# Patient Record
Sex: MALE | Race: BLACK OR AFRICAN AMERICAN | NOT HISPANIC OR LATINO
[De-identification: names, ages, dates, MRNs, and addresses within clinical notes are randomized per-mention and may not be internally consistent; named-entity substitution may affect disease eponyms.]

---

## 2019-12-25 PROBLEM — Z00.00 ENCOUNTER FOR PREVENTIVE HEALTH EXAMINATION: Status: ACTIVE | Noted: 2019-12-25

## 2020-01-02 ENCOUNTER — APPOINTMENT (OUTPATIENT)
Dept: VASCULAR SURGERY | Facility: CLINIC | Age: 85
End: 2020-01-02
Payer: MEDICARE

## 2020-01-02 PROCEDURE — 93923 UPR/LXTR ART STDY 3+ LVLS: CPT

## 2020-01-02 PROCEDURE — 93880 EXTRACRANIAL BILAT STUDY: CPT

## 2020-01-02 PROCEDURE — 99204 OFFICE O/P NEW MOD 45 MIN: CPT

## 2020-01-06 NOTE — PROCEDURE
[FreeTextEntry1] : BRANDYN:\par   Left- 0.81\par   Right- 0.88\par \par Carotid US without significant stenosis

## 2020-01-06 NOTE — HISTORY OF PRESENT ILLNESS
[FreeTextEntry1] : 85 yo male with PMH of HTN, HLD, previous smoker, CHF presenting to the office for evaluation of toe ulcer. He noticed he had an ulcer on the left 3rd toe for about a month. He is followed by Dr. Veras who is doing wound care and recently removed the toenail. He was hospitalized for infection and discharged on oral abx which he finished on 12/19/19. He states the ulcer is no longer red or swollen but it is not really healing. He denies any pain with walking however he does not do a lot of walking because of the wound and worried about putting pressure on it. Cleaning daily with hydrogen peroxide and wrapping with gauze. \par \par

## 2020-01-06 NOTE — PHYSICAL EXAM
[2+] : left 2+ [0] : left 0 [Alert] : alert [Oriented to Person] : oriented to person [Oriented to Place] : oriented to place [Oriented to Time] : oriented to time [Calm] : calm [Ankle Swelling (On Exam)] : not present [Varicose Veins Of Lower Extremities] : not present [] : not present [de-identified] : appears stated age [de-identified] : irregular heart [de-identified] : left third toe with small superficial ulcer, dorsal/medial, no signs of infection about 0.2x0.2cm

## 2020-01-06 NOTE — ASSESSMENT
[FreeTextEntry1] : 85 yo male with PMH of HTN, HLD, previous smoker, CHF presenting to the office for evaluation of toe ulcer. It has not changed in the past month. BRANDYN/PVR shows moderate disease. We discussed intervention (angio/PTA/stent). Patient would like to hold off for now and just try wound care. Had a long discussion that he does have an increased chance of infection and possible amputation. Patient understands, will start cleaning with hydrogen peroxide and betadine and will FU in one month. Will come in sooner with any issues.

## 2020-01-15 ENCOUNTER — INPATIENT (INPATIENT)
Facility: HOSPITAL | Age: 85
LOS: 0 days | Discharge: HOME | DRG: 603 | End: 2020-01-16
Attending: HOSPITALIST | Admitting: HOSPITALIST
Payer: MEDICARE

## 2020-01-15 VITALS
SYSTOLIC BLOOD PRESSURE: 104 MMHG | HEART RATE: 85 BPM | HEIGHT: 69 IN | DIASTOLIC BLOOD PRESSURE: 55 MMHG | OXYGEN SATURATION: 99 % | TEMPERATURE: 98 F | WEIGHT: 190.7 LBS | RESPIRATION RATE: 20 BRPM

## 2020-01-15 DIAGNOSIS — R63.8 OTHER SYMPTOMS AND SIGNS CONCERNING FOOD AND FLUID INTAKE: ICD-10-CM

## 2020-01-15 DIAGNOSIS — I48.91 UNSPECIFIED ATRIAL FIBRILLATION: ICD-10-CM

## 2020-01-15 DIAGNOSIS — E78.5 HYPERLIPIDEMIA, UNSPECIFIED: ICD-10-CM

## 2020-01-15 DIAGNOSIS — Z91.89 OTHER SPECIFIED PERSONAL RISK FACTORS, NOT ELSEWHERE CLASSIFIED: ICD-10-CM

## 2020-01-15 DIAGNOSIS — I10 ESSENTIAL (PRIMARY) HYPERTENSION: ICD-10-CM

## 2020-01-15 DIAGNOSIS — N17.9 ACUTE KIDNEY FAILURE, UNSPECIFIED: ICD-10-CM

## 2020-01-15 DIAGNOSIS — N28.9 DISORDER OF KIDNEY AND URETER, UNSPECIFIED: ICD-10-CM

## 2020-01-15 DIAGNOSIS — R73.03 PREDIABETES: ICD-10-CM

## 2020-01-15 DIAGNOSIS — L03.90 CELLULITIS, UNSPECIFIED: ICD-10-CM

## 2020-01-15 DIAGNOSIS — I50.9 HEART FAILURE, UNSPECIFIED: ICD-10-CM

## 2020-01-15 LAB
ALBUMIN SERPL ELPH-MCNC: 4 G/DL — SIGNIFICANT CHANGE UP (ref 3.3–5)
ALP SERPL-CCNC: 84 U/L — SIGNIFICANT CHANGE UP (ref 40–120)
ALT FLD-CCNC: 20 U/L — SIGNIFICANT CHANGE UP (ref 10–45)
ANION GAP SERPL CALC-SCNC: 13 MMOL/L — SIGNIFICANT CHANGE UP (ref 5–17)
APTT BLD: 43 SEC — HIGH (ref 27.5–36.3)
AST SERPL-CCNC: 30 U/L — SIGNIFICANT CHANGE UP (ref 10–40)
BASOPHILS # BLD AUTO: 0.03 K/UL — SIGNIFICANT CHANGE UP (ref 0–0.2)
BASOPHILS NFR BLD AUTO: 0.5 % — SIGNIFICANT CHANGE UP (ref 0–2)
BILIRUB SERPL-MCNC: 0.8 MG/DL — SIGNIFICANT CHANGE UP (ref 0.2–1.2)
BUN SERPL-MCNC: 25 MG/DL — HIGH (ref 7–23)
CALCIUM SERPL-MCNC: 9.3 MG/DL — SIGNIFICANT CHANGE UP (ref 8.4–10.5)
CHLORIDE SERPL-SCNC: 104 MMOL/L — SIGNIFICANT CHANGE UP (ref 96–108)
CO2 SERPL-SCNC: 24 MMOL/L — SIGNIFICANT CHANGE UP (ref 22–31)
CREAT SERPL-MCNC: 1.81 MG/DL — HIGH (ref 0.5–1.3)
EOSINOPHIL # BLD AUTO: 0.1 K/UL — SIGNIFICANT CHANGE UP (ref 0–0.5)
EOSINOPHIL NFR BLD AUTO: 1.7 % — SIGNIFICANT CHANGE UP (ref 0–6)
GLUCOSE SERPL-MCNC: 103 MG/DL — HIGH (ref 70–99)
HCT VFR BLD CALC: 39.6 % — SIGNIFICANT CHANGE UP (ref 39–50)
HGB BLD-MCNC: 12 G/DL — LOW (ref 13–17)
IMM GRANULOCYTES NFR BLD AUTO: 0.2 % — SIGNIFICANT CHANGE UP (ref 0–1.5)
INR BLD: 2.65 — HIGH (ref 0.88–1.16)
LYMPHOCYTES # BLD AUTO: 1.53 K/UL — SIGNIFICANT CHANGE UP (ref 1–3.3)
LYMPHOCYTES # BLD AUTO: 26.3 % — SIGNIFICANT CHANGE UP (ref 13–44)
MCHC RBC-ENTMCNC: 26.6 PG — LOW (ref 27–34)
MCHC RBC-ENTMCNC: 30.3 GM/DL — LOW (ref 32–36)
MCV RBC AUTO: 87.8 FL — SIGNIFICANT CHANGE UP (ref 80–100)
MONOCYTES # BLD AUTO: 0.58 K/UL — SIGNIFICANT CHANGE UP (ref 0–0.9)
MONOCYTES NFR BLD AUTO: 10 % — SIGNIFICANT CHANGE UP (ref 2–14)
NEUTROPHILS # BLD AUTO: 3.56 K/UL — SIGNIFICANT CHANGE UP (ref 1.8–7.4)
NEUTROPHILS NFR BLD AUTO: 61.3 % — SIGNIFICANT CHANGE UP (ref 43–77)
NRBC # BLD: 0 /100 WBCS — SIGNIFICANT CHANGE UP (ref 0–0)
PLATELET # BLD AUTO: 206 K/UL — SIGNIFICANT CHANGE UP (ref 150–400)
POTASSIUM SERPL-MCNC: 4.7 MMOL/L — SIGNIFICANT CHANGE UP (ref 3.5–5.3)
POTASSIUM SERPL-SCNC: 4.7 MMOL/L — SIGNIFICANT CHANGE UP (ref 3.5–5.3)
PROT SERPL-MCNC: 7.2 G/DL — SIGNIFICANT CHANGE UP (ref 6–8.3)
PROTHROM AB SERPL-ACNC: 31.1 SEC — HIGH (ref 10–12.9)
RBC # BLD: 4.51 M/UL — SIGNIFICANT CHANGE UP (ref 4.2–5.8)
RBC # FLD: 14.5 % — SIGNIFICANT CHANGE UP (ref 10.3–14.5)
SODIUM SERPL-SCNC: 141 MMOL/L — SIGNIFICANT CHANGE UP (ref 135–145)
WBC # BLD: 5.81 K/UL — SIGNIFICANT CHANGE UP (ref 3.8–10.5)
WBC # FLD AUTO: 5.81 K/UL — SIGNIFICANT CHANGE UP (ref 3.8–10.5)

## 2020-01-15 PROCEDURE — 73562 X-RAY EXAM OF KNEE 3: CPT | Mod: 26,LT

## 2020-01-15 PROCEDURE — 99222 1ST HOSP IP/OBS MODERATE 55: CPT | Mod: GC

## 2020-01-15 PROCEDURE — 99283 EMERGENCY DEPT VISIT LOW MDM: CPT

## 2020-01-15 PROCEDURE — 99284 EMERGENCY DEPT VISIT MOD MDM: CPT | Mod: 25

## 2020-01-15 RX ORDER — ATORVASTATIN CALCIUM 80 MG/1
40 TABLET, FILM COATED ORAL AT BEDTIME
Refills: 0 | Status: DISCONTINUED | OUTPATIENT
Start: 2020-01-15 | End: 2020-01-16

## 2020-01-15 RX ORDER — LISINOPRIL 2.5 MG/1
10 TABLET ORAL DAILY
Refills: 0 | Status: DISCONTINUED | OUTPATIENT
Start: 2020-01-15 | End: 2020-01-15

## 2020-01-15 RX ORDER — CEFAZOLIN SODIUM 1 G
1000 VIAL (EA) INJECTION ONCE
Refills: 0 | Status: COMPLETED | OUTPATIENT
Start: 2020-01-15 | End: 2020-01-15

## 2020-01-15 RX ORDER — METOPROLOL TARTRATE 50 MG
150 TABLET ORAL DAILY
Refills: 0 | Status: DISCONTINUED | OUTPATIENT
Start: 2020-01-16 | End: 2020-01-16

## 2020-01-15 RX ORDER — WARFARIN SODIUM 2.5 MG/1
6 TABLET ORAL DAILY
Refills: 0 | Status: DISCONTINUED | OUTPATIENT
Start: 2020-01-15 | End: 2020-01-16

## 2020-01-15 RX ORDER — CEFAZOLIN SODIUM 1 G
1000 VIAL (EA) INJECTION EVERY 8 HOURS
Refills: 0 | Status: DISCONTINUED | OUTPATIENT
Start: 2020-01-16 | End: 2020-01-16

## 2020-01-15 RX ORDER — LISINOPRIL 2.5 MG/1
10 TABLET ORAL DAILY
Refills: 0 | Status: DISCONTINUED | OUTPATIENT
Start: 2020-01-16 | End: 2020-01-16

## 2020-01-15 RX ADMIN — WARFARIN SODIUM 6 MILLIGRAM(S): 2.5 TABLET ORAL at 22:12

## 2020-01-15 RX ADMIN — Medication 100 MILLIGRAM(S): at 19:02

## 2020-01-15 RX ADMIN — ATORVASTATIN CALCIUM 40 MILLIGRAM(S): 80 TABLET, FILM COATED ORAL at 22:12

## 2020-01-15 NOTE — ED PROVIDER NOTE - PHYSICAL EXAMINATION
Physical:    General Appearance: Patient is well developed and well nourished. Patient is lying in stretcher, not diaphoretic and does not appear in acute distress.   Pulm: Chest expansion symmetric bilaterally, no evidence or retraction.    Cardio: Regular rate and rhythm. No murmurs, rubs or gallops. Pedal pulses present.   MSK: No evience of gross deformity, dislocation, fracture, edema, ecchymosis or contusion. Full flexion and extension of the knee with normal patellar tracking. Normal anterior and posterior drawer. Intact varus and valgus stress. Full ROM of hip and ankle.   Neuro: Distal sensation intact in arms and legs bilaterally. Sensory Knee and ankle reflexes 2+ and symmetric bilaterally. gait steady. gcs 15  psych: mood and affect appropriate.   skin: warm dry and intact- no note of erythema edema purpura petechia ecchymosis

## 2020-01-15 NOTE — CONSULT NOTE ADULT - ASSESSMENT
83 yo M w/ HTN, HLD, former smoker, CHF, L 3rd toe ulcer, presenting w/ bruise to knee.    - f/u orthopedics recs  - f/u XR knee  - toe wound non-infected, no vascular surgical intervention recommended  - discussed w/ chief resident and attending

## 2020-01-15 NOTE — ED PROVIDER NOTE - OBJECTIVE STATEMENT
85 y/o M with PMHx HTN, afib, borderline DM, and L 3rd toe infection presents toe to the ED with L knee pain extending down his L leg x 4-5 days, ongoing since his L 3rd toe nail removal. Pt called Dr. Mcclellan (vascular surgeon) today who referred pt to the ED for further evaluation. Pt states the pain worsens with movement, especially with flexion and extension. Denies numbness, tingling, fever or chills. 83 y/o M with PMHx HTN, afib, borderline DM, and L 3rd toe infection presents toe to the ED with L knee pain extending down his L leg x 4-5 days, ongoing since his L 3rd toe nail removal. Pt called Dr. Mcclellan (vascular surgeon) today who referred pt to the ED for further evaluation. Pt states the pain worsens with movement, especially with flexion and extension. Denies numbness, tingling, fever or chills. state swelling to the area. states that he recently saw a podiatrist this past week "and she cut the toe and some stuff came out". currently on abx but unsure which at this time.

## 2020-01-15 NOTE — H&P ADULT - PROBLEM SELECTOR PROBLEM 5
Heart failure, unspecified HF chronicity, unspecified heart failure type Hypertension, unspecified type

## 2020-01-15 NOTE — ED ADULT NURSE REASSESSMENT NOTE - NS ED NURSE REASSESS COMMENT FT1
report was received from off-going RN, nad at present, sitting up in chair eating sandwich, orders and sign out have been received, attempted flip to holding per previous Nurse, still awaiting bed assignment/flip, pt. utd on poc, with understanding verbalized

## 2020-01-15 NOTE — ED ADULT TRIAGE NOTE - CHIEF COMPLAINT QUOTE
pt  c/o left knee pain and big toe pain. pt states " he has a wound that was drained and now is more painful, here to see vascular.

## 2020-01-15 NOTE — ED PROVIDER NOTE - DIAGNOSTIC INTERPRETATION
ER Physician Assistant: left knee  INTERPRETATION: no acute fracture; no soft tissue swelling noted; normal bony alignment.

## 2020-01-15 NOTE — H&P ADULT - ATTENDING COMMENTS
Agree with above.   Pt seen and examined.     Pt presenting with Left knee pain. Believes he bumped his knee ~7d/a. Pain progressively worsened. Started taking Ibuprofen 800mg QD for the past 2w-1m. Knee without pain when immobile. Focal TTP over tibial tuberosity/infrapatellar bursa, also pain worsens to lesser extent with knee flexion. Mild erythema over knee. Mild warmth when compared to Rt knee. No fevers, leukocytosis, mildly elevated ESR, normal CRP.     A/  // Knee pain: Infrapatellar bursitis vs less likely septic bursitis vs cellulitis. Infection less likely as pt was on Clindamycin x9d course for Lt toe infection (last dose today), Afebrile, WBC, CRP wnl, ESR 38. Low concern for septic arthritis.   // Renal insufficiency, unknown baseline, in setting of h/o HF on diuretics (euvolemic on exam)    P/  - Ortho recs appreciated  - Will obtain Knee US to evaluated bursa   - F/u knee Xray  - IV cefazolin for now  - Collateral re: renal function  - Urine studies  - Hold home lasix  - Monitor BMP    Rest of plan per residents note Agree with above.   Pt seen and examined.     Pt presenting with Left knee pain. Believes he bumped his knee ~7d/a. Pain progressively worsened. Started taking Ibuprofen 800mg QD for the past 2w-1m. Knee without pain when immobile. Focal TTP over tibial tuberosity/infrapatellar bursa, also pain worsens to lesser extent with knee flexion. Mild erythema over knee. Mild warmth when compared to Rt knee. No fevers, leukocytosis, mildly elevated ESR, normal CRP.     A/  // Knee pain: Infrapatellar bursitis vs less likely septic bursitis vs cellulitis. Infection less likely as pt was on Clindamycin x9d course for Lt toe infection (last dose today), Afebrile, WBC, CRP wnl, ESR 38. Low concern for septic arthritis.   // Renal insufficiency, unknown baseline, in setting of h/o HF on diuretics (euvolemic on exam)    P/  - Ortho recs appreciated  - Will obtain Knee US to evaluated bursa   - F/u knee Xray  - IV cefazolin for now  - Avoid NSAIDs  - PT  - Collateral re: renal function  - Urine studies  - Hold home lasix  - Monitor BMP  - Avoid nephrotoxic meds, renally dose meds    Rest of plan per residents note

## 2020-01-15 NOTE — H&P ADULT - PROBLEM SELECTOR PLAN 9
F- None  E-replete as needed  N- DASH diet    DVt ppx: HSQ 1) PCP Contacted on Admission: (Y/N) --> Name & Phone #:  2) Date of Contact with PCP:  3) PCP Contacted at Discharge: (Y/N, N/A)  4) Summary of Handoff Given to PCP:   5) Post-Discharge Appointment Date and Location:

## 2020-01-15 NOTE — CONSULT NOTE ADULT - SUBJECTIVE AND OBJECTIVE BOX
85 yo M w/ HTN, HLD, former smoker, CHF, L 3rd toe ulcer, presenting w/ L knee pain. Patient denies any recent trauma to area. Denies fevers, chills, CP, SOB. Was recently seen by Dr. Mcclellan in clinic for toe ulcer w/ recommendation for wound care w/ hydrogen peroxide/betadine which he has been following.     Vital Signs Last 24 Hrs  T(C): 36.5 (15 Juan 2020 13:02), Max: 36.5 (15 Juan 2020 13:02)  T(F): 97.7 (15 Juan 2020 13:02), Max: 97.7 (15 Juan 2020 13:02)  HR: 85 (15 Juan 2020 13:02) (85 - 85)  BP: 104/55 (15 Juan 2020 13:02) (104/55 - 104/55)  BP(mean): --  RR: 20 (15 Juan 2020 13:02) (20 - 20)  SpO2: 99% (15 Juan 2020 13:02) (99% - 99%)    General: NAD  Pulm: normal resp effort and excursion  Ext: L 3rd toe w/ clean dorsal wound bed, no purulence or signs of infection, biphasic L DP, triphasic PT, palpable popliteal; bilateral palpable femorals; ABIs L 0.81, R 0.88; L lower knee anterior surface w/ some edema and erythema, no warmth, moderate TTP                          12.0   5.81  )-----------( 206      ( 15 Juan 2020 14:27 )             39.6   01-15    141  |  104  |  25<H>  ----------------------------<  103<H>  4.7   |  24  |  1.81<H>    Ca    9.3      15 Juan 2020 14:27    TPro  7.2  /  Alb  4.0  /  TBili  0.8  /  DBili  x   /  AST  30  /  ALT  20  /  AlkPhos  84  01-15

## 2020-01-15 NOTE — H&P ADULT - PROBLEM SELECTOR PLAN 5
Unknown EF. Euvolemic one exam. On Lasix, metoprolol succinate, and Lisinopril at home.   -hold Lisinopril 20 mg qd   -hold lasix 80 mg qd   -metoprolol succinate 150 mg daily On home metoprolol, lasix, and lisinopril.   - in setting of lena, hold lasix   - start lisinopril 10 mg  - continue with metoprolol succinate 150 mg daily

## 2020-01-15 NOTE — ED ADULT NURSE NOTE - OBJECTIVE STATEMENT
pt received into  spot A&Ox3 ambulatory appears comfortable arrives via walk in triage for eval of left knee pain since yesterday denies known trama/ injury two areas appear slightly swollen/ bruised. Also with left toe pain after nail removal outpatient is currently on unknown abx for it

## 2020-01-15 NOTE — ED ADULT NURSE NOTE - NSIMPLEMENTINTERV_GEN_ALL_ED
Implemented All Fall with Harm Risk Interventions:  [place] to call system. Call bell, personal items and telephone within reach. Instruct patient to call for assistance. Room bathroom lighting operational. Non-slip footwear when patient is off stretcher. Physically safe environment: no spills, clutter or unnecessary equipment. Stretcher in lowest position, wheels locked, appropriate side rails in place. Provide visual cue, wrist band, yellow gown, etc. Monitor gait and stability. Monitor for mental status changes and reorient to person, place, and time. Review medications for side effects contributing to fall risk. Reinforce activity limits and safety measures with patient and family. Provide visual clues: red socks.

## 2020-01-15 NOTE — H&P ADULT - ASSESSMENT
84-year-old male with history of borderline DM, afib, HTN, HLD, and HF (unknown EF), presents to the ED after 5-6 days of left knee pain, concerning for cellulitis, admitted for to rule out osteomylelitis and septic joint.

## 2020-01-15 NOTE — CONSULT NOTE ADULT - ATTENDING COMMENTS
Pt. seen/ examined  No knee pain w/ ROM  Agree with consult: low suspicion for septic knee arthritis  Most likely cellulitis vs bursitis  Will await MRI  No acute ortho intervention  Recommend medicine admission for IV Abx

## 2020-01-15 NOTE — H&P ADULT - PROBLEM SELECTOR PLAN 6
On home metoprolol, lasix, and lisinopril.   - in setting of lena, hold lasix and lisinopril.   - continue with metoprolol succinate 150 mg daily -continue with atorvastatin 40 mg daily

## 2020-01-15 NOTE — H&P ADULT - NSHPPHYSICALEXAM_GEN_ALL_CORE
VITAL SIGNS:  ICU Vital Signs Last 24 Hrs  T(C): 36.6 (15 Juan 2020 16:00), Max: 36.6 (15 Juan 2020 16:00)  T(F): 97.9 (15 Juan 2020 16:00), Max: 97.9 (15 Juan 2020 16:00)  HR: 74 (15 Juan 2020 16:00) (74 - 85)  BP: 104/71 (15 Juan 2020 16:00) (104/55 - 104/71)  BP(mean): --  ABP: --  ABP(mean): --  RR: 20 (15 Juan 2020 16:00) (20 - 20)  SpO2: 98% (15 Juan 2020 16:00) (98% - 99%)        CAPILLARY BLOOD GLUCOSE    VITALS:   T(C): 36.6 (01-15-20 @ 16:00), Max: 36.6 (01-15-20 @ 16:00)  HR: 74 (01-15-20 @ 16:00) (74 - 85)  BP: 104/71 (01-15-20 @ 16:00) (104/55 - 104/71)  RR: 20 (01-15-20 @ 16:00) (20 - 20)  SpO2: 98% (01-15-20 @ 16:00) (98% - 99%)    GENERAL: NAD   HEAD:  Atraumatic, Normocephalic  EYES: EOMI, PERRLA, conjunctiva and sclera clear  ENT: Moist mucous membranes  NECK: Supple, No JVD  CHEST/LUNG: Clear to auscultation bilaterally; No rales, rhonchi, wheezing, or rubs. Unlabored respirations  HEART: Regular rate and rhythm; No murmurs, rubs, or gallops  ABDOMEN: BSx4; Soft, nontender, nondistended  EXTREMITIES:  2+ Peripheral Pulses, brisk capillary refill. No clubbing, cyanosis, or edema  NERVOUS SYSTEM:  A&Ox3, no focal deficits   SKIN: erythematous left knee with no effusion   MSK: erythema in left knee, no effusion, passive and active range of motion limited; painful to manipulation of patella; no drainage. Patient did not want to take off sock on left foot for examiner to examine third left toe.

## 2020-01-15 NOTE — H&P ADULT - PROBLEM SELECTOR PLAN 4
Patient has creatinine of 1.81. Unclear if this is baseline or new ZEENAT.   - hold ACE/ARB  - follow-up urine lytes   - monitor I+Os   - avoid nephrotoxic substances Unknown EF. Euvolemic one exam. On Lasix, metoprolol succinate, and Lisinopril at home.   -hold Lisinopril 20 mg qd and start 10 mg for now  -hold lasix 80 mg qd   -metoprolol succinate 150 mg daily

## 2020-01-15 NOTE — H&P ADULT - PROBLEM SELECTOR PLAN 1
Has history of worsening leg pain. Found to have erythematous area around patella painful to manipulation.   - ortho and vascular surgery consulted, appreciate recs   - will start Keflex 500 mg po q 12 hrs (start on 01/15).   - follow-up blood cultures   - daily cbc Has history of worsening leg pain. Found to have erythematous area around patella painful to manipulation.   - ortho and vascular surgery consulted, appreciate recs   - will start cefazolin 1000 mg po q 8 hrs (start on 01/15).   - follow-up blood cultures   - daily cbc Has history of worsening leg pain. Found to have erythematous area around patella painful to manipulation.   - ortho and vascular surgery consulted, appreciate recs   - will start cefazolin 1000 mg po q 8 hrs (start on 01/15).   - follow-up blood cultures   - daily cbc    #R/o septic joint. Erythematous left knee. Painful to movement. Has been on 8-9 days of clindamycin 300 mg q 6 hrs. ESR elevated but CRP WNL.   -finished clindamycin course  -no intervention by ortho and vascular as of now

## 2020-01-15 NOTE — H&P ADULT - PROBLEM SELECTOR PLAN 3
On Coumadin at home. Currently RRR.   -continue with Coumadin 6 mg qd   -stat ptt and pt/inr   -collateral in AM as to why on Coumadin

## 2020-01-15 NOTE — ED PROVIDER NOTE - ATTENDING CONTRIBUTION TO CARE
85 y/o M with PMHx HTN, afib, borderline DM, and L 3rd toe infection presents to the ED with L knee pain extending down his L leg x 4-5 days, ongoing since his L 3rd toe nail removal. Pt called Dr. Mcclellan (vascular surgeon) today who referred pt to the ED for further evaluation. Pt states the pain worsens with movement, especially with flexion and extension. Denies numbness, tingling, fever or chills. state swelling to the area. states that he recently saw a podiatrist this past week "and she cut the toe and some stuff came out". currently on abx but unsure which at this time. On exam, pt appears well, nontoxic, ttp infrapatella tendon/bursa with associated erythema. Plan for labs, XR, surgery consult and ortho consult. vascular surgery states toe appears well, not infected and does not believe related to knee pain. orthopedics would like patient to be admitted for to medicine for treatment cellulitis and mri of the left knee for further evaluation and medical management. pt agreeable to plan.

## 2020-01-15 NOTE — ED PROVIDER NOTE - CLINICAL SUMMARY MEDICAL DECISION MAKING FREE TEXT BOX
85 y/o M with PMHx HTN, afib, borderline DM, L 3rd toe infection presents to the ED with L knee pain, ongoing for the past 4-5 days; no associated fever, chills, numbness or tingling. Pt reports pain with bending and extending his L knee; pt concerned pain is associated with his L 3rd toe infection. Pt called Dr. Mcclellan's office, who recommended pt come to the ED for further evaluation. On exam, pt appears well, nontoxic, ttp infrapatella tendon/bursa with associated erythema. Plan for labs, XR, surgery consult and ortho consult. 85 y/o M with PMHx HTN, afib, borderline DM, L 3rd toe infection presents to the ED with L knee pain, ongoing for the past 4-5 days; no associated fever, chills, numbness or tingling. Pt reports pain with bending and extending his L knee; pt concerned pain is associated with his L 3rd toe infection. Pt called Dr. Mcclellan's office, who recommended pt come to the ED for further evaluation. On exam, pt appears well, nontoxic, ttp infrapatella tendon/bursa with associated erythema. Plan for labs, XR, surgery consult and ortho consult. vascular surgery states toe appears well, not infected and does not believe related to knee pain. 85 y/o M with PMHx HTN, afib, borderline DM, L 3rd toe infection presents to the ED with L knee pain, ongoing for the past 4-5 days; no associated fever, chills, numbness or tingling. Pt reports pain with bending and extending his L knee; pt concerned pain is associated with his L 3rd toe infection. Pt called Dr. Mcclellan's office, who recommended pt come to the ED for further evaluation. On exam, pt appears well, nontoxic, ttp infrapatella tendon/bursa with associated erythema. Plan for labs, XR, surgery consult and ortho consult. vascular surgery states toe appears well, not infected and does not believe related to knee pain. orthopedics would like patient to be admitted for to medicine for treatment cellulitis and mri of the left knee for further evaluation and medical management. pt agreeable to plan.

## 2020-01-15 NOTE — H&P ADULT - PROBLEM SELECTOR PROBLEM 4
ZEENAT (acute kidney injury) Heart failure, unspecified HF chronicity, unspecified heart failure type

## 2020-01-15 NOTE — H&P ADULT - NSICDXPASTMEDICALHX_GEN_ALL_CORE_FT
PAST MEDICAL HISTORY:  Afib     Borderline diabetes mellitus     Heart failure     HLD (hyperlipidemia)     HTN (hypertension)

## 2020-01-15 NOTE — H&P ADULT - PROBLEM SELECTOR PLAN 2
erythematous left knee. Painful to movement. Has been on 8-9 days of clindamycin 300 mg q 6 hrs.   -finish clindamycin course   -consider MRI of left knee   -no intervention by ortho and vascular as of now Erythematous left knee. Painful to movement. Has been on 8-9 days of clindamycin 300 mg q 6 hrs. ESR elevated but CRP WNL.   -finish clindamycin course   -consider MRI of left knee   -no intervention by ortho and vascular as of now Patient has creatinine of 1.81. Unclear if this is baseline or new ZEENAT.   - can start lisinopril 10 mg tomorrow, but hold if creatinine increases  - follow-up urine lytes   - monitor I+Os   - avoid nephrotoxic substances  - collateral in AM for whether this is chronic or new

## 2020-01-15 NOTE — CONSULT NOTE ADULT - SUBJECTIVE AND OBJECTIVE BOX
84M with borderline DM, Afib on Coumadin, HTN, presents to ER with 5 days of left knee pain. Patient has been seen by Vascular surgery Dr Mcclellan as outpatient for infected left toe for which he is taking antibiotics for. Pt is unable to recall which antibiotics.  Pt presented to ER after calling his Vascular teams office and reporting his left knee pain. Patient is unsure if he had any trauma or bumps to his left knee over last 1 week. Pain does not radiate and he is able to walk with some stiffness. Ambulates with no assistance at baseline. Denies fevers, chills, recent illness, numbness, tingling. paresthesias.       Pleasant, comfortable, alert male, frail looking, with bandage around left third toe.   Left knee: No fluctuance, no induration. No warmth. No significant swelling to prepattelar area and no effusion to knee visible. +Small area of erythema, possible cellulitic in nature over prepatellar bursa. Active and passive ROM full without too much difficulty.  +TTP over tibial tubercle, which is prominent equally bilaterally. No TTP at joint line or patella. 5/5 ehl/ta/fhl/gs and sensation intact distally.     XR: no fracture, no effusion, no swelling and pre-patellar bursa, reviewed with Dr. Napoles    A/P 84M with 5 days of left knee pain (presumed atraumatic) with small area of cellulitis, not improving despite ABX as outpatient for toe infection  - No concern for septic joint based on exam  - No significant prepatellar bursitis at this time based on exam and XR, c however will recommend treatment of superficial cellulitis given pt is elderly, frail with multiple comorbidities and not improving on current outpatient ABX regimen.    - pt will be admitted for IV antibiotics since current outpatient antibiotic regimen not resolving issue  - Pain control  - Will follow and monitor for improvement  - WBAT  - XR reviewed  - Vascular team made aware of plan  - Discussed with Dr. Napoles who agrees with plan

## 2020-01-15 NOTE — H&P ADULT - HISTORY OF PRESENT ILLNESS
HPI     84-year-old male with history of borderline DM, afib, HTN, HLD, and HF (unknown EF), presents to the ED after 5-6 days of left knee pain. Patient was in his usual state of health when he noticed that his left knee was bothering him. He denies trauma, puncture, or any precipitating event. Initially, it did not hinder his ability to move, but 3-4 days ago, he noted that the pain became worse (again, no precipitating event). Eventually, patient had a difficulty coming to a stand froma  sitting position due to the knee pain. Of note, patient follows Dr. Mcclellan, who prescribed with clindamycin (10-day course and finished 8-9 days already) for an infected third left toe. Patient denies headache, vision change, CP, palpitations, SOB, n/v/d/c, fever/chills, appetite loss, dysuria.     In the ED, vitals were temp 97.7 F, HR 85, /55, RR 20, o2sat 99% on room air. Labs were significant for no leukocytosis, ESR 39, BUN/cre 25/1.81, ESR 39, CRP WNL. Xray of left knee obtained and pending final read. Given cefazolin 1000 mg IV x1.          INTERVAL HPI/OVERNIGHT EVENTS:    SUBJECTIVE: Patient seen and examined at bedside. Endorses pain in left knee. Denies headache, vision changes, CP, palpiations, SOB, n/v/d/c, fever/chills, weight loss.     MEDICATIONS:  MEDICATIONS  (STANDING):    MEDICATIONS  (PRN):      ALLERGIES:  Allergies    No Known Allergies    Intolerances

## 2020-01-15 NOTE — H&P ADULT - NSHPLABSRESULTS_GEN_ALL_CORE
LABS:                        12.0   5.81  )-----------( 206      ( 15 Juan 2020 14:27 )             39.6     01-15    141  |  104  |  25<H>  ----------------------------<  103<H>  4.7   |  24  |  1.81<H>    Ca    9.3      15 Juan 2020 14:27    TPro  7.2  /  Alb  4.0  /  TBili  0.8  /  DBili  x   /  AST  30  /  ALT  20  /  AlkPhos  84  01-15          RADIOLOGY & ADDITIONAL TESTS: Reviewed.

## 2020-01-16 ENCOUNTER — TRANSCRIPTION ENCOUNTER (OUTPATIENT)
Age: 85
End: 2020-01-16

## 2020-01-16 VITALS
TEMPERATURE: 98 F | SYSTOLIC BLOOD PRESSURE: 107 MMHG | RESPIRATION RATE: 17 BRPM | DIASTOLIC BLOOD PRESSURE: 64 MMHG | OXYGEN SATURATION: 98 % | HEART RATE: 86 BPM

## 2020-01-16 LAB
ALBUMIN SERPL ELPH-MCNC: 3.6 G/DL — SIGNIFICANT CHANGE UP (ref 3.3–5)
ALP SERPL-CCNC: 83 U/L — SIGNIFICANT CHANGE UP (ref 40–120)
ALT FLD-CCNC: 17 U/L — SIGNIFICANT CHANGE UP (ref 10–45)
ANION GAP SERPL CALC-SCNC: 13 MMOL/L — SIGNIFICANT CHANGE UP (ref 5–17)
APTT BLD: 41.4 SEC — HIGH (ref 27.5–36.3)
AST SERPL-CCNC: 25 U/L — SIGNIFICANT CHANGE UP (ref 10–40)
BASOPHILS # BLD AUTO: 0.02 K/UL — SIGNIFICANT CHANGE UP (ref 0–0.2)
BASOPHILS NFR BLD AUTO: 0.4 % — SIGNIFICANT CHANGE UP (ref 0–2)
BILIRUB SERPL-MCNC: 1 MG/DL — SIGNIFICANT CHANGE UP (ref 0.2–1.2)
BUN SERPL-MCNC: 26 MG/DL — HIGH (ref 7–23)
CALCIUM SERPL-MCNC: 9.4 MG/DL — SIGNIFICANT CHANGE UP (ref 8.4–10.5)
CHLORIDE SERPL-SCNC: 107 MMOL/L — SIGNIFICANT CHANGE UP (ref 96–108)
CO2 SERPL-SCNC: 23 MMOL/L — SIGNIFICANT CHANGE UP (ref 22–31)
CREAT SERPL-MCNC: 1.62 MG/DL — HIGH (ref 0.5–1.3)
EOSINOPHIL # BLD AUTO: 0.08 K/UL — SIGNIFICANT CHANGE UP (ref 0–0.5)
EOSINOPHIL NFR BLD AUTO: 1.6 % — SIGNIFICANT CHANGE UP (ref 0–6)
GLUCOSE SERPL-MCNC: 82 MG/DL — SIGNIFICANT CHANGE UP (ref 70–99)
HBA1C BLD-MCNC: 6 % — HIGH (ref 4–5.6)
HCT VFR BLD CALC: 37.2 % — LOW (ref 39–50)
HGB BLD-MCNC: 11.4 G/DL — LOW (ref 13–17)
IMM GRANULOCYTES NFR BLD AUTO: 0.4 % — SIGNIFICANT CHANGE UP (ref 0–1.5)
INR BLD: 2.7 — HIGH (ref 0.88–1.16)
LYMPHOCYTES # BLD AUTO: 1.43 K/UL — SIGNIFICANT CHANGE UP (ref 1–3.3)
LYMPHOCYTES # BLD AUTO: 28.4 % — SIGNIFICANT CHANGE UP (ref 13–44)
MAGNESIUM SERPL-MCNC: 2.2 MG/DL — SIGNIFICANT CHANGE UP (ref 1.6–2.6)
MCHC RBC-ENTMCNC: 27 PG — SIGNIFICANT CHANGE UP (ref 27–34)
MCHC RBC-ENTMCNC: 30.6 GM/DL — LOW (ref 32–36)
MCV RBC AUTO: 88.2 FL — SIGNIFICANT CHANGE UP (ref 80–100)
MONOCYTES # BLD AUTO: 0.51 K/UL — SIGNIFICANT CHANGE UP (ref 0–0.9)
MONOCYTES NFR BLD AUTO: 10.1 % — SIGNIFICANT CHANGE UP (ref 2–14)
NEUTROPHILS # BLD AUTO: 2.97 K/UL — SIGNIFICANT CHANGE UP (ref 1.8–7.4)
NEUTROPHILS NFR BLD AUTO: 59.1 % — SIGNIFICANT CHANGE UP (ref 43–77)
NRBC # BLD: 0 /100 WBCS — SIGNIFICANT CHANGE UP (ref 0–0)
PHOSPHATE SERPL-MCNC: 2.4 MG/DL — LOW (ref 2.5–4.5)
PLATELET # BLD AUTO: 188 K/UL — SIGNIFICANT CHANGE UP (ref 150–400)
POTASSIUM SERPL-MCNC: 4.3 MMOL/L — SIGNIFICANT CHANGE UP (ref 3.5–5.3)
POTASSIUM SERPL-SCNC: 4.3 MMOL/L — SIGNIFICANT CHANGE UP (ref 3.5–5.3)
PROT SERPL-MCNC: 6.6 G/DL — SIGNIFICANT CHANGE UP (ref 6–8.3)
PROTHROM AB SERPL-ACNC: 31.7 SEC — HIGH (ref 10–12.9)
RBC # BLD: 4.22 M/UL — SIGNIFICANT CHANGE UP (ref 4.2–5.8)
RBC # FLD: 14.4 % — SIGNIFICANT CHANGE UP (ref 10.3–14.5)
SODIUM SERPL-SCNC: 143 MMOL/L — SIGNIFICANT CHANGE UP (ref 135–145)
WBC # BLD: 5.03 K/UL — SIGNIFICANT CHANGE UP (ref 3.8–10.5)
WBC # FLD AUTO: 5.03 K/UL — SIGNIFICANT CHANGE UP (ref 3.8–10.5)

## 2020-01-16 PROCEDURE — 83036 HEMOGLOBIN GLYCOSYLATED A1C: CPT

## 2020-01-16 PROCEDURE — 99285 EMERGENCY DEPT VISIT HI MDM: CPT

## 2020-01-16 PROCEDURE — 86140 C-REACTIVE PROTEIN: CPT

## 2020-01-16 PROCEDURE — 85730 THROMBOPLASTIN TIME PARTIAL: CPT

## 2020-01-16 PROCEDURE — 97161 PT EVAL LOW COMPLEX 20 MIN: CPT

## 2020-01-16 PROCEDURE — 84100 ASSAY OF PHOSPHORUS: CPT

## 2020-01-16 PROCEDURE — 99239 HOSP IP/OBS DSCHRG MGMT >30: CPT | Mod: GC

## 2020-01-16 PROCEDURE — 85610 PROTHROMBIN TIME: CPT

## 2020-01-16 PROCEDURE — 83735 ASSAY OF MAGNESIUM: CPT

## 2020-01-16 PROCEDURE — 85652 RBC SED RATE AUTOMATED: CPT

## 2020-01-16 PROCEDURE — 87040 BLOOD CULTURE FOR BACTERIA: CPT

## 2020-01-16 PROCEDURE — 80053 COMPREHEN METABOLIC PANEL: CPT

## 2020-01-16 PROCEDURE — 85025 COMPLETE CBC W/AUTO DIFF WBC: CPT

## 2020-01-16 PROCEDURE — 73562 X-RAY EXAM OF KNEE 3: CPT

## 2020-01-16 PROCEDURE — 36415 COLL VENOUS BLD VENIPUNCTURE: CPT

## 2020-01-16 RX ORDER — LIDOCAINE 4 G/100G
1 CREAM TOPICAL
Qty: 10 | Refills: 0
Start: 2020-01-16 | End: 2020-01-25

## 2020-01-16 RX ORDER — ACETAMINOPHEN 500 MG
1000 TABLET ORAL ONCE
Refills: 0 | Status: DISCONTINUED | OUTPATIENT
Start: 2020-01-16 | End: 2020-01-16

## 2020-01-16 RX ORDER — COLCHICINE 0.6 MG
1.2 TABLET ORAL ONCE
Refills: 0 | Status: COMPLETED | OUTPATIENT
Start: 2020-01-16 | End: 2020-01-16

## 2020-01-16 RX ORDER — LANOLIN ALCOHOL/MO/W.PET/CERES
5 CREAM (GRAM) TOPICAL ONCE
Refills: 0 | Status: COMPLETED | OUTPATIENT
Start: 2020-01-16 | End: 2020-01-16

## 2020-01-16 RX ORDER — CEPHALEXIN 500 MG
1 CAPSULE ORAL
Qty: 8 | Refills: 0
Start: 2020-01-16 | End: 2020-01-19

## 2020-01-16 RX ORDER — DICLOFENAC SODIUM 30 MG/G
1 GEL TOPICAL
Qty: 1 | Refills: 0
Start: 2020-01-16 | End: 2020-01-25

## 2020-01-16 RX ORDER — ACETAMINOPHEN 500 MG
650 TABLET ORAL ONCE
Refills: 0 | Status: DISCONTINUED | OUTPATIENT
Start: 2020-01-16 | End: 2020-01-16

## 2020-01-16 RX ADMIN — LISINOPRIL 10 MILLIGRAM(S): 2.5 TABLET ORAL at 06:59

## 2020-01-16 RX ADMIN — Medication 100 MILLIGRAM(S): at 03:39

## 2020-01-16 RX ADMIN — Medication 100 MILLIGRAM(S): at 11:13

## 2020-01-16 RX ADMIN — Medication 5 MILLIGRAM(S): at 01:42

## 2020-01-16 RX ADMIN — Medication 150 MILLIGRAM(S): at 06:59

## 2020-01-16 NOTE — PHYSICAL THERAPY INITIAL EVALUATION ADULT - PERTINENT HX OF CURRENT PROBLEM, REHAB EVAL
84-year-old male with history of borderline DM, afib, HTN, HLD, and HF (unknown EF), presents to the ED after 5-6 days of left knee pain. Patient was in his usual state of health when he noticed that his left knee was bothering him. He denies trauma, puncture, or any precipitating event. it did not hinder his ability to move, but 3-4 days ago, he noted that the pain became worse.

## 2020-01-16 NOTE — DISCHARGE NOTE PROVIDER - NSDCMRMEDTOKEN_GEN_ALL_CORE_FT
atorvastatin 40 mg oral tablet: 1 tab(s) orally once a day  Keflex 250 mg oral capsule: 1 cap(s) orally every 12 hours   Lasix 80 mg oral tablet: 1 tab(s) orally once a day  lidocaine 4% topical cream: Apply topically to affected area once a day   lisinopril 20 mg oral tablet: 1 tab(s) orally once a day  Metoprolol Succinate ER: 150 milligram(s) orally once a day  Voltaren 1% topical gel: Apply topically to affected area 2 times a day   warfarin 6 mg oral tablet: 1 tab(s) orally once a day

## 2020-01-16 NOTE — DISCHARGE NOTE NURSING/CASE MANAGEMENT/SOCIAL WORK - PATIENT PORTAL LINK FT
You can access the FollowMyHealth Patient Portal offered by [facility] by registering at the following website: [email or web address]. By joining [facility]’s FollowMyHealth portal, you will also be able to view your health information using other applications (apps) compatible with our system.

## 2020-01-16 NOTE — DISCHARGE NOTE PROVIDER - HOSPITAL COURSE
84-year-old male with history of borderline DM, afib, HTN, HLD, and HF (unknown EF), presents to the ED after 5-6 days of left knee pain, concerning for cellulitis, admitted for to rule out osteomylelitis and septic joint.         #Cellulitis v. Gout. Has history of worsening leg pain. Found to have erythematous area around patella painful to manipulation. Initial concern for cellulitis which was briefly treated with cefazolin; however, patient had no leukocytosis or fever. Our other thought was immanuel was possibly gout. Colchicine was not given due to impaired creatinine clearance. Will give Keflex outpatient to finish 5-day antibiotic  Possibly this is a presentation of gout. Gave colchicine. Patient will follow-up with outpatient PCP and cardiologist upon leaving the hospital. Will also given Voltaren cream and lidocaine patches.         #R/o septic joint. Erythematous left knee. Painful to movement. Has been on 8-9 days of clindamycin 300 mg q 6 hrs. ESR elevated but CRP WNL. Ortho and vascular consulted with no intervention.         #CKD. Patient had an elevated creatinine. This was within his normal range. On discharge, he can resume his Lisinopril and Lasix. He will follow-up with his primary care doctor when he leaves the hospital.          #Atrial fibrillation, unspecified type. On Coumadin at home. Continue with this medication. Follow-up outpatient with PCP and cardiologist.         #Heart failure, unspecified HF chronicity, unspecified heart failure type. Unknown EF. Euvolemic one exam. On Lasix, metoprolol succinate, and Lisinopril at home, which he can continue outpatient.         #Hypertension, unspecified type. On home metoprolol, lasix, and lisinopril. Can continue these medications outpatient.         #Hyperlipidemia, unspecified hyperlipidemia type. Can continue with atorvastatin outpatient.         #Borderline diabetes mellitus. Not on metformin. A1c found to be 6%. Please follow-up with outpatient doctor.         Inpatient treatment course:     84-year-old male with history of borderline DM, afib, HTN, HLD, and HF (unknown EF), presents to the ED after 5-6 days of left knee pain, concerning for cellulitis, admitted for to rule out osteomylelitis and septic joint. The patient presented with worsening leg pain. Found to have erythematous area around patella painful to manipulation. Initial concern for cellulitis which was briefly treated with cefazolin; however, patient had no leukocytosis or fever. Our other thought was this was possibly gout. Colchicine was not given due to impaired creatinine clearance. Will give Keflex outpatient to finish 5-day antibiotic. Possibly this is a presentation of gout. Gave colchicine. Patient will follow-up with outpatient PCP and cardiologist upon leaving the hospital. Will also be given Voltaren cream and lidocaine patches.        New medications:     Labs to be followed outpatient:     Exam to be followed outpatient:

## 2020-01-16 NOTE — PROGRESS NOTE ADULT - ASSESSMENT
83 yo M w/ HTN, HLD, former smoker, CHF, L 3rd toe ulcer, presenting w/ bruise to knee.    - f/u orthopedics recs  - f/u Ultrasound of knee  - toe wound non-infected, no vascular surgical intervention recommended

## 2020-01-16 NOTE — DISCHARGE NOTE PROVIDER - CARE PROVIDER_API CALL
Tawil, Joseph  [address] [place] [address]  Phone: [phone]  Fax: (   )    -  Follow Up Time:     Don Garrett  [address] [place] [address]  Phone: [phone]  Fax: (   )    -  Follow Up Time:

## 2020-01-16 NOTE — PROGRESS NOTE ADULT - SUBJECTIVE AND OBJECTIVE BOX
Subjective: Pt still c/o pain in L knee. No issues overnight. States he feels stiffness in the knee but is able to bear weight and ambulate on it.    Vital Signs Last 24 Hrs  T(C): 36.4 (16 Jan 2020 05:49), Max: 36.6 (15 Juan 2020 16:00)  T(F): 97.6 (16 Jan 2020 05:49), Max: 97.9 (15 Juan 2020 16:00)  HR: 77 (16 Jan 2020 05:49) (74 - 91)  BP: 124/86 (16 Jan 2020 05:49) (104/55 - 124/86)  RR: 16 (16 Jan 2020 05:49) (16 - 20)  SpO2: 97% (16 Jan 2020 05:49) (96% - 99%)      Physical Exam:  Gen: AAOx3, NAD.  LLE: Bandage around left third toe.   Left knee: No fluctuance, no induration. No warmth. No swelling to prepatellar area and appreciable knee effusion. Active ROM and passive ROM 5-130 deg.  +TTP over tibial tubercle, which is prominent equally bilaterally. No TTP at medial or lateral joint line, patella, or quad tendon. 5/5 ehl/ta/fhl/gs and sensation intact distally.       XR: no fracture, no effusion, no swelling and pre-patellar bursa, reviewed with Dr. Napoles    A/P 84M with 6 days of left knee pain (presumed atraumatic) with small area of cellulitis, not improving despite ABX as outpatient for toe infection, alternative etiology patellar tendonitis  - No concern for septic joint based on exam  - No appreciable prepatellar bursitis at this time based on exam and XR, c however will recommend treatment of superficial cellulitis given pt is elderly, frail with multiple comorbidities and not improving on current outpatient ABX regimen.    - pt will be admitted for IV antibiotics since current outpatient antibiotic regimen not resolving issue  - Pain control  - Will follow and monitor for improvement  - WBAT  - XR reviewed, demonstrates OA with productive changes  - Vascular team made aware of plan  - Discussed with Dr. Napoles who agrees with plan

## 2020-01-16 NOTE — DISCHARGE NOTE PROVIDER - PROVIDER TOKENS
FREE:[LAST:[Tawil],FIRST:[Tevin],PHONE:[phone]],FAX:[(   )    -],ADDRESS:[[address] [place] [address]]],FREE:[LAST:[Tami],FIRST:[Don],PHONE:[phone]],FAX:[(   )    -],ADDRESS:[[address] [place] [address]]]

## 2020-01-16 NOTE — PROGRESS NOTE ADULT - SUBJECTIVE AND OBJECTIVE BOX
ceFAZolin   IVPB 1000  ceFAZolin   IVPB 1000  lisinopril 10  metoprolol succinate   warfarin 6    S: Seen and examined at bedside, reporting left knee pain     Vital Signs Last 24 Hrs  T(C): 36.4 (16 Jan 2020 05:49), Max: 36.6 (15 Juan 2020 16:00)  T(F): 97.6 (16 Jan 2020 05:49), Max: 97.9 (15 Juan 2020 16:00)  HR: 77 (16 Jan 2020 05:49) (74 - 91)  BP: 124/86 (16 Jan 2020 05:49) (104/55 - 124/86)  BP(mean): --  RR: 16 (16 Jan 2020 05:49) (16 - 20)  SpO2: 97% (16 Jan 2020 05:49) (96% - 99%)  I&O's Detail      Physical Exam:  General: NAD, resting comfortably in bed  Pulmonary: Nonlabored breathing, no respiratory distress  Cardiovascular: NSR  Abdominal: soft, NT/ND  Extremities: L 3rd toe w/ clean wound, no purulence or gangrene  ,LEFT: biphasic DP, triphasic PT,   ABIs L 0.81, R 0.88;  L lower knee anterior surface w/ some edema and erythema, no warmth, moderate TTP      LABS:                        11.4   5.03  )-----------( 188      ( 16 Jan 2020 06:03 )             37.2     01-16    143  |  107  |  26<H>  ----------------------------<  82  4.3   |  23  |  1.62<H>    Ca    9.4      16 Jan 2020 06:03  Phos  2.4     01-16  Mg     2.2     01-16    TPro  6.6  /  Alb  3.6  /  TBili  1.0  /  DBili  x   /  AST  25  /  ALT  17  /  AlkPhos  83  01-16    PT/INR - ( 16 Jan 2020 06:03 )   PT: 31.7 sec;   INR: 2.70          PTT - ( 16 Jan 2020 06:03 )  PTT:41.4 sec    Radiology and Additional Studies:

## 2020-01-16 NOTE — CONSULT NOTE ADULT - ASSESSMENT
per Internal Medicine    84-year-old male with history of borderline DM, afib, HTN, HLD, and HF (unknown EF), presents to the ED after 5-6 days of left knee pain, concerning for cellulitis, admitted for to rule out osteomylelitis and septic joint.       Problem/Plan - 1:  ·  Problem: Cellulitis, unspecified cellulitis site.  Plan: Has history of worsening leg pain. Found to have erythematous area around patella painful to manipulation.   - ortho and vascular surgery consulted, appreciate recs   - will start cefazolin 1000 mg po q 8 hrs (start on 01/15).   - follow-up blood cultures   - daily cbc    #R/o septic joint. Erythematous left knee. Painful to movement. Has been on 8-9 days of clindamycin 300 mg q 6 hrs. ESR elevated but CRP WNL.   -finished clindamycin course  -no intervention by ortho and vascular as of now.     Problem/Plan - 2:  ·  Problem: Renal insufficiency.  Plan: Patient has creatinine of 1.81. Unclear if this is baseline or new ZEENAT.   - can start lisinopril 10 mg tomorrow, but hold if creatinine increases  - follow-up urine lytes   - monitor I+Os   - avoid nephrotoxic substances  - collateral in AM for whether this is chronic or new.     Problem/Plan - 3:  ·  Problem: Atrial fibrillation, unspecified type.  Plan: On Coumadin at home. Currently RRR.   -continue with Coumadin 6 mg qd   -stat ptt and pt/inr   -collateral in AM as to why on Coumadin.     Problem/Plan - 4:  ·  Problem: Heart failure, unspecified HF chronicity, unspecified heart failure type.  Plan: Unknown EF. Euvolemic one exam. On Lasix, metoprolol succinate, and Lisinopril at home.   -hold Lisinopril 20 mg qd and start 10 mg for now  -hold lasix 80 mg qd   -metoprolol succinate 150 mg daily.     Problem/Plan - 5:  ·  Problem: Hypertension, unspecified type.  Plan: On home metoprolol, lasix, and lisinopril.   - in setting of zeenat, hold lasix   - start lisinopril 10 mg  - continue with metoprolol succinate 150 mg daily.     Problem/Plan - 6:  Problem: Hyperlipidemia, unspecified hyperlipidemia type. Plan: -continue with atorvastatin 40 mg daily.    Problem/Plan - 7:  ·  Problem: Borderline diabetes mellitus.  Plan: Not on metformin   -A1c in AM.     Problem/Plan - 8:  ·  Problem: Nutrition, metabolism, and development symptoms.  Plan: F- None  E-replete as needed  N- DASH diet    DVt ppx: HSQ.

## 2020-01-16 NOTE — CONSULT NOTE ADULT - SUBJECTIVE AND OBJECTIVE BOX
Patient is a 84y old  Male who presents with a chief complaint of      HPI:  HPI     84-year-old male with history of borderline DM, afib, HTN, HLD, and HF (unknown EF), presents to the ED after 5-6 days of left knee pain. Patient was in his usual state of health when he noticed that his left knee was bothering him. He denies trauma, puncture, or any precipitating event. Initially, it did not hinder his ability to move, but 3-4 days ago, he noted that the pain became worse (again, no precipitating event). Eventually, patient had a difficulty coming to a stand froma  sitting position due to the knee pain. Of note, patient follows Dr. Mcclellan, who prescribed with clindamycin (10-day course and finished 8-9 days already) for an infected third left toe. Patient denies headache, vision change, CP, palpitations, SOB, n/v/d/c, fever/chills, appetite loss, dysuria.     In the ED, vitals were temp 97.7 F, HR 85, /55, RR 20, o2sat 99% on room air. Labs were significant for no leukocytosis, ESR 39, BUN/cre 25/1.81, ESR 39, CRP WNL. Xray of left knee obtained and pending final read. Given cefazolin 1000 mg IV x1.          INTERVAL HPI/OVERNIGHT EVENTS:    SUBJECTIVE: Patient seen and examined at bedside. Endorses pain in left knee. Denies headache, vision changes, CP, palpiations, SOB, n/v/d/c, fever/chills, weight loss.     MEDICATIONS:  MEDICATIONS  (STANDING):    MEDICATIONS  (PRN):      ALLERGIES:  Allergies    No Known Allergies    Intolerances (15 Juan 2020 19:16)      PAST MEDICAL & SURGICAL HISTORY:  Heart failure  HLD (hyperlipidemia)  Borderline diabetes mellitus  Afib  HTN (hypertension)  No significant past surgical history      MEDICATIONS  (STANDING):  atorvastatin 40 milliGRAM(s) Oral at bedtime  ceFAZolin   IVPB 1000 milliGRAM(s) IV Intermittent every 8 hours  lisinopril 10 milliGRAM(s) Oral daily  metoprolol succinate  milliGRAM(s) Oral daily  warfarin 6 milliGRAM(s) Oral daily    MEDICATIONS  (PRN):  acetaminophen   Tablet .. 650 milliGRAM(s) Oral once PRN Moderate Pain (4 - 6)      FAMILY HISTORY:  FHx: diabetes mellitus      CBC Full  -  ( 16 Jan 2020 06:03 )  WBC Count : 5.03 K/uL  RBC Count : 4.22 M/uL  Hemoglobin : 11.4 g/dL  Hematocrit : 37.2 %  Platelet Count - Automated : 188 K/uL  Mean Cell Volume : 88.2 fl  Mean Cell Hemoglobin : 27.0 pg  Mean Cell Hemoglobin Concentration : 30.6 gm/dL  Auto Neutrophil # : 2.97 K/uL  Auto Lymphocyte # : 1.43 K/uL  Auto Monocyte # : 0.51 K/uL  Auto Eosinophil # : 0.08 K/uL  Auto Basophil # : 0.02 K/uL  Auto Neutrophil % : 59.1 %  Auto Lymphocyte % : 28.4 %  Auto Monocyte % : 10.1 %  Auto Eosinophil % : 1.6 %  Auto Basophil % : 0.4 %      01-16    143  |  107  |  26<H>  ----------------------------<  82  4.3   |  23  |  1.62<H>    Ca    9.4      16 Jan 2020 06:03  Phos  2.4     01-16  Mg     2.2     01-16    TPro  6.6  /  Alb  3.6  /  TBili  1.0  /  DBili  x   /  AST  25  /  ALT  17  /  AlkPhos  83  01-16            Radiology:              Vital Signs Last 24 Hrs  T(C): 36.4 (16 Jan 2020 05:49), Max: 36.6 (15 Juan 2020 16:00)  T(F): 97.6 (16 Jan 2020 05:49), Max: 97.9 (15 Juan 2020 16:00)  HR: 77 (16 Jan 2020 05:49) (74 - 91)  BP: 124/86 (16 Jan 2020 05:49) (104/55 - 124/86)  BP(mean): --  RR: 16 (16 Jan 2020 05:49) (16 - 20)  SpO2: 97% (16 Jan 2020 05:49) (96% - 99%)    REVIEW OF SYSTEMS:    CONSTITUTIONAL: No fever, weight loss, or fatigue  EYES: No eye pain, visual disturbances, or discharge  ENMT:  No difficulty hearing, tinnitus, vertigo; No sinus or throat pain  NECK: No pain or stiffness  BREASTS: No pain, masses, or nipple discharge  RESPIRATORY: No cough, wheezing, chills or hemoptysis; No shortness of breath  CARDIOVASCULAR: No chest pain, palpitations, dizziness, or leg swelling  GASTROINTESTINAL: No abdominal or epigastric pain. No nausea, vomiting, or hematemesis; No diarrhea or constipation. No melena or hematochezia.  GENITOURINARY: No dysuria, frequency, hematuria, or incontinence  NEUROLOGICAL: No headaches, memory loss, loss of strength, numbness, or tremors  SKIN: No itching, burning, rashes, or lesions   LYMPH NODES: No enlarged glands  ENDOCRINE: No heat or cold intolerance; No hair loss  MUSCULOSKELETAL: L knee pain  PSYCHIATRIC: No depression, anxiety, mood swings, or difficulty sleeping  HEME/LYMPH: No easy bruising, or bleeding gums  ALLERGY AND IMMUNOLOGIC: No hives or eczema  VASCULAR: no swelling, erythema        Physical Exam: 85 yo AA gentleman lying in semi Luna's position, c/o left knee pain    Head: normocephalic, atraumatic    Eyes: PERRLA, EOMI, no nystagmus, sclera anicteric    ENT: nasal discharge, uvula midline, no oropharyngeal erythema/exudate    Neck: supple, negative JVD, negative carotid bruits, no thyromegaly    Chest: CTA bilaterally, neg wheeze/ rhonchi/ rales/ crackles/ egophany    Cardiovascular: regular rate and rhythm, neg murmurs/rubs/gallops    Abdomen: soft, non distended, non tender, negative rebound/guarding, normal bowel sounds, neg hepatosplenomegaly    L knee: neg effusion/ fluctuance/ slight prepatella erythema, mild parapatella TTP , able to flex knee slowly secondary to guarding    Ext: L III toe bandage refused removal    :     Neurologic Exam:    Motor Exam:    Upper Extremities:     RIght:   5/5   /intrinsics               5/5  wrist flexors/extensors               5/5  biceps/triceps               5/5  deltoid               negative drift    Left :    5/5  /intrinsics               5/5  wrist flexors/extensors               5/5 biceps/triceps               5/5 deltoid               negative drift    Lower Extremities:                 Right:   5/5  DF/PF/ evertors/ invertors                5/5  Quad/ hamstrings                5/5  hip flexors/adductors/abductors                 Left:      5/5  DF/PF/ evertors/ invertors                3+/5  Quad/ hamstrings sec to pain                5/5  hip flexors/adductors/abductors                       Sensory:    intact to LT/PP in all UE/LE dermatomes    DTR:            = biceps/     triceps/     brachioradialis                      = patella/   medial hamstring/ankle                      neg clonus                      neg Babinski                          Gait:  not tested        PM&R Impression:    1) L LE cellulitis  2) weight bearing as tolerated        Recommendations:    1) Physical therapy focusing on therapeutic exercises, bed mobility/transfer out of bed evaluation, progressive ambulation with assistive devices prn.    2) Disposition Plan/Recs: pending functional progress

## 2020-01-16 NOTE — DISCHARGE NOTE PROVIDER - NSDCFUADDAPPT_GEN_ALL_CORE_FT
Please follow-up with Dr. Garrett on January 22nd at 2:20 PM.     Please follow-up with Dr. Tawil on January 27th at 12 PM.

## 2020-01-16 NOTE — DISCHARGE NOTE PROVIDER - NSDCCPCAREPLAN_GEN_ALL_CORE_FT
PRINCIPAL DISCHARGE DIAGNOSIS  Diagnosis: Acute pain of left knee  Assessment and Plan of Treatment: You were found to have pain in the left knee. We thought that it was an infeciton of the skin called cellulitis. We treated it with IV antibiotics initially, but have since transitioned you to an oral antibiotic. Please finish the remaining 4-day course of this antibiotic. Please follow-up with your primary care doctor (appt made). In addition, this may be due to another condition called gout. We did not treat you for this condition, but we have prescribed you a cream and lidocaine patches to help with the pain. Because your condition improved with antibiotics, we prescribed the antibiotic.      SECONDARY DISCHARGE DIAGNOSES  Diagnosis: CKD (chronic kidney disease)  Assessment and Plan of Treatment: You were found to have an elevated creatinine. You said that this is not new. Based on your test results, your creatinine was close to you values in the past. Please continue to take your medication. Please follow-up PRINCIPAL DISCHARGE DIAGNOSIS  Diagnosis: Acute pain of left knee  Assessment and Plan of Treatment: You were found to have pain in the left knee. We thought that it was an infeciton of the skin called cellulitis. We treated it with IV antibiotics initially, but have since transitioned you to an oral antibiotic. Please finish the remaining 4-day course of this antibiotic. Please follow-up with your primary care doctor (appt made). In addition, this may be due to another condition called gout. We did not treat you for this condition, but we have prescribed you a cream and lidocaine patches to help with the pain. Because your condition improved with antibiotics, we prescribed the antibiotic.      SECONDARY DISCHARGE DIAGNOSES  Diagnosis: Borderline diabetes  Assessment and Plan of Treatment: Your A1c was found to be elevated at 6%. This indicates that you have pre-diabetes. Please follow-up with your primary care doctor when you leave the hospital and discuss lifestyle management and/or starting medication.    Diagnosis: HLD (hyperlipidemia)  Assessment and Plan of Treatment: Please continue to take your cholesterol medications as prescribed.    Diagnosis: HTN (hypertension)  Assessment and Plan of Treatment: Please continue to take your blood presssure medications as previously prescribed by your doctors. Please follow-up with them when you leave the hospital.    Diagnosis: Heart failure  Assessment and Plan of Treatment: Please continue to take your medications as previously prescribed by your cardiologist. When you leave the hospital, please follow-up with your cardiologist.    Diagnosis: Atrial fibrillation  Assessment and Plan of Treatment: You have atrial fibrillation. Please continue to take your warfarin.    Diagnosis: CKD (chronic kidney disease)  Assessment and Plan of Treatment: You were found to have an elevated creatinine. You said that this is not new. Based on your test results, your creatinine was close to you values in the past. Please continue to take your medication. Please follow-up with your primary care doctor after leaving the hospital.

## 2020-01-16 NOTE — DISCHARGE NOTE PROVIDER - NSDCCPTREATMENT_GEN_ALL_CORE_FT
PRINCIPAL PROCEDURE  Procedure: XR knee LT 3V  Findings and Treatment: EXAM:  XR KNEE-LEFT-3 VIEWS                        PROCEDURE DATE:  01/15/2020    INTERPRETATION:  INDICATION: left knee pain  IMPRESSION: Osteoarthritis of the knee

## 2020-01-20 LAB
CULTURE RESULTS: SIGNIFICANT CHANGE UP
CULTURE RESULTS: SIGNIFICANT CHANGE UP
SPECIMEN SOURCE: SIGNIFICANT CHANGE UP
SPECIMEN SOURCE: SIGNIFICANT CHANGE UP

## 2020-01-22 DIAGNOSIS — M25.562 PAIN IN LEFT KNEE: ICD-10-CM

## 2020-01-22 DIAGNOSIS — I10 ESSENTIAL (PRIMARY) HYPERTENSION: ICD-10-CM

## 2020-01-22 DIAGNOSIS — Z79.891 LONG TERM (CURRENT) USE OF OPIATE ANALGESIC: ICD-10-CM

## 2020-01-22 DIAGNOSIS — L03.116 CELLULITIS OF LEFT LOWER LIMB: ICD-10-CM

## 2020-01-22 DIAGNOSIS — I12.9 HYPERTENSIVE CHRONIC KIDNEY DISEASE WITH STAGE 1 THROUGH STAGE 4 CHRONIC KIDNEY DISEASE, OR UNSPECIFIED CHRONIC KIDNEY DISEASE: ICD-10-CM

## 2020-01-22 DIAGNOSIS — N18.9 CHRONIC KIDNEY DISEASE, UNSPECIFIED: ICD-10-CM

## 2020-01-22 DIAGNOSIS — I48.91 UNSPECIFIED ATRIAL FIBRILLATION: ICD-10-CM

## 2020-01-22 DIAGNOSIS — I50.9 HEART FAILURE, UNSPECIFIED: ICD-10-CM

## 2020-01-22 DIAGNOSIS — E78.5 HYPERLIPIDEMIA, UNSPECIFIED: ICD-10-CM

## 2020-01-22 DIAGNOSIS — R73.03 PREDIABETES: ICD-10-CM

## 2020-01-22 DIAGNOSIS — M10.9 GOUT, UNSPECIFIED: ICD-10-CM

## 2020-01-22 DIAGNOSIS — L97.529 NON-PRESSURE CHRONIC ULCER OF OTHER PART OF LEFT FOOT WITH UNSPECIFIED SEVERITY: ICD-10-CM

## 2020-01-22 DIAGNOSIS — Z79.01 LONG TERM (CURRENT) USE OF ANTICOAGULANTS: ICD-10-CM

## 2020-01-28 PROBLEM — I48.91 UNSPECIFIED ATRIAL FIBRILLATION: Chronic | Status: ACTIVE | Noted: 2020-01-15

## 2020-01-28 PROBLEM — I10 ESSENTIAL (PRIMARY) HYPERTENSION: Chronic | Status: ACTIVE | Noted: 2020-01-15

## 2020-01-28 PROBLEM — E78.5 HYPERLIPIDEMIA, UNSPECIFIED: Chronic | Status: ACTIVE | Noted: 2020-01-15

## 2020-01-28 PROBLEM — R73.03 PREDIABETES: Chronic | Status: ACTIVE | Noted: 2020-01-15

## 2020-01-28 PROBLEM — I50.9 HEART FAILURE, UNSPECIFIED: Chronic | Status: ACTIVE | Noted: 2020-01-15

## 2020-01-30 ENCOUNTER — APPOINTMENT (OUTPATIENT)
Dept: VASCULAR SURGERY | Facility: CLINIC | Age: 85
End: 2020-01-30
Payer: MEDICARE

## 2020-01-30 PROCEDURE — 99213 OFFICE O/P EST LOW 20 MIN: CPT

## 2020-01-30 RX ORDER — AMOXICILLIN AND CLAVULANATE POTASSIUM 500; 125 MG/1; MG/1
500-125 TABLET, FILM COATED ORAL TWICE DAILY
Qty: 20 | Refills: 0 | Status: ACTIVE | COMMUNITY
Start: 2020-01-30 | End: 1900-01-01

## 2020-01-31 NOTE — PHYSICAL EXAM
[2+] : left 2+ [0] : left 0 [Alert] : alert [Oriented to Person] : oriented to person [Oriented to Place] : oriented to place [Oriented to Time] : oriented to time [Calm] : calm [Respiratory Effort] : normal respiratory effort [Normal Heart Sounds] : normal heart sounds [Ankle Swelling (On Exam)] : not present [Varicose Veins Of Lower Extremities] : not present [] : not present [de-identified] : appears stated age [de-identified] : irregular heart [de-identified] : left third toe with small superficial ulcer, dorsal/medial. Toe is dry, slightly dusky. No erythema.

## 2020-01-31 NOTE — ASSESSMENT
[FreeTextEntry1] : 85 yo male with PMH of HTN, HLD, previous smoker, CHF presenting to the office for FU toe ulcer. Pain increased over the past 2 weeks and finished 2 courses of antibiotics. BRANDYN/PVR shows moderate disease. Patient would like to continue wound care for now, will see him back in 2 weeks. If no change will consider intervention.

## 2020-02-13 ENCOUNTER — APPOINTMENT (OUTPATIENT)
Dept: VASCULAR SURGERY | Facility: CLINIC | Age: 85
End: 2020-02-13

## 2020-03-05 ENCOUNTER — INPATIENT (INPATIENT)
Facility: HOSPITAL | Age: 85
LOS: 3 days | Discharge: HOME HEALTH | DRG: 580 | End: 2020-03-09
Attending: STUDENT IN AN ORGANIZED HEALTH CARE EDUCATION/TRAINING PROGRAM | Admitting: STUDENT IN AN ORGANIZED HEALTH CARE EDUCATION/TRAINING PROGRAM
Payer: MEDICARE

## 2020-03-05 ENCOUNTER — TRANSCRIPTION ENCOUNTER (OUTPATIENT)
Age: 85
End: 2020-03-05

## 2020-03-05 ENCOUNTER — APPOINTMENT (OUTPATIENT)
Dept: VASCULAR SURGERY | Facility: CLINIC | Age: 85
End: 2020-03-05
Payer: MEDICARE

## 2020-03-05 VITALS
WEIGHT: 169.98 LBS | OXYGEN SATURATION: 100 % | RESPIRATION RATE: 18 BRPM | SYSTOLIC BLOOD PRESSURE: 119 MMHG | TEMPERATURE: 98 F | HEART RATE: 74 BPM | DIASTOLIC BLOOD PRESSURE: 89 MMHG

## 2020-03-05 DIAGNOSIS — L02.612 CUTANEOUS ABSCESS OF LEFT FOOT: ICD-10-CM

## 2020-03-05 DIAGNOSIS — L08.9 LOCAL INFECTION OF THE SKIN AND SUBCUTANEOUS TISSUE, UNSPECIFIED: ICD-10-CM

## 2020-03-05 DIAGNOSIS — Z86.79 PERSONAL HISTORY OF OTHER DISEASES OF THE CIRCULATORY SYSTEM: ICD-10-CM

## 2020-03-05 DIAGNOSIS — I49.9 CARDIAC ARRHYTHMIA, UNSPECIFIED: ICD-10-CM

## 2020-03-05 LAB
ALBUMIN SERPL ELPH-MCNC: 4 G/DL — SIGNIFICANT CHANGE UP (ref 3.3–5)
ALP SERPL-CCNC: 69 U/L — SIGNIFICANT CHANGE UP (ref 40–120)
ALT FLD-CCNC: 24 U/L — SIGNIFICANT CHANGE UP (ref 10–45)
ANION GAP SERPL CALC-SCNC: 13 MMOL/L — SIGNIFICANT CHANGE UP (ref 5–17)
APTT BLD: 38.8 SEC — HIGH (ref 27.5–36.3)
AST SERPL-CCNC: 38 U/L — SIGNIFICANT CHANGE UP (ref 10–40)
BASOPHILS # BLD AUTO: 0.02 K/UL — SIGNIFICANT CHANGE UP (ref 0–0.2)
BASOPHILS NFR BLD AUTO: 0.6 % — SIGNIFICANT CHANGE UP (ref 0–2)
BILIRUB SERPL-MCNC: 1.2 MG/DL — SIGNIFICANT CHANGE UP (ref 0.2–1.2)
BUN SERPL-MCNC: 19 MG/DL — SIGNIFICANT CHANGE UP (ref 7–23)
CALCIUM SERPL-MCNC: 9.1 MG/DL — SIGNIFICANT CHANGE UP (ref 8.4–10.5)
CHLORIDE SERPL-SCNC: 105 MMOL/L — SIGNIFICANT CHANGE UP (ref 96–108)
CO2 SERPL-SCNC: 25 MMOL/L — SIGNIFICANT CHANGE UP (ref 22–31)
CREAT SERPL-MCNC: 1.82 MG/DL — HIGH (ref 0.5–1.3)
CRP SERPL-MCNC: 0.22 MG/DL — SIGNIFICANT CHANGE UP (ref 0–0.4)
EOSINOPHIL # BLD AUTO: 0.04 K/UL — SIGNIFICANT CHANGE UP (ref 0–0.5)
EOSINOPHIL NFR BLD AUTO: 1.1 % — SIGNIFICANT CHANGE UP (ref 0–6)
ERYTHROCYTE [SEDIMENTATION RATE] IN BLOOD: 18 MM/HR — SIGNIFICANT CHANGE UP
GLUCOSE SERPL-MCNC: 102 MG/DL — HIGH (ref 70–99)
HCT VFR BLD CALC: 33.8 % — LOW (ref 39–50)
HGB BLD-MCNC: 10.5 G/DL — LOW (ref 13–17)
IMM GRANULOCYTES NFR BLD AUTO: 0.3 % — SIGNIFICANT CHANGE UP (ref 0–1.5)
INR BLD: 2.48 — HIGH (ref 0.88–1.16)
LYMPHOCYTES # BLD AUTO: 0.95 K/UL — LOW (ref 1–3.3)
LYMPHOCYTES # BLD AUTO: 26.3 % — SIGNIFICANT CHANGE UP (ref 13–44)
MCHC RBC-ENTMCNC: 27.3 PG — SIGNIFICANT CHANGE UP (ref 27–34)
MCHC RBC-ENTMCNC: 31.1 GM/DL — LOW (ref 32–36)
MCV RBC AUTO: 88 FL — SIGNIFICANT CHANGE UP (ref 80–100)
MONOCYTES # BLD AUTO: 0.5 K/UL — SIGNIFICANT CHANGE UP (ref 0–0.9)
MONOCYTES NFR BLD AUTO: 13.9 % — SIGNIFICANT CHANGE UP (ref 2–14)
NEUTROPHILS # BLD AUTO: 2.09 K/UL — SIGNIFICANT CHANGE UP (ref 1.8–7.4)
NEUTROPHILS NFR BLD AUTO: 57.8 % — SIGNIFICANT CHANGE UP (ref 43–77)
NRBC # BLD: 0 /100 WBCS — SIGNIFICANT CHANGE UP (ref 0–0)
PLATELET # BLD AUTO: 200 K/UL — SIGNIFICANT CHANGE UP (ref 150–400)
POTASSIUM SERPL-MCNC: 4.6 MMOL/L — SIGNIFICANT CHANGE UP (ref 3.5–5.3)
POTASSIUM SERPL-SCNC: 4.6 MMOL/L — SIGNIFICANT CHANGE UP (ref 3.5–5.3)
PROT SERPL-MCNC: 6.7 G/DL — SIGNIFICANT CHANGE UP (ref 6–8.3)
PROTHROM AB SERPL-ACNC: 29.1 SEC — HIGH (ref 10–12.9)
RBC # BLD: 3.84 M/UL — LOW (ref 4.2–5.8)
RBC # FLD: 15.9 % — HIGH (ref 10.3–14.5)
SODIUM SERPL-SCNC: 143 MMOL/L — SIGNIFICANT CHANGE UP (ref 135–145)
WBC # BLD: 3.61 K/UL — LOW (ref 3.8–10.5)
WBC # FLD AUTO: 3.61 K/UL — LOW (ref 3.8–10.5)

## 2020-03-05 PROCEDURE — 93010 ELECTROCARDIOGRAM REPORT: CPT

## 2020-03-05 PROCEDURE — 93306 TTE W/DOPPLER COMPLETE: CPT | Mod: 26

## 2020-03-05 PROCEDURE — 99285 EMERGENCY DEPT VISIT HI MDM: CPT

## 2020-03-05 PROCEDURE — 99233 SBSQ HOSP IP/OBS HIGH 50: CPT

## 2020-03-05 PROCEDURE — 76775 US EXAM ABDO BACK WALL LIM: CPT | Mod: 26

## 2020-03-05 PROCEDURE — 71045 X-RAY EXAM CHEST 1 VIEW: CPT | Mod: 26

## 2020-03-05 PROCEDURE — 99222 1ST HOSP IP/OBS MODERATE 55: CPT | Mod: 57,GC

## 2020-03-05 PROCEDURE — XXXXX: CPT

## 2020-03-05 RX ORDER — WARFARIN SODIUM 2.5 MG/1
1 TABLET ORAL
Qty: 0 | Refills: 0 | DISCHARGE

## 2020-03-05 RX ORDER — SODIUM CHLORIDE 9 MG/ML
1000 INJECTION INTRAMUSCULAR; INTRAVENOUS; SUBCUTANEOUS
Refills: 0 | Status: DISCONTINUED | OUTPATIENT
Start: 2020-03-05 | End: 2020-03-06

## 2020-03-05 RX ORDER — CEFAZOLIN SODIUM 1 G
2000 VIAL (EA) INJECTION EVERY 8 HOURS
Refills: 0 | Status: DISCONTINUED | OUTPATIENT
Start: 2020-03-05 | End: 2020-03-05

## 2020-03-05 RX ORDER — ATORVASTATIN CALCIUM 80 MG/1
40 TABLET, FILM COATED ORAL AT BEDTIME
Refills: 0 | Status: DISCONTINUED | OUTPATIENT
Start: 2020-03-05 | End: 2020-03-09

## 2020-03-05 RX ORDER — AMIODARONE HYDROCHLORIDE 400 MG/1
200 TABLET ORAL DAILY
Refills: 0 | Status: DISCONTINUED | OUTPATIENT
Start: 2020-03-05 | End: 2020-03-09

## 2020-03-05 RX ORDER — FUROSEMIDE 40 MG
1 TABLET ORAL
Qty: 0 | Refills: 0 | DISCHARGE

## 2020-03-05 RX ORDER — METOPROLOL TARTRATE 50 MG
50 TABLET ORAL DAILY
Refills: 0 | Status: DISCONTINUED | OUTPATIENT
Start: 2020-03-05 | End: 2020-03-09

## 2020-03-05 RX ORDER — CEFAZOLIN SODIUM 1 G
2000 VIAL (EA) INJECTION EVERY 12 HOURS
Refills: 0 | Status: DISCONTINUED | OUTPATIENT
Start: 2020-03-05 | End: 2020-03-06

## 2020-03-05 RX ORDER — HEPARIN SODIUM 5000 [USP'U]/ML
1400 INJECTION INTRAVENOUS; SUBCUTANEOUS
Qty: 25000 | Refills: 0 | Status: DISCONTINUED | OUTPATIENT
Start: 2020-03-05 | End: 2020-03-06

## 2020-03-05 RX ORDER — LISINOPRIL 2.5 MG/1
1 TABLET ORAL
Qty: 0 | Refills: 0 | DISCHARGE

## 2020-03-05 RX ORDER — METOPROLOL TARTRATE 50 MG
150 TABLET ORAL
Qty: 0 | Refills: 0 | DISCHARGE

## 2020-03-05 RX ORDER — ASPIRIN/CALCIUM CARB/MAGNESIUM 324 MG
81 TABLET ORAL DAILY
Refills: 0 | Status: DISCONTINUED | OUTPATIENT
Start: 2020-03-05 | End: 2020-03-09

## 2020-03-05 RX ADMIN — ATORVASTATIN CALCIUM 40 MILLIGRAM(S): 80 TABLET, FILM COATED ORAL at 16:43

## 2020-03-05 RX ADMIN — Medication 50 MILLIGRAM(S): at 16:43

## 2020-03-05 RX ADMIN — AMIODARONE HYDROCHLORIDE 200 MILLIGRAM(S): 400 TABLET ORAL at 16:43

## 2020-03-05 RX ADMIN — Medication 100 MILLIGRAM(S): at 16:43

## 2020-03-05 RX ADMIN — HEPARIN SODIUM 14 UNIT(S)/HR: 5000 INJECTION INTRAVENOUS; SUBCUTANEOUS at 22:37

## 2020-03-05 RX ADMIN — Medication 81 MILLIGRAM(S): at 16:43

## 2020-03-05 NOTE — ED PROVIDER NOTE - ATTENDING CONTRIBUTION TO CARE
83 yo male with PMH of borderline DM, afib, HTN, HLD, and HF (unknown EF) presents with left 3rd toe ulceration x3 months. Pt reports he is followed by Dr. Mcclellan and Dr. Oneil for wound. Last course of antibiotics ~1 month ago. He denies fever, chills, nausea, vomiting. States he intermittently has pain at site. He was seen in Dr. Oneil's clinic today, told he would need amputation and was instructed to come to ED for admission. Pt AAO, NAD, RRR, CTA b/l, foot exam per PA note, dressing in place on my exam. Pt afebrile and hemodynamically stable. Pt with worsening left 3rd toe ulceration, sent from vascular surgery office for admission with plan for partial amputation tomorrow. Labs sent and noted. Discussed with vascular surgery who are in ED to see pt. Pt admitted to Vascular service. Stable in ED.

## 2020-03-05 NOTE — CONSULT NOTE ADULT - SUBJECTIVE AND OBJECTIVE BOX
[facility] DIVISION OF KIDNEY DISEASES AND HYPERTENSION -- INITIAL CONSULT NOTE  --------------------------------------------------------------------------------  HPI:  83yo M with PVD, sys CHF, Afib, DMII, longstanding HTN and CKD III baseline Cr 1.6-1.8 with prerenal ZEENAT now back to baseline being admitted for toe amputation       PAST HISTORY  --------------------------------------------------------------------------------  PAST MEDICAL & SURGICAL HISTORY:  Heart failure  HLD (hyperlipidemia)  Borderline diabetes mellitus  Afib  HTN (hypertension)  No significant past surgical history    FAMILY HISTORY:  FHx: diabetes mellitus    PAST SOCIAL HISTORY:    ALLERGIES & MEDICATIONS  --------------------------------------------------------------------------------  Allergies    No Known Allergies    Intolerances      Standing Inpatient Medications  aMIOdarone    Tablet 200 milliGRAM(s) Oral daily  aspirin  chewable 81 milliGRAM(s) Oral daily  atorvastatin 40 milliGRAM(s) Oral at bedtime  ceFAZolin   IVPB 2000 milliGRAM(s) IV Intermittent every 8 hours  metoprolol succinate ER 50 milliGRAM(s) Oral daily    PRN Inpatient Medications      REVIEW OF SYSTEMS  --------------------------------------------------------------------------------  Gen: No weight changes, fatigue, fevers/chills, weakness  Skin: No rashes  Head/Eyes/Ears/Mouth: No headache; Normal hearing; Normal vision w/o blurriness; No sinus pain/discomfort, sore throat  Respiratory: No dyspnea, cough, wheezing, hemoptysis  CV: No chest pain, PND, orthopnea  GI: No abdominal pain, diarrhea, constipation, nausea, vomiting, melena, hematochezia  : No increased frequency, dysuria, hematuria, nocturia  MSK: No joint pain/swelling; no back pain; no edema  Neuro: No dizziness/lightheadedness, weakness, seizures, numbness, tingling  Heme: No easy bruising or bleeding  Endo: No heat/cold intolerance  Psych: No significant nervousness, anxiety, stress, depression    All other systems were reviewed and are negative, except as noted.    VITALS/PHYSICAL EXAM  --------------------------------------------------------------------------------  T(C): 36.6 (03-05-20 @ 15:46), Max: 36.6 (03-05-20 @ 15:46)  HR: 68 (03-05-20 @ 15:46) (68 - 74)  BP: 120/74 (03-05-20 @ 15:46) (119/89 - 120/74)  RR: 18 (03-05-20 @ 15:46) (18 - 18)  SpO2: 100% (03-05-20 @ 15:46) (100% - 100%)  Wt(kg): --    Weight (kg): 77.1 (03-05-20 @ 11:54)      Physical Exam:  	Gen: NAD, well-appearing  	HEENT: PERRL, supple neck, clear oropharynx  	Pulm: CTA B/L  	CV: RRR, S1S2; no rub  	Back: No spinal or CVA tenderness; no sacral edema  	Abd: +BS, soft, nontender/nondistended  	: No suprapubic tenderness  	UE: Warm, FROM, no clubbing, intact strength; no edema; no asterixis  	LE: Warm, FROM, no clubbing, intact strength; no edema  	Neuro: No focal deficits, intact gait  	Psych: Normal affect and mood  	Skin: Warm, without rashes  	Vascular access:    LABS/STUDIES  --------------------------------------------------------------------------------              10.5   3.61  >-----------<  200      [03-05-20 @ 12:46]              33.8     143  |  105  |  19  ----------------------------<  102      [03-05-20 @ 12:46]  4.6   |  25  |  1.82        Ca     9.1     [03-05-20 @ 12:46]    TPro  6.7  /  Alb  4.0  /  TBili  1.2  /  DBili  x   /  AST  38  /  ALT  24  /  AlkPhos  69  [03-05-20 @ 12:46]    PT/INR: PT 29.1 , INR 2.48       [03-05-20 @ 12:46]  PTT: 38.8       [03-05-20 @ 12:46]      Creatinine Trend:  SCr 1.82 [03-05 @ 12:46]        HbA1c 6.0      [01-16-20 @ 06:03]

## 2020-03-05 NOTE — ED ADULT NURSE NOTE - OBJECTIVE STATEMENT
Pt presents with hx of 3 month abscess to left 3rd toe. Pt reports he was sent in today for pre op admission with plan for amputation of toe tomorrow. Pt reports no pain, no fevers.

## 2020-03-05 NOTE — H&P ADULT - HISTORY OF PRESENT ILLNESS
HPI: 84 M PMH HTN, prediabetes, pAfib (on Xarelto-last taken 3/4), HF( unknown EF), former smoker p/w nonhealing ulcer of L 3rd toe after evaluation in office w/Dr. Mcclellan. Pt reports sx started approximately 2 mo ago with small ulceration of L 3rd toe, atraumatic, following by Dr. Mcclellan in office and completed course of [id number] and [id number] which initially improved but has since increased in size with purulent drainage first identified today. Pt reports increasing throbbing pain of L 3rd toe, denies rest pain, denies numbness/weakness, reports minimal intermittent swelling of LLE, ambulates independently without issues. Denies fever/chills/nausea/emesis/chest pain/dyspnea/ palpitations Of note pt has had two recent hospitalizations one at [facility] 1/15-1/16 for L knee pain, the second at [facility] for 3rd toe pain c/b rapid Afib 2/20/20 evaluated by cardiology, unable to complete echocardiogram, offered cardioversion but declined by pt at the time. After this admission pt was transitioned from Coumadin to Xarelto. 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 Pt is a Shinto

## 2020-03-05 NOTE — ED PROVIDER NOTE - OBJECTIVE STATEMENT
85yo male with history of borderline DM, afib, HTN, HLD, and HF (unknown EF) presents with left 3rd toe ulceration x3 months. Pt reports he is followed by Dr. Mcclellan and Dr. Oneil for wound. Last course of antibiotics ~1 month ago. He denies fever, chills, nausea, vomiting. States he intermittently has pain at site. He was seen in Dr. Oneil's clinic today, told he would need amputation and was instructed to come to ED for admission.

## 2020-03-05 NOTE — CONSULT NOTE ADULT - CONSULT REASON
Surgical Oncology Consult Note       [address] [facility] [place]  [address] [place] [address]    [place] [facility]  1972  [id number]  [address] [facility] [place]  [address] [address]      Chief Complaint:     Chief Complaint   Patient presents with    Consult     Second opinion for imaging       Assessment and Plan:   Assessment/Plan   The patient presents for an abnormality of her right breast   The patient has had a mammographic abnormality identified in the right breast she has been followed for approximately 1 5 years  On ultrasound there is a complex cyst in the area Dr Easton Christianson had recommended this be biopsied  This was subsequently biopsied by Dr Moira Christie and the cyst collapsed but the mammographic abnormality did not resolved  He has recommended 1 more six-month follow-up for the mammographic abnormality  Dr Easton Christianson is out of town though I have asked him to review it when he returns  I reviewed the films with the patient so she has a much better understanding of what was going on  She will contact us next week at her convenience for Dr Easton Christianson is opinion  I was comfortable with the recommendation as it stands  Oncology History:      No history exists  History of Present Illness:   See above,  the patient has a family history of her mother being diagnosed with breast cancer at the age of 71 she is now 68 and is in hospice care from her breast cancer  The patient's mother was tested for a full breast cancer panel and was negative  Patient has questions regarding whether she should be tested  There is no breast cancer history known in the father's side  Review of Systems:   Review of Systems   All other systems reviewed and are negative        Past Medical History:      Patient Active Problem List   Diagnosis    Dysmenorrhea    Herniated lumbar intervertebral disc    Oral contraceptive pill surveillance    Seasonal allergies    Situational anxiety    Essential hypertension    Palpitations    Menorrhagia with regular cycle    Cyst of right breast        Past Medical History:   Diagnosis Date    Atrial flutter (HCC)     Herniated lumbar intervertebral disc     Hypertension     Intestinal obstruction (HCC)     Scoliosis     Varicella         Past Surgical History:   Procedure Laterality Date    APPENDECTOMY      BACK SURGERY  02/2015    Lower back  L5-S1 microdiscectomy     CYST REMOVAL  2007    Excision of vaginal cyst or tumor  Perdomo Party   OTHER SURGICAL HISTORY      Surgical lysis of intestinal adhesions    US BREAST CYST ASPIRATION RIGHT INITIAL Right 12/3/2019    VOLVULUS REDUCTION      WISDOM TOOTH EXTRACTION          Family History   Problem Relation Age of Onset    Breast cancer Mother 71    BRCA1 Negative Mother     BRCA2 Negative Mother     Hypertension Father     Leukemia Father 71    Stroke Maternal Grandmother     Hypertension Family     No Known Problems Daughter     No Known Problems Maternal Grandfather     No Known Problems Paternal Grandmother     No Known Problems Paternal Grandfather     No Known Problems Daughter         Social History     Socioeconomic History    Marital status: Single     Spouse name: Not on file    Number of children: Not on file    Years of education: Not on file    Highest education level: Not on file   Occupational History    Occupation:    Social Needs    Financial resource strain: Not on file    Food insecurity:     Worry: Not on file     Inability: Not on file    Transportation needs:     Medical: Not on file     Non-medical: Not on file   Tobacco Use    Smoking status: Former Smoker     Types: Cigarettes    Smokeless tobacco: Never Used   Substance and Sexual Activity    Alcohol use:  Yes     Alcohol/week: 4 0 standard drinks     Types: 4 Standard drinks or equivalent per week     Comment: Weekends only    Drug use: No    Sexual activity: Yes     Partners: Male     Birth control/protection: OCP   Lifestyle    Physical activity:     Days per week: Not on file     Minutes per session: Not on file    Stress: Not on file   Relationships    Social connections:     Talks on phone: Not on file     Gets together: Not on file     Attends Gnosticist service: Not on file     Active member of club or organization: Not on file     Attends meetings of clubs or organizations: Not on file     Relationship status: Not on file    Intimate partner violence:     Fear of current or ex partner: Not on file     Emotionally abused: Not on file     Physically abused: Not on file     Forced sexual activity: Not on file   Other Topics Concern    Not on file   Social History Narrative    Two children    Completed college, bachelors degree        Current Outpatient Medications:     ALPRAZolam (XANAX) 0 25 mg tablet, Take by mouth daily at bedtime as needed for anxiety, Disp: , Rfl:     norgestimate-ethinyl estradiol (ORTHO-CYCLEN) 0 25-35 MG-MCG per tablet, Take 1 tablet by mouth daily, Disp: 84 tablet, Rfl: 1     Allergies   Allergen Reactions    Bactrim [Sulfamethoxazole-Trimethoprim] Other (See Comments)     "Resulted in bowel obstruction"    Ciprofloxacin Other (See Comments)     "Resulted in bladder problems"    Clindamycin      Other reaction(s): CLINDAMYCIN  Other reaction(s): Other (See Comments)  Unknown Reaction from childhood    Gentamicin      Other reaction(s): Other (See Comments)  Unknown Reaction from childhood  Other reaction(s): Other (See Comments)  Unknown Reaction from childhood    Sulfa Antibiotics      Other reaction(s):  Other (See Comments)  BOWEL OBSTRUCTION       Physical Exam:     Vitals:    01/08/20 0812   BP: 140/80   Pulse: 102   Resp: 16   Temp: 98 2 °F (36 8 °C)     Physical Exam   Pulmonary/Chest:     Both breasts were examined in ZEENAT on CKD the sitting and supine position  There are no worrisome skin lesions, no nipple retraction and no nipple discharge  There are no dominant masses, axillary adenopathy or supraclavicular adenopathy on either side  Results:   I reviewed her ultrasound as well as her mammogram films  I concur with results and recommendations  Discussion/Summary:   Since Dr Marcos Alaniz recommended the biopsy all asked him to review the case and will review that when the patient causes back next week at her convenience  With respect to genetic testing center mother was tested with the panel and there is no family history of breast cancer on the father's side I do not think the patient would benefit from genetic testing  I did stress the screening as well as being breast self aware and having a low threshold to contact health care provider should she appreciate any changes or concerns  The patient seemed to be very comfortable with my explanations of her imaging as well as having the films reviewed  I offered a six-month follow-up visit or she could follow up with her gynecologist   She has explained that she was comfortable following with her gynecologist unless there was an unresolved issue at her next six-month follow-up  Advance Care Planning/Advance Directives:  I discussed the disease status, treatment plans and follow-up with the patient

## 2020-03-05 NOTE — H&P ADULT - ASSESSMENT
A/P: 84 M PMH HTN, prediabetes, pAfib (on Xarelto-last taken 3/4), HF( unknown EF), former smoker p/w nonhealing ulcer of L 3rd toe.    Admit to Vascular Surgery, Dr. Mcclellan  Cardiac diet, NPO after MN  CXR/EKG, preop for OR 3rd toe ampuTation tomorrow, likely Angiogram mon 3/9  IV Ancef  Cardiology consult for hx of HF and pAfib, Echocardiogram  Renal consult in setting of rising Creatinine-Dr. Bello, monitor strict I+Os  Holding Xarelto preop, will recheck coags in am  Discussed w/ chief resident and Dr. Mcclellan A/P: 84 M PMH HTN, prediabetes, pAfib (on Xarelto-last taken 3/4), HF( unknown EF), former smoker p/w nonhealing ulcer of L 3rd toe.    Admit to Vascular Surgery, Dr. Mcclellan  Cardiac diet, NPO after MN  CXR/EKG, preop for OR 3rd toe ampuTation tomorrow, likely Angiogram mon 3/9  IV Ancef  Cardiology consult for hx of HF and pAfib, Echocardiogram, start Aspirin 81 mg, not taking as an outpt  Renal consult in setting of rising Creatinine-Dr. Bello, monitor strict I+Os  Holding Xarelto preop, will recheck coags in am  Discussed w/ chief resident and Dr. Mcclellan

## 2020-03-05 NOTE — ED PROVIDER NOTE - CLINICAL SUMMARY MEDICAL DECISION MAKING FREE TEXT BOX
ED course unremarkable - afebrile and hemodynamically stable. ED course unremarkable - afebrile and hemodynamically stable. Worsening left 3rd toe ulceration, sent from vascular surgery office for admission with plan for partial amputation tomorrow. Labs sent and pending. Discussed with vascular surgery who evaluated pt in ED, will admit for further management.

## 2020-03-05 NOTE — H&P ADULT - NSHPSOCIALHISTORY_GEN_ALL_CORE
Social: Reports smoking 5-10 yrs over 40 yrs ago, denies etoh use, denies illicit drug use, worked as railroad technician. Pt is a [employer]

## 2020-03-05 NOTE — H&P ADULT - NSHPLABSRESULTS_GEN_ALL_CORE
10.5   3.61  )-----------( 200      ( 05 Mar 2020 12:46 )             33.8       03-05    143  |  105  |  19  ----------------------------<  102<H>  4.6   |  25  |  1.82<H>    Ca    9.1      05 Mar 2020 12:46    TPro  6.7  /  Alb  4.0  /  TBili  1.2  /  DBili  x   /  AST  38  /  ALT  24  /  AlkPhos  69  03-05    PT/INR - ( 05 Mar 2020 12:46 )   PT: 29.1 sec;   INR: 2.48          PTT - ( 05 Mar 2020 12:46 )  PTT:38.8 sec

## 2020-03-05 NOTE — ED PROVIDER NOTE - PHYSICAL EXAMINATION
VITAL SIGNS: I have reviewed nursing notes and confirm.  CONSTITUTIONAL: Well-developed; well-nourished; in no acute distress.   SKIN:  warm and dry, no acute rash. 4cm x 4cm ulceration with serous drainage over the dorsal aspect of the distal left 3rd toe that is mildly ttp.   HEAD:  normocephalic, atraumatic.  EYES: PERRL, EOM intact; conjunctiva and sclera clear.  ENT: No nasal discharge; airway clear.   NECK: Supple; non tender.  CARD: S1, S2 normal; no murmurs, gallops, or rubs. Regular rate and rhythm.   RESP:  Clear to auscultation b/l, no wheezes, rales or rhonchi.  ABD: Normal bowel sounds; soft; non-distended; non-tender; no guarding/ rebound.  EXT: Normal ROM of toes. No clubbing, cyanosis or edema. 2+ pulses to b/l ue/le.  NEURO: Alert, oriented, grossly unremarkable. Distal toe sensation intact.   PSYCH: Cooperative, mood and affect appropriate.

## 2020-03-05 NOTE — H&P ADULT - NSHPPHYSICALEXAM_GEN_ALL_CORE
Vital Signs Last 24 Hrs  T(C): 36.5 (05 Mar 2020 11:54), Max: 36.5 (05 Mar 2020 11:54)  T(F): 97.7 (05 Mar 2020 11:54), Max: 97.7 (05 Mar 2020 11:54)  HR: 74 (05 Mar 2020 11:54) (74 - 74)  BP: 119/89 (05 Mar 2020 11:54) (119/89 - 119/89)  BP(mean): --  RR: 18 (05 Mar 2020 11:54) (18 - 18)  SpO2: 100% (05 Mar 2020 11:54) (100% - 100%)    General: NAD, resting comfortably  Neuro: AAOX3, EOMI, PERRLA, no scleral icterus  Pulm: CTAB, Nonlabored breathing  CV: sinus rhythm   Abdomen: soft, nondistended, nontender, no rebound, no guarding  Extremities: WWP b/l, minimal LLE swelling, 2X1.5 cm ulceration of LLE with minimal purulent drainage-sent for culture in office. LLE triphasic DP/PT, palpable popliteal. RLE triphasic DP/PT, palpable popliteal  BRANDYN: Left 0.81, R 0.88

## 2020-03-05 NOTE — ED PROVIDER NOTE - NS ED ROS FT
Constitutional: No fever. No chills.  Eyes: No redness. No discharge. No vision change.   ENT: No sore throat. No ear pain.  Cardiovascular: No chest pain. No leg swelling.  Respiratory: No cough. No shortness of breath.  GI: No abdominal pain. No vomiting. No diarrhea.   MSK: No joint pain. No back pain.   Skin: No rash. No abrasions. +wound.   Neuro: No numbness. No weakness.   Psych: No known mental health issues.

## 2020-03-05 NOTE — CONSULT NOTE ADULT - ASSESSMENT
83yo M with PVD, sys CHF, Afib, DMII, longstanding HTN and CKD III baseline Cr 1.6-1.8 with prerenal ZEENAT now back to baseline being admitted for toe amputation     # CKD III baseline Cr 1.6-1.8 - back to baseline. Likely 2/2 DMII/HTN however no imaging or u/a on file. Please get non urgent renal sono with bladder sono and PVR, please check u/a with urine protein to creatinine ratio. Anemia please check Fe, TIBC, Ferritin and SPEP, serum free light chains and serum immunofixation. DMII uncontrolled. HTN well controlled.   Will benefit from pre-contrast hydration (per team angio tentatively planned for monday) 75/hr NS for 6hrs pre and 6hrs post contrast as he's at elevated risk for SHERI  Avoid nsaids, nephrotoxins. Hold BHAVIK blockade and diuretics while NPO for procedures. Renal diet. 83yo M with PVD, sys CHF, Afib, DMII, longstanding HTN and CKD III baseline Cr 1.6-1.8 with prerenal ZEENAT now back to baseline being admitted for toe amputation     # CKD III baseline Cr 1.6-1.8 - back to baseline. Likely 2/2 DMII/HTN however no imaging or u/a on file. Please get non urgent renal sono with bladder sono and PVR, please check u/a with urine protein to creatinine ratio. Anemia please check Fe, TIBC, Ferritin and SPEP, serum free light chains and serum immunofixation. DMII uncontrolled. HTN well controlled.   Will benefit from pre-contrast hydration (per team angio tentatively planned for monday) 75/hr NS for 6hrs pre and 6hrs post contrast as he's at elevated risk for SHERI  Avoid nsaids, nephrotoxins. Hold BHAVIK blockade and diuretics while NPO for procedures. Renal diet.  Would change Cefazolin dosing to q12hrs for CrCl 34 to prevent cefazolin toxicity in the setting of reduced renal fx.

## 2020-03-06 ENCOUNTER — RESULT REVIEW (OUTPATIENT)
Age: 85
End: 2020-03-06

## 2020-03-06 LAB
% ALBUMIN: 60.5 % — SIGNIFICANT CHANGE UP
% ALPHA 1: 5 % — SIGNIFICANT CHANGE UP
% ALPHA 2: 12.3 % — SIGNIFICANT CHANGE UP
% BETA: 8.7 % — SIGNIFICANT CHANGE UP
% GAMMA: 13.5 % — SIGNIFICANT CHANGE UP
ALBUMIN SERPL ELPH-MCNC: 4.1 G/DL — SIGNIFICANT CHANGE UP (ref 3.6–5.5)
ALBUMIN/GLOB SERPL ELPH: 1.6 RATIO — SIGNIFICANT CHANGE UP
ALPHA1 GLOB SERPL ELPH-MCNC: 0.3 G/DL — SIGNIFICANT CHANGE UP (ref 0.1–0.4)
ALPHA2 GLOB SERPL ELPH-MCNC: 0.8 G/DL — SIGNIFICANT CHANGE UP (ref 0.5–1)
ANION GAP SERPL CALC-SCNC: 10 MMOL/L — SIGNIFICANT CHANGE UP (ref 5–17)
APPEARANCE UR: CLEAR — SIGNIFICANT CHANGE UP
APTT BLD: 129 SEC — CRITICAL HIGH (ref 27.5–36.3)
APTT BLD: 172.2 SEC — CRITICAL HIGH (ref 27.5–36.3)
APTT BLD: 186 SEC — CRITICAL HIGH (ref 27.5–36.3)
APTT BLD: 37.6 SEC — HIGH (ref 27.5–36.3)
B-GLOBULIN SERPL ELPH-MCNC: 0.6 G/DL — SIGNIFICANT CHANGE UP (ref 0.5–1)
BILIRUB UR-MCNC: NEGATIVE — SIGNIFICANT CHANGE UP
BLD GP AB SCN SERPL QL: NEGATIVE — SIGNIFICANT CHANGE UP
BUN SERPL-MCNC: 17 MG/DL — SIGNIFICANT CHANGE UP (ref 7–23)
CALCIUM SERPL-MCNC: 8.7 MG/DL — SIGNIFICANT CHANGE UP (ref 8.4–10.5)
CHLORIDE SERPL-SCNC: 106 MMOL/L — SIGNIFICANT CHANGE UP (ref 96–108)
CHLORIDE UR-SCNC: 34 MMOL/L — SIGNIFICANT CHANGE UP
CO2 SERPL-SCNC: 26 MMOL/L — SIGNIFICANT CHANGE UP (ref 22–31)
COLOR SPEC: YELLOW — SIGNIFICANT CHANGE UP
CREAT ?TM UR-MCNC: 291 MG/DL — SIGNIFICANT CHANGE UP
CREAT SERPL-MCNC: 1.61 MG/DL — HIGH (ref 0.5–1.3)
DIFF PNL FLD: NEGATIVE — SIGNIFICANT CHANGE UP
FERRITIN SERPL-MCNC: 648 NG/ML — HIGH (ref 30–400)
GAMMA GLOBULIN: 0.9 G/DL — SIGNIFICANT CHANGE UP (ref 0.6–1.6)
GLUCOSE BLDC GLUCOMTR-MCNC: 87 MG/DL — SIGNIFICANT CHANGE UP (ref 70–99)
GLUCOSE SERPL-MCNC: 82 MG/DL — SIGNIFICANT CHANGE UP (ref 70–99)
GLUCOSE UR QL: NEGATIVE — SIGNIFICANT CHANGE UP
GRAM STN FLD: SIGNIFICANT CHANGE UP
HCT VFR BLD CALC: 32.3 % — LOW (ref 39–50)
HGB BLD-MCNC: 10.1 G/DL — LOW (ref 13–17)
INR BLD: 1.98 — HIGH (ref 0.88–1.16)
INTERPRETATION SERPL IFE-IMP: SIGNIFICANT CHANGE UP
IRON SATN MFR SERPL: 30 % — SIGNIFICANT CHANGE UP (ref 16–55)
IRON SATN MFR SERPL: 47 UG/DL — SIGNIFICANT CHANGE UP (ref 45–165)
KAPPA LC SER QL IFE: 2.51 MG/DL — HIGH (ref 0.33–1.94)
KAPPA/LAMBDA FREE LIGHT CHAIN RATIO, SERUM: 1.33 RATIO — SIGNIFICANT CHANGE UP (ref 0.26–1.65)
KETONES UR-MCNC: NEGATIVE — SIGNIFICANT CHANGE UP
LAMBDA LC SER QL IFE: 1.89 MG/DL — SIGNIFICANT CHANGE UP (ref 0.57–2.63)
LEUKOCYTE ESTERASE UR-ACNC: NEGATIVE — SIGNIFICANT CHANGE UP
MAGNESIUM SERPL-MCNC: 1.8 MG/DL — SIGNIFICANT CHANGE UP (ref 1.6–2.6)
MCHC RBC-ENTMCNC: 27.2 PG — SIGNIFICANT CHANGE UP (ref 27–34)
MCHC RBC-ENTMCNC: 31.3 GM/DL — LOW (ref 32–36)
MCV RBC AUTO: 87.1 FL — SIGNIFICANT CHANGE UP (ref 80–100)
NITRITE UR-MCNC: NEGATIVE — SIGNIFICANT CHANGE UP
NRBC # BLD: 0 /100 WBCS — SIGNIFICANT CHANGE UP (ref 0–0)
OSMOLALITY UR: 421 MOSMOL/KG — SIGNIFICANT CHANGE UP (ref 100–650)
PH UR: 7 — SIGNIFICANT CHANGE UP (ref 5–8)
PLATELET # BLD AUTO: 169 K/UL — SIGNIFICANT CHANGE UP (ref 150–400)
POTASSIUM SERPL-MCNC: 3.7 MMOL/L — SIGNIFICANT CHANGE UP (ref 3.5–5.3)
POTASSIUM SERPL-SCNC: 3.7 MMOL/L — SIGNIFICANT CHANGE UP (ref 3.5–5.3)
POTASSIUM UR-SCNC: 76 MMOL/L — SIGNIFICANT CHANGE UP
PROT ?TM UR-MCNC: 29 MG/DL — HIGH (ref 0–12)
PROT PATTERN SERPL ELPH-IMP: SIGNIFICANT CHANGE UP
PROT SERPL-MCNC: 6.7 G/DL — SIGNIFICANT CHANGE UP (ref 6–8.3)
PROT UR-MCNC: ABNORMAL MG/DL
PROT/CREAT UR-RTO: 0.1 RATIO — SIGNIFICANT CHANGE UP (ref 0–0.2)
PROTHROM AB SERPL-ACNC: 23 SEC — HIGH (ref 10–12.9)
RBC # BLD: 3.71 M/UL — LOW (ref 4.2–5.8)
RBC # FLD: 15.7 % — HIGH (ref 10.3–14.5)
RH IG SCN BLD-IMP: POSITIVE — SIGNIFICANT CHANGE UP
SODIUM SERPL-SCNC: 142 MMOL/L — SIGNIFICANT CHANGE UP (ref 135–145)
SODIUM UR-SCNC: 85 MMOL/L — SIGNIFICANT CHANGE UP
SP GR SPEC: 1.01 — SIGNIFICANT CHANGE UP (ref 1–1.03)
SPECIMEN SOURCE: SIGNIFICANT CHANGE UP
TIBC SERPL-MCNC: 156 UG/DL — LOW (ref 220–430)
UIBC SERPL-MCNC: 109 UG/DL — LOW (ref 110–370)
UROBILINOGEN FLD QL: 0.2 E.U./DL — SIGNIFICANT CHANGE UP
UUN UR-MCNC: 939 MG/DL — SIGNIFICANT CHANGE UP
WBC # BLD: 3.22 K/UL — LOW (ref 3.8–10.5)
WBC # FLD AUTO: 3.22 K/UL — LOW (ref 3.8–10.5)

## 2020-03-06 PROCEDURE — 88311 DECALCIFY TISSUE: CPT | Mod: 26

## 2020-03-06 PROCEDURE — 99221 1ST HOSP IP/OBS SF/LOW 40: CPT

## 2020-03-06 PROCEDURE — 88305 TISSUE EXAM BY PATHOLOGIST: CPT | Mod: 26

## 2020-03-06 PROCEDURE — 28825 PARTIAL AMPUTATION OF TOE: CPT | Mod: GC

## 2020-03-06 PROCEDURE — 99232 SBSQ HOSP IP/OBS MODERATE 35: CPT

## 2020-03-06 RX ORDER — VANCOMYCIN HCL 1 G
1250 VIAL (EA) INTRAVENOUS ONCE
Refills: 0 | Status: COMPLETED | OUTPATIENT
Start: 2020-03-06 | End: 2020-03-06

## 2020-03-06 RX ORDER — OXYCODONE HYDROCHLORIDE 5 MG/1
5 TABLET ORAL ONCE
Refills: 0 | Status: DISCONTINUED | OUTPATIENT
Start: 2020-03-06 | End: 2020-03-08

## 2020-03-06 RX ORDER — HEPARIN SODIUM 5000 [USP'U]/ML
1100 INJECTION INTRAVENOUS; SUBCUTANEOUS
Qty: 25000 | Refills: 0 | Status: DISCONTINUED | OUTPATIENT
Start: 2020-03-06 | End: 2020-03-06

## 2020-03-06 RX ORDER — HEPARIN SODIUM 5000 [USP'U]/ML
800 INJECTION INTRAVENOUS; SUBCUTANEOUS
Qty: 25000 | Refills: 0 | Status: DISCONTINUED | OUTPATIENT
Start: 2020-03-06 | End: 2020-03-06

## 2020-03-06 RX ORDER — HEPARIN SODIUM 5000 [USP'U]/ML
950 INJECTION INTRAVENOUS; SUBCUTANEOUS
Qty: 25000 | Refills: 0 | Status: DISCONTINUED | OUTPATIENT
Start: 2020-03-06 | End: 2020-03-07

## 2020-03-06 RX ORDER — ACETAMINOPHEN 500 MG
650 TABLET ORAL EVERY 6 HOURS
Refills: 0 | Status: DISCONTINUED | OUTPATIENT
Start: 2020-03-06 | End: 2020-03-09

## 2020-03-06 RX ADMIN — AMIODARONE HYDROCHLORIDE 200 MILLIGRAM(S): 400 TABLET ORAL at 15:54

## 2020-03-06 RX ADMIN — Medication 650 MILLIGRAM(S): at 23:40

## 2020-03-06 RX ADMIN — Medication 100 MILLIGRAM(S): at 05:28

## 2020-03-06 RX ADMIN — HEPARIN SODIUM 9.5 UNIT(S)/HR: 5000 INJECTION INTRAVENOUS; SUBCUTANEOUS at 23:13

## 2020-03-06 RX ADMIN — HEPARIN SODIUM 1100 UNIT(S)/HR: 5000 INJECTION INTRAVENOUS; SUBCUTANEOUS at 15:26

## 2020-03-06 RX ADMIN — Medication 650 MILLIGRAM(S): at 18:48

## 2020-03-06 RX ADMIN — Medication 50 MILLIGRAM(S): at 15:54

## 2020-03-06 RX ADMIN — Medication 81 MILLIGRAM(S): at 14:14

## 2020-03-06 RX ADMIN — ATORVASTATIN CALCIUM 40 MILLIGRAM(S): 80 TABLET, FILM COATED ORAL at 21:21

## 2020-03-06 RX ADMIN — Medication 650 MILLIGRAM(S): at 17:48

## 2020-03-06 RX ADMIN — SODIUM CHLORIDE 40 MILLILITER(S): 9 INJECTION INTRAMUSCULAR; INTRAVENOUS; SUBCUTANEOUS at 00:18

## 2020-03-06 RX ADMIN — Medication 166.67 MILLIGRAM(S): at 11:21

## 2020-03-06 NOTE — PROGRESS NOTE ADULT - SUBJECTIVE AND OBJECTIVE BOX
O/N: ANIKA, preopped hepgtt started                                    A/P: 84 M PMH HTN, prediabetes, pAfib (on Xarelto-last taken 3/4), HF( unknown EF), former smoker p/w nonhealing ulcer of L 3rd toe.      Cardiac diet, NPO  preop for OR 3rd toe ampuTation tomorrow, likely Angiogram mon 3/9  CXR/EKG,   IV Ancef  Cardiology consult for hx of HF and pAfib,   Echocardiogram, start Aspirin 81 mg, not taking as an outpt  Renal consult in setting of rising Creatinine-Dr. Bello, monitor strict I+Os  Hep gtt O/N: ANIKA, preopped hepgtt started  no complaints this AM        Vital Signs Last 24 Hrs  T(C): 36.5 (06 Mar 2020 09:21), Max: 36.9 (05 Mar 2020 22:01)  T(F): 97.7 (06 Mar 2020 09:21), Max: 98.4 (05 Mar 2020 22:01)  HR: 69 (06 Mar 2020 05:16) (67 - 76)  BP: 120/78 (06 Mar 2020 05:16) (109/53 - 132/74)  BP(mean): --  RR: 18 (06 Mar 2020 05:16) (17 - 18)  SpO2: 99% (06 Mar 2020 05:16) (97% - 100%)      General: NAD, resting comfortably  	Neuro: AAOX3, EOMI, PERRLA, no scleral icterus  	Pulm: CTAB, Nonlabored breathing  	CV: sinus rhythm   	Abdomen: soft, nondistended, nontender, no rebound, no guarding  	Extremities: WWP b/l, minimal LLE swelling, 2X1.5 cm ulceration of LLE with minimal purulent drainage-sent for culture in office. LLE triphasic DP/PT, palpable popliteal. RLE triphasic DP/PT, palpable popliteal  dressing changed this AM          LABS:                        10.1   3.22  )-----------( 169      ( 06 Mar 2020 06:02 )             32.3     06 Mar 2020 06:02    142    |  106    |  17     ----------------------------<  82     3.7     |  26     |  1.61     Ca    8.7        06 Mar 2020 06:02  Mg     1.8       06 Mar 2020 06:02    TPro  6.7    /  Alb  4.0    /  TBili  1.2    /  DBili  x      /  AST  38     /  ALT  24     /  AlkPhos  69     05 Mar 2020 12:46    PT/INR - ( 06 Mar 2020 06:02 )   PT: 23.0 sec;   INR: 1.98          PTT - ( 06 Mar 2020 06:02 )  PTT:186.0 sec                          A/P: 84 M PMH HTN, prediabetes, pAfib (on Xarelto-last taken 3/4), HF( unknown EF), former smoker p/w nonhealing ulcer of L 3rd toe.      Cardiac diet, NPO  preop for OR 3rd toe ampuTation this AM, likely Angiogram mon 3/9   IV Ancef  Cardiology consult for hx of HF and pAfib,   Echocardiogram, start Aspirin 81 mg, not taking as an outpt  Renal consult in setting of rising Creatinine-Dr. Bello, monitor strict I+Os  Hep gtt  f/u cultures O/N: ANIKA, preopped hepgtt started  no complaints this AM        Vital Signs Last 24 Hrs  T(C): 36.5 (06 Mar 2020 09:21), Max: 36.9 (05 Mar 2020 22:01)  T(F): 97.7 (06 Mar 2020 09:21), Max: 98.4 (05 Mar 2020 22:01)  HR: 69 (06 Mar 2020 05:16) (67 - 76)  BP: 120/78 (06 Mar 2020 05:16) (109/53 - 132/74)  BP(mean): --  RR: 18 (06 Mar 2020 05:16) (17 - 18)  SpO2: 99% (06 Mar 2020 05:16) (97% - 100%)      General: NAD, resting comfortably  	Neuro: AAOX3, EOMI, PERRLA, no scleral icterus  	Pulm: CTAB, Nonlabored breathing  	CV: sinus rhythm   	Abdomen: soft, nondistended, nontender, no rebound, no guarding  	Extremities: WWP b/l, minimal LLE swelling, 2X1.5 cm ulceration of LLE with minimal purulent drainage-sent for culture in office. LLE triphasic DP/PT, palpable popliteal. RLE triphasic DP/PT, palpable popliteal  dressing changed this AM          LABS:                        10.1   3.22  )-----------( 169      ( 06 Mar 2020 06:02 )             32.3     06 Mar 2020 06:02    142    |  106    |  17     ----------------------------<  82     3.7     |  26     |  1.61     Ca    8.7        06 Mar 2020 06:02  Mg     1.8       06 Mar 2020 06:02    TPro  6.7    /  Alb  4.0    /  TBili  1.2    /  DBili  x      /  AST  38     /  ALT  24     /  AlkPhos  69     05 Mar 2020 12:46    PT/INR - ( 06 Mar 2020 06:02 )   PT: 23.0 sec;   INR: 1.98          PTT - ( 06 Mar 2020 06:02 )  PTT:186.0 sec              ---------------------------------------------------------------------------  PLEASE CHECK WHEN PRESENT:     [  ]Heart Failure     [  ] Acute     [  ] Acute on Chronic     [  x] Chronic  -------------------------------------------------------------------     [  ]Diastolic [HFpEF]     [x  ]Systolic [HFrEF] EF 35%     [  ]Combined [HFpEF & HFrEF]  .................................................................................     [  ]Other:     [ ] Pulmonary Hypertension     [x ] Afib: persistent      [ x] Hypertensive Heart Disease  -------------------------------------------------------------------  [ ] Respiratory failure  [ ] Acute cor pulmonale  [ ] Asthma/COPD Exacerbation  [ ] Pleural effusion  [ ] Aspiration pneumonia  [ ] Obstructive Sleep Apnea  -------------------------------------------------------------------  [  ]ZEENAT     [  ]ATN     [  ]Reneal Medullary Necrosis     [  ]Renal Cortical Necrosis     [  ]Other Pathological Lesions:    [  ]CKD 1  [  ]CKD 2  [x  ]CKD 3  [  ]CKD 4  [  ]CKD 5  [  ]Other  -------------------------------------------------------------------  [ x ]Diabetes  [x  ] Diabetic PVD Ulcer  [  ] Neuropathic ulcer to DM  [  ] Diabetes with Nephropathy  [  ] Osteomyelitis due to diabetes  --------------------------------------------------------------------  [  ]Malnutrition: See Nutrition Note  [  ]Cachexia  [  ]Other:   [  ]Supplement Ordered:  [  ]Morbid Obesity (BMI >=40]  ---------------------------------------------------------------------  [ ] Sepsis/severe sepsis/septic shock  [ ] Noninfectious SIRS  [ ] UTI  [ ] Pneumonia  -----------------------------------------------------------------------  [ ] Acidosis/alkalosis  [ ] Fluid overload  [ ] Hypokalemia  [ ] Hyperkalemia  [ ] Hypomagnesemia  [ ] Hypophosphatemia  [ ] Hyperphosphatemia  ------------------------------------------------------------------------  [ ] Acute blood loss anemia  [ ] Post op blood loss anemia  [ ] Iron deficiency anemia  [ x] Anemia due to chronic disease  [ ] Hypercoagulable state  [ ] Thrombocytopenia  ----------------------------------------------------------------------  [ ] Cerebral infarction  [ ] Transient ischemia attack  [ ] Encephalopathy - Toxic or Metabolic      A/P: 84 M PMH HTN, prediabetes, pAfib (on Xarelto-last taken 3/4), HF( unknown EF), former smoker p/w nonhealing ulcer of L 3rd toe.      Cardiac diet, NPO  preop for OR 3rd toe ampuTation this AM, likely Angiogram mon 3/9   IV Ancef  Cardiology consult for hx of HF and pAfib,   Echocardiogram, start Aspirin 81 mg, not taking as an outpt  Renal consult in setting of rising Creatinine-Dr. Bello, monitor strict I+Os  Hep gtt  f/u cultures

## 2020-03-06 NOTE — PROGRESS NOTE ADULT - SUBJECTIVE AND OBJECTIVE BOX
[facility] DIVISION OF KIDNEY DISEASES AND HYPERTENSION  --------------------------------------------------------------------------------  HPI:  83yo M with PVD, sys CHF, Afib, DMII, longstanding HTN and CKD III baseline Cr 1.6-1.8 with prerenal ZEENAT now back to baseline POD#0 s/p left 3rd toe amputation with plans for LE angio Monday      PAST HISTORY  --------------------------------------------------------------------------------  PAST MEDICAL & SURGICAL HISTORY:  Heart failure  HLD (hyperlipidemia)  Borderline diabetes mellitus  Afib  HTN (hypertension)  No significant past surgical history    FAMILY HISTORY:  FHx: diabetes mellitus    PAST SOCIAL HISTORY:    ALLERGIES & MEDICATIONS  --------------------------------------------------------------------------------  Allergies    No Known Allergies    Intolerances      Standing Inpatient Medications  aMIOdarone    Tablet 200 milliGRAM(s) Oral daily  aspirin  chewable 81 milliGRAM(s) Oral daily  atorvastatin 40 milliGRAM(s) Oral at bedtime  ceFAZolin   IVPB 2000 milliGRAM(s) IV Intermittent every 8 hours  metoprolol succinate ER 50 milliGRAM(s) Oral daily    PRN Inpatient Medications      REVIEW OF SYSTEMS  --------------------------------------------------------------------------------  Gen: No weight changes, fatigue, fevers/chills, weakness  Skin: No rashes  Head/Eyes/Ears/Mouth: No headache; Normal hearing; Normal vision w/o blurriness; No sinus pain/discomfort, sore throat  Respiratory: No dyspnea, cough, wheezing, hemoptysis  CV: No chest pain, PND, orthopnea  GI: No abdominal pain, diarrhea, constipation, nausea, vomiting, melena, hematochezia  : No increased frequency, dysuria, hematuria, nocturia  MSK: No joint pain/swelling; no back pain; no edema  Neuro: No dizziness/lightheadedness, weakness, seizures, numbness, tingling  Heme: No easy bruising or bleeding  Endo: No heat/cold intolerance  Psych: No significant nervousness, anxiety, stress, depression    All other systems were reviewed and are negative, except as noted.    VITALS/PHYSICAL EXAM  --------------------------------------------------------------------------------  T(C): 36.6 (03-05-20 @ 15:46), Max: 36.6 (03-05-20 @ 15:46)  HR: 68 (03-05-20 @ 15:46) (68 - 74)  BP: 120/74 (03-05-20 @ 15:46) (119/89 - 120/74)  RR: 18 (03-05-20 @ 15:46) (18 - 18)  SpO2: 100% (03-05-20 @ 15:46) (100% - 100%)  Wt(kg): --    Weight (kg): 77.1 (03-05-20 @ 11:54)      Physical Exam:  	Gen: NAD, well-appearing  	HEENT: PERRL, supple neck, clear oropharynx  	Pulm: CTA B/L  	CV: RRR, S1S2; no rub  	Back: No spinal or CVA tenderness; no sacral edema  	Abd: +BS, soft, nontender/nondistended  	: No suprapubic tenderness  	UE: Warm, FROM, no clubbing, intact strength; no edema; no asterixis  	LE: Warm, FROM, no clubbing, intact strength; no edema  	Neuro: No focal deficits, intact gait  	Psych: Normal affect and mood  	Skin: Warm, without rashes  	Vascular access:    LABS/STUDIES  --------------------------------------------------------------------------------              10.5   3.61  >-----------<  200      [03-05-20 @ 12:46]              33.8     143  |  105  |  19  ----------------------------<  102      [03-05-20 @ 12:46]  4.6   |  25  |  1.82        Ca     9.1     [03-05-20 @ 12:46]    TPro  6.7  /  Alb  4.0  /  TBili  1.2  /  DBili  x   /  AST  38  /  ALT  24  /  AlkPhos  69  [03-05-20 @ 12:46]    PT/INR: PT 29.1 , INR 2.48       [03-05-20 @ 12:46]  PTT: 38.8       [03-05-20 @ 12:46]      Creatinine Trend:  SCr 1.82 [03-05 @ 12:46]        HbA1c 6.0      [01-16-20 @ 06:03] [facility] DIVISION OF KIDNEY DISEASES AND HYPERTENSION  --------------------------------------------------------------------------------  HPI:  85yo M with PVD, sys CHF, Afib, DMII, longstanding HTN and CKD III baseline Cr 1.6-1.8 with prerenal ZEENAT now back to baseline POD#0 s/p left 3rd toe amputation with plans for LE angio Monday  He's feeling quite well post-op, minimal pain, very tired from the sedation. Wife at bedside. Denies urinary hesitancy or SOB    PAST HISTORY  --------------------------------------------------------------------------------  PAST MEDICAL & SURGICAL HISTORY:  Heart failure  HLD (hyperlipidemia)  Borderline diabetes mellitus  Afib  HTN (hypertension)  No significant past surgical history    FAMILY HISTORY:  FHx: diabetes mellitus    PAST SOCIAL HISTORY:    ALLERGIES & MEDICATIONS  --------------------------------------------------------------------------------  Allergies    No Known Allergies    Intolerances      Standing Inpatient Medications  aMIOdarone    Tablet 200 milliGRAM(s) Oral daily  aspirin  chewable 81 milliGRAM(s) Oral daily  atorvastatin 40 milliGRAM(s) Oral at bedtime  ceFAZolin   IVPB 2000 milliGRAM(s) IV Intermittent every 8 hours  metoprolol succinate ER 50 milliGRAM(s) Oral daily    PRN Inpatient Medications      REVIEW OF SYSTEMS  --------------------------------------------------------------------------------  Gen: + fatigue,  No weight changes, fevers/chills, weakness  Skin: No rashes  Head/Eyes/Ears/Mouth: No headache; Normal hearing; Normal vision w/o blurriness; No sinus pain/discomfort, sore throat  Respiratory: No dyspnea, cough, wheezing, hemoptysis  CV: No chest pain, PND, orthopnea  GI: No abdominal pain, diarrhea, constipation, nausea, vomiting, melena, hematochezia  : No increased frequency, dysuria, hematuria, nocturia  MSK: +joint pain; no back pain; occ edema  Neuro: No dizziness/lightheadedness, weakness, seizures, numbness, tingling  Heme: No easy bruising or bleeding  Endo: No heat/cold intolerance  Psych: No significant nervousness, anxiety, stress, depression    All other systems were reviewed and are negative, except as noted.    VITALS/PHYSICAL EXAM  --------------------------------------------------------------------------------  T(C): 36.6  HR: 68  BP: 120/74   RR: 16  SpO2: 100% RA  Wt(kg): --    Weight (kg): 77.1 (03-05-20 @ 11:54)      Physical Exam:  	Gen: NAD, well-appearing  	HEENT: PERRL, supple neck, clear oropharynx  	Pulm: CTA B/L  	CV: RRR, S1S2; no rub  	Abd: +BS, soft, nontender/nondistended  	: No suprapubic tenderness  	UE: Warm, no asterixis  	LE: Warm,  no edema, L foot in bandage  	Neuro: No focal deficits, AAox3  	Psych: Normal affect and mood  	Skin: Warm, without rashes      LABS/STUDIES  reviewed

## 2020-03-06 NOTE — PROGRESS NOTE ADULT - ASSESSMENT
85yo M with PVD, sys CHF, Afib, DMII, longstanding HTN and CKD III baseline Cr 1.6-1.8 with prerenal ZEENAT now back to baseline POD#0 s/p left 3rd toe amputation with plans for LE angio Monday    # CKD III baseline Cr 1.6-1.8 - back to baseline. Likely 2/2 DMII/HTN  - reviewed U/A -bland. Renal sono also unremarkable.   - Anemia of chronic inflammation/disease.  SPEP, serum free light chains and serum immunofixation pending.   - DMII needs improved control. HTN well controlled.   - Will benefit from pre-contrast hydration (per team angio tentatively planned for monday) 75/hr NS for 6hrs pre and 6hrs post contrast as he's at elevated risk for SHERI  Avoid nsaids, nephrotoxins. Hold lasix while NPO for procedures. Renal diet.

## 2020-03-06 NOTE — ASSESSMENT
[FreeTextEntry1] : 84yoM w/PMH of HTN, HLD, previous smoker, CHF presenting to the office for f/u of L 3rd toe ulcer, for which he was recently admitted to [facility].  Pt was treated for toe infection, and underwent unsuccessful LLE angio, abandoned due to an episode of Afib w/rapid ventricular response.\par \par Today, L 3rd toe ulcer unroofed and purulent drainage noted and cultured (preliminary culture results c/w gram+ cocci).  Will take pt for L 3rd toe amp on 03/06 and f/u angio in OR 03/09.

## 2020-03-06 NOTE — PROGRESS NOTE ADULT - SUBJECTIVE AND OBJECTIVE BOX
Pre-op Diagnosis: Non Healing L 3rd toe ulcer  Procedure: 3rd toe Amp   Surgeon: Eleuterio    Consent: Signed in chart                          10.5   3.61  )-----------( 200      ( 05 Mar 2020 12:46 )             33.8     03-05    143  |  105  |  19  ----------------------------<  102<H>  4.6   |  25  |  1.82<H>    Ca    9.1      05 Mar 2020 12:46    TPro  6.7  /  Alb  4.0  /  TBili  1.2  /  DBili  x   /  AST  38  /  ALT  24  /  AlkPhos  69  03-05    PT/INR - ( 05 Mar 2020 12:46 )   PT: 29.1 sec;   INR: 2.48          PTT - ( 05 Mar 2020 12:46 )  PTT:38.8 sec  Urinalysis Basic - ( 05 Mar 2020 23:55 )    Color: Yellow / Appearance: Clear / S.015 / pH: x  Gluc: x / Ketone: NEGATIVE  / Bili: Negative / Urobili: 0.2 E.U./dL   Blood: x / Protein: Trace mg/dL / Nitrite: NEGATIVE   Leuk Esterase: NEGATIVE / RBC: < 5 /HPF / WBC < 5 /HPF   Sq Epi: x / Non Sq Epi: 0-5 /HPF / Bacteria: Present /HPF    Type & Screen: Apos  Ab pending on 2nd T&S  CXR: No Infiltrates  EKG: NSR    A/P: 84yMale pre-op for above procedure  1. NPO past midnight, except medications  2. IVFsodium chloride 0.9%. 1000 milliLiter(s) IV Continuous <Continuous>    3. [1 ] Blood on hold, Units: Pre-op Diagnosis: Non Healing L 3rd toe ulcer  Procedure: 3rd toe Amp   Surgeon: Eletuerio    Consent: Signed in chart                          10.5   3.61  )-----------( 200      ( 05 Mar 2020 12:46 )             33.8     03-05    143  |  105  |  19  ----------------------------<  102<H>  4.6   |  25  |  1.82<H>    Ca    9.1      05 Mar 2020 12:46    TPro  6.7  /  Alb  4.0  /  TBili  1.2  /  DBili  x   /  AST  38  /  ALT  24  /  AlkPhos  69  03-05    PT/INR - ( 05 Mar 2020 12:46 )   PT: 29.1 sec;   INR: 2.48          PTT - ( 05 Mar 2020 12:46 )  PTT:38.8 sec  Urinalysis Basic - ( 05 Mar 2020 23:55 )    Color: Yellow / Appearance: Clear / S.015 / pH: x  Gluc: x / Ketone: NEGATIVE  / Bili: Negative / Urobili: 0.2 E.U./dL   Blood: x / Protein: Trace mg/dL / Nitrite: NEGATIVE   Leuk Esterase: NEGATIVE / RBC: < 5 /HPF / WBC < 5 /HPF   Sq Epi: x / Non Sq Epi: 0-5 /HPF / Bacteria: Present /HPF    Type & Screen: Apos  Ab pending on 2nd T&S  CXR: No Infiltrates  EKG: Afib    A/P: 84yMale pre-op for above procedure  1. NPO past midnight, except medications  2. IVFsodium chloride 0.9%. 1000 milliLiter(s) IV Continuous <Continuous>    3. [1 ] Blood on hold, Units:

## 2020-03-06 NOTE — CONSULT NOTE ADULT - SUBJECTIVE AND OBJECTIVE BOX
Patient is a 84y old  Male who presents with a chief complaint of Third toe ulcer (06 Mar 2020 06:42)        HPI:  HPI: 84 M PMH HTN, prediabetes, pAfib (on Xarelto-last taken 3/4), HF( unknown EF), former smoker p/w nonhealing ulcer of L 3rd toe after evaluation in office w/Dr. Mcclellan. Pt reports sx started approximately 2 mo ago with small ulceration of L 3rd toe, atraumatic, following by Dr. Mcclellan in office and completed course of [id number] and [id number] which initially improved but has since increased in size with purulent drainage first identified today. Pt reports increasing throbbing pain of L 3rd toe, denies rest pain, denies numbness/weakness, reports minimal intermittent swelling of LLE, ambulates independently without issues. Denies fever/chills/nausea/emesis/chest pain/dyspnea/ palpitations Of note pt has had two recent hospitalizations one at [facility] 1/15-1/16 for L knee pain, the second at [facility] for 3rd toe pain c/b rapid Afib 2/20/20 evaluated by cardiology, unable to complete echocardiogram, offered cardioversion but declined by pt at the time. After this admission pt was transitioned from Coumadin to Xarelto. Pt is a Mosque who does not want blood products    Cardiology consulted for Preoperative Evaluation:  Pt with known Dilated CM, HFrEF 35%, follows with cardiologist Dr. Mata at [facility].  Pt reports good exercise tolerance, >4 METS  Denies CP, SOB, GREY, orthopnea, dizziness, syncope    PMH: as above and hx of throat cancer  PSH: Left laryngectomy (2014)  Meds: Amiodarone 200mg d, Xarelto 15 mg d, Atorvastatin 40mg d, Lasix 40 mg d, Metoprolol Succinate ER 50mg d  Allergies: denies, Ibuprofen intolerance  Social: Reports smoking 5-10 yrs over 40 yrs ago, denies etoh use, denies illicit drug use, worked as railroad technician. Pt is a Mosque (05 Mar 2020 15:15)    PAST MEDICAL & SURGICAL HISTORY:  Heart failure  HLD (hyperlipidemia)  Borderline diabetes mellitus  Afib  HTN (hypertension)  No significant past surgical history    FAMILY HISTORY:  FHx: diabetes mellitus    Social:  Allergies    No Known Allergies    Intolerances    	  MEDICATIONS:  aMIOdarone    Tablet 200 milliGRAM(s) Oral daily  metoprolol succinate ER 50 milliGRAM(s) Oral daily  ceFAZolin   IVPB 2000 milliGRAM(s) IV Intermittent every 12 hours  atorvastatin 40 milliGRAM(s) Oral at bedtime  aspirin  chewable 81 milliGRAM(s) Oral daily  heparin  Infusion 1400 Unit(s)/Hr IV Continuous <Continuous>  sodium chloride 0.9%. 1000 milliLiter(s) IV Continuous <Continuous>      PHYSICAL EXAM:  T(C): 36.6 (03-06-20 @ 05:16), Max: 36.9 (03-05-20 @ 22:01)  HR: 69 (03-06-20 @ 05:16) (67 - 76)  BP: 120/78 (03-06-20 @ 05:16) (109/53 - 132/74)  RR: 18 (03-06-20 @ 05:16) (17 - 18)  SpO2: 99% (03-06-20 @ 05:16) (97% - 100%)  Wt(kg): --  I&O's Summary    05 Mar 2020 07:01  -  06 Mar 2020 07:00  --------------------------------------------------------  IN: 392 mL / OUT: 225 mL / NET: 167 mL      Height (cm): 175.3 (03-05 @ 18:52)  Weight (kg): 79.1 (03-05 @ 18:52)  BMI (kg/m2): 25.7 (03-05 @ 18:52)  BSA (m2): 1.95 (03-05 @ 18:52)    Gen: NAD, AAOx3  Neck: no JVD  Cardiovascular: irregular, S1 S2, No murmurs  Respiratory: Lungs clear to auscultation	  Gastrointestinal:  Soft, Non-tender, + BS	   Ext: no edema, L toe ulcer wrapped  Neuro: no focal deficits.      TELEMETRY: rate controlled afib	    ECG:  	afib with non specific ekg changes  RADIOLOGY:   DIAGNOSTIC TESTING:  [ ] Echocardiogram:    < from: TTE Echo Complete w/ Contrast w/ Doppler (03.05.20 @ 15:57) >   1. The left ventricle cavity size is moderately dilated. Left ventricular systolic function is moderately reduced with a calculated ejection fraction of 35% with global hypokinesis.   2. The right ventricle is mildly dilated. Right ventricular systolic function is normal.   3. Severely dilated left atrium.   4. Severely dilated right atrium.   5. Mild-to-moderate mitral regurgitation.   6. Mild-to-moderate tricuspid regurgitation.   7. Pulmonary hypertension present, pulmonary artery systolic pressure is 47 mmHg.   8. No pericardial effusion.    < end of copied text >    [ ]  Catheterization:  [ ] Stress Test:    OTHER: 	    LABS:	 	    CARDIAC MARKERS:                          10.1   3.22  )-----------( 169      ( 06 Mar 2020 06:02 )             32.3     03-06    142  |  106  |  17  ----------------------------<  82  3.7   |  26  |  1.61<H>    Ca    8.7      06 Mar 2020 06:02  Mg     1.8     03-06    TPro  6.7  /  Alb  4.0  /  TBili  1.2  /  DBili  x   /  AST  38  /  ALT  24  /  AlkPhos  69  03-05    proBNP:   Lipid Profile:   HgA1c:   TSH:     ASSESSMENT/PLAN: 	  84 y.o male pmh of dilated CM, HFrEF 35%, paroxysmal Afib now on heparin drip, htn, predm presents with non healing toe ulcer going for L 3rd toe amputation.    Cardiology consulted for preoperative evaluation:  Pt able to complete > 4 METS  RCRI 1, class II risk   Pt is low risk for a low risk procedure  No further cardiac preoperative evaluation required prior to procedure  Known HFrEF 35% and Afib  Continue Toprol XL 50mg  Heparin drip preop.  Resume oral anticoagulation post op when vascular surgery team finds appropriate (after angiogram planned Monday)  Pt endorsed that he refuses blood transfusion.  Case discussed with Dr. Victoria and Vascular Team

## 2020-03-06 NOTE — PROGRESS NOTE ADULT - SUBJECTIVE AND OBJECTIVE BOX
Ortho Post Op Check    Procedure: Digital block of Left third toe. Third toe amputation of left foot  Surgeon: Dr. Mcclellan    Pt comfortable without complaints, pain controlled  Denies CP, SOB, N/V, numbness/tingling     Vital Signs Last 24 Hrs  T(C): 36.4 (03-06-20 @ 13:24), Max: 36.7 (03-06-20 @ 10:30)  T(F): 97.6 (03-06-20 @ 13:24), Max: 98.1 (03-06-20 @ 10:30)  HR: 88 (03-06-20 @ 13:01) (61 - 88)  BP: 141/79 (03-06-20 @ 13:01) (114/68 - 141/79)  BP(mean): 93 (03-06-20 @ 12:15) (65 - 93)  RR: 18 (03-06-20 @ 13:01) (11 - 18)  SpO2: 96% (03-06-20 @ 13:01) (96% - 100%)      General: Pt Alert and oriented, NAD  DSG C/D/I  Pulses: toes wwp, cap refill < 3 secs  Motor: wiggling toes                          10.1   3.22  )-----------( 169      ( 06 Mar 2020 06:02 )             32.3   06 Mar 2020 06:02    142    |  106    |  17     ----------------------------<  82     3.7     |  26     |  1.61     Mg     1.8       06 Mar 2020 06:02    TPro  6.7    /  Alb  4.0    /  TBili  1.2    /  DBili  x      /  AST  38     /  ALT  24     /  AlkPhos  69     05 Mar 2020 12:46          ---------------------------------------------------------------------------  I personally reviewed: [  ]EKG   [  ]CXR    [  ] CT    [  ]Other  ---------------------------------------------------------------------------  PLEASE CHECK WHEN PRESENT:     [x]Heart Failure     [  ] Acute     [  ] Acute on Chronic     [  ] Chronic  -------------------------------------------------------------------     [  ]Diastolic [HFpEF]     [  ]Systolic [HFrEF]     [  ]Combined [HFpEF & HFrEF]     [  ]Other:  -------------------------------------------------------------------  [ ] Respiratory failure  [ ] Acute cor pulmonale  [ ] Asthma/COPD Exacerbation  [ ] Pleural effusion  [ ] Aspiration pneumonia  -------------------------------------------------------------------  [  ]ZEENAT     [  ]ATN     [  ]Reneal Medullary Necrosis     [  ]Renal Cortical Necrosis     [  ]Other Pathological Lesions:    [  ]CKD 1  [x]CKD 2  [  ]CKD 3  [  ]CKD 4  [  ]CKD 5  [  ]Other  -------------------------------------------------------------------  [  ]Diabetes  [  ] Diabetic PVD Ulcer  [  ] Neuropathic ulcer to DM  [  ] Diabetes with Nephropathy  [  ] Osteomyelitis due to diabetes  --------------------------------------------------------------------  [  ]Malnutrition: See Nutrition Note  [  ]Cachexia  [  ]Other:   [  ]Supplement Ordered:  [  ]Morbid Obesity (BMI >=40]  ---------------------------------------------------------------------  [ ] Sepsis/severe sepsis/septic shock  [ ] UTI  [ ] Pneumonia  -----------------------------------------------------------------------  [ ] Acidosis/alkalosis  [ ] Fluid overload  [ ] Hypokalemia  [ ] Hyperkalemia  [ ] Hypomagnesemia  [ ] Hypophosphatemia  [ ] Hyperphosphatemia  ------------------------------------------------------------------------  [ ] Acute blood loss anemia  [ ] Post op blood loss anemia  [ ] Iron deficiency anemia  [ ] Anemia due to chronic disease  [ ] Hypercoagulable state  ----------------------------------------------------------------------  [ ] Cerebral infarction  [ ] Transient ischemia attack  [ ] Encephalopathy              A/P: 84 M PMH HTN, prediabetes, pAfib (on Xarelto-last taken 3/4), HF( unknown EF), former smoker p/w nonhealing ulcer of L 3rd toe s/p above procedure  - Stable  - Pain Control  - DVT ppx: hep drip  - Post op abx: vanco  - PT, WBS: heal WB    Ortho Pager [phone]

## 2020-03-06 NOTE — BRIEF OPERATIVE NOTE - OPERATION/FINDINGS
Third toe amputation of left foot Digital block of Left third toe. Third toe amputation of left foot. Wound culture sent for further evaluation. Hemostasis achieved.

## 2020-03-06 NOTE — PROVIDER CONTACT NOTE (OTHER) - REASON
Pt. is Jehova's witness, Pt. is Jehovah witness, pt. refuses all blood products. Pt. ordered for 1 unit of PRBC's for OR.

## 2020-03-06 NOTE — HISTORY OF PRESENT ILLNESS
[FreeTextEntry1] : 84yoM w/PMH of HTN, HLD, previous smoker, CHF presenting to the office for f/u of L 3rd toe ulcer, for which he was recently admitted to [facility].  Pt was treated for toe infection, and underwent unsuccessful LLE angio, abandoned due to an episode of Afib w/rapid ventricular response.\par \par Pt presents today in NAD, no fevers/chills/malaise/pain, or drainage from the toe.\par

## 2020-03-07 LAB
ANION GAP SERPL CALC-SCNC: 13 MMOL/L — SIGNIFICANT CHANGE UP (ref 5–17)
APTT BLD: 133.5 SEC — CRITICAL HIGH (ref 27.5–36.3)
APTT BLD: 78.9 SEC — HIGH (ref 27.5–36.3)
APTT BLD: 90.5 SEC — HIGH (ref 27.5–36.3)
BUN SERPL-MCNC: 16 MG/DL — SIGNIFICANT CHANGE UP (ref 7–23)
CALCIUM SERPL-MCNC: 8.5 MG/DL — SIGNIFICANT CHANGE UP (ref 8.4–10.5)
CHLORIDE SERPL-SCNC: 104 MMOL/L — SIGNIFICANT CHANGE UP (ref 96–108)
CO2 SERPL-SCNC: 23 MMOL/L — SIGNIFICANT CHANGE UP (ref 22–31)
CREAT SERPL-MCNC: 1.55 MG/DL — HIGH (ref 0.5–1.3)
GLUCOSE SERPL-MCNC: 99 MG/DL — SIGNIFICANT CHANGE UP (ref 70–99)
HCT VFR BLD CALC: 36.1 % — LOW (ref 39–50)
HGB BLD-MCNC: 11.3 G/DL — LOW (ref 13–17)
MAGNESIUM SERPL-MCNC: 1.8 MG/DL — SIGNIFICANT CHANGE UP (ref 1.6–2.6)
MCHC RBC-ENTMCNC: 27.1 PG — SIGNIFICANT CHANGE UP (ref 27–34)
MCHC RBC-ENTMCNC: 31.3 GM/DL — LOW (ref 32–36)
MCV RBC AUTO: 86.6 FL — SIGNIFICANT CHANGE UP (ref 80–100)
NRBC # BLD: 0 /100 WBCS — SIGNIFICANT CHANGE UP (ref 0–0)
PHOSPHATE SERPL-MCNC: 2.1 MG/DL — LOW (ref 2.5–4.5)
PLATELET # BLD AUTO: 167 K/UL — SIGNIFICANT CHANGE UP (ref 150–400)
POTASSIUM SERPL-MCNC: 3.9 MMOL/L — SIGNIFICANT CHANGE UP (ref 3.5–5.3)
POTASSIUM SERPL-SCNC: 3.9 MMOL/L — SIGNIFICANT CHANGE UP (ref 3.5–5.3)
RBC # BLD: 4.17 M/UL — LOW (ref 4.2–5.8)
RBC # FLD: 15.7 % — HIGH (ref 10.3–14.5)
SODIUM SERPL-SCNC: 140 MMOL/L — SIGNIFICANT CHANGE UP (ref 135–145)
WBC # BLD: 4.09 K/UL — SIGNIFICANT CHANGE UP (ref 3.8–10.5)
WBC # FLD AUTO: 4.09 K/UL — SIGNIFICANT CHANGE UP (ref 3.8–10.5)

## 2020-03-07 PROCEDURE — 99233 SBSQ HOSP IP/OBS HIGH 50: CPT

## 2020-03-07 PROCEDURE — 99232 SBSQ HOSP IP/OBS MODERATE 35: CPT | Mod: GC,57

## 2020-03-07 RX ORDER — GUAIFENESIN/DEXTROMETHORPHAN 600MG-30MG
10 TABLET, EXTENDED RELEASE 12 HR ORAL ONCE
Refills: 0 | Status: COMPLETED | OUTPATIENT
Start: 2020-03-07 | End: 2020-03-07

## 2020-03-07 RX ORDER — HEPARIN SODIUM 5000 [USP'U]/ML
850 INJECTION INTRAVENOUS; SUBCUTANEOUS
Qty: 25000 | Refills: 0 | Status: DISCONTINUED | OUTPATIENT
Start: 2020-03-07 | End: 2020-03-08

## 2020-03-07 RX ORDER — VANCOMYCIN HCL 1 G
1250 VIAL (EA) INTRAVENOUS ONCE
Refills: 0 | Status: COMPLETED | OUTPATIENT
Start: 2020-03-07 | End: 2020-03-07

## 2020-03-07 RX ADMIN — Medication 200 MILLIGRAM(S): at 16:01

## 2020-03-07 RX ADMIN — Medication 10 MILLILITER(S): at 22:10

## 2020-03-07 RX ADMIN — Medication 50 MILLIGRAM(S): at 06:47

## 2020-03-07 RX ADMIN — Medication 650 MILLIGRAM(S): at 06:47

## 2020-03-07 RX ADMIN — AMIODARONE HYDROCHLORIDE 200 MILLIGRAM(S): 400 TABLET ORAL at 06:47

## 2020-03-07 RX ADMIN — ATORVASTATIN CALCIUM 40 MILLIGRAM(S): 80 TABLET, FILM COATED ORAL at 21:07

## 2020-03-07 RX ADMIN — HEPARIN SODIUM 8.5 UNIT(S)/HR: 5000 INJECTION INTRAVENOUS; SUBCUTANEOUS at 09:08

## 2020-03-07 RX ADMIN — Medication 650 MILLIGRAM(S): at 07:28

## 2020-03-07 RX ADMIN — Medication 166.67 MILLIGRAM(S): at 11:12

## 2020-03-07 RX ADMIN — Medication 10 MILLILITER(S): at 04:36

## 2020-03-07 RX ADMIN — Medication 81 MILLIGRAM(S): at 11:12

## 2020-03-07 RX ADMIN — Medication 650 MILLIGRAM(S): at 00:18

## 2020-03-07 NOTE — PROVIDER CONTACT NOTE (OTHER) - ASSESSMENT
VSS. IV site is stable. pt denies any c/o.
VSS. Pt. denies chest pain, SOB, and palpitations.
pt asymptomatic
Pt. A&Ox4 and report he is Jehovah witness and refuses all blood products.

## 2020-03-07 NOTE — PROVIDER CONTACT NOTE (OTHER) - BACKGROUND
HTN, pre DM, HF, former smoker, RpAfib (Xarelto)
Pt has Hx of Afib , HTN, pre DM, HF, former smoker
Pt is in A Fib
Pt. admitted for toe pain and ulcer. Pt. has a hx of a-fib, HTN, and HLD.
Pt. admitted for toe ulcer. Pt. NPO for possible procedure.

## 2020-03-07 NOTE — CONSULT NOTE ADULT - SUBJECTIVE AND OBJECTIVE BOX
HPI:  HPI: 84 M PMH HTN, prediabetes, pAfib (on Xarelto-last taken 3/4), HF( unknown EF), former smoker p/w nonhealing ulcer of L 3rd toe after evaluation in office w/Dr. Mcclellan. Pt reports sx started approximately 2 mo ago with small ulceration of L 3rd toe, atraumatic, following by Dr. Mcclellan in office and completed course of Cggwgqf5g and [id number] which initially improved but has since increased in size with purulent drainage first identified today. Pt reports increasing throbbing pain of L 3rd toe, denies rest pain, denies numbness/weakness, reports minimal intermittent swelling of LLE, ambulates independently without issues. Denies fever/chills/nausea/emesis/chest pain/dyspnea/ palpitations Of note pt has had two recent hospitalizations one at [facility] 1/15- for L knee pain, the second at [facility] for 3rd toe pain c/b rapid Afib 20 evaluated by cardiology, unable to complete echocardiogram, offered cardioversion but declined by pt at the time. After this admission pt was transitioned from Coumadin to Xarelto. Pt is a Judaism who does not want blood products    PMH: as above and hx of throat cancer  PSH: Left laryngectomy ()  Meds: Amiodarone 200mg d, Xarelto 15 mg d, Atorvastatin 40mg d, Lasix 40 mg d, Metoprolol Succinate ER 50mg d  Allergies: denies, Ibuprofen intolerance  Social: Reports smoking 5-10 yrs over 40 yrs ago, denies etoh use, denies illicit drug use, worked as railroad technician. Pt is a Judaism (05 Mar 2020 15:15)    INTERVAL HPI/OVERNIGHT EVENTS:    VITAL SIGNS:  T(C): 36.3 (20 @ 14:17), Max: 36.5 (20 @ 21:45)  T(F): 97.4 (20 @ 14:17), Max: 97.7 (20 @ 21:45)  HR: 74 (20 @ 13:09) (74 - 98)  BP: 128/73 (20 @ 13:09) (126/79 - 138/95)  BP(mean): --  RR: 16 (20 @ 13:09) (16 - 17)  SpO2: 96% (20 @ 13:09) (96% - 99%)  Wt(kg): --    PHYSICAL EXAM:    Constitutional: WDWN resting comfortably in bed; NAD  Head: NC/AT  Eyes: PERRL, EOMI, anicteric sclera  ENT: no nasal discharge; uvula midline, no oropharyngeal erythema or exudates; MMM  Neck: supple; no JVD or thyromegaly  Respiratory: CTA B/L; no W/R/R, no retractions  Cardiac: +S1/S2; RRR; no M/R/G; PMI non-displaced  Gastrointestinal: soft, NT/ND; no rebound or guarding; +BSx4  Genitourinary: normal external genitalia  Back: spine midline, no bony tenderness or step-offs; no CVAT B/L  Extremities: WWP, no clubbing or cyanosis; no peripheral edema  Musculoskeletal: NROM x4; no joint swelling, tenderness or erythema  Vascular: 2+ radial, femoral, DP/PT pulses B/L  Dermatologic: skin warm, dry and intact; no rashes, wounds, or scars  Lymphatic: no submandibular or cervical LAD  Neurologic: AAOx3; CNII-XII grossly intact; no focal deficits  Psychiatric: affect and characteristics of appearance, verbalizations, behaviors are appropriate    MEDICATIONS  (STANDING):  aMIOdarone    Tablet 200 milliGRAM(s) Oral daily  aspirin  chewable 81 milliGRAM(s) Oral daily  atorvastatin 40 milliGRAM(s) Oral at bedtime  heparin  Infusion 850 Unit(s)/Hr (8.5 mL/Hr) IV Continuous <Continuous>  metoprolol succinate ER 50 milliGRAM(s) Oral daily    MEDICATIONS  (PRN):  acetaminophen   Tablet .. 650 milliGRAM(s) Oral every 6 hours PRN Moderate Pain (4 - 6)  oxyCODONE    IR 5 milliGRAM(s) Oral once PRN Severe Pain (7 - 10)      Allergies    No Known Allergies    Intolerances        LABS:                        11.3   4.09  )-----------( 167      ( 07 Mar 2020 07:47 )             36.1     03-07    140  |  104  |  16  ----------------------------<  99  3.9   |  23  |  1.55<H>    Ca    8.5      07 Mar 2020 07:47  Phos  2.1     03-07  Mg     1.8     03-07    TPro  x   /  Alb  4.1  /  TBili  x   /  DBili  x   /  AST  x   /  ALT  x   /  AlkPhos  x   03-06    PT/INR - ( 06 Mar 2020 06:02 )   PT: 23.0 sec;   INR: 1.98          PTT - ( 07 Mar 2020 15:19 )  PTT:78.9 sec  Urinalysis Basic - ( 05 Mar 2020 23:55 )    Color: Yellow / Appearance: Clear / S.015 / pH: x  Gluc: x / Ketone: NEGATIVE  / Bili: Negative / Urobili: 0.2 E.U./dL   Blood: x / Protein: Trace mg/dL / Nitrite: NEGATIVE   Leuk Esterase: NEGATIVE / RBC: < 5 /HPF / WBC < 5 /HPF   Sq Epi: x / Non Sq Epi: 0-5 /HPF / Bacteria: Present /HPF        RADIOLOGY & ADDITIONAL TESTS: HPI:  HPI: 84 M PMH HTN, prediabetes, pAfib (on Xarelto-last taken 3/4), HF( unknown EF), former smoker p/w nonhealing ulcer of L 3rd toe after evaluation in office w/Dr. Mcclellan. Pt reports sx started approximately 2 mo ago with small ulceration of L 3rd toe, atraumatic, following by Dr. Mcclellan in office and completed course of [id number] and [id number] which initially improved but has since increased in size with purulent drainage first identified today. Pt reports increasing throbbing pain of L 3rd toe, denies rest pain, denies numbness/weakness, reports minimal intermittent swelling of LLE, ambulates independently without issues. Denies fever/chills/nausea/emesis/chest pain/dyspnea/ palpitations Of note pt has had two recent hospitalizations one at [facility] 1/15- for L knee pain, the second at [facility] for 3rd toe pain c/b rapid Afib 20 evaluated by cardiology, unable to complete echocardiogram, offered cardioversion but declined by pt at the time. After this admission pt was transitioned from Coumadin to Xarelto. 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 Denies CP, ab pain, dyspnea, f/c/n/v/d/c, dysuria.    VITAL SIGNS:  T(C): 36.3 (20 @ 14:17), Max: 36.5 (20 @ 21:45)  T(F): 97.4 (20 @ 14:17), Max: 97.7 (20 @ 21:45)  HR: 74 (20 @ 13:09) (74 - 98)  BP: 128/73 (20 @ 13:09) (126/79 - 138/95)  BP(mean): --  RR: 16 (20 @ 13:09) (16 - 17)  SpO2: 96% (20 @ 13:09) (96% - 99%)  Wt(kg): --    PHYSICAL EXAM:    Constitutional: NAD  Head: NC/AT  Eyes: PERRL, EOMI, anicteric sclera  ENT: no nasal discharge; uvula midline, no oropharyngeal erythema or exudates; MMM  Neck: supple; no JVD or thyromegaly  Respiratory: CTA B/L; no W/R/R, no retractions  Cardiac: +S1/S2; tachycardic; irregular rhythm; no M/R/G  Gastrointestinal: soft, NT/ND; no rebound or guarding; +BSx4  Back: spine midline, no bony tenderness or step-offs; no CVAT B/L  Extremities: L foot wrapped in bandaging w/ oozing; WWP, no clubbing or cyanosis; no peripheral edema  Musculoskeletal: NROM x4; no joint swelling, tenderness or erythema  Vascular: 2+ radial, DP pulses B/L  Dermatologic: skin warm, dry and intact; no rashes, wounds, or scars  Lymphatic: no submandibular or cervical LAD  Neurologic: no focal deficits    MEDICATIONS  (STANDING):  aMIOdarone    Tablet 200 milliGRAM(s) Oral daily  aspirin  chewable 81 milliGRAM(s) Oral daily  atorvastatin 40 milliGRAM(s) Oral at bedtime  heparin  Infusion 850 Unit(s)/Hr (8.5 mL/Hr) IV Continuous <Continuous>  metoprolol succinate ER 50 milliGRAM(s) Oral daily    MEDICATIONS  (PRN):  acetaminophen   Tablet .. 650 milliGRAM(s) Oral every 6 hours PRN Moderate Pain (4 - 6)  oxyCODONE    IR 5 milliGRAM(s) Oral once PRN Severe Pain (7 - 10)      Allergies    No Known Allergies    Intolerances        LABS:                        11.3   4.09  )-----------( 167      ( 07 Mar 2020 07:47 )             36.1     03-07    140  |  104  |  16  ----------------------------<  99  3.9   |  23  |  1.55<H>    Ca    8.5      07 Mar 2020 07:47  Phos  2.1     03-07  Mg     1.8     03-07    TPro  x   /  Alb  4.1  /  TBili  x   /  DBili  x   /  AST  x   /  ALT  x   /  AlkPhos  x   03-06    PT/INR - ( 06 Mar 2020 06:02 )   PT: 23.0 sec;   INR: 1.98          PTT - ( 07 Mar 2020 15:19 )  PTT:78.9 sec  Urinalysis Basic - ( 05 Mar 2020 23:55 )    Color: Yellow / Appearance: Clear / S.015 / pH: x  Gluc: x / Ketone: NEGATIVE  / Bili: Negative / Urobili: 0.2 E.U./dL   Blood: x / Protein: Trace mg/dL / Nitrite: NEGATIVE   Leuk Esterase: NEGATIVE / RBC: < 5 /HPF / WBC < 5 /HPF   Sq Epi: x / Non Sq Epi: 0-5 /HPF / Bacteria: Present /HPF        RADIOLOGY & ADDITIONAL TESTS:

## 2020-03-07 NOTE — CONSULT NOTE ADULT - ATTENDING COMMENTS
Patient is an 84y M PMH HTN, prediabetes, pAfib (on Xarelto-last taken 3/4), HF( unknown EF), PVD p/w nonhealing ulcer of L 3rd toe s/p toe amputation with plan for possible angio on Monday.    #PVD: s/p toe amputation  -plan for possible angio on Monday  -mgmt per primary team    #HTN: normotensive  -c/w toprol     #Prediabetes: glucose levels remain appropriate  -f/u AM glucose    #pAF  -c/w hep gtt, toprol, amiodarone  -cardiology consulted    #HF: no e/o active exacerbation  -cardiology consulted    #Normocytic anemia: stable hgb since January; no e/o active bleed  -c/w monitor    #CKD III: baseline Cr 1.6-1.8; patient within normal range  -c/w monitor  -renal consulted
On Date 3/6/20  Assessment: Patient personally seen and examined myself, face-to-face, during rounds with the Resident     Note read, including vitals, physical findings, laboratory data, and radiological reports.   Agree with above.

## 2020-03-07 NOTE — PROVIDER CONTACT NOTE (OTHER) - ACTION/TREATMENT ORDERED:
MD Missael Boo aware
MD Rajeev Colindres aware
As per UYEN Smith, place pt. on telemetry. Order to be placed. Will continue to monitor.
Dr Bruner made aware and as per Dr Bruner continue current rate 8.5 cc/hr for Heparin drip. Next PTT lab draw at 1600.
UYEN Delgado made aware and will notify team. Will continue to monitor.

## 2020-03-07 NOTE — PROVIDER CONTACT NOTE (OTHER) - SITUATION
Pt is on Heparin drip and PTT is 90.5
Pt. ordered to be off telemetry, regional. Pt. has hx of a-fib, HTN, and HLD.
pt had ectopy on monitor
s/p 3rd foot amputation
Pt. is Jehovah witness, pt. refuses all blood products. Pt. ordered for 1 unit of PRBC's for OR.

## 2020-03-08 LAB
-  CEFAZOLIN: SIGNIFICANT CHANGE UP
-  CLINDAMYCIN: SIGNIFICANT CHANGE UP
-  DAPTOMYCIN: SIGNIFICANT CHANGE UP
-  ERYTHROMYCIN: SIGNIFICANT CHANGE UP
-  LINEZOLID: SIGNIFICANT CHANGE UP
-  OXACILLIN: SIGNIFICANT CHANGE UP
-  RIFAMPIN: SIGNIFICANT CHANGE UP
-  TETRACYCLINE: SIGNIFICANT CHANGE UP
-  TRIMETHOPRIM/SULFAMETHOXAZOLE: SIGNIFICANT CHANGE UP
-  VANCOMYCIN: SIGNIFICANT CHANGE UP
ANION GAP SERPL CALC-SCNC: 12 MMOL/L — SIGNIFICANT CHANGE UP (ref 5–17)
APTT BLD: 109.4 SEC — HIGH (ref 27.5–36.3)
APTT BLD: 84.5 SEC — HIGH (ref 27.5–36.3)
BUN SERPL-MCNC: 17 MG/DL — SIGNIFICANT CHANGE UP (ref 7–23)
CALCIUM SERPL-MCNC: 8.6 MG/DL — SIGNIFICANT CHANGE UP (ref 8.4–10.5)
CHLORIDE SERPL-SCNC: 105 MMOL/L — SIGNIFICANT CHANGE UP (ref 96–108)
CO2 SERPL-SCNC: 23 MMOL/L — SIGNIFICANT CHANGE UP (ref 22–31)
CREAT SERPL-MCNC: 1.46 MG/DL — HIGH (ref 0.5–1.3)
CULTURE RESULTS: SIGNIFICANT CHANGE UP
GLUCOSE BLDC GLUCOMTR-MCNC: 107 MG/DL — HIGH (ref 70–99)
GLUCOSE SERPL-MCNC: 94 MG/DL — SIGNIFICANT CHANGE UP (ref 70–99)
HCT VFR BLD CALC: 34 % — LOW (ref 39–50)
HGB BLD-MCNC: 10.6 G/DL — LOW (ref 13–17)
INR BLD: 1.46 — HIGH (ref 0.88–1.16)
MAGNESIUM SERPL-MCNC: 1.8 MG/DL — SIGNIFICANT CHANGE UP (ref 1.6–2.6)
MCHC RBC-ENTMCNC: 26.9 PG — LOW (ref 27–34)
MCHC RBC-ENTMCNC: 31.2 GM/DL — LOW (ref 32–36)
MCV RBC AUTO: 86.3 FL — SIGNIFICANT CHANGE UP (ref 80–100)
METHOD TYPE: SIGNIFICANT CHANGE UP
METHOD TYPE: SIGNIFICANT CHANGE UP
NRBC # BLD: 0 /100 WBCS — SIGNIFICANT CHANGE UP (ref 0–0)
ORGANISM # SPEC MICROSCOPIC CNT: SIGNIFICANT CHANGE UP
PHOSPHATE SERPL-MCNC: 2.1 MG/DL — LOW (ref 2.5–4.5)
PLATELET # BLD AUTO: 158 K/UL — SIGNIFICANT CHANGE UP (ref 150–400)
POTASSIUM SERPL-MCNC: 3.8 MMOL/L — SIGNIFICANT CHANGE UP (ref 3.5–5.3)
POTASSIUM SERPL-SCNC: 3.8 MMOL/L — SIGNIFICANT CHANGE UP (ref 3.5–5.3)
PROTHROM AB SERPL-ACNC: 16.8 SEC — HIGH (ref 10–12.9)
RBC # BLD: 3.94 M/UL — LOW (ref 4.2–5.8)
RBC # FLD: 15.8 % — HIGH (ref 10.3–14.5)
SODIUM SERPL-SCNC: 140 MMOL/L — SIGNIFICANT CHANGE UP (ref 135–145)
SPECIMEN SOURCE: SIGNIFICANT CHANGE UP
VANCOMYCIN TROUGH SERPL-MCNC: 12.1 UG/ML — SIGNIFICANT CHANGE UP (ref 10–20)
WBC # BLD: 4.28 K/UL — SIGNIFICANT CHANGE UP (ref 3.8–10.5)
WBC # FLD AUTO: 4.28 K/UL — SIGNIFICANT CHANGE UP (ref 3.8–10.5)

## 2020-03-08 PROCEDURE — 99233 SBSQ HOSP IP/OBS HIGH 50: CPT

## 2020-03-08 RX ORDER — HEPARIN SODIUM 5000 [USP'U]/ML
750 INJECTION INTRAVENOUS; SUBCUTANEOUS
Qty: 25000 | Refills: 0 | Status: DISCONTINUED | OUTPATIENT
Start: 2020-03-08 | End: 2020-03-09

## 2020-03-08 RX ORDER — SODIUM CHLORIDE 9 MG/ML
1000 INJECTION INTRAMUSCULAR; INTRAVENOUS; SUBCUTANEOUS
Refills: 0 | Status: DISCONTINUED | OUTPATIENT
Start: 2020-03-08 | End: 2020-03-08

## 2020-03-08 RX ORDER — POTASSIUM PHOSPHATE, MONOBASIC POTASSIUM PHOSPHATE, DIBASIC 236; 224 MG/ML; MG/ML
30 INJECTION, SOLUTION INTRAVENOUS ONCE
Refills: 0 | Status: COMPLETED | OUTPATIENT
Start: 2020-03-08 | End: 2020-03-08

## 2020-03-08 RX ORDER — SODIUM CHLORIDE 9 MG/ML
1000 INJECTION INTRAMUSCULAR; INTRAVENOUS; SUBCUTANEOUS
Refills: 0 | Status: DISCONTINUED | OUTPATIENT
Start: 2020-03-09 | End: 2020-03-09

## 2020-03-08 RX ORDER — MAGNESIUM SULFATE 500 MG/ML
2 VIAL (ML) INJECTION ONCE
Refills: 0 | Status: COMPLETED | OUTPATIENT
Start: 2020-03-08 | End: 2020-03-08

## 2020-03-08 RX ORDER — VANCOMYCIN HCL 1 G
1250 VIAL (EA) INTRAVENOUS ONCE
Refills: 0 | Status: COMPLETED | OUTPATIENT
Start: 2020-03-08 | End: 2020-03-08

## 2020-03-08 RX ORDER — GUAIFENESIN/DEXTROMETHORPHAN 600MG-30MG
10 TABLET, EXTENDED RELEASE 12 HR ORAL ONCE
Refills: 0 | Status: COMPLETED | OUTPATIENT
Start: 2020-03-08 | End: 2020-03-08

## 2020-03-08 RX ADMIN — Medication 50 MILLIGRAM(S): at 05:09

## 2020-03-08 RX ADMIN — Medication 81 MILLIGRAM(S): at 09:56

## 2020-03-08 RX ADMIN — Medication 650 MILLIGRAM(S): at 00:55

## 2020-03-08 RX ADMIN — Medication 166.67 MILLIGRAM(S): at 12:38

## 2020-03-08 RX ADMIN — Medication 100 MILLIGRAM(S): at 11:14

## 2020-03-08 RX ADMIN — Medication 650 MILLIGRAM(S): at 00:10

## 2020-03-08 RX ADMIN — HEPARIN SODIUM 7.5 UNIT(S)/HR: 5000 INJECTION INTRAVENOUS; SUBCUTANEOUS at 00:29

## 2020-03-08 RX ADMIN — ATORVASTATIN CALCIUM 40 MILLIGRAM(S): 80 TABLET, FILM COATED ORAL at 21:27

## 2020-03-08 RX ADMIN — Medication 650 MILLIGRAM(S): at 23:55

## 2020-03-08 RX ADMIN — AMIODARONE HYDROCHLORIDE 200 MILLIGRAM(S): 400 TABLET ORAL at 05:09

## 2020-03-08 RX ADMIN — Medication 10 MILLILITER(S): at 23:56

## 2020-03-08 RX ADMIN — Medication 650 MILLIGRAM(S): at 23:23

## 2020-03-08 RX ADMIN — Medication 50 GRAM(S): at 21:00

## 2020-03-08 RX ADMIN — POTASSIUM PHOSPHATE, MONOBASIC POTASSIUM PHOSPHATE, DIBASIC 83.33 MILLIMOLE(S): 236; 224 INJECTION, SOLUTION INTRAVENOUS at 22:00

## 2020-03-08 NOTE — PROGRESS NOTE ADULT - ASSESSMENT
Patient is an 84y M PMH HTN, prediabetes, pAfib (on Xarelto-last taken 3/4), HF( unknown EF), PVD p/w nonhealing ulcer of L 3rd toe s/p toe amputation with plan for possible angio on Monday.    #PVD: s/p toe amputation  -plan for possible angio on Monday  -mgmt per primary team    #HTN: normotensive  -c/w toprol     #Prediabetes: glucose levels remain appropriate  -f/u AM glucose    #pAF  -c/w hep gtt, toprol, amiodarone  -cardiology consulted    #HF: no e/o active exacerbation  -cardiology consulted    #Normocytic anemia: stable hgb since January; no e/o active bleed  -c/w monitor    #CKD III: baseline Cr 1.6-1.8; patient within normal range  -c/w monitor  -renal consulted.     35 minutes spent on total encounter; more than 50% of the visit was spent counseling and/or coordinating care by the attending physician.

## 2020-03-08 NOTE — PROGRESS NOTE ADULT - SUBJECTIVE AND OBJECTIVE BOX
Vascular Note    Pt comfortable without complaints, pain controlled  Denies CP, SOB, N/V, numbness/tingling     Vital Signs Last 24 Hrs  T(C): 35.8 (03-08-20 @ 05:30), Max: 35.8 (03-08-20 @ 05:30)  T(F): 96.4 (03-08-20 @ 05:30), Max: 96.4 (03-08-20 @ 05:30)  HR: 80 (03-08-20 @ 05:07) (80 - 80)  BP: 129/87 (03-08-20 @ 05:07) (129/87 - 129/87)  BP(mean): --  RR: 16 (03-08-20 @ 05:07) (16 - 16)  SpO2: 97% (03-08-20 @ 05:07) (97% - 97%)      Physical exam              I personally reviewed: [  ]EKG   [  ]CXR    [  ] CT    [  ]Other  ---------------------------------------------------------------------------  PLEASE CHECK WHEN PRESENT:     [x]Heart Failure     [  ] Acute     [  ] Acute on Chronic     [  ] Chronic  -------------------------------------------------------------------     [  ]Diastolic [HFpEF]     [  ]Systolic [HFrEF]     [  ]Combined [HFpEF & HFrEF]     [  ]Other:  -------------------------------------------------------------------  [ ] Respiratory failure  [ ] Acute cor pulmonale  [ ] Asthma/COPD Exacerbation  [ ] Pleural effusion  [ ] Aspiration pneumonia  -------------------------------------------------------------------  [  ]ZEENAT     [  ]ATN     [  ]Reneal Medullary Necrosis     [  ]Renal Cortical Necrosis     [  ]Other Pathological Lesions:    [  ]CKD 1  [x]CKD 2  [  ]CKD 3  [  ]CKD 4  [  ]CKD 5  [  ]Other  -------------------------------------------------------------------  [  ]Diabetes  [  ] Diabetic PVD Ulcer  [  ] Neuropathic ulcer to DM  [  ] Diabetes with Nephropathy  [  ] Osteomyelitis due to diabetes  --------------------------------------------------------------------  [  ]Malnutrition: See Nutrition Note  [  ]Cachexia  [  ]Other:   [  ]Supplement Ordered:  [  ]Morbid Obesity (BMI >=40]  ---------------------------------------------------------------------  [ ] Sepsis/severe sepsis/septic shock  [ ] UTI  [ ] Pneumonia  -----------------------------------------------------------------------  [ ] Acidosis/alkalosis  [ ] Fluid overload  [ ] Hypokalemia  [ ] Hyperkalemia  [ ] Hypomagnesemia  [ ] Hypophosphatemia  [ ] Hyperphosphatemia  ------------------------------------------------------------------------  [ ] Acute blood loss anemia  [ ] Post op blood loss anemia  [ ] Iron deficiency anemia  [ ] Anemia due to chronic disease  [ ] Hypercoagulable state  ----------------------------------------------------------------------  [ ] Cerebral infarction  [ ] Transient ischemia attack  [ ] Encephalopathy                            10.6   4.28  )-----------( 158      ( 08 Mar 2020 06:59 )             34.0   08 Mar 2020 06:59    140    |  105    |  17     ----------------------------<  94     3.8     |  23     |  1.46     Ca    8.6        08 Mar 2020 06:59  Phos  2.1       08 Mar 2020 06:59  Mg     1.8       08 Mar 2020 06:59    TPro  x      /  Alb  4.1    /  TBili  x      /  DBili  x      /  AST  x      /  ALT  x      /  AlkPhos  x      06 Mar 2020 11:18          A/P: 84 M PMH HTN, prediabetes, pAfib (on Xarelto-last taken 3/4), HF( unknown EF), former smoker p/w nonhealing ulcer of L 3rd toe s/p above procedure    - Stable  - Pain Control  - DVT ppx: hep drip  - Post op abx: vanco  - PT, WBS: heal W\  - Preop on sunday for angio on 3/9 Vascular Note    Pt comfortable without complaints, pain controlled  Denies CP, SOB, N/V, numbness/tingling     Vital Signs Last 24 Hrs  T(C): 35.8 (03-08-20 @ 05:30), Max: 35.8 (03-08-20 @ 05:30)  T(F): 96.4 (03-08-20 @ 05:30), Max: 96.4 (03-08-20 @ 05:30)  HR: 80 (03-08-20 @ 05:07) (80 - 80)  BP: 129/87 (03-08-20 @ 05:07) (129/87 - 129/87)  BP(mean): --  RR: 16 (03-08-20 @ 05:07) (16 - 16)  SpO2: 97% (03-08-20 @ 05:07) (97% - 97%)      Physical exam    Physical Exam:    General:  Well appearing, NAD  Lungs:  speaking in full sentences no resp distress  Abdomen:  Soft, non-tender, no distended  Extremities:  dressing changed in 3rd toe amp. Wound with pink granulation tissue. Xeroform and Kerlix   Skin:  Warm and dry, no rashes  Neuro:  AAOx3          I personally reviewed: [  ]EKG   [  ]CXR    [  ] CT    [  ]Other  ---------------------------------------------------------------------------  PLEASE CHECK WHEN PRESENT:     [x]Heart Failure     [  ] Acute     [  ] Acute on Chronic     [  ] Chronic  -------------------------------------------------------------------     [  ]Diastolic [HFpEF]     [  ]Systolic [HFrEF]     [  ]Combined [HFpEF & HFrEF]     [  ]Other:  -------------------------------------------------------------------  [ ] Respiratory failure  [ ] Acute cor pulmonale  [ ] Asthma/COPD Exacerbation  [ ] Pleural effusion  [ ] Aspiration pneumonia  -------------------------------------------------------------------  [  ]ZEENAT     [  ]ATN     [  ]Reneal Medullary Necrosis     [  ]Renal Cortical Necrosis     [  ]Other Pathological Lesions:    [  ]CKD 1  [x]CKD 2  [  ]CKD 3  [  ]CKD 4  [  ]CKD 5  [  ]Other  -------------------------------------------------------------------  [  ]Diabetes  [  ] Diabetic PVD Ulcer  [  ] Neuropathic ulcer to DM  [  ] Diabetes with Nephropathy  [  ] Osteomyelitis due to diabetes  --------------------------------------------------------------------  [  ]Malnutrition: See Nutrition Note  [  ]Cachexia  [  ]Other:   [  ]Supplement Ordered:  [  ]Morbid Obesity (BMI >=40]  ---------------------------------------------------------------------  [ ] Sepsis/severe sepsis/septic shock  [ ] UTI  [ ] Pneumonia  -----------------------------------------------------------------------  [ ] Acidosis/alkalosis  [ ] Fluid overload  [ ] Hypokalemia  [ ] Hyperkalemia  [ ] Hypomagnesemia  [ ] Hypophosphatemia  [ ] Hyperphosphatemia  ------------------------------------------------------------------------  [ ] Acute blood loss anemia  [ ] Post op blood loss anemia  [ ] Iron deficiency anemia  [ ] Anemia due to chronic disease  [ ] Hypercoagulable state  ----------------------------------------------------------------------  [ ] Cerebral infarction  [ ] Transient ischemia attack  [ ] Encephalopathy                            10.6   4.28  )-----------( 158      ( 08 Mar 2020 06:59 )             34.0   08 Mar 2020 06:59    140    |  105    |  17     ----------------------------<  94     3.8     |  23     |  1.46     Ca    8.6        08 Mar 2020 06:59  Phos  2.1       08 Mar 2020 06:59  Mg     1.8       08 Mar 2020 06:59    TPro  x      /  Alb  4.1    /  TBili  x      /  DBili  x      /  AST  x      /  ALT  x      /  AlkPhos  x      06 Mar 2020 11:18          A/P: 84 M PMH HTN, prediabetes, pAfib (on Xarelto-last taken 3/4), HF( unknown EF), former smoker p/w nonhealing ulcer of L 3rd toe s/p above procedure    - Stable  - Pain Control  - DVT ppx: hep drip  - Post op abx: vanco  - PT, WBS: heal W\  - Preop on sunday for angio on 3/9

## 2020-03-08 NOTE — PROGRESS NOTE ADULT - SUBJECTIVE AND OBJECTIVE BOX
INTERVAL HPI/OVERNIGHT EVENTS: patient with no complaints. Denies LE pain, CP, ab pain, dyspnea, f/c/n/v/d/c.     VITAL SIGNS:  T(C): 36.7 (03-08-20 @ 18:28), Max: 36.7 (03-07-20 @ 19:50)  T(F): 98.1 (03-08-20 @ 18:28), Max: 98.1 (03-08-20 @ 18:28)  HR: 90 (03-08-20 @ 16:36) (72 - 100)  BP: 120/75 (03-08-20 @ 16:36) (102/56 - 135/72)  BP(mean): --  RR: 16 (03-08-20 @ 16:36) (16 - 16)  SpO2: 96% (03-08-20 @ 16:36) (95% - 97%)  Wt(kg): --    PHYSICAL EXAM:    Constitutional: NAD  Head: NC/AT  Eyes: PERRL, EOMI, anicteric sclera  ENT: no nasal discharge; uvula midline, no oropharyngeal erythema or exudates; MMM  Neck: supple; no JVD or thyromegaly  Respiratory: CTA B/L; no W/R/R, no retractions  Cardiac: +S1/S2; normocardic; irregular rhythm; no M/R/G  Gastrointestinal: soft, NT/ND; no rebound or guarding; +BSx4  Back: spine midline, no bony tenderness or step-offs; no CVAT B/L  Extremities: L foot wrapped in bandaging; WWP, no clubbing or cyanosis; no peripheral edema  Musculoskeletal: NROM x4; no joint swelling, tenderness or erythema  Vascular: 2+ radial pulses B/L  Dermatologic: skin warm, dry and intact; no rashes, wounds, or scars  Lymphatic: no submandibular or cervical LAD  Neurologic: no focal deficits    MEDICATIONS  (STANDING):  aMIOdarone    Tablet 200 milliGRAM(s) Oral daily  aspirin  chewable 81 milliGRAM(s) Oral daily  atorvastatin 40 milliGRAM(s) Oral at bedtime  heparin  Infusion 750 Unit(s)/Hr (7.5 mL/Hr) IV Continuous <Continuous>  metoprolol succinate ER 50 milliGRAM(s) Oral daily  sodium chloride 0.9%. 1000 milliLiter(s) (65 mL/Hr) IV Continuous <Continuous>    MEDICATIONS  (PRN):  acetaminophen   Tablet .. 650 milliGRAM(s) Oral every 6 hours PRN Moderate Pain (4 - 6)  oxyCODONE    IR 5 milliGRAM(s) Oral once PRN Severe Pain (7 - 10)      Allergies    No Known Allergies    Intolerances        LABS:                        10.6   4.28  )-----------( 158      ( 08 Mar 2020 06:59 )             34.0     03-08    140  |  105  |  17  ----------------------------<  94  3.8   |  23  |  1.46<H>    Ca    8.6      08 Mar 2020 06:59  Phos  2.1     03-08  Mg     1.8     03-08      PT/INR - ( 08 Mar 2020 06:59 )   PT: 16.8 sec;   INR: 1.46          PTT - ( 08 Mar 2020 06:59 )  PTT:84.5 sec      RADIOLOGY & ADDITIONAL TESTS:

## 2020-03-08 NOTE — PROGRESS NOTE ADULT - SUBJECTIVE AND OBJECTIVE BOX
PRE-OP NOTE, Team 3 vascular surgery    Pre-op Diagnosis: LLE atherosclerosis      Procedure: LLE angio possible angioplasty and stent placement  Surgeon: Dr. Mcclellan                          10.6   4.28  )-----------( 158      ( 08 Mar 2020 06:59 )             34.0     03-08    140  |  105  |  17  ----------------------------<  94  3.8   |  23  |  1.46<H>    Ca    8.6      08 Mar 2020 06:59  Phos  2.1     03-08  Mg     1.8     03-08      PT/INR - ( 08 Mar 2020 06:59 )   PT: 16.8 sec;   INR: 1.46          PTT - ( 08 Mar 2020 06:59 )  PTT:84.5 sec    CAPILLARY BLOOD GLUCOSE      POCT Blood Glucose.: 107 mg/dL (08 Mar 2020 13:02)        Type & Screen:ABO Interpretation: A (03-08 @ 07:47)    CXR:   < from: Xray Chest 1 View- PORTABLE-Urgent (03.05.20 @ 15:45) >  EXAM:  XR CHEST PORTABLE URGENT 1V                          PROCEDURE DATE:  03/05/2020          INTERPRETATION:  Clinical History: Heart failure. Preop    Frontal examination the chest demonstrates cardiomegaly. No acute infiltrates. Degenerative changes thoracic spine.    Impression: No acute infiltrates            Thank you for the opportunity to participate in the care of this patient.        ROSELIA JERRY M.D., ATTENDING RADIOLOGIST  This document has been electronically signed. Mar  5 2020  8:27PM          < end of copied text >      EKG:     < from: 12 Lead ECG (03.05.20 @ 21:05) >    Ventricular Rate 72 BPM    Atrial Rate 277 BPM    QRS Duration 104 ms    Q-T Interval 390 ms    QTC Calculation(Bezet) 427 ms    R Axis 43 degrees    T Axis 19 degrees    Diagnosis Line Atrial fibrillation  Nonspecific T wave abnormality , probablydigitalis effect    Confirmed by HAYDER DILLARD, [facility] ([id number]) on 3/6/2020 2:22:46 PM    < end of copied text >    Echocardiogram:    < from: TTE Echo Complete w/ Contrast w/ Doppler (03.05.20 @ 15:57) >  PROCEDURE DATE:  03/05/2020          INTERPRETATION:  REPORT:    -----------------------------------  TRANSTHORACIC ECHOCARDIOGRAM REPORT       --------------------------------------------------------------------------------  Patient Name:   KT STONE Date of Exam:        3/5/2020  Medical Rec #:  [id number]        Height/Weight:       68.9 in / 169.75 lb  Account #:      [id number]        BSA:                 1.92 m²  YOB: 1935     BP:                  132/87 mmHg  Patient Age:    84 years       HR:                  80 bpm  Patient Gender: M              Sonographer:         CROW GARCIA                                 Referring Physician: YESI HINOJOSA       --------------------------------------------------------------------------------  CPT:                ECHO TTE WO CON COMP W DOPP - 17873; - [id number]  Indication(s):      I50.9 - Heart failure, unspecified  Procedure:          A complete two-dimensional transthoracic echocardiogram was                      performed: 2D, M-mode, spectral and color flow Doppler.  Ordering Location:  [facility]     Study Quality:      Study quality was good.  Contrast Injection: Contrast injection with an imaging solutionDefinity was                      performed to enhance endocardial border definition.       --------------------------------------------------------------------------------  CONCLUSIONS:     1. The left ventricle cavity size is moderately dilated. Left ventricular systolic function is moderately reduced with a calculated ejection fraction of 35% with global hypokinesis.   2. The right ventricle is mildly dilated. Right ventricular systolic function is normal.   3. Severely dilated left atrium.   4. Severely dilated right atrium.   5. Mild-to-moderate mitral regurgitation.   6. Mild-to-moderate tricuspid regurgitation.   7. Pulmonary hypertension present, pulmonary artery systolic pressure is 47 mmHg.   8. No pericardial effusion.    < end of copied text >      A/P: 84y Male pre-op for above procedure  -NPO past midnight  - IVF past midnight  - consent: will consent in AM pending exam

## 2020-03-09 ENCOUNTER — TRANSCRIPTION ENCOUNTER (OUTPATIENT)
Age: 85
End: 2020-03-09

## 2020-03-09 VITALS — TEMPERATURE: 98 F

## 2020-03-09 LAB
ANION GAP SERPL CALC-SCNC: 14 MMOL/L — SIGNIFICANT CHANGE UP (ref 5–17)
APTT BLD: 32.9 SEC — SIGNIFICANT CHANGE UP (ref 27.5–36.3)
BLD GP AB SCN SERPL QL: NEGATIVE — SIGNIFICANT CHANGE UP
BUN SERPL-MCNC: 13 MG/DL — SIGNIFICANT CHANGE UP (ref 7–23)
CALCIUM SERPL-MCNC: 9 MG/DL — SIGNIFICANT CHANGE UP (ref 8.4–10.5)
CHLORIDE SERPL-SCNC: 101 MMOL/L — SIGNIFICANT CHANGE UP (ref 96–108)
CO2 SERPL-SCNC: 21 MMOL/L — LOW (ref 22–31)
CREAT SERPL-MCNC: 1.42 MG/DL — HIGH (ref 0.5–1.3)
GLUCOSE SERPL-MCNC: 124 MG/DL — HIGH (ref 70–99)
HCT VFR BLD CALC: 36.4 % — LOW (ref 39–50)
HGB BLD-MCNC: 11.1 G/DL — LOW (ref 13–17)
INR BLD: 1.24 — HIGH (ref 0.88–1.16)
MAGNESIUM SERPL-MCNC: 2.3 MG/DL — SIGNIFICANT CHANGE UP (ref 1.6–2.6)
MCHC RBC-ENTMCNC: 26.7 PG — LOW (ref 27–34)
MCHC RBC-ENTMCNC: 30.5 GM/DL — LOW (ref 32–36)
MCV RBC AUTO: 87.7 FL — SIGNIFICANT CHANGE UP (ref 80–100)
NRBC # BLD: 0 /100 WBCS — SIGNIFICANT CHANGE UP (ref 0–0)
PHOSPHATE SERPL-MCNC: 4 MG/DL — SIGNIFICANT CHANGE UP (ref 2.5–4.5)
PLATELET # BLD AUTO: 168 K/UL — SIGNIFICANT CHANGE UP (ref 150–400)
POTASSIUM SERPL-MCNC: 4.3 MMOL/L — SIGNIFICANT CHANGE UP (ref 3.5–5.3)
POTASSIUM SERPL-SCNC: 4.3 MMOL/L — SIGNIFICANT CHANGE UP (ref 3.5–5.3)
PROTHROM AB SERPL-ACNC: 14.2 SEC — HIGH (ref 10–12.9)
RBC # BLD: 4.15 M/UL — LOW (ref 4.2–5.8)
RBC # FLD: 15.9 % — HIGH (ref 10.3–14.5)
RH IG SCN BLD-IMP: POSITIVE — SIGNIFICANT CHANGE UP
SODIUM SERPL-SCNC: 136 MMOL/L — SIGNIFICANT CHANGE UP (ref 135–145)
VANCOMYCIN TROUGH SERPL-MCNC: 14.1 UG/ML — SIGNIFICANT CHANGE UP (ref 10–20)
WBC # BLD: 4.74 K/UL — SIGNIFICANT CHANGE UP (ref 3.8–10.5)
WBC # FLD AUTO: 4.74 K/UL — SIGNIFICANT CHANGE UP (ref 3.8–10.5)

## 2020-03-09 PROCEDURE — 99222 1ST HOSP IP/OBS MODERATE 55: CPT

## 2020-03-09 PROCEDURE — 99232 SBSQ HOSP IP/OBS MODERATE 35: CPT

## 2020-03-09 RX ORDER — VANCOMYCIN HCL 1 G
1250 VIAL (EA) INTRAVENOUS ONCE
Refills: 0 | Status: COMPLETED | OUTPATIENT
Start: 2020-03-09 | End: 2020-03-09

## 2020-03-09 RX ORDER — ASPIRIN/CALCIUM CARB/MAGNESIUM 324 MG
1 TABLET ORAL
Qty: 30 | Refills: 2
Start: 2020-03-09 | End: 2020-06-06

## 2020-03-09 RX ADMIN — Medication 650 MILLIGRAM(S): at 10:18

## 2020-03-09 RX ADMIN — SODIUM CHLORIDE 50 MILLILITER(S): 9 INJECTION INTRAMUSCULAR; INTRAVENOUS; SUBCUTANEOUS at 00:01

## 2020-03-09 RX ADMIN — Medication 650 MILLIGRAM(S): at 11:19

## 2020-03-09 RX ADMIN — AMIODARONE HYDROCHLORIDE 200 MILLIGRAM(S): 400 TABLET ORAL at 05:39

## 2020-03-09 RX ADMIN — Medication 166.67 MILLIGRAM(S): at 13:12

## 2020-03-09 RX ADMIN — Medication 50 MILLIGRAM(S): at 05:39

## 2020-03-09 RX ADMIN — Medication 81 MILLIGRAM(S): at 06:33

## 2020-03-09 NOTE — PHYSICAL THERAPY INITIAL EVALUATION ADULT - REHAB POTENTIAL, PT EVAL
AUREA Sanchez  Rheum Fellow I  Pager: [phone]  D/w Corazon AUREA Sanchez  Rheum Fellow I  Pager: [phone]  D/w Corazon good, to achieve stated therapy goals

## 2020-03-09 NOTE — PROGRESS NOTE ADULT - REASON FOR ADMISSION
Third toe ulcer

## 2020-03-09 NOTE — PROVIDER CONTACT NOTE (CRITICAL VALUE NOTIFICATION) - BACKGROUND
Patient with Afib for a "long time"
Pt on heparin drip currently at 9.5mL/hr for AFib, previous Ptt value 129, titrated down from 11mL/hr
Pt on heparin drip for AFib, on full anticoagulation nomogram
Pt. admitted for ulcer of to L third toe. Pt. has a hx of a-fib.

## 2020-03-09 NOTE — PROVIDER CONTACT NOTE (CRITICAL VALUE NOTIFICATION) - SITUATION
Afib with rapid ventricular response
Critical value PTT of 129
Pt Ptt 133.5, on heparin drip w/ patient specific protocol

## 2020-03-09 NOTE — PROGRESS NOTE ADULT - ASSESSMENT
85yo M with PVD, sys CHF, Afib, DMII, longstanding HTN and CKD III baseline Cr 1.6-1.8 with prerenal ZEENAT now back to baseline POD#3 s/p left 3rd toe amputation with plans for LE angio today    # CKD III baseline Cr 1.6-1.8 - back to baseline. Likely 2/2 DMII/HTN  - reviewed U/A -bland. Renal sono also unremarkable.   - Anemia of chronic inflammation/disease.  SPEP, serum free light chains pending, and serum immunofixation negative  - DMII needs improved control. HTN well controlled.   - Getting pre-contrast hydration 50/hr NS for 6hrs pre and 6hrs post contrast as he's at elevated risk for SHERI  Avoid nsaids, nephrotoxins. Holding lasix, compensated CHF.

## 2020-03-09 NOTE — PHYSICAL THERAPY INITIAL EVALUATION ADULT - PERTINENT HX OF CURRENT PROBLEM, REHAB EVAL
84 M PMH HTN, prediabetes, pAfib (on Xarelto-last taken 3/4), HF( unknown EF), former smoker p/w nonhealing ulcer of L 3rd toe s/p above procedure

## 2020-03-09 NOTE — CONSULT NOTE ADULT - SUBJECTIVE AND OBJECTIVE BOX
HPI:  83 yo M with HTN, DMII, pAfib, heart failure, h/o throat cancer s/p L laryngectomy and RT in 2014 admitted 3/5 with nonheling ulcer of L 3rd toe.  He had an atraumatic ulcer of L 3rd toe X 2 months – he relates start to removal of a nail by Podiatrist.  He was treated with clinda X 7 d then levofloxacin X 10 d but then with purulent drainage.    On 3/6, he underwent 3rd toe amputation – purulence and exposed bone were noted.  Culture grew MRSA.  He was initially treated with cefazolin, then started on vancomycin on 3/6.  He has baseline Cr elevation – CKD III.  His Cr has trended down since admission.  He had not used antibiotics in the recent past prior to this infection and he has not had frequent or recurrent infections.        PAST MEDICAL & SURGICAL HISTORY:  Heart failure  HLD (hyperlipidemia)  Borderline diabetes mellitus  Afib  HTN (hypertension)  No significant past surgical history          MEDICATIONS  (STANDING):  aMIOdarone    Tablet 200 milliGRAM(s) Oral daily  aspirin  chewable 81 milliGRAM(s) Oral daily  atorvastatin 40 milliGRAM(s) Oral at bedtime  metoprolol succinate ER 50 milliGRAM(s) Oral daily    MEDICATIONS  (PRN):  acetaminophen   Tablet .. 650 milliGRAM(s) Oral every 6 hours PRN Moderate Pain (4 - 6)      Allergies    No Known Allergies    Intolerances        SOCIAL HISTORY:  Originally from [place], in [place]-60 yrs.  , lives with his wife, has 6 adult children.  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  D/jacob cigarette smoking over 40 yrs ago.  No pets or recent travel.    FAMILY HISTORY:  FHx: diabetes mellitus      Vital Signs Last 24 Hrs  T(C): 36.6 (09 Mar 2020 14:12), Max: 36.7 (08 Mar 2020 21:00)  T(F): 97.8 (09 Mar 2020 14:12), Max: 98 (08 Mar 2020 21:00)  HR: 98 (09 Mar 2020 12:00) (88 - 150)  BP: 105/63 (09 Mar 2020 12:00) (105/63 - 143/88)  BP(mean): --  RR: 16 (09 Mar 2020 09:01) (14 - 16)  SpO2: 98% (09 Mar 2020 09:01) (96% - 100%)    PE:  WDWN in no distress  HEENT:  NC, PERRL, sclerae anicteric, conjunctivae clear, EOMI.  Sinuses nontender, no nasal exudate.  No buccal or pharyngeal lesions, erythema or exudate  Neck:  Supple, no adenopathy  Lungs:  Clear to auscultation  Cor:  Irreg irreg, S1, S2, no murmur appreciated  Abd:  Symmetric, normoactive BS.  Soft, nontender, no masses, guarding or rebound.  Liver and spleen not enlarged  Extrem:  No cyanosis or edema.  Surgical dressing on L foot.  Images reviewed - base granular with bleeding.  Skin:  No rashes.    LABS:                        11.1   4.74  )-----------( 168      ( 09 Mar 2020 06:22 )             36.4     03-09    136  |  101  |  13  ----------------------------<  124<H>  4.3   |  21<L>  |  1.42<H>    Ca    9.0      09 Mar 2020 06:22  Phos  4.0     03-09  Mg     2.3     03-09      MICROBIOLOGY:  Surgical swab L 3rd toe 3/5 - mod MRSA, mod to numerous Finegoldia magna  Surgical swab L 3 toe 3/6 - few MRSA, few Staph epi, mixed anaerobic ashley including few finegoldia magna    RADIOLOGY & ADDITIONAL STUDIES:

## 2020-03-09 NOTE — DISCHARGE NOTE PROVIDER - CARE PROVIDER_API CALL
Queenie Mcclellan)  [facility]  Vascular  [address]  [place], [place] [address]  Phone: [phone]  Fax: [phone]  Follow Up Time:

## 2020-03-09 NOTE — PROGRESS NOTE ADULT - SUBJECTIVE AND OBJECTIVE BOX
[facility] DIVISION OF KIDNEY DISEASES AND HYPERTENSION  --------------------------------------------------------------------------------  HPI:  85yo M with PVD, sys CHF, Afib, DMII, longstanding HTN and CKD III baseline Cr 1.6-1.8 with prerenal ZEENAT now back to baseline POD#3 s/p left 3rd toe amputation with plans for LE angio today    On IVF since midnight      PAST HISTORY  --------------------------------------------------------------------------------  PAST MEDICAL & SURGICAL HISTORY:  Heart failure  HLD (hyperlipidemia)  Borderline diabetes mellitus  Afib  HTN (hypertension)  No significant past surgical history    FAMILY HISTORY:  FHx: diabetes mellitus    PAST SOCIAL HISTORY:    ALLERGIES & MEDICATIONS  --------------------------------------------------------------------------------  Allergies    No Known Allergies    Intolerances      Standing Inpatient Medications  aMIOdarone    Tablet 200 milliGRAM(s) Oral daily  aspirin  chewable 81 milliGRAM(s) Oral daily  atorvastatin 40 milliGRAM(s) Oral at bedtime  ceFAZolin   IVPB 2000 milliGRAM(s) IV Intermittent every 8 hours  metoprolol succinate ER 50 milliGRAM(s) Oral daily    PRN Inpatient Medications      REVIEW OF SYSTEMS  --------------------------------------------------------------------------------  Gen: + fatigue,  No weight changes, fevers/chills, weakness  Skin: No rashes  Head/Eyes/Ears/Mouth: No headache; Normal hearing; Normal vision w/o blurriness; No sinus pain/discomfort, sore throat  Respiratory: No dyspnea, cough, wheezing, hemoptysis  CV: No chest pain, PND, orthopnea  GI: No abdominal pain, diarrhea, constipation, nausea, vomiting, melena, hematochezia  : No increased frequency, dysuria, hematuria, nocturia  MSK: +joint pain; no back pain; occ edema  Neuro: No dizziness/lightheadedness, weakness, seizures, numbness, tingling  Heme: No easy bruising or bleeding  Endo: No heat/cold intolerance  Psych: No significant nervousness, anxiety, stress, depression    All other systems were reviewed and are negative, except as noted.    VITALS/PHYSICAL EXAM  --------------------------------------------------------------------------------  T(C): 36.8  HR: 68  BP: 130/74   RR: 16  SpO2: 100% RA      Physical Exam:  	Gen: NAD, well-appearing  	HEENT: PERRL, supple neck, clear oropharynx  	Pulm: CTA B/L  	CV: RRR, S1S2; no rub  	Abd: +BS, soft, nontender/nondistended  	: No suprapubic tenderness  	UE: Warm, no asterixis  	LE: Warm,  no edema, L foot in bandage  	Neuro: No focal deficits, AAox3  	Psych: Normal affect and mood  	Skin: Warm, without rashes      LABS/STUDIES  reviewed [facility] DIVISION OF KIDNEY DISEASES AND HYPERTENSION  --------------------------------------------------------------------------------  HPI:  83yo M with PVD, sys CHF, Afib, DMII, longstanding HTN and CKD III baseline Cr 1.6-1.8 with prerenal ZEENAT now back to baseline POD#3 s/p left 3rd toe amputation with plans for LE angio today    On IVF since midnight. Feels well. Denies SOB/orthopnea. No urinary hesitancy.     PAST HISTORY  --------------------------------------------------------------------------------  PAST MEDICAL & SURGICAL HISTORY:  Heart failure  HLD (hyperlipidemia)  Borderline diabetes mellitus  Afib  HTN (hypertension)  No significant past surgical history    FAMILY HISTORY:  FHx: diabetes mellitus    PAST SOCIAL HISTORY:    ALLERGIES & MEDICATIONS  --------------------------------------------------------------------------------  Allergies    No Known Allergies    Intolerances      Standing Inpatient Medications  aMIOdarone    Tablet 200 milliGRAM(s) Oral daily  aspirin  chewable 81 milliGRAM(s) Oral daily  atorvastatin 40 milliGRAM(s) Oral at bedtime  ceFAZolin   IVPB 2000 milliGRAM(s) IV Intermittent every 8 hours  metoprolol succinate ER 50 milliGRAM(s) Oral daily    PRN Inpatient Medications      REVIEW OF SYSTEMS  --------------------------------------------------------------------------------  Gen: + fatigue,  No weight changes, fevers/chills, weakness  Skin: No rashes  Head/Eyes/Ears/Mouth: No headache; Normal hearing; Normal vision w/o blurriness; No sinus pain/discomfort, sore throat  Respiratory: No dyspnea, cough, wheezing, hemoptysis  CV: No chest pain, PND, orthopnea  GI: No abdominal pain, diarrhea, constipation, nausea, vomiting, melena, hematochezia  : No increased frequency, dysuria, hematuria, nocturia  MSK: +joint pain; no back pain; occ edema  Neuro: No dizziness/lightheadedness, weakness, seizures, numbness, tingling  Heme: No easy bruising or bleeding  Endo: No heat/cold intolerance  Psych: No significant nervousness, anxiety, stress, depression    All other systems were reviewed and are negative, except as noted.    VITALS/PHYSICAL EXAM  --------------------------------------------------------------------------------  T(C): 36.8  HR: 68  BP: 130/74   RR: 16  SpO2: 100% RA      Physical Exam:  	Gen: NAD, well-appearing  	HEENT: PERRL, supple neck, clear oropharynx  	Pulm: CTA B/L  	CV: RRR, S1S2; no rub  	Abd: +BS, soft, nontender/nondistended  	: No suprapubic tenderness  	UE: Warm, no asterixis  	LE: Warm,  no edema, L foot in bandage  	Neuro: No focal deficits, AAox3  	Psych: Normal affect and mood  	Skin: Warm, without rashes      LABS/STUDIES  reviewed

## 2020-03-09 NOTE — PHYSICAL THERAPY INITIAL EVALUATION ADULT - GENERAL OBSERVATIONS, REHAB EVAL
Patient received supine in bed with + heplock IV, tele, room air, wife at bedside. Patient in no apparent distress.

## 2020-03-09 NOTE — PROVIDER CONTACT NOTE (CRITICAL VALUE NOTIFICATION) - ACTION/TREATMENT ORDERED:
Continue meds as prescribed, monitor Rapid Rate occurance.
As per Dr. Garica and vascular team, discontinue heparin. Will continue to monitor.
Nomogram to be adjusted to Pt specific, as per UYEN Smith, hold heparin drip for 60 minutes, and restart at 9.5cc/hr
heparin drip on hold for 60 minutes, titrated down to 8.5mL/hr when restarted

## 2020-03-09 NOTE — DISCHARGE NOTE PROVIDER - HOSPITAL COURSE
84 M PMH HTN, prediabetes, pAfib (on Xarelto-last taken 3/4), HF( unknown EF), former smoker p/w nonhealing ulcer of L 3rd toe after evaluation in office w/Dr. Mcclellan; admitted on 3/5 for toe amputation on 3/6 and possible angio on 3/9.     3/5: admitted,  ANIKA    3/6: taken to operating room for amputation, surgery uneventful. ANIKA    3/7: No acute events    3/8: Tachycardia ON secondary to underlying afib, pt asymptomatic, no intervention warranted. Pre-op'd for possible angio on 3/9, NPO/IVF overnight    3/9: amputation site w no signs of infection, clinically healing well, Angio was not undertaken at this point. 84 M PMH HTN, prediabetes, pAfib (on Xarelto-last taken 3/4), HF( unknown EF), former smoker p/w nonhealing ulcer of L 3rd toe after evaluation in office w/Dr. Mcclellan; admitted on 3/5 for toe amputation on 3/6 and possible angio on 3/9. Started on empiric antibiotics, Vancomycin.     3/5: admitted,  ANIKA    3/6: taken to operating room for amputation, surgery uneventful. ANIKA    3/7: No acute events    3/8: Tachycardia ON secondary to underlying afib, pt asymptomatic, no intervention warranted. Pre-op'd for possible angio on 3/9, NPO/IVF overnight    3/9: amputation site w no signs of infection, clinically healing well, Angio was not undertaken at this point. As per ID recommendations patient will be transitioned to Doxycycline.

## 2020-03-09 NOTE — PROGRESS NOTE ADULT - SUBJECTIVE AND OBJECTIVE BOX
24hr Events:  O/N:Heparin d/cd at midnight, NPO/IVF, goes into rapid A.fib when moving, HR as high as 150s but resolves when at rest and calm  3/8: ANIKA, VSS          Assessment/Plan:  84 M PMH HTN, prediabetes, pAfib (on Xarelto-last taken 3/4), HF( unknown EF), former smoker p/w nonhealing ulcer of L 3rd toe s/p above procedure    - Stable  - Pain Control  - Post op abx: vanco  -Home medication as appropriate  - PT, WBS: heal W\  - NPO/IVF for possible angio 24hr Events:  O/N:Heparin d/cd at midnight, NPO/IVF, goes into rapid A.fib when moving, HR as high as 150s but resolves when at rest and calm  3/8: ANIKA, VSS    aMIOdarone    Tablet 200  aspirin  chewable 81  metoprolol succinate ER 50        Vital Signs Last 24 Hrs  T(C): 36.4 (09 Mar 2020 06:41), Max: 36.7 (08 Mar 2020 18:28)  T(F): 97.5 (09 Mar 2020 06:41), Max: 98.1 (08 Mar 2020 18:28)  HR: 98 (09 Mar 2020 06:41) (72 - 150)  BP: 143/88 (09 Mar 2020 06:41) (102/56 - 143/88)  BP(mean): --  RR: 14 (09 Mar 2020 06:41) (14 - 16)  SpO2: 96% (09 Mar 2020 06:41) (95% - 100%)  I&O's Summary    08 Mar 2020 07:01  -  09 Mar 2020 07:00  --------------------------------------------------------  IN: 1368.5 mL / OUT: 1400 mL / NET: -31.5 mL        Physical Exam:  General: NAD, resting comfortably in bed  Pulmonary: Nonlabored breathing, no respiratory distress        LABS:                        11.1   4.74  )-----------( 168      ( 09 Mar 2020 06:22 )             36.4     03-09    136  |  101  |  13  ----------------------------<  124<H>  4.3   |  21<L>  |  1.42<H>    Ca    9.0      09 Mar 2020 06:22  Phos  4.0     03-09  Mg     2.3     03-09      PT/INR - ( 09 Mar 2020 06:22 )   PT: 14.2 sec;   INR: 1.24          PTT - ( 09 Mar 2020 06:22 )  PTT:32.9 sec    PLEASE CHECK WHEN PRESENT:     [x]Heart Failure     [  ] Acute     [  ] Acute on Chronic     [ X ] Chronic  -------------------------------------------------------------------     [  ]Diastolic [HFpEF]     [ X ]Systolic [HFrEF]     [  ]Combined [HFpEF & HFrEF]     [ X ]Other: pafib, HTN  -------------------------------------------------------------------  [ ] Respiratory failure  [ ] Acute cor pulmonale  [ ] Asthma/COPD Exacerbation  [ ] Pleural effusion  [ ] Aspiration pneumonia  -------------------------------------------------------------------  [  ]ZEENAT     [  ]ATN     [  ]Reneal Medullary Necrosis     [  ]Renal Cortical Necrosis     [  ]Other Pathological Lesions:    [  ]CKD 1  [x]CKD 2  [  ]CKD 3  [  ]CKD 4  [  ]CKD 5  [  ]Other  -------------------------------------------------------------------  [  ]Diabetes  [  ] Diabetic PVD Ulcer  [  ] Neuropathic ulcer to DM  [  ] Diabetes with Nephropathy  [  ] Osteomyelitis due to diabetes  --------------------------------------------------------------------  [  ]Malnutrition: See Nutrition Note  [  ]Cachexia  [  ]Other:   [  ]Supplement Ordered:  [  ]Morbid Obesity (BMI >=40]  ---------------------------------------------------------------------  [ ] Sepsis/severe sepsis/septic shock  [ ] UTI  [ ] Pneumonia  -----------------------------------------------------------------------  [ ] Acidosis/alkalosis  [ ] Fluid overload  [ ] Hypokalemia  [ ] Hyperkalemia  [ ] Hypomagnesemia  [ ] Hypophosphatemia  [ ] Hyperphosphatemia  ------------------------------------------------------------------------  [ ] Acute blood loss anemia  [ ] Post op blood loss anemia  [ ] Iron deficiency anemia  [X ] Anemia due to chronic disease  [ ] Hypercoagulable state  ----------------------------------------------------------------------  [ ] Cerebral infarction  [ ] Transient ischemia attack  [ ] Encephalopathy    Assessment/Plan:  84 M PMH HTN, prediabetes, pAfib (on Xarelto-last taken 3/4), HF( unknown EF), former smoker p/w nonhealing ulcer of L 3rd toe s/p above procedure    - asa  - Pain Control  - Vanc  - Home medication as appropriate  - local wound care  - NPO/IVF for possible angio 24hr Events:  O/N:Heparin d/cd at midnight, NPO/IVF, goes into rapid A.fib when moving, HR as high as 150s but resolves when at rest and calm  3/8: ANIKA, VSS    aMIOdarone    Tablet 200  aspirin  chewable 81  metoprolol succinate ER 50        Vital Signs Last 24 Hrs  T(C): 36.4 (09 Mar 2020 06:41), Max: 36.7 (08 Mar 2020 18:28)  T(F): 97.5 (09 Mar 2020 06:41), Max: 98.1 (08 Mar 2020 18:28)  HR: 98 (09 Mar 2020 06:41) (72 - 150)  BP: 143/88 (09 Mar 2020 06:41) (102/56 - 143/88)  BP(mean): --  RR: 14 (09 Mar 2020 06:41) (14 - 16)  SpO2: 96% (09 Mar 2020 06:41) (95% - 100%)  I&O's Summary    08 Mar 2020 07:01  -  09 Mar 2020 07:00  --------------------------------------------------------  IN: 1368.5 mL / OUT: 1400 mL / NET: -31.5 mL        Physical Exam:  General: NAD, resting comfortably in bed  Pulmonary: Nonlabored breathing, no respiratory distress  Ext: LLE- 3rd toe amp site with pink granulation tissue.      LABS:                        11.1   4.74  )-----------( 168      ( 09 Mar 2020 06:22 )             36.4     03-09    136  |  101  |  13  ----------------------------<  124<H>  4.3   |  21<L>  |  1.42<H>    Ca    9.0      09 Mar 2020 06:22  Phos  4.0     03-09  Mg     2.3     03-09      PT/INR - ( 09 Mar 2020 06:22 )   PT: 14.2 sec;   INR: 1.24          PTT - ( 09 Mar 2020 06:22 )  PTT:32.9 sec    PLEASE CHECK WHEN PRESENT:     [x]Heart Failure     [  ] Acute     [  ] Acute on Chronic     [ X ] Chronic  -------------------------------------------------------------------     [  ]Diastolic [HFpEF]     [ X ]Systolic [HFrEF]     [  ]Combined [HFpEF & HFrEF]     [ X ]Other: pafib, HTN  -------------------------------------------------------------------  [ ] Respiratory failure  [ ] Acute cor pulmonale  [ ] Asthma/COPD Exacerbation  [ ] Pleural effusion  [ ] Aspiration pneumonia  -------------------------------------------------------------------  [  ]ZEENAT     [  ]ATN     [  ]Reneal Medullary Necrosis     [  ]Renal Cortical Necrosis     [  ]Other Pathological Lesions:    [  ]CKD 1  [x]CKD 2  [  ]CKD 3  [  ]CKD 4  [  ]CKD 5  [  ]Other  -------------------------------------------------------------------  [  ]Diabetes  [  ] Diabetic PVD Ulcer  [  ] Neuropathic ulcer to DM  [  ] Diabetes with Nephropathy  [  ] Osteomyelitis due to diabetes  --------------------------------------------------------------------  [  ]Malnutrition: See Nutrition Note  [  ]Cachexia  [  ]Other:   [  ]Supplement Ordered:  [  ]Morbid Obesity (BMI >=40]  ---------------------------------------------------------------------  [ ] Sepsis/severe sepsis/septic shock  [ ] UTI  [ ] Pneumonia  -----------------------------------------------------------------------  [ ] Acidosis/alkalosis  [ ] Fluid overload  [ ] Hypokalemia  [ ] Hyperkalemia  [ ] Hypomagnesemia  [ ] Hypophosphatemia  [ ] Hyperphosphatemia  ------------------------------------------------------------------------  [ ] Acute blood loss anemia  [ ] Post op blood loss anemia  [ ] Iron deficiency anemia  [X ] Anemia due to chronic disease  [ ] Hypercoagulable state  ----------------------------------------------------------------------  [ ] Cerebral infarction  [ ] Transient ischemia attack  [ ] Encephalopathy    Assessment/Plan:  84 M PMH HTN, prediabetes, pAfib (on Xarelto-last taken 3/4), HF( unknown EF), former smoker p/w nonhealing ulcer of L 3rd toe s/p above procedure    - asa  - Pain Control  - Vanc  - Home medication as appropriate  - local wound care  - NPO/IVF for possible angio

## 2020-03-09 NOTE — PHYSICAL THERAPY INITIAL EVALUATION ADULT - ADDITIONAL COMMENTS
Patient reports that he lives with his spouse in an elevator building, no steps to enter. Lives with spouse. Patient reports being independent with all mobility and ADLs at baseline. Denies using a rolling walker or cane at home.

## 2020-03-09 NOTE — DISCHARGE NOTE PROVIDER - NSDCMRMEDTOKEN_GEN_ALL_CORE_FT
amiodarone 200 mg oral tablet: 1 tab(s) orally once a day  atorvastatin 40 mg oral tablet: 1 tab(s) orally once a day  Lasix 40 mg oral tablet: 1 tab(s) orally once a day  Metoprolol Succinate ER 50 mg oral tablet, extended release: 1 tab(s) orally once a day  Xarelto 15 mg oral tablet: 1 tab(s) orally once a day (in the evening) amiodarone 200 mg oral tablet: 1 tab(s) orally once a day  Aspirin Enteric Coated 81 mg oral delayed release tablet: 1 tab(s) orally once a day MDD:1  atorvastatin 40 mg oral tablet: 1 tab(s) orally once a day  doxycycline hyclate 100 mg oral capsule: 1 cap(s) orally 2 times a day MDD:2  Lasix 40 mg oral tablet: 1 tab(s) orally once a day  Metoprolol Succinate ER 50 mg oral tablet, extended release: 1 tab(s) orally once a day  Xarelto 15 mg oral tablet: 1 tab(s) orally once a day (in the evening)

## 2020-03-09 NOTE — DISCHARGE NOTE NURSING/CASE MANAGEMENT/SOCIAL WORK - PATIENT PORTAL LINK FT
You can access the FollowMyHealth Patient Portal offered by [facility] by registering at the following website: [email or web address]. By joining [facility]’s FollowMyHealth portal, you will also be able to view your health information using other applications (apps) compatible with our system.

## 2020-03-09 NOTE — CONSULT NOTE ADULT - ASSESSMENT
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  He is s/p amputation with clean base.  He has received 3 d of antibiotics since surgery on 3/6 - should take care of [place] in combination with amputation.  Would treat MRSA for an additional week - MICs for both linezolid and doxy are at breakpoint.  Is susceptible to TMP/SX but given CKD and other co-morbidities would be reluctant to use.  Suggest:  - Doxycycline 100 mg po q12h X 7 d  Recommendations discussed with primary team.  Please recall if further ID input is desired - team 2

## 2020-03-09 NOTE — PROVIDER CONTACT NOTE (CRITICAL VALUE NOTIFICATION) - ASSESSMENT
Patient out of bed walking to BR
Pt currently on heparin drip 11cc/hr, AFib on monitor, A&Ox4, VSS, Pt denies chest pain, no palpitations, no dizziness, no SOB, no signs of acute distress or neuro changes, L foot dressing lightly saturated w/ dried blood from previous ambulation, no changes in appearance , vascular team aware
Pt currently on heparin drip 9.5mL/hr, AFib on monitor, A&Ox4, VSS, Pt denies chest pain, no palpitations, no dizziness, no SOB, no signs of acute distress or neuro changes, L foot dressing lightly saturated w/ dried blood from previous ambulation, no changes in appearance , vascular team aware
Pt. A&Ox4, no distress noted.

## 2020-03-09 NOTE — PROVIDER CONTACT NOTE (CRITICAL VALUE NOTIFICATION) - RECOMMENDATIONS
Cardiologist will be informed.
Evaluate.
Follow nomogram, continue to monitor
Hold for 60 minutes and titrate down

## 2020-03-09 NOTE — DISCHARGE NOTE PROVIDER - NSDCFUADDINST_GEN_ALL_CORE_FT
Weight bearing as tolerated LLE with assistive devices if needed.   wet to dry dressing changes daily.   Patient may shower, however caution should be taken as patient may be unsteady.     May restart taking home dose of xarelto.   Patient should start taking aspirin 81mg daily.     Oral antibiotics will be started on day after discharge, Doxycycline 2 times daily.     Should follow up with Dr. Mcclellan in 1-2 weeks, call office to make appointment.

## 2020-03-09 NOTE — PHYSICAL THERAPY INITIAL EVALUATION ADULT - CRITERIA FOR SKILLED THERAPEUTIC INTERVENTIONS
anticipated equipment needs at discharge/rehab potential/therapy frequency/anticipated discharge recommendation/impairments found

## 2020-03-11 LAB — SURGICAL PATHOLOGY STUDY: SIGNIFICANT CHANGE UP

## 2020-03-13 PROCEDURE — 87070 CULTURE OTHR SPECIMN AEROBIC: CPT

## 2020-03-13 PROCEDURE — 83735 ASSAY OF MAGNESIUM: CPT

## 2020-03-13 PROCEDURE — 85610 PROTHROMBIN TIME: CPT

## 2020-03-13 PROCEDURE — 84155 ASSAY OF PROTEIN SERUM: CPT

## 2020-03-13 PROCEDURE — 84165 PROTEIN E-PHORESIS SERUM: CPT

## 2020-03-13 PROCEDURE — 81001 URINALYSIS AUTO W/SCOPE: CPT

## 2020-03-13 PROCEDURE — 85025 COMPLETE CBC W/AUTO DIFF WBC: CPT

## 2020-03-13 PROCEDURE — 82962 GLUCOSE BLOOD TEST: CPT

## 2020-03-13 PROCEDURE — 82570 ASSAY OF URINE CREATININE: CPT

## 2020-03-13 PROCEDURE — 80048 BASIC METABOLIC PNL TOTAL CA: CPT

## 2020-03-13 PROCEDURE — 87186 SC STD MICRODIL/AGAR DIL: CPT

## 2020-03-13 PROCEDURE — 97161 PT EVAL LOW COMPLEX 20 MIN: CPT

## 2020-03-13 PROCEDURE — 86140 C-REACTIVE PROTEIN: CPT

## 2020-03-13 PROCEDURE — 84540 ASSAY OF URINE/UREA-N: CPT

## 2020-03-13 PROCEDURE — 88311 DECALCIFY TISSUE: CPT

## 2020-03-13 PROCEDURE — 80202 ASSAY OF VANCOMYCIN: CPT

## 2020-03-13 PROCEDURE — 83540 ASSAY OF IRON: CPT

## 2020-03-13 PROCEDURE — 88305 TISSUE EXAM BY PATHOLOGIST: CPT

## 2020-03-13 PROCEDURE — 86850 RBC ANTIBODY SCREEN: CPT

## 2020-03-13 PROCEDURE — 84100 ASSAY OF PHOSPHORUS: CPT

## 2020-03-13 PROCEDURE — 99285 EMERGENCY DEPT VISIT HI MDM: CPT | Mod: 25

## 2020-03-13 PROCEDURE — 84300 ASSAY OF URINE SODIUM: CPT

## 2020-03-13 PROCEDURE — 93005 ELECTROCARDIOGRAM TRACING: CPT

## 2020-03-13 PROCEDURE — 71045 X-RAY EXAM CHEST 1 VIEW: CPT

## 2020-03-13 PROCEDURE — 76775 US EXAM ABDO BACK WALL LIM: CPT

## 2020-03-13 PROCEDURE — 82728 ASSAY OF FERRITIN: CPT

## 2020-03-13 PROCEDURE — 83550 IRON BINDING TEST: CPT

## 2020-03-13 PROCEDURE — 87075 CULTR BACTERIA EXCEPT BLOOD: CPT

## 2020-03-13 PROCEDURE — 86901 BLOOD TYPING SEROLOGIC RH(D): CPT

## 2020-03-13 PROCEDURE — 83521 IG LIGHT CHAINS FREE EACH: CPT

## 2020-03-13 PROCEDURE — 84133 ASSAY OF URINE POTASSIUM: CPT

## 2020-03-13 PROCEDURE — 84156 ASSAY OF PROTEIN URINE: CPT

## 2020-03-13 PROCEDURE — 83935 ASSAY OF URINE OSMOLALITY: CPT

## 2020-03-13 PROCEDURE — 85652 RBC SED RATE AUTOMATED: CPT

## 2020-03-13 PROCEDURE — C8929: CPT

## 2020-03-13 PROCEDURE — 80053 COMPREHEN METABOLIC PANEL: CPT

## 2020-03-13 PROCEDURE — 86334 IMMUNOFIX E-PHORESIS SERUM: CPT

## 2020-03-13 PROCEDURE — 85027 COMPLETE CBC AUTOMATED: CPT

## 2020-03-13 PROCEDURE — 85730 THROMBOPLASTIN TIME PARTIAL: CPT

## 2020-03-13 PROCEDURE — 82436 ASSAY OF URINE CHLORIDE: CPT

## 2020-03-13 PROCEDURE — 36415 COLL VENOUS BLD VENIPUNCTURE: CPT

## 2020-03-16 DIAGNOSIS — N18.3 CHRONIC KIDNEY DISEASE, STAGE 3 (MODERATE): ICD-10-CM

## 2020-03-16 DIAGNOSIS — I70.202 UNSPECIFIED ATHEROSCLEROSIS OF NATIVE ARTERIES OF EXTREMITIES, LEFT LEG: ICD-10-CM

## 2020-03-16 DIAGNOSIS — I48.0 PAROXYSMAL ATRIAL FIBRILLATION: ICD-10-CM

## 2020-03-16 DIAGNOSIS — I50.22 CHRONIC SYSTOLIC (CONGESTIVE) HEART FAILURE: ICD-10-CM

## 2020-03-16 DIAGNOSIS — D64.9 ANEMIA, UNSPECIFIED: ICD-10-CM

## 2020-03-16 DIAGNOSIS — Z79.01 LONG TERM (CURRENT) USE OF ANTICOAGULANTS: ICD-10-CM

## 2020-03-16 DIAGNOSIS — I13.0 HYPERTENSIVE HEART AND CHRONIC KIDNEY DISEASE WITH HEART FAILURE AND STAGE 1 THROUGH STAGE 4 CHRONIC KIDNEY DISEASE, OR UNSPECIFIED CHRONIC KIDNEY DISEASE: ICD-10-CM

## 2020-03-16 DIAGNOSIS — L97.529 NON-PRESSURE CHRONIC ULCER OF OTHER PART OF LEFT FOOT WITH UNSPECIFIED SEVERITY: ICD-10-CM

## 2020-03-16 DIAGNOSIS — Z83.3 FAMILY HISTORY OF DIABETES MELLITUS: ICD-10-CM

## 2020-03-16 DIAGNOSIS — E11.621 TYPE 2 DIABETES MELLITUS WITH FOOT ULCER: ICD-10-CM

## 2020-03-16 DIAGNOSIS — I73.9 PERIPHERAL VASCULAR DISEASE, UNSPECIFIED: ICD-10-CM

## 2020-03-16 DIAGNOSIS — Z87.891 PERSONAL HISTORY OF NICOTINE DEPENDENCE: ICD-10-CM

## 2020-05-01 ENCOUNTER — OUTPATIENT (OUTPATIENT)
Dept: OUTPATIENT SERVICES | Facility: HOSPITAL | Age: 85
LOS: 1 days | End: 2020-05-01
Payer: MEDICARE

## 2020-05-01 PROCEDURE — G9001: CPT

## 2020-05-04 ENCOUNTER — INPATIENT (INPATIENT)
Facility: HOSPITAL | Age: 85
LOS: 16 days | DRG: 252 | End: 2020-05-21
Attending: INTERNAL MEDICINE | Admitting: STUDENT IN AN ORGANIZED HEALTH CARE EDUCATION/TRAINING PROGRAM
Payer: MEDICARE

## 2020-05-04 ENCOUNTER — APPOINTMENT (OUTPATIENT)
Dept: VASCULAR SURGERY | Facility: CLINIC | Age: 85
End: 2020-05-04
Payer: MEDICARE

## 2020-05-04 VITALS
RESPIRATION RATE: 18 BRPM | HEART RATE: 80 BPM | HEIGHT: 70 IN | TEMPERATURE: 98 F | DIASTOLIC BLOOD PRESSURE: 59 MMHG | OXYGEN SATURATION: 96 % | WEIGHT: 169.98 LBS | SYSTOLIC BLOOD PRESSURE: 99 MMHG

## 2020-05-04 DIAGNOSIS — I10 ESSENTIAL (PRIMARY) HYPERTENSION: ICD-10-CM

## 2020-05-04 DIAGNOSIS — N17.9 ACUTE KIDNEY FAILURE, UNSPECIFIED: ICD-10-CM

## 2020-05-04 DIAGNOSIS — Z90.02 ACQUIRED ABSENCE OF LARYNX: Chronic | ICD-10-CM

## 2020-05-04 DIAGNOSIS — Z01.810 ENCOUNTER FOR PREPROCEDURAL CARDIOVASCULAR EXAMINATION: ICD-10-CM

## 2020-05-04 DIAGNOSIS — I48.91 UNSPECIFIED ATRIAL FIBRILLATION: ICD-10-CM

## 2020-05-04 DIAGNOSIS — I50.9 HEART FAILURE, UNSPECIFIED: ICD-10-CM

## 2020-05-04 LAB
ALBUMIN SERPL ELPH-MCNC: 3.6 G/DL — SIGNIFICANT CHANGE UP (ref 3.3–5)
ALP SERPL-CCNC: 75 U/L — SIGNIFICANT CHANGE UP (ref 40–120)
ALT FLD-CCNC: 20 U/L — SIGNIFICANT CHANGE UP (ref 10–45)
ANION GAP SERPL CALC-SCNC: 13 MMOL/L — SIGNIFICANT CHANGE UP (ref 5–17)
ANION GAP SERPL CALC-SCNC: 13 MMOL/L — SIGNIFICANT CHANGE UP (ref 5–17)
APPEARANCE UR: CLEAR — SIGNIFICANT CHANGE UP
APPEARANCE UR: CLEAR — SIGNIFICANT CHANGE UP
APTT BLD: 40.7 SEC — HIGH (ref 27.5–36.3)
AST SERPL-CCNC: 26 U/L — SIGNIFICANT CHANGE UP (ref 10–40)
BASOPHILS # BLD AUTO: 0.02 K/UL — SIGNIFICANT CHANGE UP (ref 0–0.2)
BASOPHILS NFR BLD AUTO: 0.2 % — SIGNIFICANT CHANGE UP (ref 0–2)
BILIRUB SERPL-MCNC: 0.9 MG/DL — SIGNIFICANT CHANGE UP (ref 0.2–1.2)
BILIRUB UR-MCNC: NEGATIVE — SIGNIFICANT CHANGE UP
BILIRUB UR-MCNC: NEGATIVE — SIGNIFICANT CHANGE UP
BUN SERPL-MCNC: 48 MG/DL — HIGH (ref 7–23)
BUN SERPL-MCNC: 53 MG/DL — HIGH (ref 7–23)
CALCIUM SERPL-MCNC: 9.1 MG/DL — SIGNIFICANT CHANGE UP (ref 8.4–10.5)
CALCIUM SERPL-MCNC: 9.4 MG/DL — SIGNIFICANT CHANGE UP (ref 8.4–10.5)
CHLORIDE SERPL-SCNC: 97 MMOL/L — SIGNIFICANT CHANGE UP (ref 96–108)
CHLORIDE SERPL-SCNC: 97 MMOL/L — SIGNIFICANT CHANGE UP (ref 96–108)
CK SERPL-CCNC: 450 U/L — HIGH (ref 30–200)
CO2 SERPL-SCNC: 25 MMOL/L — SIGNIFICANT CHANGE UP (ref 22–31)
CO2 SERPL-SCNC: 26 MMOL/L — SIGNIFICANT CHANGE UP (ref 22–31)
COLOR SPEC: YELLOW — SIGNIFICANT CHANGE UP
COLOR SPEC: YELLOW — SIGNIFICANT CHANGE UP
CREAT ?TM UR-MCNC: 116 MG/DL — SIGNIFICANT CHANGE UP
CREAT SERPL-MCNC: 2.3 MG/DL — HIGH (ref 0.5–1.3)
CREAT SERPL-MCNC: 2.58 MG/DL — HIGH (ref 0.5–1.3)
CRP SERPL-MCNC: 5.79 MG/DL — HIGH (ref 0–0.4)
DIFF PNL FLD: NEGATIVE — SIGNIFICANT CHANGE UP
DIFF PNL FLD: NEGATIVE — SIGNIFICANT CHANGE UP
EOSINOPHIL # BLD AUTO: 0.02 K/UL — SIGNIFICANT CHANGE UP (ref 0–0.5)
EOSINOPHIL NFR BLD AUTO: 0.2 % — SIGNIFICANT CHANGE UP (ref 0–6)
ERYTHROCYTE [SEDIMENTATION RATE] IN BLOOD: 78 MM/HR — HIGH
GLUCOSE BLDC GLUCOMTR-MCNC: 120 MG/DL — HIGH (ref 70–99)
GLUCOSE BLDC GLUCOMTR-MCNC: 86 MG/DL — SIGNIFICANT CHANGE UP (ref 70–99)
GLUCOSE SERPL-MCNC: 115 MG/DL — HIGH (ref 70–99)
GLUCOSE SERPL-MCNC: 94 MG/DL — SIGNIFICANT CHANGE UP (ref 70–99)
GLUCOSE UR QL: NEGATIVE — SIGNIFICANT CHANGE UP
GLUCOSE UR QL: NEGATIVE — SIGNIFICANT CHANGE UP
GRAM STN FLD: SIGNIFICANT CHANGE UP
HCT VFR BLD CALC: 31.1 % — LOW (ref 39–50)
HGB BLD-MCNC: 9.7 G/DL — LOW (ref 13–17)
IMM GRANULOCYTES NFR BLD AUTO: 0.4 % — SIGNIFICANT CHANGE UP (ref 0–1.5)
INR BLD: 2.48 — HIGH (ref 0.88–1.16)
KETONES UR-MCNC: NEGATIVE — SIGNIFICANT CHANGE UP
KETONES UR-MCNC: NEGATIVE — SIGNIFICANT CHANGE UP
LACTATE SERPL-SCNC: 2.4 MMOL/L — HIGH (ref 0.5–2)
LEUKOCYTE ESTERASE UR-ACNC: NEGATIVE — SIGNIFICANT CHANGE UP
LEUKOCYTE ESTERASE UR-ACNC: NEGATIVE — SIGNIFICANT CHANGE UP
LYMPHOCYTES # BLD AUTO: 0.56 K/UL — LOW (ref 1–3.3)
LYMPHOCYTES # BLD AUTO: 5.7 % — LOW (ref 13–44)
MCHC RBC-ENTMCNC: 26.6 PG — LOW (ref 27–34)
MCHC RBC-ENTMCNC: 31.2 GM/DL — LOW (ref 32–36)
MCV RBC AUTO: 85.2 FL — SIGNIFICANT CHANGE UP (ref 80–100)
MONOCYTES # BLD AUTO: 0.61 K/UL — SIGNIFICANT CHANGE UP (ref 0–0.9)
MONOCYTES NFR BLD AUTO: 6.3 % — SIGNIFICANT CHANGE UP (ref 2–14)
NEUTROPHILS # BLD AUTO: 8.5 K/UL — HIGH (ref 1.8–7.4)
NEUTROPHILS NFR BLD AUTO: 87.2 % — HIGH (ref 43–77)
NITRITE UR-MCNC: NEGATIVE — SIGNIFICANT CHANGE UP
NITRITE UR-MCNC: NEGATIVE — SIGNIFICANT CHANGE UP
NRBC # BLD: 0 /100 WBCS — SIGNIFICANT CHANGE UP (ref 0–0)
NT-PROBNP SERPL-SCNC: 7866 PG/ML — HIGH (ref 0–300)
PH UR: 6.5 — SIGNIFICANT CHANGE UP (ref 5–8)
PH UR: 7 — SIGNIFICANT CHANGE UP (ref 5–8)
PLATELET # BLD AUTO: 286 K/UL — SIGNIFICANT CHANGE UP (ref 150–400)
POTASSIUM SERPL-MCNC: 4.5 MMOL/L — SIGNIFICANT CHANGE UP (ref 3.5–5.3)
POTASSIUM SERPL-MCNC: 4.9 MMOL/L — SIGNIFICANT CHANGE UP (ref 3.5–5.3)
POTASSIUM SERPL-SCNC: 4.5 MMOL/L — SIGNIFICANT CHANGE UP (ref 3.5–5.3)
POTASSIUM SERPL-SCNC: 4.9 MMOL/L — SIGNIFICANT CHANGE UP (ref 3.5–5.3)
PROT SERPL-MCNC: 7.3 G/DL — SIGNIFICANT CHANGE UP (ref 6–8.3)
PROT UR-MCNC: ABNORMAL MG/DL
PROT UR-MCNC: NEGATIVE MG/DL — SIGNIFICANT CHANGE UP
PROTHROM AB SERPL-ACNC: 29.1 SEC — HIGH (ref 10–12.9)
RBC # BLD: 3.65 M/UL — LOW (ref 4.2–5.8)
RBC # FLD: 15.5 % — HIGH (ref 10.3–14.5)
SARS-COV-2 RNA SPEC QL NAA+PROBE: SIGNIFICANT CHANGE UP
SODIUM SERPL-SCNC: 135 MMOL/L — SIGNIFICANT CHANGE UP (ref 135–145)
SODIUM SERPL-SCNC: 136 MMOL/L — SIGNIFICANT CHANGE UP (ref 135–145)
SODIUM UR-SCNC: 37 MMOL/L — SIGNIFICANT CHANGE UP
SP GR SPEC: 1.01 — SIGNIFICANT CHANGE UP (ref 1–1.03)
SP GR SPEC: <=1.005 — SIGNIFICANT CHANGE UP (ref 1–1.03)
SPECIMEN SOURCE: SIGNIFICANT CHANGE UP
UROBILINOGEN FLD QL: 0.2 E.U./DL — SIGNIFICANT CHANGE UP
UROBILINOGEN FLD QL: 0.2 E.U./DL — SIGNIFICANT CHANGE UP
UUN UR-MCNC: 871 MG/DL — SIGNIFICANT CHANGE UP
WBC # BLD: 9.75 K/UL — SIGNIFICANT CHANGE UP (ref 3.8–10.5)
WBC # FLD AUTO: 9.75 K/UL — SIGNIFICANT CHANGE UP (ref 3.8–10.5)

## 2020-05-04 PROCEDURE — 93010 ELECTROCARDIOGRAM REPORT: CPT

## 2020-05-04 PROCEDURE — 71045 X-RAY EXAM CHEST 1 VIEW: CPT | Mod: 26

## 2020-05-04 PROCEDURE — 93971 EXTREMITY STUDY: CPT

## 2020-05-04 PROCEDURE — 99233 SBSQ HOSP IP/OBS HIGH 50: CPT

## 2020-05-04 PROCEDURE — 73630 X-RAY EXAM OF FOOT: CPT | Mod: 26,LT

## 2020-05-04 PROCEDURE — 73590 X-RAY EXAM OF LOWER LEG: CPT | Mod: 26,LT

## 2020-05-04 PROCEDURE — 71045 X-RAY EXAM CHEST 1 VIEW: CPT | Mod: 26,77

## 2020-05-04 PROCEDURE — 99024 POSTOP FOLLOW-UP VISIT: CPT

## 2020-05-04 PROCEDURE — 99285 EMERGENCY DEPT VISIT HI MDM: CPT

## 2020-05-04 PROCEDURE — 99222 1ST HOSP IP/OBS MODERATE 55: CPT

## 2020-05-04 RX ORDER — PIPERACILLIN AND TAZOBACTAM 4; .5 G/20ML; G/20ML
3.38 INJECTION, POWDER, LYOPHILIZED, FOR SOLUTION INTRAVENOUS EVERY 8 HOURS
Refills: 0 | Status: DISCONTINUED | OUTPATIENT
Start: 2020-05-04 | End: 2020-05-04

## 2020-05-04 RX ORDER — SODIUM CHLORIDE 9 MG/ML
1000 INJECTION INTRAMUSCULAR; INTRAVENOUS; SUBCUTANEOUS
Refills: 0 | Status: DISCONTINUED | OUTPATIENT
Start: 2020-05-04 | End: 2020-05-05

## 2020-05-04 RX ORDER — SODIUM CHLORIDE 9 MG/ML
1000 INJECTION, SOLUTION INTRAVENOUS
Refills: 0 | Status: DISCONTINUED | OUTPATIENT
Start: 2020-05-04 | End: 2020-05-04

## 2020-05-04 RX ORDER — METOPROLOL TARTRATE 50 MG
50 TABLET ORAL DAILY
Refills: 0 | Status: DISCONTINUED | OUTPATIENT
Start: 2020-05-04 | End: 2020-05-05

## 2020-05-04 RX ORDER — PIPERACILLIN AND TAZOBACTAM 4; .5 G/20ML; G/20ML
3.38 INJECTION, POWDER, LYOPHILIZED, FOR SOLUTION INTRAVENOUS EVERY 6 HOURS
Refills: 0 | Status: DISCONTINUED | OUTPATIENT
Start: 2020-05-04 | End: 2020-05-06

## 2020-05-04 RX ORDER — INSULIN LISPRO 100/ML
VIAL (ML) SUBCUTANEOUS
Refills: 0 | Status: DISCONTINUED | OUTPATIENT
Start: 2020-05-04 | End: 2020-05-08

## 2020-05-04 RX ORDER — DEXTROSE 50 % IN WATER 50 %
15 SYRINGE (ML) INTRAVENOUS ONCE
Refills: 0 | Status: DISCONTINUED | OUTPATIENT
Start: 2020-05-04 | End: 2020-05-21

## 2020-05-04 RX ORDER — ASPIRIN/CALCIUM CARB/MAGNESIUM 324 MG
81 TABLET ORAL DAILY
Refills: 0 | Status: DISCONTINUED | OUTPATIENT
Start: 2020-05-04 | End: 2020-05-06

## 2020-05-04 RX ORDER — SODIUM CHLORIDE 9 MG/ML
1000 INJECTION, SOLUTION INTRAVENOUS
Refills: 0 | Status: DISCONTINUED | OUTPATIENT
Start: 2020-05-04 | End: 2020-05-21

## 2020-05-04 RX ORDER — VANCOMYCIN HCL 1 G
1000 VIAL (EA) INTRAVENOUS ONCE
Refills: 0 | Status: COMPLETED | OUTPATIENT
Start: 2020-05-04 | End: 2020-05-04

## 2020-05-04 RX ORDER — DEXTROSE 50 % IN WATER 50 %
12.5 SYRINGE (ML) INTRAVENOUS ONCE
Refills: 0 | Status: DISCONTINUED | OUTPATIENT
Start: 2020-05-04 | End: 2020-05-21

## 2020-05-04 RX ORDER — DIPHENHYDRAMINE HCL 50 MG
25 CAPSULE ORAL ONCE
Refills: 0 | Status: COMPLETED | OUTPATIENT
Start: 2020-05-04 | End: 2020-05-04

## 2020-05-04 RX ORDER — GLUCAGON INJECTION, SOLUTION 0.5 MG/.1ML
1 INJECTION, SOLUTION SUBCUTANEOUS ONCE
Refills: 0 | Status: DISCONTINUED | OUTPATIENT
Start: 2020-05-04 | End: 2020-05-21

## 2020-05-04 RX ORDER — ATORVASTATIN CALCIUM 80 MG/1
40 TABLET, FILM COATED ORAL AT BEDTIME
Refills: 0 | Status: DISCONTINUED | OUTPATIENT
Start: 2020-05-04 | End: 2020-05-20

## 2020-05-04 RX ORDER — HEPARIN SODIUM 5000 [USP'U]/ML
INJECTION INTRAVENOUS; SUBCUTANEOUS
Qty: 25000 | Refills: 0 | Status: DISCONTINUED | OUTPATIENT
Start: 2020-05-04 | End: 2020-05-05

## 2020-05-04 RX ORDER — DEXTROSE 50 % IN WATER 50 %
25 SYRINGE (ML) INTRAVENOUS ONCE
Refills: 0 | Status: DISCONTINUED | OUTPATIENT
Start: 2020-05-04 | End: 2020-05-21

## 2020-05-04 RX ORDER — PIPERACILLIN AND TAZOBACTAM 4; .5 G/20ML; G/20ML
3.38 INJECTION, POWDER, LYOPHILIZED, FOR SOLUTION INTRAVENOUS ONCE
Refills: 0 | Status: COMPLETED | OUTPATIENT
Start: 2020-05-04 | End: 2020-05-04

## 2020-05-04 RX ORDER — AMIODARONE HYDROCHLORIDE 400 MG/1
400 TABLET ORAL DAILY
Refills: 0 | Status: DISCONTINUED | OUTPATIENT
Start: 2020-05-04 | End: 2020-05-06

## 2020-05-04 RX ADMIN — Medication 250 MILLIGRAM(S): at 13:10

## 2020-05-04 RX ADMIN — SODIUM CHLORIDE 60 MILLILITER(S): 9 INJECTION INTRAMUSCULAR; INTRAVENOUS; SUBCUTANEOUS at 15:07

## 2020-05-04 RX ADMIN — Medication 25 MILLIGRAM(S): at 22:14

## 2020-05-04 RX ADMIN — HEPARIN SODIUM 1400 UNIT(S)/HR: 5000 INJECTION INTRAVENOUS; SUBCUTANEOUS at 20:01

## 2020-05-04 RX ADMIN — PIPERACILLIN AND TAZOBACTAM 200 GRAM(S): 4; .5 INJECTION, POWDER, LYOPHILIZED, FOR SOLUTION INTRAVENOUS at 12:35

## 2020-05-04 RX ADMIN — ATORVASTATIN CALCIUM 40 MILLIGRAM(S): 80 TABLET, FILM COATED ORAL at 21:54

## 2020-05-04 RX ADMIN — PIPERACILLIN AND TAZOBACTAM 200 GRAM(S): 4; .5 INJECTION, POWDER, LYOPHILIZED, FOR SOLUTION INTRAVENOUS at 18:38

## 2020-05-04 RX ADMIN — SODIUM CHLORIDE 60 MILLILITER(S): 9 INJECTION INTRAMUSCULAR; INTRAVENOUS; SUBCUTANEOUS at 18:39

## 2020-05-04 RX ADMIN — PIPERACILLIN AND TAZOBACTAM 3.38 GRAM(S): 4; .5 INJECTION, POWDER, LYOPHILIZED, FOR SOLUTION INTRAVENOUS at 13:05

## 2020-05-04 NOTE — ED PROVIDER NOTE - PROGRESS NOTE DETAILS
cxr shows left side pneumothorax, pt with no cp or sob, admits to mechanical fall at home yesterday on left side.  discussed with vascular surgery who don't want further imaging, pt put on monitor, on O2, admit pending covid swab

## 2020-05-04 NOTE — H&P ADULT - ASSESSMENT
84 M PMH HTN, DM, A fib on Xarelto (Last taken 8am) HF EF 35%, PAD, CKD III p/w L 3rd toe gangrene s/p amputation. BRANDYN falsely elevated due to history of DM Scheduled for L Toe amputation and Angiogram.    Admit to Vascular Surgery Dr. Mcclellan  NPO after midnight/IVF  Home meds  Vanc/Zosyn  Hold Xarelto, Start Heparin Drip @ 8pm  LLE U/S  Pre op and Consent for OR in AM  Renal Consult for ZEENAT (Cr. 2.58)  Pneumothorax on X-RAY, Place on NRB 15lpm w/ repeat CXR @ 6pm   Plan discussed with Attending and Chief Resident

## 2020-05-04 NOTE — ED ADULT TRIAGE NOTE - CHIEF COMPLAINT QUOTE
black L 3rd toe black and pain since operation on 3/6 with MD Bell, reports took abx after surgery, no drainage, no fevers at home, no cough/SOB

## 2020-05-04 NOTE — CONSULT NOTE ADULT - PROBLEM SELECTOR RECOMMENDATION 9
Pt able to complete > 4 METS  RCRI 1, class II risk   Pt is low risk for a low risk procedure  No further cardiac preoperative evaluation required prior to procedure

## 2020-05-04 NOTE — H&P ADULT - NSHPPHYSICALEXAM_GEN_ALL_CORE
Vital Signs Last 24 Hrs  T(C): 36.6 (04 May 2020 10:39), Max: 36.6 (04 May 2020 10:39)  T(F): 97.9 (04 May 2020 10:39), Max: 97.9 (04 May 2020 10:39)  HR: 80 (04 May 2020 10:39) (80 - 80)  BP: 99/59 (04 May 2020 10:39) (99/59 - 99/59)  BP(mean): --  RR: 18 (04 May 2020 10:39) (18 - 18)  SpO2: 96% (04 May 2020 10:39) (96% - 96%)  I&O's Detail    Physical Exam  General: NAD, resting comfortably in bed  C/V: NSR  Pulm: Nonlabored breathing, no respiratory distress  Abd: soft, non-tender, non-distended.  Extrem: WWP, LLE 3rd toe blakc and gangrenous with purulence, LLE swelling  Neuro: no focal deficits, normal sensation  Pulses: LLE DP/PT: Biphasic, Pop/Femoral Palpable, BRANDYN 0.81; RLE DP/PT Triphasic, Pop/Fem Palp, BRANDYN: 0.88

## 2020-05-04 NOTE — HISTORY OF PRESENT ILLNESS
[FreeTextEntry1] : 83 yo M with CHF, L 3rd toe gangrene s/p amputation in march who now comes back with infection in his toe with dry gangrene. Pt denies any fever or chills but complains of swelling in the leg and pain in the toe. He denies any chest pain or sob.

## 2020-05-04 NOTE — ED PROVIDER NOTE - OBJECTIVE STATEMENT
83 y/o M with PMHx of HTN, afib on Xarelto presenting with c/o left foot pain and continued swelling x 2 weeks. Pt had 3rd toe amputation to his left foot on 3/6/20 with Dr. Mcclellan. Since, pt has noted persisted symptom since the surgery. Pt saw Dr. Mcclellan in the office today who sent him into the ED for further evaluation. Pt has not been on any abx for the past 2 weeks. Denies any recent fevers.

## 2020-05-04 NOTE — H&P ADULT - HISTORY OF PRESENT ILLNESS
84 M PMH HTN, DM, A fib on Xarelto (Last taken 8am) HF EF 35%, CKD III PAD p/w L 3rd toe gangrene s/p amputation. Patient was admitted on March of this year and underwent L toe amputation on 3/06 of which cultures grew MRSA and was subsequently discharged on Doxycycline. He now returns with 3 weeks of LLE pain and swelling. Pt reports persistent pain of the 3rd toe and swelling of the LLE, denies numbness or weakness. Was was seen in the office by Dr. Mcclellan and was recommended to the ED for worsening appearance of the left 3rd toe. He denies fever, chills, nausea, sob, coughing, or recent COVID contacts.

## 2020-05-04 NOTE — CONSULT NOTE ADULT - PROBLEM SELECTOR RECOMMENDATION 9
ZEENAT on CKD stage III( baseline Cr 1.6-1.8)  known to nephrology team from previous admission  impression r/o prerenal etiologies vs intrinsic including rhabdo and ATN  - plz obtain CPK and pro-BNP/ UA with urine Na,Cr and Urea  - bladder scan to check PVR/ plz monitor IOs and UOP   - ok to hydrate with NS 60 cc/hr prior to angiogram, lung exam and imaging unremarkable except for a small left pneumothorax unclear why he is requiring NRB to maintain O2 saturation but would most likely be able to handle gentle IV hydration pre contrast  Will follow ZEENAT on CKD stage III( baseline Cr 1.6-1.8)  DDx: r/o prerenal etiologies vs intrinsic including rhabdo and ATN  - plz obtain CPK and pro-BNP/ UA with urine Na,Cr and Urea  - bladder scan to check PVR/ plz monitor IOs and UOP   - ok to hydrate with NS 60 cc/hr prior to angiogram, lung exam and imaging unremarkable except for a small left pneumothorax unclear why he is requiring NRB to maintain O2 saturation but would most likely be able to handle gentle IV hydration pre contrast, would recommend to obtain LLE doppler study to r/o DVT/PEs   Will follow ZEENAT on CKD stage III( baseline Cr 1.6-1.8)  DDx: r/o prerenal etiologies vs intrinsic including rhabdo and ATN  - plz obtain CPK and pro-BNP/ UA with urine Na,Cr and Urea  - bladder scan to check PVR/ plz monitor IOs and UOP   - ok to hydrate with NS 60 cc/hr prior to angiogram, lung exam and imaging unremarkable except for a small left pneumothorax unclear why he is requiring NRB to maintain O2 saturation but would most likely be able to handle gentle IV hydration pre contrast, would recommend to obtain LLE doppler study to r/o DVT/PEs 9 pt already anticoagulated, xarelto transitioned to heparin gtt pre-op)  Will follow  Discussed w/Dr. Bello ZEENAT on CKD stage III( baseline Cr 1.6-1.8)  DDx: r/o prerenal etiologies vs intrinsic including rhabdo and ATN  - plz obtain CPK and pro-BNP/ UA with urine Na,Cr and Urea  - bladder scan to check PVR/ plz monitor IOs and UOP   - ok to hydrate with NS 60 cc/hr prior to angiogram, lung exam and imaging unremarkable except for a small left pneumothorax unclear why he is requiring NRB to maintain O2 saturation but would most likely be able to handle gentle IV hydration pre contrast, would recommend to obtain LLE doppler study to r/o DVT/PEs (pt anticoagulated, xarelto transitioned to heparin gtt pre-op)  Will follow  Discussed w/Dr. Bello ZEENAT on CKD stage III( baseline Cr 1.6-1.8)  DDx: r/o prerenal etiologies vs intrinsic including rhabdo and ATN  - plz obtain CPK and pro-BNP/ UA with urine Na,Cr and Urea  - bladder scan to check PVR/ plz monitor IOs and UOP   - ok to hydrate with NS 60 cc/hr prior to angiogram, lung exam and imaging unremarkable except for a small left pneumothorax unclear why he is requiring NRB to maintain O2 saturation but would most likely be able to handle gentle IV hydration pre contrast, would recommend to obtain LLE doppler study to r/o DVT/PEs (pt anticoagulated, xarelto transitioned to heparin gtt pre-op)  - please obtain vanc trough prior to next dose / zosyn renally dosed  Will follow  Discussed w/Dr. Bello ZEENAT on CKD stage III( baseline Cr 1.6-1.8)  DDx: r/o prerenal etiologies vs intrinsic including rhabdo and ATN, AIN also under differential as pt has been on doxy for few weeks however less likely in the absence typical presentation   - plz obtain CPK and pro-BNP/ UA with urine Na,Cr and Urea  - bladder scan to check PVR/ plz monitor IOs and UOP   - ok to hydrate with NS 60 cc/hr prior to angiogram, lung exam and imaging unremarkable except for a small left pneumothorax unclear why he is requiring NRB to maintain O2 saturation but would most likely be able to handle gentle IV hydration pre contrast, would recommend to obtain LLE doppler study to r/o DVT/PEs (pt anticoagulated, xarelto transitioned to heparin gtt pre-op)  - please obtain vanc trough prior to next dose / zosyn renally dosed  Will follow  Discussed w/Dr. Bello ZEENAT on CKD stage III( baseline Cr 1.6-1.8)  DDx: r/o prerenal etiologies vs intrinsic including rhabdo and ATN, AIN also under differential as pt has been on doxy for few weeks however less likely in the absence of typical presentation   - plz obtain CPK and pro-BNP/ UA with urine Na,Cr and Urea  - bladder scan to check PVR/ plz monitor IOs and UOP   - ok to hydrate with NS 60 cc/hr prior to angiogram, lung exam and imaging unremarkable except for a small left pneumothorax unclear why he is requiring NRB to maintain O2 saturation but would most likely be able to handle gentle IV hydration pre contrast, would recommend to obtain LLE doppler study to r/o DVT/PEs (pt anticoagulated, xarelto transitioned to heparin gtt pre-op)  - please obtain vanc trough prior to next dose / zosyn renally dosed  Will follow  Discussed w/Dr. Bello

## 2020-05-04 NOTE — PHYSICAL EXAM
[Normal Thyroid] : the thyroid was normal [Respiratory Effort] : normal respiratory effort [Normal Breath Sounds] : Normal breath sounds [Normal Heart Sounds] : normal heart sounds [2+] : left 2+ [0] : left 0 [Skin Ulcer] : ulcer [Oriented to Place] : oriented to place [Oriented to Person] : oriented to person [Alert] : alert [Oriented to Time] : oriented to time [Calm] : calm [JVD] : no jugular venous distention  [Varicose Veins Of Lower Extremities] : not present [Ankle Swelling (On Exam)] : not present [] : not present [Purpura] : no purpura  [Petechiae] : no petechiae [Skin Induration] : no induration [de-identified] : NC/AT, anicteric [de-identified] : appears stated age, NAD [de-identified] : irregular heart [de-identified] : purulent drainage from 3rd toe amputation site, surrounding erythema  [de-identified] : FROM throughout, strength 5/5x4

## 2020-05-04 NOTE — ED PROVIDER NOTE - CLINICAL SUMMARY MEDICAL DECISION MAKING FREE TEXT BOX
85 y/o M with PMHx of HTN and afib on Xarelto sent in by vascular surgeon Dr. Mcclellan for left foot pain and swelling after left 3rd toe amputation on 3/6. Labs and X-ray ordered; discussed with vascular who will see pt in ER. Dispo and antibiotics per vascular.

## 2020-05-04 NOTE — ED PROVIDER NOTE - MUSCULOSKELETAL, MLM
Spine appears normal, range of motion is not limited, no muscle or joint tenderness. Left foot s/p 3rd toe amputation. Darkening of skin proximal to where 3rd phalanx was. Maceration in webspace adjacent to 4th toe with tenderness and swelling to dorsum foot and ankle. Foot is otherwise warm w/ no palpable pulse.

## 2020-05-04 NOTE — ED PROVIDER NOTE - ATTENDING CONTRIBUTION TO CARE
83 y/o M with PMHx of HTN, afib on Xarelto presenting with c/o left foot pain and continued swelling x 2 weeks. Pt had 3rd toe amputation to his left foot on 3/6/20 with Dr. Mcclellan. Since, pt has noted persisted symptom since the surgery. Pt saw Dr. Mcclellan in the office today who sent him into the ED for further evaluation. Pt has not been on any abx for the past 2 weeks. Denies any recent fevers.    Agree with HPI and PE as documented by PA    PT sent for vascular admission for antibiotics and further mgmt    Seen by vascular and agrees, pending covid swab to assess proper floor. 83 y/o M with PMHx of HTN, afib on Xarelto presenting with c/o left foot pain and continued swelling x 2 weeks. Pt had 3rd toe amputation to his left foot on 3/6/20 with Dr. Mcclellan. Since, pt has noted persisted symptom since the surgery. Pt saw Dr. Mcclellan in the office today who sent him into the ED for further evaluation. Pt has not been on any abx for the past 2 weeks. Denies any recent fevers.    Agree with HPI and PE as documented by PA    PT sent for vascular admission for antibiotics and further mgmt    Seen by vascular and agrees, pending covid swab to assess proper floor.    Called by rads regarding small pneumo on left - pt states he fell to floor day prior but has no pain.  Vascular surgery aware of findings.  Does not want ct scan at this time.  Will put on NRB and reassess for worsening pneumo but vascular surgery comfortable managing.  Does not request CT consult at this time.

## 2020-05-04 NOTE — ASSESSMENT
[Ulcer Care] : ulcer care [FreeTextEntry1] : 85 yo M with 3rd toe amputation site infection\par \par Plan is for admission for IV antibiotics, will need angiogram and debridement

## 2020-05-04 NOTE — CONSULT NOTE ADULT - SUBJECTIVE AND OBJECTIVE BOX
CC: PAD    HISTORY OF PRESENT ILLNESS:  HPI:  84 M PMH HTN, DM, A fib on Xarelto (Last taken 8am) HF EF 35%, CKD III PAD p/w L 3rd toe gangrene s/p amputation. Patient was admitted on March of this year and underwent L toe amputation on 3/06 of which cultures grew MRSA and was subsequently discharged on Doxycycline. He now returns with 3 weeks of LLE pain and swelling. Pt reports persistent pain of the 3rd toe and swelling of the LLE, denies numbness or weakness. Was was seen in the office by Dr. Mcclellan and was recommended to the ED for worsening appearance of the left 3rd toe. He denies fever, chills, nausea, sob, coughing, or recent COVID contacts. (04 May 2020 14:24)      Interval HPI - mild cp and sob  Location: chest  Duration: spontaneous  Severity: 4/10  Timing: intermittent  Modifing factors: none      PAST MEDICAL & SURGICAL HISTORY:  Heart failure  HLD (hyperlipidemia)  Borderline diabetes mellitus  Afib  HTN (hypertension)  H/O laryngectomy        [ ] Diabetes   [ ] Hypertension  [ ] Hyperlipidemia  [ ] CAD  [ ] PCI  [ ] CABG    PREVIOUS DIAGNOSTIC TESTING:    [ ] Echocardiogram:  [ ]  Catheterization:  [ ] Stress Test:  	    MEDICATIONS:  aMIOdarone    Tablet 400 milliGRAM(s) Oral daily  metoprolol succinate ER 50 milliGRAM(s) Oral daily            atorvastatin 40 milliGRAM(s) Oral at bedtime  dextrose 40% Gel 15 Gram(s) Oral once PRN  dextrose 50% Injectable 12.5 Gram(s) IV Push once  dextrose 50% Injectable 25 Gram(s) IV Push once  glucagon  Injectable 1 milliGRAM(s) IntraMuscular once PRN  insulin lispro (HumaLOG) corrective regimen sliding scale   SubCutaneous Before meals and at bedtime    aspirin  chewable 81 milliGRAM(s) Oral daily  dextrose 5%. 1000 milliLiter(s) IV Continuous <Continuous>  heparin  Infusion.  Unit(s)/Hr IV Continuous <Continuous>  sodium chloride 0.9%. 1000 milliLiter(s) IV Continuous <Continuous>          SOCIAL HISTORY:    [ x] Non-smoker  [ ] Smoker  [ ] Alcohol    FAMILY HX: FAMILY HISTORY:  FHx: diabetes mellitus    no cad in first degree relatives    Allergies    No Known Allergies    Intolerances    	    REVIEW OF SYSTEMS:    [x] as per HPI  CONSTITUTIONAL: No fever, weight loss, or fatigue  ENT:  No difficulty hearing, tinnitus, vertigo; No sinus or throat pain  RESPIRATORY: No cough, wheezing, chills or hemoptysis; No Shortness of Breath  CARDIOVASCULAR: No chest pain, palpitations, dizziness, or leg swelling  GASTROINTESTINAL: No abdominal or epigastric pain. No nausea, vomiting, or hematemesis; No diarrhea or constipation. No melena or hematochezia.  GENITOURINARY: No dysuria, frequency, hematuria, or incontinence  NEUROLOGICAL: No headaches, memory loss, loss of strength, numbness, or tremors  MUSCULOSKELETAL: No joint pain or swelling; No muscle, back, or extremity pain  [x] All others negative	  [ ] Unable to obtain    PHYSICAL EXAM:  T(C): 36.4 (05-04-20 @ 14:15), Max: 36.6 (05-04-20 @ 10:39)  HR: 96 (05-04-20 @ 14:15) (80 - 96)  BP: 113/79 (05-04-20 @ 14:15) (99/59 - 113/79)  RR: 20 (05-04-20 @ 14:15) (18 - 20)  SpO2: 96% (05-04-20 @ 14:15) (96% - 96%)  Wt(kg): --  I&O's Summary      Appearance: Normal	  HEENT:   Normal oral mucosa, PERRL, EOMI	  Lymphatic: No lymphadenopathy  Cardiovascular: Normal S1 S2, No JVD, No murmurs, No edema  Respiratory: Lungs clear to auscultation	  Psychiatry: A & O x 3, Mood & affect appropriate  Gastrointestinal:  Soft, Non-tender, + BS	  Skin: No rashes, No ecchymoses, No cyanosis	  Neurologic: Non-focal  Extremities: Normal range of motion, No clubbing, cyanosis or edema  Vascular: Peripheral pulses palpable 2+ bilaterally    TELEMETRY: 	    ECG:    ECHO:  STRESS:  CATH:  	  RADIOLOGY:  CXR:  CT:  US:   	  	  LABS:	 	    CARDIAC MARKERS:            05-04    136  |  97  |  53<H>  ----------------------------<  94  4.9   |  26  |  2.58<H>    Ca    9.4      04 May 2020 11:28    TPro  7.3  /  Alb  3.6  /  TBili  0.9  /  DBili  x   /  AST  26  /  ALT  20  /  AlkPhos  75  05-04    proBNP:   Lipid Profile:   HgA1c:   TSH:     .  LABS:                         9.7    9.75  )-----------( 286      ( 04 May 2020 11:28 )             31.1     05-04    136  |  97  |  53<H>  ----------------------------<  94  4.9   |  26  |  2.58<H>    Ca    9.4      04 May 2020 11:28    TPro  7.3  /  Alb  3.6  /  TBili  0.9  /  DBili  x   /  AST  26  /  ALT  20  /  AlkPhos  75  05-04    PT/INR - ( 04 May 2020 15:14 )   PT: 29.1 sec;   INR: 2.48          PTT - ( 04 May 2020 15:14 )  PTT:40.7 sec      Lactate, Blood: 2.4 mmol/L (05-04 @ 11:24)      RADIOLOGY, EKG & ADDITIONAL TESTS: Reviewed. CC: PAD    HISTORY OF PRESENT ILLNESS:  HPI:  84 M PMH HTN, DM, A fib on Xarelto (Last taken 8am) HF EF 35%, CKD III PAD p/w L 3rd toe gangrene s/p amputation. Patient was admitted on March of this year and underwent L toe amputation on 3/06 of which cultures grew MRSA and was subsequently discharged on Doxycycline. He now returns with 3 weeks of LLE pain and swelling. Pt reports persistent pain of the 3rd toe and swelling of the LLE, denies numbness or weakness. Was was seen in the office by Dr. Mcclellan and was recommended to the ED for worsening appearance of the left 3rd toe. He denies fever, chills, nausea, sob, coughing, or recent COVID contacts. (04 May 2020 14:24)      Interval HPI - mild cp and sob  Location: chest  Duration: spontaneous  Severity: 4/10  Timing: intermittent  Modifing factors: none      PAST MEDICAL & SURGICAL HISTORY:  Heart failure  HLD (hyperlipidemia)  Borderline diabetes mellitus  Afib  HTN (hypertension)  H/O laryngectomy        [ ] Diabetes   [ ] Hypertension  [ ] Hyperlipidemia  [ ] CAD  [ ] PCI  [ ] CABG    PREVIOUS DIAGNOSTIC TESTING:    [ ] Echocardiogram:  [ ]  Catheterization:  [ ] Stress Test:  	    MEDICATIONS:  aMIOdarone    Tablet 400 milliGRAM(s) Oral daily  metoprolol succinate ER 50 milliGRAM(s) Oral daily            atorvastatin 40 milliGRAM(s) Oral at bedtime  dextrose 40% Gel 15 Gram(s) Oral once PRN  dextrose 50% Injectable 12.5 Gram(s) IV Push once  dextrose 50% Injectable 25 Gram(s) IV Push once  glucagon  Injectable 1 milliGRAM(s) IntraMuscular once PRN  insulin lispro (HumaLOG) corrective regimen sliding scale   SubCutaneous Before meals and at bedtime    aspirin  chewable 81 milliGRAM(s) Oral daily  dextrose 5%. 1000 milliLiter(s) IV Continuous <Continuous>  heparin  Infusion.  Unit(s)/Hr IV Continuous <Continuous>  sodium chloride 0.9%. 1000 milliLiter(s) IV Continuous <Continuous>          SOCIAL HISTORY:    [ x] Non-smoker  [ ] Smoker  [ ] Alcohol    FAMILY HX: FAMILY HISTORY:  FHx: diabetes mellitus    no cad in first degree relatives    Allergies    No Known Allergies    Intolerances    	    REVIEW OF SYSTEMS:    [x] as per HPI  CONSTITUTIONAL: No fever, weight loss, or fatigue  ENT:  No difficulty hearing, tinnitus, vertigo; No sinus or throat pain  RESPIRATORY: No cough, wheezing, chills or hemoptysis; No Shortness of Breath  CARDIOVASCULAR: No chest pain, palpitations, dizziness, or leg swelling  GASTROINTESTINAL: No abdominal or epigastric pain. No nausea, vomiting, or hematemesis; No diarrhea or constipation. No melena or hematochezia.  GENITOURINARY: No dysuria, frequency, hematuria, or incontinence  NEUROLOGICAL: No headaches, memory loss, loss of strength, numbness, or tremors  MUSCULOSKELETAL: No joint pain or swelling; No muscle, back, or extremity pain  [x] All others negative	  [ ] Unable to obtain    PHYSICAL EXAM:  T(C): 36.4 (05-04-20 @ 14:15), Max: 36.6 (05-04-20 @ 10:39)  HR: 96 (05-04-20 @ 14:15) (80 - 96)  BP: 113/79 (05-04-20 @ 14:15) (99/59 - 113/79)  RR: 20 (05-04-20 @ 14:15) (18 - 20)  SpO2: 96% (05-04-20 @ 14:15) (96% - 96%)  Wt(kg): --  I&O's Summary      Appearance: Normal	  HEENT:   Normal oral mucosa, PERRL, EOMI	  Lymphatic: No lymphadenopathy  Cardiovascular: Normal S1 S2, No JVD, No murmurs, No edema  Respiratory: Lungs clear to auscultation	  Psychiatry: A & O x 3, Mood & affect appropriate  Gastrointestinal:  Soft, Non-tender, + BS	  Skin: No rashes, No ecchymoses, No cyanosis	  Neurologic: Non-focal  Extremities: Normal range of motion, No clubbing, cyanosis or edema  Vascular: Peripheral pulses palpable 2+ bilaterally    TELEMETRY: 	    ECG:  < from: 12 Lead ECG (03.05.20 @ 21:05) >  Ventricular Rate 72 BPM    Atrial Rate 277 BPM    QRS Duration 104 ms    Q-T Interval 390 ms    QTC Calculation(Bezet) 427 ms    R Axis 43 degrees    T Axis 19 degrees    Diagnosis Line Atrial fibrillation  Nonspecific T wave abnormality , probablydigitalis effect    Confirmed by HAYDER DILLARD, [id number] on 3/6/2020 2:22:46 PM    < end of copied text >    ECHO: < from: TTE Echo Complete w/ Contrast w/ Doppler (03.05.20 @ 15:57) >     1. The left ventricle cavity size is moderately dilated. Left ventricular systolic function is moderately reduced with a calculated ejection fraction of 35% with global hypokinesis.   2. The right ventricle is mildly dilated. Right ventricular systolic function is normal.   3. Severely dilated left atrium.   4. Severely dilated right atrium.   5. Mild-to-moderate mitral regurgitation.   6. Mild-to-moderate tricuspid regurgitation.   7. Pulmonary hypertension present, pulmonary artery systolic pressure is 47 mmHg.   8. No pericardial effusion.    < end of copied text >    STRESS:  CATH:  	  RADIOLOGY:  CXR: < from: Xray Chest 1 View- PORTABLE-Urgent (05.04.20 @ 12:14) >    IMPRESSION:  Marked cardiomegaly with multiple chamber enlargement.  New small left-sided pneumothorax.  Lung zones are otherwise clear.    < end of copied text >    CT:  US:   	  	  LABS:	 	    CARDIAC MARKERS:            05-04    136  |  97  |  53<H>  ----------------------------<  94  4.9   |  26  |  2.58<H>    Ca    9.4      04 May 2020 11:28    TPro  7.3  /  Alb  3.6  /  TBili  0.9  /  DBili  x   /  AST  26  /  ALT  20  /  AlkPhos  75  05-04    proBNP:   Lipid Profile:   HgA1c:   TSH:     .  LABS:                         9.7    9.75  )-----------( 286      ( 04 May 2020 11:28 )             31.1     05-04    136  |  97  |  53<H>  ----------------------------<  94  4.9   |  26  |  2.58<H>    Ca    9.4      04 May 2020 11:28    TPro  7.3  /  Alb  3.6  /  TBili  0.9  /  DBili  x   /  AST  26  /  ALT  20  /  AlkPhos  75  05-04    PT/INR - ( 04 May 2020 15:14 )   PT: 29.1 sec;   INR: 2.48          PTT - ( 04 May 2020 15:14 )  PTT:40.7 sec      Lactate, Blood: 2.4 mmol/L (05-04 @ 11:24)      RADIOLOGY, EKG & ADDITIONAL TESTS: Reviewed.

## 2020-05-04 NOTE — CONSULT NOTE ADULT - ASSESSMENT
A/p  83 y/o AAM w/PMH HTN/DM/A-fib on Xarelto/HF EF 35%, CKD III and PAD, recently admitted in march for toe gangrene s/p left 3rd toe amputation/ presented back to ED from Dr. Mcclellan's office for further work up of LLE pain and swelling, nephrology consulted for ZEENAT on CKD III  plan for OR and angiogram tomorrow

## 2020-05-04 NOTE — CHART NOTE - NSCHARTNOTEFT_GEN_A_CORE
Had to draw labs myself, 2:30h later after missed timed lab order for 6pm. Everything is important but some things have priority.

## 2020-05-04 NOTE — ED ADULT NURSE NOTE - NSIMPLEMENTINTERV_GEN_ALL_ED
Implemented All Fall with Harm Risk Interventions:  [place] to call system. Call bell, personal items and telephone within reach. Instruct patient to call for assistance. Room bathroom lighting operational. Non-slip footwear when patient is off stretcher. Physically safe environment: no spills, clutter or unnecessary equipment. Stretcher in lowest position, wheels locked, appropriate side rails in place. Provide visual cue, wrist band, yellow gown, etc. Monitor gait and stability. Monitor for mental status changes and reorient to person, place, and time. Review medications for side effects contributing to fall risk. Reinforce activity limits and safety measures with patient and family. Provide visual clues: red socks.

## 2020-05-04 NOTE — CONSULT NOTE ADULT - SUBJECTIVE AND OBJECTIVE BOX
HPI:  85 y/o M w/PMH HTN/DM/A-fib on Xarelto/HF EF 35%, CKD III and PAD was admitted recently in March for gangrenous left 3rd toe  and underwent left 3rd toe amputation in 3/6, discharged home on doxycycline for + OR culture for MRSA, was sent to ED from Dr. Mcclellan's office for admission and work up of persistent LLE pain, nephrology consulted as pt noticed to have elevated cr from baseline.  denies fever/chills/N&V/ diarrhea/ low intake or decreased UOP  afebrile and HDS upon admission/ labs remarkable for Cr 2.58 from 1.4 in March 9th/ BUN 53/ lytes otherwise WNL and elevated lactate 2.4      Review of Systems:  Other Review of Systems: as noted in HPI	      Allergies and Intolerances:        Allergies:  	No Known Allergies:     Home Medications:   * Patient Currently Takes Medications as of 05-Mar-2020 15:32 documented in Structured Notes  · 	amiodarone 200 mg oral tablet: Last Dose Taken:  , 1 tab(s) orally once a day  · 	Xarelto 15 mg oral tablet: Last Dose Taken:  , 1 tab(s) orally once a day (in the evening)  · 	Lasix 40 mg oral tablet: Last Dose Taken:  , 1 tab(s) orally once a day  · 	Metoprolol Succinate ER 50 mg oral tablet, extended release: Last Dose Taken:  , 1 tab(s) orally once a day  · 	atorvastatin 40 mg oral tablet: Last Dose Taken:  , 1 tab(s) orally once a day    Patient History:    Past Medical, Past Surgical, and Family History:  PAST MEDICAL HISTORY:  Afib   Borderline diabetes mellitus   Heart failure   HLD (hyperlipidemia)   HTN (hypertension).     PAST SURGICAL HISTORY:  s/p 3rd left toe amputation     FAMILY HISTORY:  FHx: diabetes mellitus     Social History:  Social History (marital status, living situation, occupation, tobacco use, alcohol and drug use, and sexual history): Social: Reports smoking 5-10 yrs over 40 yrs ago, denies etoh use, denies illicit drug use HPI:  85 y/o M w/PMH HTN/DM/A-fib on Xarelto/HF EF 35%, CKD III and PAD was admitted recently in March for gangrenous left 3rd toe  and underwent left 3rd toe amputation in 3/6, discharged home on doxycycline for + OR culture for MRSA, was sent to ED from Dr. Mcclellan's office for admission and work up of persistent LLE pain, nephrology consulted as pt noticed to have elevated cr from baseline.  denies fever/chills/N&V/ diarrhea or decreased UOP/ intake has decreased compared to his baseline since discharge in March but denies sig dehydration or poor appetite  no rashes    afebrile/ requires NRB to maintain O2 sat > 92%/ COVID swab negative / labs remarkable for Cr 2.58 from 1.4 in March 9th/ BUN 53/ lytes otherwise WNL and elevated lactate 2.4   CXR with small left sided pneumothorax otherwise unremarkable and clear      Review of Systems:  Other Review of Systems: as noted in HPI	      Allergies and Intolerances:        Allergies:  	No Known Allergies:     Home Medications:   * Patient Currently Takes Medications as of 05-Mar-2020 15:32 documented in Structured Notes  · 	amiodarone 200 mg oral tablet: Last Dose Taken:  , 1 tab(s) orally once a day  · 	Xarelto 15 mg oral tablet: Last Dose Taken:  , 1 tab(s) orally once a day (in the evening)  · 	Lasix 40 mg oral tablet: Last Dose Taken:  , 1 tab(s) orally once a day  · 	Metoprolol Succinate ER 50 mg oral tablet, extended release: Last Dose Taken:  , 1 tab(s) orally once a day  · 	atorvastatin 40 mg oral tablet: Last Dose Taken:  , 1 tab(s) orally once a day    Patient History:    Past Medical, Past Surgical, and Family History:  PAST MEDICAL HISTORY:  Afib   Borderline diabetes mellitus   Heart failure   HLD (hyperlipidemia)   HTN (hypertension).     PAST SURGICAL HISTORY:  s/p 3rd left toe amputation     FAMILY HISTORY:  FHx: diabetes mellitus     Social History:  Social History (marital status, living situation, occupation, tobacco use, alcohol and drug use, and sexual history): Social: Reports smoking 5-10 yrs over 40 yrs ago, denies etoh use, denies illicit drug use	    VITAL SIGNS:  T(C): 36.4 (05-04-20 @ 14:15), Max: 36.6 (05-04-20 @ 10:39)  T(F): 97.6 (05-04-20 @ 14:15), Max: 97.9 (05-04-20 @ 10:39)  HR: 96 (05-04-20 @ 14:15) (80 - 96)  BP: 113/79 (05-04-20 @ 14:15) (99/59 - 113/79)  RR: 20 (05-04-20 @ 14:15) (18 - 20)  SpO2: 96% (05-04-20 @ 14:15) (96% - 96%)    PHYSICAL EXAM:  Constitutional: WDWN/ NAD  Eyes: PERRL, EOMI, clear conjunctiva  ENT: MMM  Neck: JVP 5 cm above clavicle   Respiratory: CTA B/L  Cardiac: +S1/S2/ a-fib HR ranging  bpm  Gastrointestinal: soft, NT/ND  Extremities: Warm/ LLE swollen up to mid shin with 2+ pitiing edema and distal cellulitic changes surrounding the amputation site on the foot  RLE non edematous    Neurologic: AAOx3; non focal    .  LABS:                         9.7    9.75  )-----------( 286      ( 04 May 2020 11:28 )             31.1     05-04    136  |  97  |  53<H>  ----------------------------<  94  4.9   |  26  |  2.58<H>    Ca    9.4      04 May 2020 11:28    TPro  7.3  /  Alb  3.6  /  TBili  0.9  /  DBili  x   /  AST  26  /  ALT  20  /  AlkPhos  75  05-04    PT/INR - ( 04 May 2020 15:14 )   PT: 29.1 sec;   INR: 2.48          PTT - ( 04 May 2020 15:14 )  PTT:40.7 sec    Lactate, Blood: 2.4 mmol/L (05-04 @ 11:24)    RADIOLOGY, EKG & ADDITIONAL TESTS: Reviewed.

## 2020-05-04 NOTE — ED PROVIDER NOTE - CARE PLAN
Anticipated Discharge Disposition: Home with HH    Action: LSW informed by Beckie NEWMAN that HH would be $180 out of pocket and would be included on hospital bill. Beckie also informed LSW that HH has approved, but very important for pt to attend his appointment with Dr. Santos this week.  LSW met with pt to discuss importance of appointment.     Barriers to Discharge: None    Plan: LSW to assist as needed.     Addendum 145  LSW tt Dr. Calloway that pt approved for HH. Dr. Calloway tt back stating he is good to go if everything is set up. LSW informed bedside RNAlisa.      Principal Discharge DX:	Cellulitis

## 2020-05-04 NOTE — H&P ADULT - NSHPLABSRESULTS_GEN_ALL_CORE
LABS:                        9.7    9.75  )-----------( 286      ( 04 May 2020 11:28 )             31.1     05-04    136  |  97  |  53<H>  ----------------------------<  94  4.9   |  26  |  2.58<H>    Ca    9.4      04 May 2020 11:28    TPro  7.3  /  Alb  3.6  /  TBili  0.9  /  DBili  x   /  AST  26  /  ALT  20  /  AlkPhos  75  05-04          RADIOLOGY & ADDITIONAL STUDIES:  < from: Xray Chest 1 View- PORTABLE-Urgent (05.04.20 @ 12:14) >    FINDINGS: Persistent marked cardiomegaly which may be secondary to multiple chamber enlargement as there is splaying of the greta to suggest left atrial enlargement. Lung zones are clear. There is a new small left apical pneumothorax. Soft tissues and osseous structures    IMPRESSION:  Marked cardiomegaly with multiple chamber enlargement.  New small left-sided pneumothorax.  Lung zones are otherwise clear.    < end of copied text >      < from: Xray Foot AP + Lateral + Oblique, Left (05.04.20 @ 12:17) >    3 views of the left foot are submitted. There is diffuse soft tissue swelling and extensive vascular calcification. Patient is status post amputation of the third digit at the waist of the middle phalanx. The margins are slightly irregular.      IMPRESSION: Soft tissue swelling. Margins of the third toe stump are slightly irregular. Infection cannot be excluded.    < end of copied text >    < from: Xray Tibia + Fibula 2 Views, Left (05.04.20 @ 12:30) >    INTERPRETATION:  Indication: left leg infection    Three views of the tibia and fibula are submitted.   No fracture or dislocation is present. Diffuse soft tissue swelling is present. Vascular calcifications are noted.   Impression:    Nonspecific soft tissue swelling which may be secondary to infection.        < end of copied text >

## 2020-05-05 ENCOUNTER — TRANSCRIPTION ENCOUNTER (OUTPATIENT)
Age: 85
End: 2020-05-05

## 2020-05-05 LAB
A1C WITH ESTIMATED AVERAGE GLUCOSE RESULT: 6 % — HIGH (ref 4–5.6)
ANION GAP SERPL CALC-SCNC: 12 MMOL/L — SIGNIFICANT CHANGE UP (ref 5–17)
APTT BLD: 106.6 SEC — HIGH (ref 27.5–36.3)
APTT BLD: 36.7 SEC — HIGH (ref 27.5–36.3)
APTT BLD: 38 SEC — HIGH (ref 27.5–36.3)
APTT BLD: >200 SEC — CRITICAL HIGH (ref 27.5–36.3)
BUN SERPL-MCNC: 44 MG/DL — HIGH (ref 7–23)
CALCIUM SERPL-MCNC: 9 MG/DL — SIGNIFICANT CHANGE UP (ref 8.4–10.5)
CHLORIDE SERPL-SCNC: 100 MMOL/L — SIGNIFICANT CHANGE UP (ref 96–108)
CO2 SERPL-SCNC: 25 MMOL/L — SIGNIFICANT CHANGE UP (ref 22–31)
CREAT ?TM UR-MCNC: 112 MG/DL — SIGNIFICANT CHANGE UP
CREAT SERPL-MCNC: 2.23 MG/DL — HIGH (ref 0.5–1.3)
ESTIMATED AVERAGE GLUCOSE: 126 MG/DL — HIGH (ref 68–114)
GLUCOSE BLDC GLUCOMTR-MCNC: 102 MG/DL — HIGH (ref 70–99)
GLUCOSE BLDC GLUCOMTR-MCNC: 115 MG/DL — HIGH (ref 70–99)
GLUCOSE BLDC GLUCOMTR-MCNC: 48 MG/DL — LOW (ref 70–99)
GLUCOSE BLDC GLUCOMTR-MCNC: 53 MG/DL — LOW (ref 70–99)
GLUCOSE BLDC GLUCOMTR-MCNC: 75 MG/DL — SIGNIFICANT CHANGE UP (ref 70–99)
GLUCOSE BLDC GLUCOMTR-MCNC: 84 MG/DL — SIGNIFICANT CHANGE UP (ref 70–99)
GLUCOSE BLDC GLUCOMTR-MCNC: 93 MG/DL — SIGNIFICANT CHANGE UP (ref 70–99)
GLUCOSE SERPL-MCNC: 99 MG/DL — SIGNIFICANT CHANGE UP (ref 70–99)
HCT VFR BLD CALC: 30.8 % — LOW (ref 39–50)
HGB BLD-MCNC: 9.3 G/DL — LOW (ref 13–17)
INR BLD: 1.87 — HIGH (ref 0.88–1.16)
MAGNESIUM SERPL-MCNC: 2.2 MG/DL — SIGNIFICANT CHANGE UP (ref 1.6–2.6)
MCHC RBC-ENTMCNC: 26.1 PG — LOW (ref 27–34)
MCHC RBC-ENTMCNC: 30.2 GM/DL — LOW (ref 32–36)
MCV RBC AUTO: 86.3 FL — SIGNIFICANT CHANGE UP (ref 80–100)
NRBC # BLD: 0 /100 WBCS — SIGNIFICANT CHANGE UP (ref 0–0)
PHOSPHATE SERPL-MCNC: 2.7 MG/DL — SIGNIFICANT CHANGE UP (ref 2.5–4.5)
PLATELET # BLD AUTO: 280 K/UL — SIGNIFICANT CHANGE UP (ref 150–400)
POTASSIUM SERPL-MCNC: 4.7 MMOL/L — SIGNIFICANT CHANGE UP (ref 3.5–5.3)
POTASSIUM SERPL-SCNC: 4.7 MMOL/L — SIGNIFICANT CHANGE UP (ref 3.5–5.3)
PROTHROM AB SERPL-ACNC: 21.7 SEC — HIGH (ref 10–12.9)
RBC # BLD: 3.57 M/UL — LOW (ref 4.2–5.8)
RBC # FLD: 15.4 % — HIGH (ref 10.3–14.5)
SARS-COV-2 RNA SPEC QL NAA+PROBE: SIGNIFICANT CHANGE UP
SODIUM SERPL-SCNC: 137 MMOL/L — SIGNIFICANT CHANGE UP (ref 135–145)
SODIUM UR-SCNC: 39 MMOL/L — SIGNIFICANT CHANGE UP
UUN UR-MCNC: 964 MG/DL — SIGNIFICANT CHANGE UP
VANCOMYCIN TROUGH SERPL-MCNC: 5.8 UG/ML — LOW (ref 10–20)
WBC # BLD: 10.67 K/UL — HIGH (ref 3.8–10.5)
WBC # FLD AUTO: 10.67 K/UL — HIGH (ref 3.8–10.5)

## 2020-05-05 PROCEDURE — 93926 LOWER EXTREMITY STUDY: CPT | Mod: 26,LT

## 2020-05-05 PROCEDURE — 71045 X-RAY EXAM CHEST 1 VIEW: CPT | Mod: 26

## 2020-05-05 PROCEDURE — 99232 SBSQ HOSP IP/OBS MODERATE 35: CPT

## 2020-05-05 PROCEDURE — 93010 ELECTROCARDIOGRAM REPORT: CPT

## 2020-05-05 RX ORDER — METOPROLOL TARTRATE 50 MG
50 TABLET ORAL DAILY
Refills: 0 | Status: DISCONTINUED | OUTPATIENT
Start: 2020-05-06 | End: 2020-05-07

## 2020-05-05 RX ORDER — METOPROLOL TARTRATE 50 MG
100 TABLET ORAL DAILY
Refills: 0 | Status: DISCONTINUED | OUTPATIENT
Start: 2020-05-05 | End: 2020-05-05

## 2020-05-05 RX ORDER — DEXTROSE 50 % IN WATER 50 %
12.5 SYRINGE (ML) INTRAVENOUS ONCE
Refills: 0 | Status: COMPLETED | OUTPATIENT
Start: 2020-05-05 | End: 2020-05-05

## 2020-05-05 RX ORDER — HEPARIN SODIUM 5000 [USP'U]/ML
1500 INJECTION INTRAVENOUS; SUBCUTANEOUS
Qty: 25000 | Refills: 0 | Status: DISCONTINUED | OUTPATIENT
Start: 2020-05-05 | End: 2020-05-05

## 2020-05-05 RX ORDER — SODIUM CHLORIDE 9 MG/ML
1000 INJECTION INTRAMUSCULAR; INTRAVENOUS; SUBCUTANEOUS
Refills: 0 | Status: DISCONTINUED | OUTPATIENT
Start: 2020-05-05 | End: 2020-05-07

## 2020-05-05 RX ORDER — HEPARIN SODIUM 5000 [USP'U]/ML
1400 INJECTION INTRAVENOUS; SUBCUTANEOUS
Qty: 25000 | Refills: 0 | Status: DISCONTINUED | OUTPATIENT
Start: 2020-05-05 | End: 2020-05-06

## 2020-05-05 RX ORDER — VANCOMYCIN HCL 1 G
1000 VIAL (EA) INTRAVENOUS ONCE
Refills: 0 | Status: COMPLETED | OUTPATIENT
Start: 2020-05-05 | End: 2020-05-05

## 2020-05-05 RX ORDER — METOPROLOL TARTRATE 50 MG
150 TABLET ORAL DAILY
Refills: 0 | Status: DISCONTINUED | OUTPATIENT
Start: 2020-05-05 | End: 2020-05-05

## 2020-05-05 RX ORDER — LANOLIN ALCOHOL/MO/W.PET/CERES
1 CREAM (GRAM) TOPICAL AT BEDTIME
Refills: 0 | Status: DISCONTINUED | OUTPATIENT
Start: 2020-05-05 | End: 2020-05-13

## 2020-05-05 RX ADMIN — AMIODARONE HYDROCHLORIDE 400 MILLIGRAM(S): 400 TABLET ORAL at 06:45

## 2020-05-05 RX ADMIN — PIPERACILLIN AND TAZOBACTAM 200 GRAM(S): 4; .5 INJECTION, POWDER, LYOPHILIZED, FOR SOLUTION INTRAVENOUS at 06:00

## 2020-05-05 RX ADMIN — PIPERACILLIN AND TAZOBACTAM 200 GRAM(S): 4; .5 INJECTION, POWDER, LYOPHILIZED, FOR SOLUTION INTRAVENOUS at 23:40

## 2020-05-05 RX ADMIN — HEPARIN SODIUM 17 UNIT(S)/HR: 5000 INJECTION INTRAVENOUS; SUBCUTANEOUS at 12:45

## 2020-05-05 RX ADMIN — SODIUM CHLORIDE 60 MILLILITER(S): 9 INJECTION INTRAMUSCULAR; INTRAVENOUS; SUBCUTANEOUS at 13:42

## 2020-05-05 RX ADMIN — HEPARIN SODIUM 16 UNIT(S)/HR: 5000 INJECTION INTRAVENOUS; SUBCUTANEOUS at 07:41

## 2020-05-05 RX ADMIN — ATORVASTATIN CALCIUM 40 MILLIGRAM(S): 80 TABLET, FILM COATED ORAL at 21:43

## 2020-05-05 RX ADMIN — PIPERACILLIN AND TAZOBACTAM 200 GRAM(S): 4; .5 INJECTION, POWDER, LYOPHILIZED, FOR SOLUTION INTRAVENOUS at 00:02

## 2020-05-05 RX ADMIN — HEPARIN SODIUM 14 UNIT(S)/HR: 5000 INJECTION INTRAVENOUS; SUBCUTANEOUS at 22:01

## 2020-05-05 RX ADMIN — PIPERACILLIN AND TAZOBACTAM 200 GRAM(S): 4; .5 INJECTION, POWDER, LYOPHILIZED, FOR SOLUTION INTRAVENOUS at 12:28

## 2020-05-05 RX ADMIN — Medication 1 MILLIGRAM(S): at 23:40

## 2020-05-05 RX ADMIN — Medication 81 MILLIGRAM(S): at 12:28

## 2020-05-05 RX ADMIN — Medication 50 MILLIGRAM(S): at 09:00

## 2020-05-05 RX ADMIN — PIPERACILLIN AND TAZOBACTAM 200 GRAM(S): 4; .5 INJECTION, POWDER, LYOPHILIZED, FOR SOLUTION INTRAVENOUS at 17:41

## 2020-05-05 RX ADMIN — HEPARIN SODIUM 15 UNIT(S)/HR: 5000 INJECTION INTRAVENOUS; SUBCUTANEOUS at 02:23

## 2020-05-05 RX ADMIN — HEPARIN SODIUM 17 UNIT(S)/HR: 5000 INJECTION INTRAVENOUS; SUBCUTANEOUS at 07:43

## 2020-05-05 RX ADMIN — Medication 12.5 GRAM(S): at 12:28

## 2020-05-05 RX ADMIN — HEPARIN SODIUM 16 UNIT(S)/HR: 5000 INJECTION INTRAVENOUS; SUBCUTANEOUS at 13:30

## 2020-05-05 RX ADMIN — Medication 166.67 MILLIGRAM(S): at 14:49

## 2020-05-05 NOTE — PROVIDER CONTACT NOTE (OTHER) - ACTION/TREATMENT ORDERED:
Patient given dextrose 50% 12.5mg as per order. finger stick rechecked 84mg/dl, patient also eating lunch at this time

## 2020-05-05 NOTE — PROGRESS NOTE ADULT - SUBJECTIVE AND OBJECTIVE BOX
no acute events overnight  initial plan for OR and angiogram this am postponed in setting on ZEENAT   renal fx improving w gentle hydration, good uop   episode of urinary retention yesterday   repeat CXR with no pneumothorax, off oxygen   breathing comfortably on RA, sat 100%      Meds:  aMIOdarone    Tablet 400 daily  aspirin  chewable 81 daily  atorvastatin 40 at bedtime  dextrose 40% Gel 15 once PRN  dextrose 5%. 1000 <Continuous>  dextrose 50% Injectable 12.5 once  dextrose 50% Injectable 25 once  glucagon  Injectable 1 once PRN  heparin  Infusion 1500 <Continuous>  insulin lispro (HumaLOG) corrective regimen sliding scale  Before meals and at bedtime  metoprolol succinate  daily  piperacillin/tazobactam IVPB.. 3.375 every 6 hours  sodium chloride 0.9%. 1000 <Continuous>      T(C): , Max: 36.9 (05-05-20 @ 08:03)  T(F): , Max: 98.5 (05-05-20 @ 08:03)  HR: 94 (05-05-20 @ 15:05)  BP: 96/60 (05-05-20 @ 15:05)  BP(mean): 79 (05-04-20 @ 17:15)  RR: 14 (05-05-20 @ 15:05)  SpO2: 100% (05-05-20 @ 15:05)  Wt(kg): --    05-04 @ 07:01  -  05-05 @ 07:00  --------------------------------------------------------  IN: 844 mL / OUT: 1150 mL / NET: -306 mL    05-05 @ 07:01  -  05-05 @ 16:01  --------------------------------------------------------  IN: 461.5 mL / OUT: 450 mL / NET: 11.5 mL      Height (cm): 177.8 (05-05 @ 02:21)  Weight (kg): 77.1 (05-05 @ 02:21)  BMI (kg/m2): 24.4 (05-05 @ 02:21)  BSA (m2): 1.95 (05-05 @ 02:21)      PHYSICAL EXAM:  Constitutional: alert, NAD  ENT: MMM  Neck: supple  Respiratory: CTA B/L  Cardiac: +S1/S2/ a-fib  Gastrointestinal: soft, NT, ND  Extremities: LLE swollen up to mid shin with 2+ pitting edema and distal cellulitic changes surrounding the amputation site on the foot,  RLE non edematous    Neurologic: AAO x3; non focal      LABS:                        9.3    10.67 )-----------( 280      ( 05 May 2020 06:31 )             30.8     05-05    137  |  100  |  44<H>  ----------------------------<  99  4.7   |  25  |  2.23<H>    Ca    9.0      05 May 2020 06:31  Phos  2.7     05-05  Mg     2.2     05-05    TPro  7.3  /  Alb  3.6  /  TBili  0.9  /  DBili  x   /  AST  26  /  ALT  20  /  AlkPhos  75  05-04      PT/INR - ( 05 May 2020 06:31 )   PT: 21.7 sec;   INR: 1.87          PTT - ( 05 May 2020 12:21 )  PTT:106.6 sec  Urinalysis Basic - ( 04 May 2020 21:31 )    Color: Yellow / Appearance: Clear / SG: <=1.005 / pH: x  Gluc: x / Ketone: NEGATIVE  / Bili: Negative / Urobili: 0.2 E.U./dL   Blood: x / Protein: NEGATIVE mg/dL / Nitrite: NEGATIVE   Leuk Esterase: NEGATIVE / RBC: x / WBC x   Sq Epi: x / Non Sq Epi: x / Bacteria: x      Creatinine, Random Urine: 112 mg/dL (05-05 @ 13:57)  Sodium, Random Urine: 39 mmol/L (05-05 @ 13:57)  Creatinine, Random Urine: 116 mg/dL (05-04 @ 21:31)  Sodium, Random Urine: 37 mmol/L (05-04 @ 21:31)        RADIOLOGY & ADDITIONAL STUDIES:    < from: Xray Chest 1 View- PORTABLE-Routine (05.05.20 @ 03:37) >    EXAM:  XR CHEST PORTABLE ROUTINE 1V                          PROCEDURE DATE:  05/05/2020          INTERPRETATION:    Examination: XR CHEST    History: , pneumothorax    Findings:  Comparison is made to prior study dated 5/4/2020. Stable prominence of the cardiac silhouette. Interval improvement in left basilar opacity. Right lung is clear. No pneumothorax. No pleural effusion. No acute bony abnormality.    Impression:  1.  Interval improvement in left basilar opacity.      DISCRETE X-RAY DATA:  Percent of LEFT lung opacification: 1-33%  Percent of RIGHT lung opacification: Clear  Change in lung opacification from most recent x-ray (<=3 days): Decrease  Change from prior dated 3 or more days (same admission): No Prior    < end of copied text >

## 2020-05-05 NOTE — CHART NOTE - NSCHARTNOTEFT_GEN_A_CORE
Admitting Diagnosis:   Patient is a 84y old  Male who presents with a chief complaint of     Consult: Yes [   ]  No [  x ]    Reason for Initial Nutrition Assessment: Wt loss assessment    PAST MEDICAL & SURGICAL HISTORY:  Heart failure  HLD (hyperlipidemia)  Borderline diabetes mellitus  Afib  HTN (hypertension)  H/O laryngectomy    Current Nutrition Order: NPO diet for OR    PO Intake: Good (%) [   ]  Fair (50-75%) [   ] Poor (<25%) [   ]- N/A NPO, but appetite was good while on regular diet.     GI Issues: WDL, last BM 5/3, no n/v/d/c noted.     Pain: No pain noted per flowsheet    Skin Integrity: Surgical incision, eric score 19.     Labs:   05-05    137  |  100  |  44<H>  ----------------------------<  99  4.7   |  25  |  2.23<H>    Ca    9.0      05 May 2020 06:31  Phos  2.7     05-05  Mg     2.2     05-05    TPro  7.3  /  Alb  3.6  /  TBili  0.9  /  DBili  x   /  AST  26  /  ALT  20  /  AlkPhos  75  05-04    CAPILLARY BLOOD GLUCOSE    POCT Blood Glucose.: 75 mg/dL (05 May 2020 11:42)  POCT Blood Glucose.: 48 mg/dL (05 May 2020 11:33)  POCT Blood Glucose.: 102 mg/dL (05 May 2020 07:54)  POCT Blood Glucose.: 120 mg/dL (04 May 2020 21:19)  POCT Blood Glucose.: 86 mg/dL (04 May 2020 18:58)    Medications:  MEDICATIONS  (STANDING):  aMIOdarone    Tablet 400 milliGRAM(s) Oral daily  aspirin  chewable 81 milliGRAM(s) Oral daily  atorvastatin 40 milliGRAM(s) Oral at bedtime  dextrose 5%. 1000 milliLiter(s) (50 mL/Hr) IV Continuous <Continuous>  dextrose 50% Injectable 12.5 Gram(s) IV Push once  dextrose 50% Injectable 25 Gram(s) IV Push once  heparin  Infusion 1500 Unit(s)/Hr (17 mL/Hr) IV Continuous <Continuous>  insulin lispro (HumaLOG) corrective regimen sliding scale   SubCutaneous Before meals and at bedtime  metoprolol succinate ER 50 milliGRAM(s) Oral daily  piperacillin/tazobactam IVPB.. 3.375 Gram(s) IV Intermittent every 6 hours  sodium chloride 0.9%. 1000 milliLiter(s) (60 mL/Hr) IV Continuous <Continuous>    MEDICATIONS  (PRN):  dextrose 40% Gel 15 Gram(s) Oral once PRN Blood Glucose LESS THAN 70 milliGRAM(s)/deciliter  glucagon  Injectable 1 milliGRAM(s) IntraMuscular once PRN Glucose LESS THAN 70 milligrams/deciliter    Admitted Anthropometrics:  Height: 70" Weight: 170lbs, IBW 166lbs+/-10%, %%, BMI 24.4 kg/m2-    Weight:  3/5 174lbs  5/4 170lbs    Weight Change: Per previous admission, pt with a -4lb/2% wt loss over 2 months (not significant) likely 2/2 toe amputation.     Nutrition Focused Physical Exam: Completed [   ]  Unable to complete [   ]- Unremarkable.     Estimated energy needs:   ABW (77kg) used for calculations as pt between % of IBW. Needs adjusted for post-op and advanced age.   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 CXR performed-small L sided pneumothorax placed on supplemental O2. Repeat Cxr with no pneumothorax. Questionable plan for left toe amputation with angiogram today. On assessment, pt resting in bed without complaints. Assessment was limited as pt was being prepped. Pt was NPO at time of assessment, but pt noting no changes in appetite. Pt hasn't noted any unusual changes in weight since previous admission- per EMR, note a -4lb/2% wt loss over 2 months (not significant) likely 2/2 toe amputation. Education was deferred 2/2 time- plan to disc CST CHO diet and wound healing diet guidelines. NKFA. Please see nutrition recommendations below. RD to follow up per protocol.     Nutrition Diagnosis: Increased kcal, pro needs RT increased nutrient demand 2/2 wound healing AEB S/p amputation    [  ] No active nutrition diagnosis at this time  [  ] Current medical condition precludes nutrition intervention    Goal: Consistently meet >75% est needs    Recommendations:   1. NPO diet  2. Recommend advance to CST CHO diet when medically feasible  3. Recommend addition of MVI once diet advances  4. Cont to trend weights biweekly    Education: Education was limited- RD will attempt to follow up and provide edu per protocol.     Risk Level: High [  ] Moderate [ x  ] Low [   ]

## 2020-05-05 NOTE — PROGRESS NOTE ADULT - SUBJECTIVE AND OBJECTIVE BOX
PRE-OP NOTE, Vascular Surgery, PCN:     Pre-op Diagnosis: CELLULITIS  Cellulitis    Procedure: LLE angiogram and toe amputation  Surgeon: Dr. Mcclellan                          9.3    10.67 )-----------( 280      ( 05 May 2020 06:31 )             30.8     05-05    137  |  100  |  44<H>  ----------------------------<  99  4.7   |  25  |  2.23<H>    Ca    9.0      05 May 2020 06:31  Phos  2.7     05-05  Mg     2.2     05-05    TPro  7.3  /  Alb  3.6  /  TBili  0.9  /  DBili  x   /  AST  26  /  ALT  20  /  AlkPhos  75  05-04    PT/INR - ( 05 May 2020 06:31 )   PT: 21.7 sec;   INR: 1.87          PTT - ( 05 May 2020 20:11 )  PTT:>200.0 sec  Urinalysis Basic - ( 04 May 2020 21:31 )    Color: Yellow / Appearance: Clear / SG: <=1.005 / pH: x  Gluc: x / Ketone: NEGATIVE  / Bili: Negative / Urobili: 0.2 E.U./dL   Blood: x / Protein: NEGATIVE mg/dL / Nitrite: NEGATIVE   Leuk Esterase: NEGATIVE / RBC: x / WBC x   Sq Epi: x / Non Sq Epi: x / Bacteria: x      CAPILLARY BLOOD GLUCOSE      POCT Blood Glucose.: 115 mg/dL (05 May 2020 21:41)  POCT Blood Glucose.: 93 mg/dL (05 May 2020 17:08)  POCT Blood Glucose.: 84 mg/dL (05 May 2020 13:13)  POCT Blood Glucose.: 53 mg/dL (05 May 2020 12:20)  POCT Blood Glucose.: 75 mg/dL (05 May 2020 11:42)  POCT Blood Glucose.: 48 mg/dL (05 May 2020 11:33)  POCT Blood Glucose.: 102 mg/dL (05 May 2020 07:54)    LIVER FUNCTIONS - ( 04 May 2020 11:28 )  Alb: 3.6 g/dL / Pro: 7.3 g/dL / ALK PHOS: 75 U/L / ALT: 20 U/L / AST: 26 U/L / GGT: x             Type & Screen:ABO Interpretation: A (05-05 @ 05:30)    CXR: ok  EKG: in chart  Echocardiogram: 3/5     1. The left ventricle cavity size is moderately dilated. Left ventricular systolic function is moderately reduced with a calculated ejection fraction of 35% with global hypokinesis.   2. The right ventricle is mildly dilated. Right ventricular systolic function is normal.   3. Severely dilated left atrium.   4. Severely dilated right atrium.   5. Mild-to-moderate mitral regurgitation.   6. Mild-to-moderate tricuspid regurgitation.   7. Pulmonary hypertension present, pulmonary artery systolic pressure is 47 mmHg.   8. No pericardial effusion.        A/P: 84y Male pre-op for above procedure  -NPO past midnight  - IVF past midnight  - consent: in chart  - Hep drip to stop at 6am  - 2am labs

## 2020-05-05 NOTE — PROGRESS NOTE ADULT - ASSESSMENT
85 y/o AAM w/PMH HTN/DM/A-fib on Xarelto/HF EF 35%, CKD III and PAD, recently admitted in march for toe gangrene s/p left 3rd toe amputation/ presented back to ED from Dr. Mcclellan's office for further work up of LLE pain and swelling, nephrology consulted for ZEENAT on CKD III  plan for OR and angiogram.    # ZEENAT on CKD stage III (baseline Cr 1.6-1.8)  - likely prerenal given improvement w gentle hydration, however retention has to be ruled out (350 ml PVR yesterday that resolved spontaniously)  - repeat PVR bladder scan, place Moe if positive retention   - hold IV hydration once not NPO, encourage oral intake/ hydration  - keep euvolemic, hydrate with NS at 60 cc/hr pre-/ post- angiogram and when NPO  - monitor BUN/Cr, lytes, uop   - check urine Na, Cr, Urea  - obtain vanc trough  - adjust meds/ ABx to CrCl <30 ml/min   - avoid nephrotoxic meds (NSAIDs)

## 2020-05-05 NOTE — PROGRESS NOTE ADULT - ASSESSMENT
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 BRANDYN falsely elevated due to history of DM Scheduled for L Toe amputation and Angiogram. Respiratory status improved after supplemental O2, CXR w/o evidence of pneumothorax. Clinically stable.       NPO after midnight/IVF  Home meds  Vanc/Zosyn  f/u Renal recs  f/u noon Vanc level   AM labs

## 2020-05-05 NOTE — PROGRESS NOTE ADULT - SUBJECTIVE AND OBJECTIVE BOX
INTERVAL HPI/OVERNIGHT EVENTS:  CXR performed-small L sided pneumothorax placed on supplemental O2, L toe xray: Margins of third toe stump slightly irregular infection cannot be ruled out.Repeat BMP with improved crea. Started on hep drip. aptt non therapeutic - follow up with am aptt. Repaeat cxr with no pneumothorax. Off O2 therapy. Follow up with AM cxr. urine ouput adequate.    SUBJECTIVE: Examined at the bedside with chief resident. States left foot pain present. Denies cp, sob, nausea, emesis. No acute complaints    aMIOdarone    Tablet 400 milliGRAM(s) Oral daily  aspirin  chewable 81 milliGRAM(s) Oral daily  heparin  Infusion 1500 Unit(s)/Hr IV Continuous <Continuous>  metoprolol succinate ER 50 milliGRAM(s) Oral daily  piperacillin/tazobactam IVPB.. 3.375 Gram(s) IV Intermittent every 6 hours      Vital Signs Last 24 Hrs  T(C): 36.9 (05 May 2020 08:03), Max: 36.9 (05 May 2020 08:03)  T(F): 98.5 (05 May 2020 08:03), Max: 98.5 (05 May 2020 08:03)  HR: 108 (05 May 2020 08:03) (71 - 111)  BP: 99/62 (05 May 2020 08:03) (99/59 - 118/57)  BP(mean): 79 (04 May 2020 17:15) (79 - 79)  RR: 19 (05 May 2020 08:03) (17 - 20)  SpO2: 96% (05 May 2020 08:03) (95% - 100%)  I&O's Detail    04 May 2020 07:01  -  05 May 2020 07:00  --------------------------------------------------------  IN:    heparin  Infusion.: 84 mL    heparin Infusion: 60 mL    IV PiggyBack: 100 mL    sodium chloride 0.9%.: 600 mL  Total IN: 844 mL    OUT:    Post-Void Residual per Intermittent Catheterization: 350 mL    Voided: 800 mL  Total OUT: 1150 mL    Total NET: -306 mL          Physical Exam  General: NAD, resting comfortably in bed  C/V: NSR  Pulm: Nonlabored breathing, no respiratory distress  Abd: soft, non-tender, non-distended.  Extrem: WWP, LLE swelling, 3rd toe black and gangrenous, no purulence, cleaned with betadine and wrapped in curlex,  Neuro: no focal deficits, normal sensation  Pulses: LLE DP/PT Biphasic, RLE DP/PT triphasic    LABS:                        9.3    10.67 )-----------( 280      ( 05 May 2020 06:31 )             30.8     05-05    137  |  100  |  44<H>  ----------------------------<  99  4.7   |  25  |  2.23<H>    Ca    9.0      05 May 2020 06:31  Phos  2.7     05-05  Mg     2.2     05-05    TPro  7.3  /  Alb  3.6  /  TBili  0.9  /  DBili  x   /  AST  26  /  ALT  20  /  AlkPhos  75  05-04    PT/INR - ( 05 May 2020 06:31 )   PT: 21.7 sec;   INR: 1.87          PTT - ( 05 May 2020 06:31 )  PTT:36.7 sec  Urinalysis Basic - ( 04 May 2020 21:31 )    Color: Yellow / Appearance: Clear / SG: <=1.005 / pH: x  Gluc: x / Ketone: NEGATIVE  / Bili: Negative / Urobili: 0.2 E.U./dL   Blood: x / Protein: NEGATIVE mg/dL / Nitrite: NEGATIVE   Leuk Esterase: NEGATIVE / RBC: x / WBC x   Sq Epi: x / Non Sq Epi: x / Bacteria: x    IMPRESSION:  Marked cardiomegaly with multiple chamber enlargement.  New small left-sided pneumothorax.  Lung zones are otherwise clear.    < end of copied text >      Culture - Blood (collected 04 May 2020 14:25)  Source: .Blood Blood  Preliminary Report (05 May 2020 03:01):    No growth at 12 hours    Culture - Blood (collected 04 May 2020 14:25)  Source: .Blood Blood  Preliminary Report (05 May 2020 03:01):    No growth at 12 hours    Culture - Other (collected 04 May 2020 12:21)  Source: .Other left foot wound  Gram Stain (04 May 2020 21:05):    No WBC's seen.    Few Gram positive cocci in pairs and in clusters      RADIOLOGY & ADDITIONAL STUDIES:  < from: Xray Chest 1 View- PORTABLE-Urgent (05.04.20 @ 12:14) >  FINDINGS: Persistent marked cardiomegaly which may be secondary to multiple chamber enlargement as there is splaying of the greta to suggest left atrial enlargement. Lung zones are clear. There is a new small left apical pneumothorax. Soft tissues and osseous structures

## 2020-05-06 ENCOUNTER — RESULT REVIEW (OUTPATIENT)
Age: 85
End: 2020-05-06

## 2020-05-06 DIAGNOSIS — Z71.89 OTHER SPECIFIED COUNSELING: ICD-10-CM

## 2020-05-06 LAB
-  AMPICILLIN: SIGNIFICANT CHANGE UP
-  CEFAZOLIN: SIGNIFICANT CHANGE UP
-  CLINDAMYCIN: SIGNIFICANT CHANGE UP
-  DAPTOMYCIN: SIGNIFICANT CHANGE UP
-  ERYTHROMYCIN: SIGNIFICANT CHANGE UP
-  LEVOFLOXACIN: SIGNIFICANT CHANGE UP
-  LINEZOLID: SIGNIFICANT CHANGE UP
-  LINEZOLID: SIGNIFICANT CHANGE UP
-  OXACILLIN: SIGNIFICANT CHANGE UP
-  RIFAMPIN: SIGNIFICANT CHANGE UP
-  TETRACYCLINE: SIGNIFICANT CHANGE UP
-  TRIMETHOPRIM/SULFAMETHOXAZOLE: SIGNIFICANT CHANGE UP
-  VANCOMYCIN: SIGNIFICANT CHANGE UP
-  VANCOMYCIN: SIGNIFICANT CHANGE UP
ANION GAP SERPL CALC-SCNC: 14 MMOL/L — SIGNIFICANT CHANGE UP (ref 5–17)
APTT BLD: 161.8 SEC — CRITICAL HIGH (ref 27.5–36.3)
BUN SERPL-MCNC: 40 MG/DL — HIGH (ref 7–23)
CALCIUM SERPL-MCNC: 9 MG/DL — SIGNIFICANT CHANGE UP (ref 8.4–10.5)
CHLORIDE SERPL-SCNC: 97 MMOL/L — SIGNIFICANT CHANGE UP (ref 96–108)
CO2 SERPL-SCNC: 25 MMOL/L — SIGNIFICANT CHANGE UP (ref 22–31)
CREAT SERPL-MCNC: 2.06 MG/DL — HIGH (ref 0.5–1.3)
CULTURE RESULTS: SIGNIFICANT CHANGE UP
GLUCOSE BLDC GLUCOMTR-MCNC: 59 MG/DL — LOW (ref 70–99)
GLUCOSE BLDC GLUCOMTR-MCNC: 87 MG/DL — SIGNIFICANT CHANGE UP (ref 70–99)
GLUCOSE BLDC GLUCOMTR-MCNC: 88 MG/DL — SIGNIFICANT CHANGE UP (ref 70–99)
GLUCOSE BLDC GLUCOMTR-MCNC: 89 MG/DL — SIGNIFICANT CHANGE UP (ref 70–99)
GLUCOSE SERPL-MCNC: 119 MG/DL — HIGH (ref 70–99)
HCT VFR BLD CALC: 31.1 % — LOW (ref 39–50)
HGB BLD-MCNC: 9.6 G/DL — LOW (ref 13–17)
INR BLD: 1.67 — HIGH (ref 0.88–1.16)
MAGNESIUM SERPL-MCNC: 2.2 MG/DL — SIGNIFICANT CHANGE UP (ref 1.6–2.6)
MCHC RBC-ENTMCNC: 26.4 PG — LOW (ref 27–34)
MCHC RBC-ENTMCNC: 30.9 GM/DL — LOW (ref 32–36)
MCV RBC AUTO: 85.7 FL — SIGNIFICANT CHANGE UP (ref 80–100)
METHOD TYPE: SIGNIFICANT CHANGE UP
NRBC # BLD: 0 /100 WBCS — SIGNIFICANT CHANGE UP (ref 0–0)
ORGANISM # SPEC MICROSCOPIC CNT: SIGNIFICANT CHANGE UP
PHOSPHATE SERPL-MCNC: 2.7 MG/DL — SIGNIFICANT CHANGE UP (ref 2.5–4.5)
PLATELET # BLD AUTO: 294 K/UL — SIGNIFICANT CHANGE UP (ref 150–400)
POTASSIUM SERPL-MCNC: 4.3 MMOL/L — SIGNIFICANT CHANGE UP (ref 3.5–5.3)
POTASSIUM SERPL-SCNC: 4.3 MMOL/L — SIGNIFICANT CHANGE UP (ref 3.5–5.3)
PROTHROM AB SERPL-ACNC: 19.4 SEC — HIGH (ref 10–12.9)
RBC # BLD: 3.63 M/UL — LOW (ref 4.2–5.8)
RBC # FLD: 15.4 % — HIGH (ref 10.3–14.5)
SODIUM SERPL-SCNC: 136 MMOL/L — SIGNIFICANT CHANGE UP (ref 135–145)
SPECIMEN SOURCE: SIGNIFICANT CHANGE UP
VANCOMYCIN TROUGH SERPL-MCNC: 10.3 UG/ML — SIGNIFICANT CHANGE UP (ref 10–20)
WBC # BLD: 11.69 K/UL — HIGH (ref 3.8–10.5)
WBC # FLD AUTO: 11.69 K/UL — HIGH (ref 3.8–10.5)

## 2020-05-06 PROCEDURE — 99223 1ST HOSP IP/OBS HIGH 75: CPT

## 2020-05-06 PROCEDURE — 28820 AMPUTATION OF TOE: CPT | Mod: T3,78,GC

## 2020-05-06 PROCEDURE — 88305 TISSUE EXAM BY PATHOLOGIST: CPT | Mod: 26

## 2020-05-06 PROCEDURE — 99232 SBSQ HOSP IP/OBS MODERATE 35: CPT

## 2020-05-06 PROCEDURE — 37230: CPT | Mod: 78,GC

## 2020-05-06 PROCEDURE — 99222 1ST HOSP IP/OBS MODERATE 55: CPT | Mod: GC

## 2020-05-06 PROCEDURE — 99232 SBSQ HOSP IP/OBS MODERATE 35: CPT | Mod: GC

## 2020-05-06 RX ORDER — OXYCODONE AND ACETAMINOPHEN 5; 325 MG/1; MG/1
1 TABLET ORAL EVERY 6 HOURS
Refills: 0 | Status: DISCONTINUED | OUTPATIENT
Start: 2020-05-06 | End: 2020-05-13

## 2020-05-06 RX ORDER — ASPIRIN/CALCIUM CARB/MAGNESIUM 324 MG
81 TABLET ORAL ONCE
Refills: 0 | Status: COMPLETED | OUTPATIENT
Start: 2020-05-06 | End: 2020-05-06

## 2020-05-06 RX ORDER — CLOPIDOGREL BISULFATE 75 MG/1
75 TABLET, FILM COATED ORAL DAILY
Refills: 0 | Status: DISCONTINUED | OUTPATIENT
Start: 2020-05-06 | End: 2020-05-21

## 2020-05-06 RX ORDER — DEXTROSE 50 % IN WATER 50 %
50 SYRINGE (ML) INTRAVENOUS ONCE
Refills: 0 | Status: COMPLETED | OUTPATIENT
Start: 2020-05-06 | End: 2020-05-06

## 2020-05-06 RX ORDER — HEPARIN SODIUM 5000 [USP'U]/ML
1200 INJECTION INTRAVENOUS; SUBCUTANEOUS
Qty: 25000 | Refills: 0 | Status: DISCONTINUED | OUTPATIENT
Start: 2020-05-06 | End: 2020-05-06

## 2020-05-06 RX ORDER — VANCOMYCIN HCL 1 G
1250 VIAL (EA) INTRAVENOUS ONCE
Refills: 0 | Status: COMPLETED | OUTPATIENT
Start: 2020-05-06 | End: 2020-05-06

## 2020-05-06 RX ORDER — SODIUM HYPOCHLORITE 0.125 %
1 SOLUTION, NON-ORAL MISCELLANEOUS DAILY
Refills: 0 | Status: DISCONTINUED | OUTPATIENT
Start: 2020-05-06 | End: 2020-05-13

## 2020-05-06 RX ORDER — PIPERACILLIN AND TAZOBACTAM 4; .5 G/20ML; G/20ML
2.25 INJECTION, POWDER, LYOPHILIZED, FOR SOLUTION INTRAVENOUS EVERY 6 HOURS
Refills: 0 | Status: DISCONTINUED | OUTPATIENT
Start: 2020-05-06 | End: 2020-05-09

## 2020-05-06 RX ADMIN — Medication 50 MILLILITER(S): at 07:41

## 2020-05-06 RX ADMIN — HEPARIN SODIUM 12 UNIT(S)/HR: 5000 INJECTION INTRAVENOUS; SUBCUTANEOUS at 04:31

## 2020-05-06 RX ADMIN — PIPERACILLIN AND TAZOBACTAM 200 GRAM(S): 4; .5 INJECTION, POWDER, LYOPHILIZED, FOR SOLUTION INTRAVENOUS at 13:12

## 2020-05-06 RX ADMIN — PIPERACILLIN AND TAZOBACTAM 200 GRAM(S): 4; .5 INJECTION, POWDER, LYOPHILIZED, FOR SOLUTION INTRAVENOUS at 18:21

## 2020-05-06 RX ADMIN — Medication 50 MILLIGRAM(S): at 06:23

## 2020-05-06 RX ADMIN — AMIODARONE HYDROCHLORIDE 400 MILLIGRAM(S): 400 TABLET ORAL at 06:23

## 2020-05-06 RX ADMIN — ATORVASTATIN CALCIUM 40 MILLIGRAM(S): 80 TABLET, FILM COATED ORAL at 21:42

## 2020-05-06 RX ADMIN — PIPERACILLIN AND TAZOBACTAM 200 GRAM(S): 4; .5 INJECTION, POWDER, LYOPHILIZED, FOR SOLUTION INTRAVENOUS at 23:56

## 2020-05-06 RX ADMIN — Medication 166.67 MILLIGRAM(S): at 15:25

## 2020-05-06 RX ADMIN — CLOPIDOGREL BISULFATE 75 MILLIGRAM(S): 75 TABLET, FILM COATED ORAL at 11:24

## 2020-05-06 RX ADMIN — PIPERACILLIN AND TAZOBACTAM 200 GRAM(S): 4; .5 INJECTION, POWDER, LYOPHILIZED, FOR SOLUTION INTRAVENOUS at 05:35

## 2020-05-06 RX ADMIN — Medication 81 MILLIGRAM(S): at 11:24

## 2020-05-06 NOTE — CHART NOTE - NSCHARTNOTEFT_GEN_A_CORE
at 1245 pm. At 122 pm, right femoral 5F arterial sheath removed. Manual compression held for 20 mins, groin soft without hematoma. Strict flat bedrest until 6 pm. Monitor for signs/symptoms of groin hematoma, communicated to nurse.

## 2020-05-06 NOTE — CONSULT NOTE ADULT - SUBJECTIVE AND OBJECTIVE BOX
Electrophysiology Consult Note:     CHIEF COMPLAINT:  Patient is a 84y old  Male who presents with a chief complaint of       HISTORY OF PRESENT ILLNESS:   84 M with history of HTN, DM, chronic Afib (for 20 years, on Xarelto), chronic systolic CHF LVEF 35%, CKD III, PAD with L 3rd toe gangrene s/p recent toe amputation in 2020.  Wound Cx from March was positive for MSRA (went home on Doxycyclin).  Returned to ED on 20 with 3 weeks of LLE pain and swelling.  He just underwent another toe amputation this morning.   Per vascular team, he was having AFIB RVR this morning cortez-operative with VR 120s.  He was given Amio 300 mg IVB and Lopressor Push during the case.  Postop in Recovery area, his HR is around 100s bpm with SBP in the high 80s.  EPS is asked to help manage AFIB.   Medical Record from [facility] and [place] reviewed. Also spoke with his wife on the phone today.  Pt went to [facility] on 2/10/20 for toe pain and was noted to have AFIB RVR. He was discharged home with Amiodarone.  He has been taking Amio 200 mg daily since. He continues taking Xarelto 15 mg daily and Metoprolol.  His outpatient Cardiologist is Dr. Joseph Tawil at [facility].  He states that he is aware of having cardiomyopathy and was recommended in the past by his cardiologist for cardiac catheterization / ICD.   However, pt states he felt fine and declined those test/procedure at that time.    He states that he has palpitations occasionally when he walks fast.  Denies GREY (able to walk around independently in recent past). Denies Cp/ syncope / dizziness / orthopnea.  Has Left LE edema due to infection but no right LE edema.         PAST MEDICAL & SURGICAL HISTORY:  Heart failure  HLD (hyperlipidemia)  Borderline diabetes mellitus  Afib  HTN (hypertension)  H/O laryngectomy      FAMILY HISTORY:  FHx: diabetes mellitus      SOCIAL HISTORY:    Former smoker 50 years ago, denies alcohol and recreational substance use.    Allergies:  No Known Allergies      MEDICATIONS  (STANDING):  aMIOdarone    Tablet 400 milliGRAM(s) Oral daily  atorvastatin 40 milliGRAM(s) Oral at bedtime  clopidogrel Tablet 75 milliGRAM(s) Oral daily  Dakins Solution - 1/2 Strength 1 Application(s) Topical daily  insulin lispro (HumaLOG) corrective regimen sliding scale   SubCutaneous Before meals and at bedtime  melatonin 1 milliGRAM(s) Oral at bedtime  metoprolol succinate ER 50 milliGRAM(s) Oral daily  piperacillin/tazobactam IVPB.. 3.375 Gram(s) IV Intermittent every 6 hours  sodium chloride 0.9%. 1000 milliLiter(s) (60 mL/Hr) IV Continuous <Continuous>    MEDICATIONS  (PRN):  dextrose 40% Gel 15 Gram(s) Oral once PRN Blood Glucose LESS THAN 70 milliGRAM(s)/deciliter  glucagon  Injectable 1 milliGRAM(s) IntraMuscular once PRN Glucose LESS THAN 70 milligrams/deciliter  oxycodone    5 mG/acetaminophen 325 mG 1 Tablet(s) Oral every 6 hours PRN Severe Pain (7 - 10)        REVIEW OF SYSTEMS:  CONSTITUTIONAL: No fever, weight loss, or fatigue  EYES: No eye pain, visual disturbances, or discharge  ENMT:  No difficulty hearing, tinnitus, vertigo; No sinus or throat pain  NECK: No pain or stiffness  RESPIRATORY: No cough, wheezing, chills or hemoptysis; No Shortness of Breath  CARDIOVASCULAR: See above.   GASTROINTESTINAL: No abdominal or epigastric pain. No nausea, vomiting, or hematemesis; No diarrhea or constipation. No melena or hematochezia.  GENITOURINARY: No dysuria, frequency, hematuria, or incontinence  NEUROLOGICAL: No headaches, memory loss, loss of strength, numbness, or tremors  SKIN: No itching, burning, rashes, or lesions   LYMPH Nodes: No enlarged glands  ENDOCRINE: No heat or cold intolerance; No hair loss  MUSCULOSKELETAL: s/p left toe amputations.  Currently no pain just postop.  + edema left foot.   PSYCHIATRIC: No depression, anxiety, mood swings, or difficulty sleeping  HEME/LYMPH: No easy bruising, or bleeding gums  ALLERY AND IMMUNOLOGIC: No hives or eczema	      PHYSICAL EXAM:  Vital Signs Last 24 Hrs  T(C): 36.7 (06 May 2020 10:26), Max: 37.2 (05 May 2020 21:50)  T(F): 98 (06 May 2020 10:26), Max: 98.9 (05 May 2020 21:50)  HR: 100 (06 May 2020 11:30) (92 - 117)  BP: 86/54 (06 May 2020 11:30) (76/55 - 112/55)  BP(mean): 65 (06 May 2020 11:30) (61 - 71)  RR: 15 (06 May 2020 11:30) (14 - 26)  SpO2: 96% (06 May 2020 11:30) (92% - 100%)      Constitutional: NAD	  HEENT:   Normal oral mucosa, PERRL, EOMI	  Neck: No JVD  CVS: Normal S1 / S2, irregular, No murmurs  Pulm: CTA anteriorly. No wheeze or rale  GI:  + BS, soft, NT / ND   Ext: no edema RLE. Left foot with dressing on.  Right groin wound no hematoma.   Vascular: Peripheral pulses palpable 2+ bilaterally  MS: full range of motion in all joints  Neurologic: A&O x 3, Non-focal  Psych: Pleasant, has good insight  Skin: No rash or lesion       	  LABS:	                         9.6    11.69 )-----------( 294      ( 06 May 2020 02:43 )             31.1     05-06    136  |  97  |  40<H>  ----------------------------<  119<H>  4.3   |  25  |  2.06<H>    Ca    9.0      06 May 2020 02:43  Phos  2.7     05-06  Mg     2.2     05-06    EK20: AFIB  bpm.   ms.   EK20: AF  bpm.     Telemetry: (PACU) AFIB VR  bpm.     TTE Echo Complete w/ Contrast w/ Doppler (20 @ 15:57)   1. The left ventricle cavity size is moderately dilated. Left ventricular systolic function is moderately reduced with a calculated ejection fraction of 35% with global hypokinesis.   2. The right ventricle is mildly dilated. Right ventricular systolic function is normal.   3. Severely dilated left atrium.  LA 5.2 cm.    4. Severely dilated right atrium.   5. Mild-to-moderate mitral regurgitation.   6. Mild-to-moderate tricuspid regurgitation.   7. Pulmonary hypertension present, pulmonary artery systolic pressure is 47 mmHg.   8. No pericardial effusion.

## 2020-05-06 NOTE — BRIEF OPERATIVE NOTE - OPERATION/FINDINGS
R CFA access with 5F sheath. Selectively placed Omniflush catheter in L CFA. LLE angio showed patent L CFA/PFA/SFA/pop. Below the knee, AT has high takeoff. AT patent proximally, but occludes 3-4 cm after the origin. TP trunk with two high-grade stenoses. PT is main runoff to foot, but peroneal does reconstitute the AT at the ankle. DP fills by collaterals only. The pedal arch is incomplete.     The TP trunk lesions were treated with two drug-eluting stents ([place] resolute 3x30 and 3x38). Completion angio showed improved filling of PT down to foot and filling some digital arteries.    Short 5F sheath secured in place.    Contrast: 55 cc    We then turned our attention to left foot. Left 3rd and 4th toes amputated. Ample purulence expressed from wound. Wound debridement performed down to metatarsal bones. Debrided all infected and non-viable tissue. Wound left open, Wrapped foot with kerlix wrap. R CFA access with 5F sheath. Selectively placed Omniflush catheter in L CFA. LLE angio showed patent L CFA/PFA/SFA/pop. Below the knee, AT has high takeoff. AT patent proximally, but occludes 3-4 cm after the origin. TP trunk with two high-grade stenoses. PT is main runoff to foot, but peroneal does reconstitute the AT at the ankle. DP fills by collaterals only. The pedal arch is incomplete.     The TP trunk lesions were treated with two drug-eluting stents ([place] resolute 3x30 and 3x38). Completion angio showed improved filling of PT down to foot and filling some digital arteries.    Short 5F sheath secured in place.    Contrast: 55 cc    We then turned our attention to left foot. Performed left third toe transmetatarsal amputation and left fourth toe digital amputation. Ample purulence expressed from wound. Wound debridement performed down to metatarsal bones. Debrided all infected and non-viable tissue. Wound left open, Wrapped foot with kerlix wrap.

## 2020-05-06 NOTE — CONSULT NOTE ADULT - SUBJECTIVE AND OBJECTIVE BOX
HPI: 84yMale    Age at Dx:  How dx:  Hx and duration of insulin:  Current Therapy:  Hx of hypoglycemia  Hx of DKA/HHS?    Home FSG:  Fasting  Lunch  Dinner  Bed    Hx of other regimens  Complications:  Outpatient Endo:    PMH & Surgical Hx:CELLULITIS  ADVANCED ILLNESS  FHx: diabetes mellitus  Handoff  MEWS Score  Heart failure  HLD (hyperlipidemia)  Borderline diabetes mellitus  Afib  HTN (hypertension)  Borderline diabetes mellitus  Afib  HTN (hypertension)  Diabetic infection of left foot  Diabetic foot infection  Cellulitis  ZEENAT (acute kidney injury)  Hypertension, unspecified type  Afib  Heart failure  Preop cardiovascular exam  Amputation of left fourth toe  Angiogram, extremity, left  H/O laryngectomy  No significant past surgical history  No significant past surgical history  TOE PAIN  57      FH:  DM:  Thyroid:  Autoimmune:  Other:    SH:  Smoking  Etoh:  Recreational Drugs:  Social Life:    Current Meds:  aMIOdarone    Tablet 400 milliGRAM(s) Oral daily  atorvastatin 40 milliGRAM(s) Oral at bedtime  clopidogrel Tablet 75 milliGRAM(s) Oral daily  Dakins Solution - 1/2 Strength 1 Application(s) Topical daily  dextrose 40% Gel 15 Gram(s) Oral once PRN  dextrose 5%. 1000 milliLiter(s) IV Continuous <Continuous>  dextrose 50% Injectable 12.5 Gram(s) IV Push once  dextrose 50% Injectable 25 Gram(s) IV Push once  glucagon  Injectable 1 milliGRAM(s) IntraMuscular once PRN  insulin lispro (HumaLOG) corrective regimen sliding scale   SubCutaneous Before meals and at bedtime  melatonin 1 milliGRAM(s) Oral at bedtime  metoprolol succinate ER 50 milliGRAM(s) Oral daily  oxycodone    5 mG/acetaminophen 325 mG 1 Tablet(s) Oral every 6 hours PRN  piperacillin/tazobactam IVPB.. 3.375 Gram(s) IV Intermittent every 6 hours  sodium chloride 0.9%. 1000 milliLiter(s) IV Continuous <Continuous>      Allergies:  No Known Allergies      ROS:  Denies the following except as indicated.    General: weight loss/weight gain, decreased appetite, fatigue  Eyes: Blurry vision, double vision, visual changes  ENT: Throat pain, changes in voice,   CV: palpitations, SOB, CP, cough  GI: NVD, difficulty swallowing, abdominal pain  : polyuria, dysuria  Endo: abnormal menses, temperature intolerance, decreased libido  MSK: weakness, joint pain  Skin: rash, dryness, diaphoresis  Heme: Easy bruising,bleeding  Neuro: HA, dizziness, lightheadedness, numbness tingling  Psych: Anxiety, Depression    Vital Signs Last 24 Hrs  T(C): 36.7 (06 May 2020 10:26), Max: 37.2 (05 May 2020 21:50)  T(F): 98 (06 May 2020 10:26), Max: 98.9 (05 May 2020 21:50)  HR: 103 (06 May 2020 13:45) (92 - 117)  BP: 98/61 (06 May 2020 13:45) (76/55 - 104/61)  BP(mean): 74 (06 May 2020 13:45) (61 - 76)  RR: 20 (06 May 2020 13:45) (14 - 26)  SpO2: 97% (06 May 2020 13:45) (91% - 100%)  Height (cm): 177.8 (05-05 @ 02:21)  Weight (kg): 77.1 (05-05 @ 02:21)  BMI (kg/m2): 24.4 (05-05 @ 02:21)      Constitutional: wn/wd in NAD.   HEENT: NCAT, MMM, OP clear, EOMI, , no proptosis or lid retraction  Neck: no thyromegaly or palpable thyroid nodules   Respiratory: lungs CTAB.  Cardiovascular: regular rhythm, normal S1 and S2, no audible murmurs, no peripheral edema  GI: soft, NT/ND, no masses/HSM appreciated.  Neurology: no tremors, DTR 2+  Skin: no visible rashes/lesions  Psychiatric: AAO x 3, normal affect/mood.  Ext: radial pulses intact, DP pulses intact, extremities warm, no cyanosis, clubbing or edema.       LABS:                        9.6    11.69 )-----------( 294      ( 06 May 2020 02:43 )             31.1     05-06    136  |  97  |  40<H>  ----------------------------<  119<H>  4.3   |  25  |  2.06<H>    Ca    9.0      06 May 2020 02:43  Phos  2.7     05-06  Mg     2.2     05-06      PT/INR - ( 06 May 2020 02:43 )   PT: 19.4 sec;   INR: 1.67          PTT - ( 06 May 2020 02:43 )  PTT:161.8 sec  Urinalysis Basic - ( 04 May 2020 21:31 )    Color: Yellow / Appearance: Clear / SG: <=1.005 / pH: x  Gluc: x / Ketone: NEGATIVE  / Bili: Negative / Urobili: 0.2 E.U./dL   Blood: x / Protein: NEGATIVE mg/dL / Nitrite: NEGATIVE   Leuk Esterase: NEGATIVE / RBC: x / WBC x   Sq Epi: x / Non Sq Epi: x / Bacteria: x            RADIOLOGY & ADDITIONAL STUDIES:  CAPILLARY BLOOD GLUCOSE      POCT Blood Glucose.: 88 mg/dL (06 May 2020 10:08)  POCT Blood Glucose.: 59 mg/dL (06 May 2020 07:24)  POCT Blood Glucose.: 115 mg/dL (05 May 2020 21:41)  POCT Blood Glucose.: 93 mg/dL (05 May 2020 17:08)        A/P:84y Male    1.  DM  Please continue lantus       units at night / morning.  Please continue lispro      units before each meal.  Please continue lispro moderate / low dose sliding scale four times daily with meals and at bedtime    Pt's fingerstick glucose goal is     Will continue to monitor     For discharge, pt can continue    Pt can follow up at discharge with [facility] by calling  to make an appointment.   Will discuss case with     and update primary team HPI: 84 M PMH HTN, DM, A fib on Xarelto (Last taken 8am) HF EF 35%, CKD III PAD p/w L 3rd toe gangrene s/p amputation. Patient was admitted on March of this year and underwent L toe amputation on 3/06 of which cultures grew MRSA and was subsequently discharged on Doxycycline. He now returns with 3 weeks of LLE pain and swelling. Pt reports persistent pain of the 3rd toe and swelling of the LLE, denies numbness or weakness. Was was seen in the office by Dr. Mcclellan and was recommended to the ED for worsening appearance of the left 3rd toe. He denies fever, chills, nausea, sob, coughing, or recent COVID contacts.    he had an arterial duplex that showed right BRANDYN 0.88 and left BRANDYN 0.81 - mild stenosis. he was taken fo rthe left fourth toe amputation and angiogram today. He is s/p 2 KECIA placement in TP trunk. currently on antibiotics with zosyn. ID and EP following the patient.    Endocrine was consulted for his hypoglycemic episodes. During the hospital stay, he had episodes of hypoglycemia - which were asymtomatic and was traeted with juice and D50. he denied having any symptoms when he was having the episode. They usually check his FSG in the right hand fingers. FSG reviewed since admission.  5/4 blood glucose  11 BMP - 94  1900 FSG - 86  2100   21 BMP - 115  5/5  6 BMP 99  800  - had breakfast - eggs and french toast  1100 FSG - 48 - was given some juice - repeat FSg was 75  1200noon  FSG 53 - was given dextrose 12.. repeat FSg was 84. had soup  1700 FSG - 93. had dinne riwth salmon, mushroom, beans, ginger ale  2100 FSG - 115  5/6  3 BMP - 119  700 AM FSG 59 - Was NPO from midnight. given D50  1000 Am FSG - 88    Patient said that he has been a pre-diabetic for years. he has never been started on any meds. he usually has 2 to 3 meals a day. breakfast will be cereal. Lunc h with be sandwich. Dinner will be meat, rice, vegs. he bateman snot check his FSg at home. he has never been told that he         PMH & Surgical Hx:CELLULITIS  ADVANCED ILLNESS  FHx: diabetes mellitus  Handoff  MEWS Score  Heart failure  HLD (hyperlipidemia)  Borderline diabetes mellitus  Afib  HTN (hypertension)  Borderline diabetes mellitus  Afib  HTN (hypertension)  Diabetic infection of left foot  Diabetic foot infection  Cellulitis  ZEENAT (acute kidney injury)  Hypertension, unspecified type  Afib  Heart failure  Preop cardiovascular exam  Amputation of left fourth toe  Angiogram, extremity, left  H/O laryngectomy  No significant past surgical history  No significant past surgical history  TOE PAIN  57      FH:  DM:  Thyroid:  Autoimmune:  Other:    SH:  Smoking  Etoh:  Recreational Drugs:  Social Life:    Current Meds:  aMIOdarone    Tablet 400 milliGRAM(s) Oral daily  atorvastatin 40 milliGRAM(s) Oral at bedtime  clopidogrel Tablet 75 milliGRAM(s) Oral daily  Dakins Solution - 1/2 Strength 1 Application(s) Topical daily  dextrose 40% Gel 15 Gram(s) Oral once PRN  dextrose 5%. 1000 milliLiter(s) IV Continuous <Continuous>  dextrose 50% Injectable 12.5 Gram(s) IV Push once  dextrose 50% Injectable 25 Gram(s) IV Push once  glucagon  Injectable 1 milliGRAM(s) IntraMuscular once PRN  insulin lispro (HumaLOG) corrective regimen sliding scale   SubCutaneous Before meals and at bedtime  melatonin 1 milliGRAM(s) Oral at bedtime  metoprolol succinate ER 50 milliGRAM(s) Oral daily  oxycodone    5 mG/acetaminophen 325 mG 1 Tablet(s) Oral every 6 hours PRN  piperacillin/tazobactam IVPB.. 3.375 Gram(s) IV Intermittent every 6 hours  sodium chloride 0.9%. 1000 milliLiter(s) IV Continuous <Continuous>      Allergies:  No Known Allergies      ROS:  Denies the following except as indicated.    General: weight loss/weight gain, decreased appetite, fatigue  Eyes: Blurry vision, double vision, visual changes  ENT: Throat pain, changes in voice,   CV: palpitations, SOB, CP, cough  GI: NVD, difficulty swallowing, abdominal pain  : polyuria, dysuria  Endo: abnormal menses, temperature intolerance, decreased libido  MSK: weakness, joint pain  Skin: rash, dryness, diaphoresis  Heme: Easy bruising,bleeding  Neuro: HA, dizziness, lightheadedness, numbness tingling  Psych: Anxiety, Depression    Vital Signs Last 24 Hrs  T(C): 36.7 (06 May 2020 10:26), Max: 37.2 (05 May 2020 21:50)  T(F): 98 (06 May 2020 10:26), Max: 98.9 (05 May 2020 21:50)  HR: 103 (06 May 2020 13:45) (92 - 117)  BP: 98/61 (06 May 2020 13:45) (76/55 - 104/61)  BP(mean): 74 (06 May 2020 13:45) (61 - 76)  RR: 20 (06 May 2020 13:45) (14 - 26)  SpO2: 97% (06 May 2020 13:45) (91% - 100%)  Height (cm): 177.8 (05-05 @ 02:21)  Weight (kg): 77.1 (05-05 @ 02:21)  BMI (kg/m2): 24.4 (05-05 @ 02:21)      Constitutional: wn/wd in NAD.   HEENT: NCAT, MMM, OP clear, EOMI, , no proptosis or lid retraction  Neck: no thyromegaly or palpable thyroid nodules   Respiratory: lungs CTAB.  Cardiovascular: regular rhythm, normal S1 and S2, no audible murmurs, no peripheral edema  GI: soft, NT/ND, no masses/HSM appreciated.  Neurology: no tremors, DTR 2+  Skin: no visible rashes/lesions  Psychiatric: AAO x 3, normal affect/mood.  Ext: radial pulses intact, DP pulses intact, extremities warm, no cyanosis, clubbing or edema.       LABS:                        9.6    11.69 )-----------( 294      ( 06 May 2020 02:43 )             31.1     05-06    136  |  97  |  40<H>  ----------------------------<  119<H>  4.3   |  25  |  2.06<H>    Ca    9.0      06 May 2020 02:43  Phos  2.7     05-06  Mg     2.2     05-06      PT/INR - ( 06 May 2020 02:43 )   PT: 19.4 sec;   INR: 1.67          PTT - ( 06 May 2020 02:43 )  PTT:161.8 sec  Urinalysis Basic - ( 04 May 2020 21:31 )    Color: Yellow / Appearance: Clear / SG: <=1.005 / pH: x  Gluc: x / Ketone: NEGATIVE  / Bili: Negative / Urobili: 0.2 E.U./dL   Blood: x / Protein: NEGATIVE mg/dL / Nitrite: NEGATIVE   Leuk Esterase: NEGATIVE / RBC: x / WBC x   Sq Epi: x / Non Sq Epi: x / Bacteria: x            RADIOLOGY & ADDITIONAL STUDIES:  CAPILLARY BLOOD GLUCOSE      POCT Blood Glucose.: 88 mg/dL (06 May 2020 10:08)  POCT Blood Glucose.: 59 mg/dL (06 May 2020 07:24)  POCT Blood Glucose.: 115 mg/dL (05 May 2020 21:41)  POCT Blood Glucose.: 93 mg/dL (05 May 2020 17:08)        A/P:84y Male    1.  DM  Please continue lantus       units at night / morning.  Please continue lispro      units before each meal.  Please continue lispro moderate / low dose sliding scale four times daily with meals and at bedtime    Pt's fingerstick glucose goal is     Will continue to monitor     For discharge, pt can continue    Pt can follow up at discharge with [facility] by calling  to make an appointment.   Will discuss case with     and update primary team HPI: 84 M PMH HTN, DM, A fib on Xarelto (Last taken 8am) HF EF 35%, CKD III PAD p/w L 3rd toe gangrene s/p amputation. Patient was admitted on March of this year and underwent L toe amputation on 3/06 of which cultures grew MRSA and was subsequently discharged on Doxycycline. He now returns with 3 weeks of LLE pain and swelling. Pt reports persistent pain of the 3rd toe and swelling of the LLE, denies numbness or weakness. Was was seen in the office by Dr. Mcclellan and was recommended to the ED for worsening appearance of the left 3rd toe. He denies fever, chills, nausea, sob, coughing, or recent COVID contacts.    he had an arterial duplex that showed right BRANDYN 0.88 and left BRANDYN 0.81 - mild stenosis. he was taken fo rthe left fourth toe amputation and angiogram today. He is s/p 2 KECIA placement in TP trunk. currently on antibiotics with zosyn. ID and EP following the patient.    Endocrine was consulted for his hypoglycemic episodes. During the hospital stay, he had episodes of hypoglycemia - which were asymtomatic and was traeted with juice and D50. he denied having any symptoms when he was having the episode. They usually check his FSG in the right hand fingers. FSG reviewed since admission.  5/4 blood glucose  11 BMP - 94  1900 FSG - 86  2100   21 BMP - 115  5/5  6 BMP 99  800  - had breakfast - eggs and french toast  1100 FSG - 48 - was given some juice - repeat FSg was 75  1200noon  FSG 53 - was given dextrose 12.. repeat FSg was 84. had soup  1700 FSG - 93. had dinne riwth salmon, mushroom, beans, ginger ale  2100 FSG - 115  5/6  3 BMP - 119  700 AM FSG 59 - Was NPO from midnight. given D50  1000 Am FSG - 88    Patient said that he has been a pre-diabetic for years. he has never been started on any meds. he usually has 2 to 3 meals a day. breakfast will be cereal. Lunch with be sandwich. Dinner will be meat, rice, vegs. he does not check his FSg at home. he has never been told that his blood glucose were on lower side before. He never experienced any reactive hypoglycemia events in the past. he has been told that he has to watch out for his renal function. he does not take any meds at home that can cause hypoglycemia.  he was taking levofloxacin for about a week in jan and took doxycycline for about 1 week in march.   baseline Cr was 1.6 to 1.8 before with GFR 45. FSG during prior admission were running in 80 to 100s.  He did undergo left laryngectomy 6 years ago - unclear whether he received any radiation or chemo at that time.    PMH & Surgical Hx:CELLULITIS  ADVANCED ILLNESS  FHx: diabetes mellitus  Heart failure  HLD (hyperlipidemia)  Borderline diabetes mellitus  Afib  HTN (hypertension)  cellulitis  ZEENAT (acute kidney injury)  Amputation of left fourth toe  Angiogram, extremity, left  H/O laryngectomy    FH:  DM: wife has DM. She uses insulin at home  Thyroid: denies  Autoimmune: denies    SH:  Smoking: quit smoking 40 years ago  Etoh: denies  Recreational Drugs: denies  Social Life: lives with wife    Current Meds:  aMIOdarone    Tablet 400 milliGRAM(s) Oral daily  atorvastatin 40 milliGRAM(s) Oral at bedtime  clopidogrel Tablet 75 milliGRAM(s) Oral daily  Dakins Solution - 1/2 Strength 1 Application(s) Topical daily  dextrose 40% Gel 15 Gram(s) Oral once PRN  dextrose 5%. 1000 milliLiter(s) IV Continuous <Continuous>  dextrose 50% Injectable 12.5 Gram(s) IV Push once  dextrose 50% Injectable 25 Gram(s) IV Push once  glucagon  Injectable 1 milliGRAM(s) IntraMuscular once PRN  insulin lispro (HumaLOG) corrective regimen sliding scale   SubCutaneous Before meals and at bedtime  melatonin 1 milliGRAM(s) Oral at bedtime  metoprolol succinate ER 50 milliGRAM(s) Oral daily  oxycodone    5 mG/acetaminophen 325 mG 1 Tablet(s) Oral every 6 hours PRN  piperacillin/tazobactam IVPB.. 3.375 Gram(s) IV Intermittent every 6 hours  sodium chloride 0.9%. 1000 milliLiter(s) IV Continuous <Continuous>      Allergies:  No Known Allergies      ROS:  Denies the following except as indicated.    General: weight loss/weight gain, decreased appetite, fatigue  Eyes: Blurry vision, double vision, visual changes  ENT: Throat pain, changes in voice,   CV: palpitations, SOB, CP, cough  GI: NVD, difficulty swallowing, abdominal pain  : polyuria, dysuria  Endo: temperature intolerance, decreased libido  MSK: weakness, joint pain  Neuro: HA, dizziness, lightheadedness  Psych: Anxiety, Depression    Vital Signs Last 24 Hrs  T(C): 36.7 (06 May 2020 10:26), Max: 37.2 (05 May 2020 21:50)  T(F): 98 (06 May 2020 10:26), Max: 98.9 (05 May 2020 21:50)  HR: 103 (06 May 2020 13:45) (92 - 117)  BP: 98/61 (06 May 2020 13:45) (76/55 - 104/61)  BP(mean): 74 (06 May 2020 13:45) (61 - 76)  RR: 20 (06 May 2020 13:45) (14 - 26)  SpO2: 97% (06 May 2020 13:45) (91% - 100%)  Height (cm): 177.8 (05-05 @ 02:21)  Weight (kg): 77.1 (05-05 @ 02:21)  BMI (kg/m2): 24.4 (05-05 @ 02:21)      Constitutional: wn/wd in NAD.   HEENT: No proptosis or lid retraction  Neck: no thyromegaly or palpable thyroid nodules   Respiratory: lungs CTAB.  Cardiovascular: irregular rhythm, S1 and S2 +, no peripheral edema  GI: soft, NT/ND, no masses/HSM appreciated.  Neurology: no tremors, DTR 2+, moves extremities  Psychiatric: AAO x 3, normal affect/mood.  Ext: radial pulses intact, DP pulses intact, extremities cold in both upper and lower extremities, his capillary refill is 1+      LABS:                        9.6    11.69 )-----------( 294      ( 06 May 2020 02:43 )             31.1     05-06    136  |  97  |  40<H>  ----------------------------<  119<H>  4.3   |  25  |  2.06<H>    Ca    9.0      06 May 2020 02:43  Phos  2.7     05-06  Mg     2.2     05-06      PT/INR - ( 06 May 2020 02:43 )   PT: 19.4 sec;   INR: 1.67          PTT - ( 06 May 2020 02:43 )  PTT:161.8 sec  Urinalysis Basic - ( 04 May 2020 21:31 )    Color: Yellow / Appearance: Clear / SG: <=1.005 / pH: x  Gluc: x / Ketone: NEGATIVE  / Bili: Negative / Urobili: 0.2 E.U./dL   Blood: x / Protein: NEGATIVE mg/dL / Nitrite: NEGATIVE   Leuk Esterase: NEGATIVE / RBC: x / WBC x   Sq Epi: x / Non Sq Epi: x / Bacteria: x            RADIOLOGY & ADDITIONAL STUDIES:  CAPILLARY BLOOD GLUCOSE      POCT Blood Glucose.: 88 mg/dL (06 May 2020 10:08)  POCT Blood Glucose.: 59 mg/dL (06 May 2020 07:24)  POCT Blood Glucose.: 115 mg/dL (05 May 2020 21:41)  POCT Blood Glucose.: 93 mg/dL (05 May 2020 17:08)        A/P: 84 M PMH HTN, DM, A fib on Xarelto (Last taken 8am) HF EF 35%, CKD III PAD p/w 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 He is s/p left fourth toe amputation and angiogram 5/6/20. He is s/p 2 KECIA placement in TP trunk. currently on antibiotics.    1.  Hypoglycemia   - likely factitious at this time given the cold extremities and the BMP showing normal blood glucose. Unlikely to be reactive hypoglycemia given no risk factors.   - had CKD. Normal liver function  - Cr 2.06 and GFR 34  - Wt 77.1 kg with BMI 24.4  Hba1c 6  - Please obtain Basic metabolic panel, c-peptide level, insulin level and pro-insulin level when the FSG is less than 60. These labs should be drawn before administering any dextrose containing   solution ( Juice or D50)  - Please obtain TSH and Free thyroxine level as well  - Please stop the lispro sliding scale for now  - Please consider warming the fingers before obtaining the FSG  Will continue to monitor     For discharge, TBD    Pt can follow up at discharge with [facility] by calling  to make an appointment.   discussed case with Dr. Bello    and updated primary team

## 2020-05-06 NOTE — CONSULT NOTE ADULT - ASSESSMENT
84 M PMH HTN, DM, A fib on Xarelto (Last taken 8am) HF EF 35%, PAD, CKD III presented with L 3rd toe gangrene s/p amputation. He has been afebrile during his stay. Started on Vanc/Zosyn on 5/4.  Pt is now S/P LLE angiogram with placement of 2 KECIA in the proximal PT trunk with amputation of the 3rd and 4th toes and partial amputation of 3rd metatarsal.  OR cultures were sent.      Suggest:  - Continue with Vancomycin and Zosyn  - Draw vancomycin trough 5/8 2pm (30 min before 4th dose)  - F/U OR cultures  - F/U blood cx's from 5/4  - ID will follow 84 M PMH HTN, DM, A fib on Xarelto (Last taken 8am) HF EF 35%, PAD, CKD III presented with L 3rd toe gangrene s/p amputation. He has been afebrile during his stay. Started on Vanc/Zosyn on 5/4.  Pt is now S/P LLE angiogram with placement of 2 KECIA in the proximal PT trunk with amputation of the 3rd and 4th toes and partial amputation of 3rd metatarsal.  OR cultures were sent.      Suggest:  - Continue with Vancomycin and Zosyn  - Zosyn 2.25 q6h, Decrease Vancomycin back down to 1g q24h  - Draw vancomycin trough 5/7 2pm (30 min before 4th dose)  - F/U OR cultures  - F/U blood cx's from 5/4  - ID will follow

## 2020-05-06 NOTE — PROGRESS NOTE ADULT - ASSESSMENT
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 BRANDYN falsely elevated due to history of DM Scheduled for L Toe amputation and Angiogram. Clinically stable. Planned for OR this am.     NPO/IVF  Home meds  Vanc/Zosyn  f/u Renal recs  f/u 1pm Vanc level   AM labs

## 2020-05-06 NOTE — PROGRESS NOTE ADULT - ASSESSMENT
Assessment: 84y man s/p LLE Angiogram and L 4th toe amputation.     Plan:  Pain/nausea control PRN  Regular /IVF  Home meds  Incentive spirometer/OOB/Ambulate  AM labs

## 2020-05-06 NOTE — PROGRESS NOTE ADULT - SUBJECTIVE AND OBJECTIVE BOX
Surgery Post-Op Note    Pre-Op Dx: Diabetic foot infection    Procedure: Amputation of left fourth toe  Angiogram, extremity, left    Surgeon: Dr. Mcclellan    Subjective: Examined resting comfortably at the bedside. Denies foot pain, numbness, and tingling. Denies cp, sob, nausea. No acute complaints.        Vital Signs Last 24 Hrs  T(C): 36.7 (06 May 2020 10:26), Max: 37.2 (05 May 2020 21:50)  T(F): 98 (06 May 2020 10:26), Max: 98.9 (05 May 2020 21:50)  HR: 103 (06 May 2020 13:45) (92 - 117)  BP: 98/61 (06 May 2020 13:45) (76/55 - 104/61)  BP(mean): 74 (06 May 2020 13:45) (61 - 76)  RR: 20 (06 May 2020 13:45) (14 - 26)  SpO2: 97% (06 May 2020 13:45) (91% - 100%)    Physical Exam:  General: NAD, resting comfortably in bed  Pulmonary: Nonlabored breathing, no respiratory distress  Cardiovascular: NSR  Abdominal: soft, non-tender, non-distended  Extremities: WWP, normal strength, LLE dressing clean and dry  Neuro: A/O x 3, no focal deficits, normal sensation  Groin: R groin soft, no palpable or pulsatile masses  Pulses: LLE PT monophasic      LABS:                        9.6    11.69 )-----------( 294      ( 06 May 2020 02:43 )             31.1     05-06    136  |  97  |  40<H>  ----------------------------<  119<H>  4.3   |  25  |  2.06<H>    Ca    9.0      06 May 2020 02:43  Phos  2.7     05-06  Mg     2.2     05-06      PT/INR - ( 06 May 2020 02:43 )   PT: 19.4 sec;   INR: 1.67          PTT - ( 06 May 2020 02:43 )  PTT:161.8 sec  CAPILLARY BLOOD GLUCOSE      POCT Blood Glucose.: 88 mg/dL (06 May 2020 10:08)  POCT Blood Glucose.: 59 mg/dL (06 May 2020 07:24)  POCT Blood Glucose.: 115 mg/dL (05 May 2020 21:41)  POCT Blood Glucose.: 93 mg/dL (05 May 2020 17:08)    Urinalysis Basic - ( 04 May 2020 21:31 )    Color: Yellow / Appearance: Clear / SG: <=1.005 / pH: x  Gluc: x / Ketone: NEGATIVE  / Bili: Negative / Urobili: 0.2 E.U./dL   Blood: x / Protein: NEGATIVE mg/dL / Nitrite: NEGATIVE   Leuk Esterase: NEGATIVE / RBC: x / WBC x   Sq Epi: x / Non Sq Epi: x / Bacteria: x              Radiology and Additional Studies:

## 2020-05-06 NOTE — CONSULT NOTE ADULT - ASSESSMENT
84 M with history of HTN, DM, chronic Afib (for 20 years, on Xarelto), chronic systolic CHF LVEF 35%, CKD III, PAD with L 3rd toe gangrene / MRSA in wound. Now s/p toe amputation today.  Periop pt with AFIB  bpm with relative hypotension.  Asymptomatic.    - Pt with chronic AFIB for 20+ years. He had one cardioversion many years ago and states that it didn't work.  His LA is severely dilated on Echo. Therefore, there is no utility in rhythm control.  Our goal is for rate control strategy.  Recommend stopping Amiodarone as there is no plan cardiovert him either mechanically or chemically.  Continue Metoprolol.  Limited AV constantino agent given CKD (ie no digoxin) and low BP. However, given systolic dysfunction, it's reasonable to have high HR in order to maintain CO, especially with ongoing infection.  Cautious use of IVF with current LVEF.    - No ICD implantation at this time given current infection. He would need to f/u with his outpatient cardiologist (Dr. Tawil) for w/u for his cardiomyopathy.  Per wife, pt didn't have cardiac cath etc.   ** pending round with EP attending. 84 M with history of HTN, DM, chronic Afib (for 20 years, on Xarelto), chronic systolic CHF LVEF 35%, CKD III, PAD with L 3rd toe gangrene / MRSA in wound. Now s/p toe amputation today.  Periop pt with AFIB  bpm with relative hypotension.  Asymptomatic.    - Pt with chronic AFIB for 20+ years. He had one cardioversion many years ago and states that it didn't work.  His LA is severely dilated on Echo. Therefore, there is no utility in rhythm control.  Our goal is for rate control strategy.  Recommend stopping Amiodarone as there is no plan cardiovert him either mechanically or chemically.  Continue Metoprolol.  Limited AV constantino agent given CKD (ie no digoxin) and low BP. However, given systolic dysfunction, it's reasonable to have high HR in order to maintain CO, especially with ongoing infection.  Cautious use of IVF with current LVEF.    - One last option if AFIB is not well rate controlled would be for implanting a pacing device followed by AV node ablation. However, he is not symptomatic from AFIB at this time and he currently has MRSA in wound. We determine that it's not the optimal time for device implant given infection risk.  In addition, if we were to consider the "pace and ablate" option, we would need to consider PPM vs ICD implant since has has low EF. However, according to his wife, he didn't have prior cardiac w/u, ie cath, stress test etc.  We don't know if he has underlying CAD.  In addition, he had declined an ICD offering in the past by his cardiologist. He should f/u with his outpatient cardiologist for CAD workup once COVID crisis is eased up.   - d/w dr. Lockett.

## 2020-05-06 NOTE — PROGRESS NOTE ADULT - SUBJECTIVE AND OBJECTIVE BOX
INTERVAL HPI/OVERNIGHT EVENTS:  AM PTT 36.7, Hep ggt increased to 17, FS 48 asymptomatic, rechecked 75, Placed on home dose of 150 mg daily. Noon Vanc level 5.8, Vanc 1000 started, wound culture + MRSA, Enterococcus, and Dermabacter. Vitals stable overnight - urine output adequate with small PVRs.  - Preoped for tomorrow - Aptts >130 hep drip was adjusted and will be stopped 6am - US with tibial disease L    SUBJECTIVE: Examined at the bedside with chief resident. States pain well controlled. Denies umbness or tingling of the foot, cp, sob, nausea, emesis.    aMIOdarone    Tablet 400 milliGRAM(s) Oral daily  aspirin  chewable 81 milliGRAM(s) Oral daily  metoprolol succinate ER 50 milliGRAM(s) Oral daily  piperacillin/tazobactam IVPB.. 3.375 Gram(s) IV Intermittent every 6 hours      Vital Signs Last 24 Hrs  T(C): 36.6 (06 May 2020 05:48), Max: 37.2 (05 May 2020 21:50)  T(F): 97.8 (06 May 2020 05:48), Max: 98.9 (05 May 2020 21:50)  HR: 111 (06 May 2020 05:48) (92 - 117)  BP: 101/59 (06 May 2020 05:48) (85/65 - 112/55)  BP(mean): --  RR: 20 (06 May 2020 05:48) (14 - 20)  SpO2: 96% (06 May 2020 05:48) (92% - 100%)  I&O's Detail    05 May 2020 07:01  -  06 May 2020 07:00  --------------------------------------------------------  IN:    heparin Infusion: 70 mL    heparin Infusion: 197.5 mL    IV PiggyBack: 400 mL    Oral Fluid: 840 mL    sodium chloride 0.9%.: 720 mL  Total IN: 2227.5 mL    OUT:    Post-Void Residual per Intermittent Catheterization: 417 mL    Voided: 925 mL  Total OUT: 1342 mL    Total NET: 885.5 mL          Physical Exam  General: NAD, resting comfortably in bed  C/V: NSR  Pulm: Nonlabored breathing, no respiratory distress  Abd: soft, non-tender, non-distended.  Extrem: WWP, LLE swollen, 3rd toe black and gangrenous, no purulence, cleaned with betadine and wrapped in curlex  Neuro: A/O x 3,no focal deficits, normal sensation  Pulses: LLE: PT monophasic, DP monophasic, Pop palpable    LABS:                        9.6    11.69 )-----------( 294      ( 06 May 2020 02:43 )             31.1     05-06    136  |  97  |  40<H>  ----------------------------<  119<H>  4.3   |  25  |  2.06<H>    Ca    9.0      06 May 2020 02:43  Phos  2.7     05-06  Mg     2.2     05-06    TPro  7.3  /  Alb  3.6  /  TBili  0.9  /  DBili  x   /  AST  26  /  ALT  20  /  AlkPhos  75  05-04    PT/INR - ( 06 May 2020 02:43 )   PT: 19.4 sec;   INR: 1.67          PTT - ( 06 May 2020 02:43 )  PTT:161.8 sec  Urinalysis Basic - ( 04 May 2020 21:31 )    Color: Yellow / Appearance: Clear / SG: <=1.005 / pH: x  Gluc: x / Ketone: NEGATIVE  / Bili: Negative / Urobili: 0.2 E.U./dL   Blood: x / Protein: NEGATIVE mg/dL / Nitrite: NEGATIVE   Leuk Esterase: NEGATIVE / RBC: x / WBC x   Sq Epi: x / Non Sq Epi: x / Bacteria: x      Culture - Other (05.04.20 @ 12:21)    Gram Stain:   No WBC's seen.  Few Gram positive cocci in pairs and in clusters    Specimen Source: .Other left foot wound    Culture Results:   Numerous Staphylococcus aureus presumptive Methicillin resistant  Confirmation to follow within 24 hours  Floor previously notified.  Numerous Enterococcus faecalis Susceptibility to follow.  Moderate Dermabacter hominis  Culture in progress      RADIOLOGY & ADDITIONAL STUDIES:

## 2020-05-06 NOTE — BRIEF OPERATIVE NOTE - NSICDXBRIEFPROCEDURE_GEN_ALL_CORE_FT
PROCEDURES:  Amputation of left fourth toe 06-May-2020 10:35:52 with left third toe amputation and extensive wound debridement [place]  Angiogram, extremity, left 06-May-2020 10:35:28 with TP trunk angioplasty/stent [facility]

## 2020-05-06 NOTE — CONSULT NOTE ADULT - SUBJECTIVE AND OBJECTIVE BOX
HPI:  84 M PMH HTN, DM, A fib on Xarelto (Last taken 8am) HF EF 35%, CKD III PAD p/w L 3rd toe gangrene s/p amputation. Patient was admitted on March of this year and underwent L toe amputation on 3/06 of which cultures grew MRSA and was subsequently discharged on Doxycycline. He now returns with 3 weeks of LLE pain and swelling. Pt reports persistent pain of the 3rd toe and swelling of the LLE, denies numbness or weakness. Was was seen in the office by Dr. Mcclellan and was recommended to the ED for worsening appearance of the left 3rd toe. He denies fever, chills, nausea, sob, coughing, or recent COVID contacts. (04 May 2020 14:24)    Hospital course: Pt was started on Vanco/Zosyn on 5/4/20.  He has been afebrile throught his stay.  On 5/6 he was taken to the OR for LLE angiogram.  Vascular surgery placed 2 KECIA's in his proximal TP trunk.  They also performed a 3rd transmetatarsal amputation with 4 digit amputation.  Pt states that he feels well after his surgery.  He denies pain in his foot or at the right groin access.  He denies N/V/SOB.      PAST MEDICAL & SURGICAL HISTORY:  Heart failure  HLD (hyperlipidemia)  Borderline diabetes mellitus  Afib  HTN (hypertension)  H/O laryngectomy          MEDICATIONS  (STANDING):  aMIOdarone    Tablet 400 milliGRAM(s) Oral daily  atorvastatin 40 milliGRAM(s) Oral at bedtime  clopidogrel Tablet 75 milliGRAM(s) Oral daily  Dakins Solution - 1/2 Strength 1 Application(s) Topical daily  dextrose 5%. 1000 milliLiter(s) (50 mL/Hr) IV Continuous <Continuous>  dextrose 50% Injectable 12.5 Gram(s) IV Push once  dextrose 50% Injectable 25 Gram(s) IV Push once  insulin lispro (HumaLOG) corrective regimen sliding scale   SubCutaneous Before meals and at bedtime  melatonin 1 milliGRAM(s) Oral at bedtime  metoprolol succinate ER 50 milliGRAM(s) Oral daily  piperacillin/tazobactam IVPB.. 3.375 Gram(s) IV Intermittent every 6 hours  sodium chloride 0.9%. 1000 milliLiter(s) (60 mL/Hr) IV Continuous <Continuous>    MEDICATIONS  (PRN):  dextrose 40% Gel 15 Gram(s) Oral once PRN Blood Glucose LESS THAN 70 milliGRAM(s)/deciliter  glucagon  Injectable 1 milliGRAM(s) IntraMuscular once PRN Glucose LESS THAN 70 milligrams/deciliter  oxycodone    5 mG/acetaminophen 325 mG 1 Tablet(s) Oral every 6 hours PRN Severe Pain (7 - 10)      Allergies    No Known Allergies    Intolerances        SOCIAL HISTORY: Previous smoker, quit 45 years ago    FAMILY HISTORY:  FHx: diabetes mellitus      Vital Signs Last 24 Hrs  T(C): 36.4 (06 May 2020 15:57), Max: 37.2 (05 May 2020 21:50)  T(F): 97.5 (06 May 2020 15:57), Max: 98.9 (05 May 2020 21:50)  HR: 108 (06 May 2020 15:57) (92 - 117)  BP: 124/57 (06 May 2020 15:57) (76/55 - 124/57)  BP(mean): 68 (06 May 2020 14:45) (61 - 76)  RR: 20 (06 May 2020 15:57) (15 - 26)  SpO2: 99% (06 May 2020 15:57) (91% - 100%)    PE:  WDWN in no distress, laying comfortably in bed  HEENT:  NC, PERRL, sclerae anicteric, conjunctivae clear, EOMI.  No nasal exudate.  No buccal or pharyngeal lesions  Neck:  Supple, no adenopathy  Lungs:  Clear to auscultation B/L  Cor:  RRR  Abd:  Soft NTND  Extrem:  Left foot wrapped in blood soaked bandage  Skin:  No rashes.    LABS:                        9.6    11.69 )-----------( 294      ( 06 May 2020 02:43 )             31.1     05-06    136  |  97  |  40<H>  ----------------------------<  119<H>  4.3   |  25  |  2.06<H>    Ca    9.0      06 May 2020 02:43  Phos  2.7     05-06  Mg     2.2     05-06      Urinalysis Basic - ( 04 May 2020 21:31 )    Color: Yellow / Appearance: Clear / SG: <=1.005 / pH: x  Gluc: x / Ketone: NEGATIVE  / Bili: Negative / Urobili: 0.2 E.U./dL   Blood: x / Protein: NEGATIVE mg/dL / Nitrite: NEGATIVE   Leuk Esterase: NEGATIVE / RBC: x / WBC x   Sq Epi: x / Non Sq Epi: x / Bacteria: x        RADIOLOGY & ADDITIONAL STUDIES: HPI:  84 M PMH HTN, DM, A fib on Xarelto (Last taken 8am) HF EF 35%, CKD III PAD p/w L 3rd toe gangrene s/p amputation. Patient was admitted on March of this year and underwent L toe amputation on 3/06 of which cultures grew MRSA and was subsequently discharged on Doxycycline. He now returns with 3 weeks of LLE pain and swelling. Pt reports persistent pain of the 3rd toe and swelling of the LLE, denies numbness or weakness. Was was seen in the office by Dr. Mcclellan and was recommended to the ED for worsening appearance of the left 3rd toe. He denies fever, chills, nausea, sob, coughing, or recent COVID contacts. (04 May 2020 14:24)    Hospital course: Pt was started on Vanco/Zosyn on 5/4/20.  He has been afebrile throught his stay.  On 5/6 he was taken to the OR for LLE angiogram.  Vascular surgery placed 2 KECIA's in his proximal TP trunk.  They also performed a 3rd transmetatarsal amputation with 4 digit amputation.  Pt states that he feels well after his surgery.  He denies pain in his foot or at the right groin access.  He denies N/V/SOB.      PAST MEDICAL & SURGICAL HISTORY:  Heart failure  HLD (hyperlipidemia)  Borderline diabetes mellitus  Afib  HTN (hypertension)  H/O laryngectomy          MEDICATIONS  (STANDING):  aMIOdarone    Tablet 400 milliGRAM(s) Oral daily  atorvastatin 40 milliGRAM(s) Oral at bedtime  clopidogrel Tablet 75 milliGRAM(s) Oral daily  Dakins Solution - 1/2 Strength 1 Application(s) Topical daily  dextrose 5%. 1000 milliLiter(s) (50 mL/Hr) IV Continuous <Continuous>  dextrose 50% Injectable 12.5 Gram(s) IV Push once  dextrose 50% Injectable 25 Gram(s) IV Push once  insulin lispro (HumaLOG) corrective regimen sliding scale   SubCutaneous Before meals and at bedtime  melatonin 1 milliGRAM(s) Oral at bedtime  metoprolol succinate ER 50 milliGRAM(s) Oral daily  piperacillin/tazobactam IVPB.. 3.375 Gram(s) IV Intermittent every 6 hours  sodium chloride 0.9%. 1000 milliLiter(s) (60 mL/Hr) IV Continuous <Continuous>    MEDICATIONS  (PRN):  dextrose 40% Gel 15 Gram(s) Oral once PRN Blood Glucose LESS THAN 70 milliGRAM(s)/deciliter  glucagon  Injectable 1 milliGRAM(s) IntraMuscular once PRN Glucose LESS THAN 70 milligrams/deciliter  oxycodone    5 mG/acetaminophen 325 mG 1 Tablet(s) Oral every 6 hours PRN Severe Pain (7 - 10)      Allergies    No Known Allergies    Intolerances        SOCIAL HISTORY: Previous smoker, quit 45 years ago.  Originally from [place], grew up in [place], lives in [place] with his wife.  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  No pets, recent travel.    FAMILY HISTORY:  FHx: diabetes mellitus      Vital Signs Last 24 Hrs  T(C): 36.4 (06 May 2020 15:57), Max: 37.2 (05 May 2020 21:50)  T(F): 97.5 (06 May 2020 15:57), Max: 98.9 (05 May 2020 21:50)  HR: 108 (06 May 2020 15:57) (92 - 117)  BP: 124/57 (06 May 2020 15:57) (76/55 - 124/57)  BP(mean): 68 (06 May 2020 14:45) (61 - 76)  RR: 20 (06 May 2020 15:57) (15 - 26)  SpO2: 99% (06 May 2020 15:57) (91% - 100%)    PE:  WDWN in no distress, laying comfortably in bed  HEENT:  NC, PERRL, sclerae anicteric, conjunctivae clear, EOMI.  No nasal exudate.  No buccal or pharyngeal lesions  Neck:  Supple, no adenopathy  Lungs:  Clear to auscultation B/L  Cor:  RRR  Abd:  Soft NTND  Extrem:  Left foot wrapped in blood soaked bandage  Skin:  No rashes.    LABS:                        9.6    11.69 )-----------( 294      ( 06 May 2020 02:43 )             31.1     05-06    136  |  97  |  40<H>  ----------------------------<  119<H>  4.3   |  25  |  2.06<H>    Ca    9.0      06 May 2020 02:43  Phos  2.7     05-06  Mg     2.2     05-06      Urinalysis Basic - ( 04 May 2020 21:31 )    Color: Yellow / Appearance: Clear / SG: <=1.005 / pH: x  Gluc: x / Ketone: NEGATIVE  / Bili: Negative / Urobili: 0.2 E.U./dL   Blood: x / Protein: NEGATIVE mg/dL / Nitrite: NEGATIVE   Leuk Esterase: NEGATIVE / RBC: x / WBC x   Sq Epi: x / Non Sq Epi: x / Bacteria: x        RADIOLOGY & ADDITIONAL STUDIES:

## 2020-05-07 LAB
ANION GAP SERPL CALC-SCNC: 15 MMOL/L — SIGNIFICANT CHANGE UP (ref 5–17)
APTT BLD: 51.9 SEC — HIGH (ref 27.5–36.3)
APTT BLD: 65.6 SEC — HIGH (ref 27.5–36.3)
APTT BLD: >200 SEC — CRITICAL HIGH (ref 27.5–36.3)
BUN SERPL-MCNC: 35 MG/DL — HIGH (ref 7–23)
CALCIUM SERPL-MCNC: 8.4 MG/DL — SIGNIFICANT CHANGE UP (ref 8.4–10.5)
CHLORIDE SERPL-SCNC: 101 MMOL/L — SIGNIFICANT CHANGE UP (ref 96–108)
CO2 SERPL-SCNC: 21 MMOL/L — LOW (ref 22–31)
CREAT SERPL-MCNC: 1.99 MG/DL — HIGH (ref 0.5–1.3)
GLUCOSE BLDC GLUCOMTR-MCNC: 112 MG/DL — HIGH (ref 70–99)
GLUCOSE BLDC GLUCOMTR-MCNC: 154 MG/DL — HIGH (ref 70–99)
GLUCOSE BLDC GLUCOMTR-MCNC: 96 MG/DL — SIGNIFICANT CHANGE UP (ref 70–99)
GLUCOSE BLDC GLUCOMTR-MCNC: 98 MG/DL — SIGNIFICANT CHANGE UP (ref 70–99)
GLUCOSE SERPL-MCNC: 106 MG/DL — HIGH (ref 70–99)
HCT VFR BLD CALC: 28 % — LOW (ref 39–50)
HGB BLD-MCNC: 8.7 G/DL — LOW (ref 13–17)
MAGNESIUM SERPL-MCNC: 2 MG/DL — SIGNIFICANT CHANGE UP (ref 1.6–2.6)
MCHC RBC-ENTMCNC: 26.5 PG — LOW (ref 27–34)
MCHC RBC-ENTMCNC: 31.1 GM/DL — LOW (ref 32–36)
MCV RBC AUTO: 85.4 FL — SIGNIFICANT CHANGE UP (ref 80–100)
NRBC # BLD: 0 /100 WBCS — SIGNIFICANT CHANGE UP (ref 0–0)
PHOSPHATE SERPL-MCNC: 3.7 MG/DL — SIGNIFICANT CHANGE UP (ref 2.5–4.5)
PLATELET # BLD AUTO: 247 K/UL — SIGNIFICANT CHANGE UP (ref 150–400)
POTASSIUM SERPL-MCNC: 4.1 MMOL/L — SIGNIFICANT CHANGE UP (ref 3.5–5.3)
POTASSIUM SERPL-SCNC: 4.1 MMOL/L — SIGNIFICANT CHANGE UP (ref 3.5–5.3)
RBC # BLD: 3.28 M/UL — LOW (ref 4.2–5.8)
RBC # FLD: 15.5 % — HIGH (ref 10.3–14.5)
SODIUM SERPL-SCNC: 137 MMOL/L — SIGNIFICANT CHANGE UP (ref 135–145)
T4 FREE SERPL-MCNC: 1.09 NG/DL — SIGNIFICANT CHANGE UP (ref 0.7–1.48)
TSH SERPL-MCNC: 0.56 UIU/ML — SIGNIFICANT CHANGE UP (ref 0.35–4.94)
TSH SERPL-MCNC: 0.58 UIU/ML — SIGNIFICANT CHANGE UP (ref 0.35–4.94)
VANCOMYCIN TROUGH SERPL-MCNC: 16.1 UG/ML — SIGNIFICANT CHANGE UP (ref 10–20)
WBC # BLD: 12.54 K/UL — HIGH (ref 3.8–10.5)
WBC # FLD AUTO: 12.54 K/UL — HIGH (ref 3.8–10.5)

## 2020-05-07 PROCEDURE — 99231 SBSQ HOSP IP/OBS SF/LOW 25: CPT | Mod: GC

## 2020-05-07 PROCEDURE — 99232 SBSQ HOSP IP/OBS MODERATE 35: CPT

## 2020-05-07 PROCEDURE — 99232 SBSQ HOSP IP/OBS MODERATE 35: CPT | Mod: GC

## 2020-05-07 RX ORDER — HEPARIN SODIUM 5000 [USP'U]/ML
950 INJECTION INTRAVENOUS; SUBCUTANEOUS
Qty: 25000 | Refills: 0 | Status: DISCONTINUED | OUTPATIENT
Start: 2020-05-07 | End: 2020-05-08

## 2020-05-07 RX ORDER — HYDROMORPHONE HYDROCHLORIDE 2 MG/ML
1 INJECTION INTRAMUSCULAR; INTRAVENOUS; SUBCUTANEOUS ONCE
Refills: 0 | Status: DISCONTINUED | OUTPATIENT
Start: 2020-05-07 | End: 2020-05-07

## 2020-05-07 RX ORDER — METOPROLOL TARTRATE 50 MG
75 TABLET ORAL DAILY
Refills: 0 | Status: DISCONTINUED | OUTPATIENT
Start: 2020-05-08 | End: 2020-05-08

## 2020-05-07 RX ORDER — METOPROLOL TARTRATE 50 MG
25 TABLET ORAL ONCE
Refills: 0 | Status: COMPLETED | OUTPATIENT
Start: 2020-05-07 | End: 2020-05-07

## 2020-05-07 RX ORDER — CHLORHEXIDINE GLUCONATE 213 G/1000ML
1 SOLUTION TOPICAL
Refills: 0 | Status: DISCONTINUED | OUTPATIENT
Start: 2020-05-07 | End: 2020-05-21

## 2020-05-07 RX ORDER — ACETAMINOPHEN WITH CODEINE 300MG-30MG
2 TABLET ORAL EVERY 6 HOURS
Refills: 0 | Status: DISCONTINUED | OUTPATIENT
Start: 2020-05-07 | End: 2020-05-08

## 2020-05-07 RX ORDER — HEPARIN SODIUM 5000 [USP'U]/ML
900 INJECTION INTRAVENOUS; SUBCUTANEOUS
Qty: 25000 | Refills: 0 | Status: DISCONTINUED | OUTPATIENT
Start: 2020-05-07 | End: 2020-05-07

## 2020-05-07 RX ORDER — VANCOMYCIN HCL 1 G
1000 VIAL (EA) INTRAVENOUS ONCE
Refills: 0 | Status: COMPLETED | OUTPATIENT
Start: 2020-05-07 | End: 2020-05-07

## 2020-05-07 RX ORDER — ACETAMINOPHEN 500 MG
1000 TABLET ORAL EVERY 6 HOURS
Refills: 0 | Status: DISCONTINUED | OUTPATIENT
Start: 2020-05-07 | End: 2020-05-08

## 2020-05-07 RX ORDER — HEPARIN SODIUM 5000 [USP'U]/ML
1200 INJECTION INTRAVENOUS; SUBCUTANEOUS
Qty: 25000 | Refills: 0 | Status: DISCONTINUED | OUTPATIENT
Start: 2020-05-07 | End: 2020-05-07

## 2020-05-07 RX ORDER — HEPARIN SODIUM 5000 [USP'U]/ML
1000 INJECTION INTRAVENOUS; SUBCUTANEOUS
Qty: 25000 | Refills: 0 | Status: DISCONTINUED | OUTPATIENT
Start: 2020-05-07 | End: 2020-05-07

## 2020-05-07 RX ADMIN — Medication 1 MILLIGRAM(S): at 22:13

## 2020-05-07 RX ADMIN — HEPARIN SODIUM 10 UNIT(S)/HR: 5000 INJECTION INTRAVENOUS; SUBCUTANEOUS at 07:34

## 2020-05-07 RX ADMIN — Medication 2: at 22:46

## 2020-05-07 RX ADMIN — HYDROMORPHONE HYDROCHLORIDE 1 MILLIGRAM(S): 2 INJECTION INTRAMUSCULAR; INTRAVENOUS; SUBCUTANEOUS at 07:17

## 2020-05-07 RX ADMIN — CHLORHEXIDINE GLUCONATE 1 APPLICATION(S): 213 SOLUTION TOPICAL at 07:07

## 2020-05-07 RX ADMIN — ATORVASTATIN CALCIUM 40 MILLIGRAM(S): 80 TABLET, FILM COATED ORAL at 22:13

## 2020-05-07 RX ADMIN — PIPERACILLIN AND TAZOBACTAM 200 GRAM(S): 4; .5 INJECTION, POWDER, LYOPHILIZED, FOR SOLUTION INTRAVENOUS at 17:30

## 2020-05-07 RX ADMIN — Medication 50 MILLIGRAM(S): at 07:17

## 2020-05-07 RX ADMIN — Medication 250 MILLIGRAM(S): at 16:16

## 2020-05-07 RX ADMIN — Medication 25 MILLIGRAM(S): at 11:45

## 2020-05-07 RX ADMIN — CLOPIDOGREL BISULFATE 75 MILLIGRAM(S): 75 TABLET, FILM COATED ORAL at 11:45

## 2020-05-07 RX ADMIN — PIPERACILLIN AND TAZOBACTAM 200 GRAM(S): 4; .5 INJECTION, POWDER, LYOPHILIZED, FOR SOLUTION INTRAVENOUS at 11:47

## 2020-05-07 RX ADMIN — PIPERACILLIN AND TAZOBACTAM 200 GRAM(S): 4; .5 INJECTION, POWDER, LYOPHILIZED, FOR SOLUTION INTRAVENOUS at 05:44

## 2020-05-07 RX ADMIN — HEPARIN SODIUM 12 UNIT(S)/HR: 5000 INJECTION INTRAVENOUS; SUBCUTANEOUS at 00:44

## 2020-05-07 RX ADMIN — HEPARIN SODIUM 9 UNIT(S)/HR: 5000 INJECTION INTRAVENOUS; SUBCUTANEOUS at 02:00

## 2020-05-07 NOTE — PROGRESS NOTE ADULT - ATTENDING COMMENTS
I have reviewed the medical record, including laboratory and radiographic studies, interviewed and examined the patient and discussed the plan with Dr. Linares, the ID Resident.  Agree with above. Will continue to follow with you – ID Team 1.

## 2020-05-07 NOTE — PROGRESS NOTE ADULT - SUBJECTIVE AND OBJECTIVE BOX
INTERVAL HPI/OVERNIGHT EVENTS:    Patient is a 84y old  Male who presents with a chief complaint of gangrene (06 May 2020 14:00)      Pt reports the following symptoms:    CONSTITUTIONAL:  Negative fever or chills, feels well, good appetite  EYES:  Negative  blurry vision or double vision  CARDIOVASCULAR:  Negative for chest pain or palpitations  RESPIRATORY:  Negative for cough, wheezing, or SOB   GASTROINTESTINAL:  Negative for nausea, vomiting, diarrhea, constipation, or abdominal pain  GENITOURINARY:  Negative frequency, urgency or dysuria  NEUROLOGIC:  No headache, confusion, dizziness, lightheadedness    MEDICATIONS  (STANDING):  atorvastatin 40 milliGRAM(s) Oral at bedtime  chlorhexidine 2% Cloths 1 Application(s) Topical <User Schedule>  clopidogrel Tablet 75 milliGRAM(s) Oral daily  Dakins Solution - 1/2 Strength 1 Application(s) Topical daily  dextrose 5%. 1000 milliLiter(s) (50 mL/Hr) IV Continuous <Continuous>  dextrose 50% Injectable 12.5 Gram(s) IV Push once  dextrose 50% Injectable 25 Gram(s) IV Push once  heparin  Infusion 1000 Unit(s)/Hr (10 mL/Hr) IV Continuous <Continuous>  insulin lispro (HumaLOG) corrective regimen sliding scale   SubCutaneous Before meals and at bedtime  melatonin 1 milliGRAM(s) Oral at bedtime  metoprolol succinate ER 50 milliGRAM(s) Oral daily  metoprolol succinate ER 25 milliGRAM(s) Oral once  piperacillin/tazobactam IVPB.. 2.25 Gram(s) IV Intermittent every 6 hours    MEDICATIONS  (PRN):  dextrose 40% Gel 15 Gram(s) Oral once PRN Blood Glucose LESS THAN 70 milliGRAM(s)/deciliter  glucagon  Injectable 1 milliGRAM(s) IntraMuscular once PRN Glucose LESS THAN 70 milligrams/deciliter  oxycodone    5 mG/acetaminophen 325 mG 1 Tablet(s) Oral every 6 hours PRN Severe Pain (7 - 10)      PHYSICAL EXAM  Vital Signs Last 24 Hrs  T(C): 36.7 (07 May 2020 09:40), Max: 36.9 (07 May 2020 05:50)  T(F): 98.1 (07 May 2020 09:40), Max: 98.4 (07 May 2020 05:50)  HR: 109 (07 May 2020 09:40) (94 - 119)  BP: 105/67 (07 May 2020 09:40) (85/53 - 105/67)  BP(mean): 71 (07 May 2020 05:50) (62 - 80)  RR: 17 (07 May 2020 09:40) (15 - 26)  SpO2: 99% (07 May 2020 09:40) (91% - 100%)    Constitutional: wn/wd in NAD.   HEENT: NCAT, MMM, OP clear, EOMI, no proptosis or lid retraction  Neck: no thyromegaly or palpable thyroid nodules   Respiratory: lungs CTAB.  Cardiovascular: regular rhythm, normal S1 and S2, no audible murmurs, no peripheral edema  GI: soft, NT/ND, no masses/HSM appreciated.  Neurology: no tremors, DTR 2+  Skin: no visible rashes/lesions  Psychiatric: AAO x 3, normal affect/mood.    LABS:                        8.7    12.54 )-----------( 247      ( 07 May 2020 06:37 )             28.0     05-07    137  |  101  |  35<H>  ----------------------------<  106<H>  4.1   |  21<L>  |  1.99<H>    Ca    8.4      07 May 2020 06:37  Phos  3.7     05-07  Mg     2.0     05-07      PT/INR - ( 06 May 2020 02:43 )   PT: 19.4 sec;   INR: 1.67          PTT - ( 07 May 2020 06:37 )  PTT:51.9 sec    Thyroid Stimulating Hormone, Serum: 0.585 uIU/mL (05-07 @ 06:37)  Thyroid Stimulating Hormone, Serum: 0.558 uIU/mL (05-07 @ 06:37)      HbA1C: 6.0 % (01-16 @ 06:03)    CAPILLARY BLOOD GLUCOSE      POCT Blood Glucose.: 98 mg/dL (07 May 2020 07:19)  POCT Blood Glucose.: 89 mg/dL (06 May 2020 21:12)  POCT Blood Glucose.: 87 mg/dL (06 May 2020 18:01)      Insulin Sliding Scale requirements X 24 Hours:    RADIOLOGY & ADDITIONAL TESTS:    A/P: 84y Male with history of DM type II presenting for       1.  DM -     Please continue           units lantus at bedtime  / in the morning and        units lispro with meals and lispro moderate / low dose sliding scale 4 times daily with meals and at bedtime.  Please continue consistent carbohydrate diet.      Goal FSG is   Will continue to monitor   For discharge, pt can continue    Pt can follow up at discharge with [facility] by calling  to make an appointment.   Will discuss case with     and update primary team INTERVAL HPI/OVERNIGHT EVENTS:    Patient is a 84y old  Male who presents with a chief complaint of gangrene (06 May 2020 14:00)  Spoke to the primary team taking care of the patient.  Patient seen at the bedside.  Was having some bleeding around the procedure site - pressure dressing applied  appetite is good.  FSG & Insulin received:  Yesterday:  pre-dinner fs, fish, vegs, rice - 50%  bedtime fs  Today:  600 BMP - 106  pre-breakfast fs, had orange juice  pre-lunch fs      Pt reports the following symptoms:  CONSTITUTIONAL:  Negative fever or chills  CARDIOVASCULAR:  Negative for chest pain or palpitations  RESPIRATORY:  Negative for cough, wheezing, or SOB   GASTROINTESTINAL:  Negative for vomiting, diarrhea, constipation, or abdominal pain  NEUROLOGIC:  No headache, confusion, dizziness, lightheadedness    MEDICATIONS  (STANDING):  atorvastatin 40 milliGRAM(s) Oral at bedtime  chlorhexidine 2% Cloths 1 Application(s) Topical <User Schedule>  clopidogrel Tablet 75 milliGRAM(s) Oral daily  Dakins Solution - 1/2 Strength 1 Application(s) Topical daily  dextrose 5%. 1000 milliLiter(s) (50 mL/Hr) IV Continuous <Continuous>  dextrose 50% Injectable 12.5 Gram(s) IV Push once  dextrose 50% Injectable 25 Gram(s) IV Push once  heparin  Infusion 1000 Unit(s)/Hr (10 mL/Hr) IV Continuous <Continuous>  insulin lispro (HumaLOG) corrective regimen sliding scale   SubCutaneous Before meals and at bedtime  melatonin 1 milliGRAM(s) Oral at bedtime  metoprolol succinate ER 50 milliGRAM(s) Oral daily  metoprolol succinate ER 25 milliGRAM(s) Oral once  piperacillin/tazobactam IVPB.. 2.25 Gram(s) IV Intermittent every 6 hours    MEDICATIONS  (PRN):  dextrose 40% Gel 15 Gram(s) Oral once PRN Blood Glucose LESS THAN 70 milliGRAM(s)/deciliter  glucagon  Injectable 1 milliGRAM(s) IntraMuscular once PRN Glucose LESS THAN 70 milligrams/deciliter  oxycodone    5 mG/acetaminophen 325 mG 1 Tablet(s) Oral every 6 hours PRN Severe Pain (7 - 10)      PHYSICAL EXAM  Vital Signs Last 24 Hrs  T(C): 36.7 (07 May 2020 09:40), Max: 36.9 (07 May 2020 05:50)  T(F): 98.1 (07 May 2020 09:40), Max: 98.4 (07 May 2020 05:50)  HR: 109 (07 May 2020 09:40) (94 - 119)  BP: 105/67 (07 May 2020 09:40) (85/53 - 105/67)  BP(mean): 71 (07 May 2020 05:50) (62 - 80)  RR: 17 (07 May 2020 09:40) (15 - 26)  SpO2: 99% (07 May 2020 09:40) (91% - 100%)    Constitutional: wn/wd in NAD.   Respiratory: lungs CTAB.  Cardiovascular: regular rhythm, normal S1 and S2, no peripheral edema  GI: soft, NT/ND, no masses/HSM appreciated.  Neurology: no tremors, DTR 2+, extremities - little warm today    LABS:                        8.7    12.54 )-----------( 247      ( 07 May 2020 06:37 )             28.0     05-07    137  |  101  |  35<H>  ----------------------------<  106<H>  4.1   |  21<L>  |  1.99<H>    Ca    8.4      07 May 2020 06:37  Phos  3.7     05-07  Mg     2.0     05-07      PT/INR - ( 06 May 2020 02:43 )   PT: 19.4 sec;   INR: 1.67          PTT - ( 07 May 2020 06:37 )  PTT:51.9 sec    Thyroid Stimulating Hormone, Serum: 0.585 uIU/mL (-07 @ 06:37)  Thyroid Stimulating Hormone, Serum: 0.558 uIU/mL (-07 @ 06:37)      HbA1C: 6.0 % ( @ 06:03)    CAPILLARY BLOOD GLUCOSE      POCT Blood Glucose.: 98 mg/dL (07 May 2020 07:19)  POCT Blood Glucose.: 89 mg/dL (06 May 2020 21:12)  POCT Blood Glucose.: 87 mg/dL (06 May 2020 18:01)      Insulin Sliding Scale requirements X 24 Hours:    RADIOLOGY & ADDITIONAL TESTS:    A/P: 84 M PMH HTN, DM, A fib on Xarelto (Last taken 8am) HF EF 35%, CKD III PAD p/w 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 He is s/p left fourth toe amputation and angiogram 20. He is s/p 2 KECIA placement in TP trunk. currently on antibiotics.    1.  Hypoglycemia   - likely factitious at this time given the cold extremities and the BMP showing normal blood glucose. Unlikely to be reactive hypoglycemia given no risk factors.   - had CKD. Normal liver function  - Cr 2.06 and GFR 34  - Wt 77.1 kg with BMI 24.4  Hba1c 6  TSH 0.585, Free T4 1.09  - Please obtain Basic metabolic panel, c-peptide level, insulin level and pro-insulin level when the FSG is less than 60. These labs should be drawn before administering any dextrose containing   solution ( Juice or D50)  - Please stop the lispro sliding scale for now  - Please consider warming the fingers before obtaining the FSG. Can consider decreasing the frequency of FSG - change to QAM and bedtime FSG.   Will continue to monitor     For discharge, TBD    Pt can follow up at discharge with [facility] by calling  to make an appointment.   discussed case with Dr. Bello    and updated primary team

## 2020-05-07 NOTE — PHYSICAL THERAPY INITIAL EVALUATION ADULT - GENERAL OBSERVATIONS, REHAB EVAL
Pt received semi supine, +LLE foot bandage C/D/I, +2L NC O2, +telemetry, +pulse ox, +IV, NAD, agreeable to PT.

## 2020-05-07 NOTE — PROGRESS NOTE ADULT - ASSESSMENT
84 M PMH HTN, DM, A fib on Xarelto (Last taken 8am) HF EF 35%, PAD, CKD III presented with L 3rd toe gangrene s/p amputation. He has been afebrile during his stay. Started on Vanc/Zosyn on 5/4.  Pt is now S/P LLE angiogram with placement of 2 KECIA in the proximal PT trunk with amputation of the 3rd and 4th toes and partial amputation of 3rd metatarsal.  OR cultures were sent per surgery resident.    Suggest:  - Continue with Vancomycin and Zosyn  - Zosyn 2.25 q6h  - Vancomycin 1g q24h  - Draw vancomycin trough at 2pm (30 min before 4th dose)  - F/U OR cultures  - F/U blood cx's from 5/4  - ID will follow 84 M PMH HTN, DM, A fib on Xarelto (Last taken 8am) HF EF 35%, PAD, CKD III presented with L 3rd toe gangrene s/p amputation. He has been afebrile during his stay. Started on Vanc/Zosyn on 5/4.  Pt is now S/P LLE angiogram with placement of 2 KECIA in the proximal PT trunk with amputation of the 3rd and 4th toes and partial amputation of 3rd metatarsal.  OR cultures were not sent so patient will need to be on broad spectrum coverage.    Suggest:  - Continue with Vancomycin and Zosyn  - Zosyn 2.25 q6h  - Vancomycin 1g q24h  - Pt will need 6 weeks of Vancomycin 1g q24h, and Zosyn 2.25 q6h pending bone pathology. Last dose will be 6/15/20  - Will need weekly CBC, CMP, ESR, CRP, and Vanc Trough  - F/U blood cx's from 5/4, and bone path  - ID will follow

## 2020-05-07 NOTE — PROGRESS NOTE ADULT - ASSESSMENT
83 y/o AAM w/PMH HTN/DM/A-fib on Xarelto/HF EF 35%, CKD III and PAD, recently admitted in march for toe gangrene s/p left 3rd toe amputation/ presented back to ED from Dr. Mcclellan's office for further work up of LLE pain and swelling, nephrology consulted for ZEENAT on CKD III  plan for OR and angiogram.    # ZEENAT on CKD stage III (baseline Cr 1.6-1.8)  - likely pre-renal given improvement w gentle hydration, also s/p angiogram on 5/6 (hydrated pre-/ post- procedure)  - keep euvolemic, encourage oral intake/ hydration  - keep map >65 mmHg   - monitor BUN/Cr, lytes, uop   - adjust meds/ ABx to CrCl <30 ml/min   - avoid nephrotoxic meds (NSAIDs)   - monitor H/H, transfuse as indicated   - AFib, consider Cardiology evaluation/ recommendations for better rate control

## 2020-05-07 NOTE — PHYSICAL THERAPY INITIAL EVALUATION ADULT - DIAGNOSIS, PT EVAL
Impaired Motor Function, Muscle Performance, Range of Motion, Gait, Locomotion, and Balance Associated with Amputation

## 2020-05-07 NOTE — PHYSICAL THERAPY INITIAL EVALUATION ADULT - ADDITIONAL COMMENTS
Pt lives with his wife in an elevator access apt. At baseline, ambulates with a RW or rollator. States that at baseline he is able to perform all basic activities of self care and his wife assists with cooking and cleaning.

## 2020-05-07 NOTE — PROVIDER CONTACT NOTE (OTHER) - ACTION/TREATMENT ORDERED:
As per ADIA Leon, ANM he was able to hear a faint pulse via doppler on pt's dorsalis pedis when MD Zohreh assessed pt on night of 3/6. Will continue to monitor.

## 2020-05-07 NOTE — PROVIDER CONTACT NOTE (OTHER) - RECOMMENDATIONS
Give OJ and recheck fs
Give dextrose 50% 12.5mg
MD to assess pt. and dressing.
Stat EKG
MD to assess pt.  MD assessed pt, reinforced dressing with additional kerlex.

## 2020-05-07 NOTE — PHYSICAL THERAPY INITIAL EVALUATION ADULT - GAIT DEVIATIONS NOTED, PT EVAL
decreased weight-shifting ability/antalgic gait/decreased step length/decreased stride length/decreased riley

## 2020-05-07 NOTE — PROGRESS NOTE ADULT - ASSESSMENT
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 BRANDYN falsely elevated due to history of DM now s/p LLE Angiogram DEStent x2, L 3rd and 4th toe amputation, and debridement. Clinically stable.     NPO/IVF  Home meds  Vanc/Zosyn  f/u Renal, EP, and Endocrine recs  f/u 2pm Vanc level   Hep ggt  AM labs

## 2020-05-07 NOTE — PROGRESS NOTE ADULT - ATTENDING COMMENTS
ZEENAT on CKD likely prerenal on admission (vs AIN from bactrim), trended down then got angio, watch CR.

## 2020-05-07 NOTE — PROVIDER CONTACT NOTE (OTHER) - ACTION/TREATMENT ORDERED:
No action ordered now. Continue heparin drip. Morning team will reinforce dressing and assess pt. Hold metoprolol until pt is assessed by team. Will continue to monitor. No action ordered now. Continue heparin drip per MD. Morning team will reinforce dressing and assess pt. Hold metoprolol until pt is assessed by team. Will continue to monitor.

## 2020-05-07 NOTE — PHYSICAL THERAPY INITIAL EVALUATION ADULT - PERTINENT HX OF CURRENT PROBLEM, REHAB EVAL
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 BRANDYN falsely elevated due to history of DM now s/p LLE Angiogram DEStent x2, L 3rd and 4th toe amputation, and debridement.

## 2020-05-07 NOTE — PROGRESS NOTE ADULT - SUBJECTIVE AND OBJECTIVE BOX
STATUS POST:  POD # 1 s/p LLE Angiogram DEStent x2, L 3rd and 4th toe amputation, and debridement    INTERVAL HPI/OVERNIGHT EVENTS:  LLE Angiogram DEStent x2, L 3rd and 4th toe amputation, and debridement, A fib in OR, received Metop and Amiodarone, purulence expressed form owund, ID, endo, and EP consulted; vanc trough 10.3 vanc 1250 x1 given. Started hep drip @9cc - No signifivcant bleeding from woun site. R groin soft, non tender, no bruising. Afib with HR@110s     SUBJECTIVE: Examined at the bedside with chief resident resting comfortably. States pain still present, denies numbness and tingling of left foor. Denies cp, sob, nausea, emesis. No acute complaints    clopidogrel Tablet 75 milliGRAM(s) Oral daily  heparin  Infusion 1000 Unit(s)/Hr IV Continuous <Continuous>  metoprolol succinate ER 50 milliGRAM(s) Oral daily  piperacillin/tazobactam IVPB.. 2.25 Gram(s) IV Intermittent every 6 hours      Vital Signs Last 24 Hrs  T(C): 36.9 (07 May 2020 05:50), Max: 36.9 (07 May 2020 05:50)  T(F): 98.4 (07 May 2020 05:50), Max: 98.4 (07 May 2020 05:50)  HR: 114 (07 May 2020 05:50) (94 - 119)  BP: 96/58 (07 May 2020 05:50) (76/55 - 104/61)  BP(mean): 71 (07 May 2020 05:50) (61 - 80)  RR: 18 (07 May 2020 05:50) (15 - 26)  SpO2: 100% (07 May 2020 05:50) (91% - 100%)  I&O's Detail    06 May 2020 07:01  -  07 May 2020 07:00  --------------------------------------------------------  IN:    heparin Infusion: 24 mL    heparin Infusion: 10 mL    heparin Infusion: 45 mL    IV PiggyBack: 350 mL    sodium chloride 0.9%: 1500 mL  Total IN: 1929 mL    OUT:    Voided: 900 mL  Total OUT: 900 mL    Total NET: 1029 mL          Physical Exam  General: NAD, resting comfortably in bed  C/V: A.fib  Pulm: Nonlabored breathing, no respiratory distress  Abd: soft, non-tender, non-distended.  Extrem: WWP, LLE swelling, Left foot wound with granulation and isolated areas of purple non viable tissue, no purulence or drainage.   Neuro: no focal deficits, normal sensation  Pulses: LLE PT monophasic    LABS:                        8.7    12.54 )-----------( 247      ( 07 May 2020 06:37 )             28.0     05-07    137  |  101  |  35<H>  ----------------------------<  106<H>  4.1   |  21<L>  |  1.99<H>    Ca    8.4      07 May 2020 06:37  Phos  3.7     05-07  Mg     2.0     05-07      PT/INR - ( 06 May 2020 02:43 )   PT: 19.4 sec;   INR: 1.67          PTT - ( 07 May 2020 06:37 )  PTT:51.9 sec      Culture - Blood (collected 04 May 2020 14:25)  Source: .Blood Blood  Preliminary Report (06 May 2020 15:01):    No growth at 2 days.    Culture - Blood (collected 04 May 2020 14:25)  Source: .Blood Blood  Preliminary Report (06 May 2020 15:01):    No growth at 2 days.    Culture - Other (collected 04 May 2020 12:21)  Source: .Other left foot wound  Gram Stain (04 May 2020 21:05):    No WBC's seen.    Few Gram positive cocci in pairs and in clusters  Final Report (06 May 2020 10:54):    Numerous Methicillin resistant Staphylococcus aureus    Numerous Enterococcus faecalis (vancomycin resistant)    Moderate Dermabacter hominis    Result called to and read back by_ Ms. SHAUNA Max RN  05/06/2020 10:52:16  Organism: Methicillin resistant Staphylococcus aureus  Methicillin resistant Staphylococcus aureus  Enterococcus faecalis (vancomycin resistant) (06 May 2020 10:54)  Organism: Enterococcus faecalis (vancomycin resistant) (06 May 2020 10:54)  Organism: Methicillin resistant Staphylococcus aureus (06 May 2020 10:54)  Organism: Methicillin resistant Staphylococcus aureus (06 May 2020 10:54)        RADIOLOGY & ADDITIONAL STUDIES:

## 2020-05-07 NOTE — PROGRESS NOTE ADULT - SUBJECTIVE AND OBJECTIVE BOX
renal fx improving w adequate uop  acceptable electrolytes   s/p LLE Angiogram DEStent x2, L 3rd and 4th toe amputation, and debridement on 5/6  A fib in OR, s/p metoprolol and amiodarone   on 2 L NC, sat 95%, not in distress      Meds:  atorvastatin 40 at bedtime  chlorhexidine 2% Cloths 1 <User Schedule>  clopidogrel Tablet 75 daily  Dakins Solution - 1/2 Strength 1 daily  dextrose 40% Gel 15 once PRN  dextrose 5%. 1000 <Continuous>  dextrose 50% Injectable 12.5 once  dextrose 50% Injectable 25 once  glucagon  Injectable 1 once PRN  heparin  Infusion 1000 <Continuous>  insulin lispro (HumaLOG) corrective regimen sliding scale  Before meals and at bedtime  melatonin 1 at bedtime  oxycodone    5 mG/acetaminophen 325 mG 1 every 6 hours PRN  piperacillin/tazobactam IVPB.. 2.25 every 6 hours      T(C): , Max: 36.9 (05-07-20 @ 05:50)  T(F): , Max: 98.4 (05-07-20 @ 05:50)  HR: 111 (05-07-20 @ 13:35)  BP: 102/64 (05-07-20 @ 13:35)  BP(mean): 71 (05-07-20 @ 05:50)  RR: 15 (05-07-20 @ 13:35)  SpO2: 95% (05-07-20 @ 13:35)  Wt(kg): --    05-06 @ 07:01  -  05-07 @ 07:00  --------------------------------------------------------  IN: 1929 mL / OUT: 900 mL / NET: 1029 mL    05-07 @ 07:01  -  05-07 @ 15:17  --------------------------------------------------------  IN: 160 mL / OUT: 0 mL / NET: 160 mL      PHYSICAL EXAM:  Constitutional: alert, NAD  ENT: MMM  Neck: supple  Respiratory: CTA B/L  Cardiac: irregularly irregular tachy  Gastrointestinal: soft, NT, ND  Extremities: LLE swelling, Left foot wound with granulation and isolated areas of purple non viable tissue, no purulence or drainage  Neurologic: AAO x3; non focal      LABS:                        8.7    12.54 )-----------( 247      ( 07 May 2020 06:37 )             28.0     05-07    137  |  101  |  35<H>  ----------------------------<  106<H>  4.1   |  21<L>  |  1.99<H>    Ca    8.4      07 May 2020 06:37  Phos  3.7     05-07  Mg     2.0     05-07        PT/INR - ( 06 May 2020 02:43 )   PT: 19.4 sec;   INR: 1.67          PTT - ( 07 May 2020 12:12 )  PTT:65.6 sec          RADIOLOGY & ADDITIONAL STUDIES:    reviewed

## 2020-05-07 NOTE — PROGRESS NOTE ADULT - ATTENDING COMMENTS
Pt seen on rounds this afternoon.  Had no new complaints, but was worried about the plans for possible ISAI for PT and continued IV Abx.  Fingersticks have been --no hypoglycemic values  His fingers seem somewhat less vasoconstricted on today's exam  Would decrease the fingersticks to twice a day

## 2020-05-07 NOTE — PROGRESS NOTE ADULT - SUBJECTIVE AND OBJECTIVE BOX
INTERVAL HPI/OVERNIGHT EVENTS: Pt states he has minor pain in his foot, He denies N/V/SOB.    CONSTITUTIONAL:  No fever, chills, night sweats  EYES:  No photophobia or visual changes  CARDIOVASCULAR:  No chest pain  RESPIRATORY:  No cough, wheezing, or SOB   GASTROINTESTINAL:  No nausea, vomiting, diarrhea, constipation, or abdominal pain  GENITOURINARY:  No frequency, urgency, dysuria or hematuria  NEUROLOGIC:  No headache, lightheadedness      ANTIBIOTICS/RELEVANT:  Zosyn 2.25 q6h  Vancomycin 1g q24h        Vital Signs Last 24 Hrs  T(C): 36.6 (07 May 2020 13:35), Max: 36.9 (07 May 2020 05:50)  T(F): 97.9 (07 May 2020 13:35), Max: 98.4 (07 May 2020 05:50)  HR: 111 (07 May 2020 13:35) (104 - 119)  BP: 102/64 (07 May 2020 13:35) (92/56 - 105/67)  BP(mean): 71 (07 May 2020 05:50) (68 - 80)  RR: 15 (07 May 2020 13:35) (15 - 21)  SpO2: 95% (07 May 2020 13:35) (95% - 100%)    PE:  WDWN in no distress, laying comfortably in bed  HEENT:  NC, PERRL, sclerae anicteric, conjunctivae clear, EOMI.  No nasal exudate.  No buccal or pharyngeal lesions  Neck:  Supple, no adenopathy  Lungs:  Clear to auscultation B/L  Cor:  RRR  Abd:  Soft NTND  Extrem:  Left foot wrapped in dressing, C/D/I  Skin:  No rashes.      LABS:                        8.7    12.54 )-----------( 247      ( 07 May 2020 06:37 )             28.0         05-07    137  |  101  |  35<H>  ----------------------------<  106<H>  4.1   |  21<L>  |  1.99<H>    Ca    8.4      07 May 2020 06:37  Phos  3.7     05-07  Mg     2.0     05-07            MICROBIOLOGY:        RADIOLOGY & ADDITIONAL STUDIES:

## 2020-05-08 LAB
ANION GAP SERPL CALC-SCNC: 14 MMOL/L — SIGNIFICANT CHANGE UP (ref 5–17)
APPEARANCE UR: CLEAR — SIGNIFICANT CHANGE UP
APTT BLD: 42.4 SEC — HIGH (ref 27.5–36.3)
APTT BLD: 47.7 SEC — HIGH (ref 27.5–36.3)
APTT BLD: 56.8 SEC — HIGH (ref 27.5–36.3)
BACTERIA # UR AUTO: PRESENT /HPF
BILIRUB UR-MCNC: NEGATIVE — SIGNIFICANT CHANGE UP
BUN SERPL-MCNC: 39 MG/DL — HIGH (ref 7–23)
CALCIUM SERPL-MCNC: 8.2 MG/DL — LOW (ref 8.4–10.5)
CHLORIDE SERPL-SCNC: 100 MMOL/L — SIGNIFICANT CHANGE UP (ref 96–108)
CO2 SERPL-SCNC: 22 MMOL/L — SIGNIFICANT CHANGE UP (ref 22–31)
COLOR SPEC: YELLOW — SIGNIFICANT CHANGE UP
COMMENT - URINE: SIGNIFICANT CHANGE UP
CREAT ?TM UR-MCNC: 156 MG/DL — SIGNIFICANT CHANGE UP
CREAT SERPL-MCNC: 2.71 MG/DL — HIGH (ref 0.5–1.3)
DIFF PNL FLD: ABNORMAL
EPI CELLS # UR: SIGNIFICANT CHANGE UP /HPF (ref 0–5)
GLUCOSE BLDC GLUCOMTR-MCNC: 111 MG/DL — HIGH (ref 70–99)
GLUCOSE BLDC GLUCOMTR-MCNC: 126 MG/DL — HIGH (ref 70–99)
GLUCOSE BLDC GLUCOMTR-MCNC: 145 MG/DL — HIGH (ref 70–99)
GLUCOSE BLDC GLUCOMTR-MCNC: 79 MG/DL — SIGNIFICANT CHANGE UP (ref 70–99)
GLUCOSE SERPL-MCNC: 95 MG/DL — SIGNIFICANT CHANGE UP (ref 70–99)
GLUCOSE UR QL: NEGATIVE — SIGNIFICANT CHANGE UP
HCT VFR BLD CALC: 25.4 % — LOW (ref 39–50)
HGB BLD-MCNC: 8.1 G/DL — LOW (ref 13–17)
KETONES UR-MCNC: ABNORMAL MG/DL
LEUKOCYTE ESTERASE UR-ACNC: NEGATIVE — SIGNIFICANT CHANGE UP
MAGNESIUM SERPL-MCNC: 2.1 MG/DL — SIGNIFICANT CHANGE UP (ref 1.6–2.6)
MCHC RBC-ENTMCNC: 27 PG — SIGNIFICANT CHANGE UP (ref 27–34)
MCHC RBC-ENTMCNC: 31.9 GM/DL — LOW (ref 32–36)
MCV RBC AUTO: 84.7 FL — SIGNIFICANT CHANGE UP (ref 80–100)
NITRITE UR-MCNC: NEGATIVE — SIGNIFICANT CHANGE UP
NRBC # BLD: 0 /100 WBCS — SIGNIFICANT CHANGE UP (ref 0–0)
PH UR: 5.5 — SIGNIFICANT CHANGE UP (ref 5–8)
PHOSPHATE SERPL-MCNC: 3.8 MG/DL — SIGNIFICANT CHANGE UP (ref 2.5–4.5)
PLATELET # BLD AUTO: 219 K/UL — SIGNIFICANT CHANGE UP (ref 150–400)
POTASSIUM SERPL-MCNC: 4 MMOL/L — SIGNIFICANT CHANGE UP (ref 3.5–5.3)
POTASSIUM SERPL-SCNC: 4 MMOL/L — SIGNIFICANT CHANGE UP (ref 3.5–5.3)
PROT UR-MCNC: ABNORMAL MG/DL
RBC # BLD: 3 M/UL — LOW (ref 4.2–5.8)
RBC # FLD: 15.6 % — HIGH (ref 10.3–14.5)
RBC CASTS # UR COMP ASSIST: < 5 /HPF — SIGNIFICANT CHANGE UP
SODIUM SERPL-SCNC: 136 MMOL/L — SIGNIFICANT CHANGE UP (ref 135–145)
SODIUM UR-SCNC: 22 MMOL/L — SIGNIFICANT CHANGE UP
SP GR SPEC: 1.02 — SIGNIFICANT CHANGE UP (ref 1–1.03)
SURGICAL PATHOLOGY STUDY: SIGNIFICANT CHANGE UP
UROBILINOGEN FLD QL: 0.2 E.U./DL — SIGNIFICANT CHANGE UP
UUN UR-MCNC: 585 MG/DL — SIGNIFICANT CHANGE UP
VANCOMYCIN FLD-MCNC: 22 UG/ML — SIGNIFICANT CHANGE UP
WBC # BLD: 9.3 K/UL — SIGNIFICANT CHANGE UP (ref 3.8–10.5)
WBC # FLD AUTO: 9.3 K/UL — SIGNIFICANT CHANGE UP (ref 3.8–10.5)
WBC UR QL: < 5 /HPF — SIGNIFICANT CHANGE UP

## 2020-05-08 PROCEDURE — 99232 SBSQ HOSP IP/OBS MODERATE 35: CPT | Mod: GC

## 2020-05-08 PROCEDURE — 99233 SBSQ HOSP IP/OBS HIGH 50: CPT

## 2020-05-08 PROCEDURE — 99232 SBSQ HOSP IP/OBS MODERATE 35: CPT

## 2020-05-08 PROCEDURE — 76937 US GUIDE VASCULAR ACCESS: CPT | Mod: 26,AS

## 2020-05-08 RX ORDER — METOPROLOL TARTRATE 50 MG
25 TABLET ORAL DAILY
Refills: 0 | Status: DISCONTINUED | OUTPATIENT
Start: 2020-05-08 | End: 2020-05-08

## 2020-05-08 RX ORDER — SODIUM CHLORIDE 9 MG/ML
1000 INJECTION INTRAMUSCULAR; INTRAVENOUS; SUBCUTANEOUS
Refills: 0 | Status: DISCONTINUED | OUTPATIENT
Start: 2020-05-08 | End: 2020-05-09

## 2020-05-08 RX ORDER — SODIUM CHLORIDE 9 MG/ML
10 INJECTION INTRAMUSCULAR; INTRAVENOUS; SUBCUTANEOUS
Refills: 0 | Status: DISCONTINUED | OUTPATIENT
Start: 2020-05-08 | End: 2020-05-21

## 2020-05-08 RX ORDER — ACETAMINOPHEN 500 MG
650 TABLET ORAL EVERY 6 HOURS
Refills: 0 | Status: DISCONTINUED | OUTPATIENT
Start: 2020-05-08 | End: 2020-05-21

## 2020-05-08 RX ORDER — RIVAROXABAN 15 MG-20MG
15 KIT ORAL
Refills: 0 | Status: DISCONTINUED | OUTPATIENT
Start: 2020-05-08 | End: 2020-05-09

## 2020-05-08 RX ORDER — METOPROLOL TARTRATE 50 MG
25 TABLET ORAL ONCE
Refills: 0 | Status: COMPLETED | OUTPATIENT
Start: 2020-05-08 | End: 2020-05-08

## 2020-05-08 RX ORDER — METOPROLOL TARTRATE 50 MG
50 TABLET ORAL
Refills: 0 | Status: DISCONTINUED | OUTPATIENT
Start: 2020-05-09 | End: 2020-05-13

## 2020-05-08 RX ORDER — CHLORHEXIDINE GLUCONATE 213 G/1000ML
1 SOLUTION TOPICAL
Refills: 0 | Status: DISCONTINUED | OUTPATIENT
Start: 2020-05-08 | End: 2020-05-08

## 2020-05-08 RX ORDER — AMIODARONE HYDROCHLORIDE 400 MG/1
200 TABLET ORAL DAILY
Refills: 0 | Status: DISCONTINUED | OUTPATIENT
Start: 2020-05-08 | End: 2020-05-13

## 2020-05-08 RX ORDER — HEPARIN SODIUM 5000 [USP'U]/ML
1000 INJECTION INTRAVENOUS; SUBCUTANEOUS
Qty: 25000 | Refills: 0 | Status: DISCONTINUED | OUTPATIENT
Start: 2020-05-08 | End: 2020-05-08

## 2020-05-08 RX ORDER — SENNA PLUS 8.6 MG/1
2 TABLET ORAL AT BEDTIME
Refills: 0 | Status: DISCONTINUED | OUTPATIENT
Start: 2020-05-08 | End: 2020-05-21

## 2020-05-08 RX ADMIN — HEPARIN SODIUM 9.5 UNIT(S)/HR: 5000 INJECTION INTRAVENOUS; SUBCUTANEOUS at 02:12

## 2020-05-08 RX ADMIN — Medication 25 MILLIGRAM(S): at 14:22

## 2020-05-08 RX ADMIN — CHLORHEXIDINE GLUCONATE 1 APPLICATION(S): 213 SOLUTION TOPICAL at 05:42

## 2020-05-08 RX ADMIN — SODIUM CHLORIDE 60 MILLILITER(S): 9 INJECTION INTRAMUSCULAR; INTRAVENOUS; SUBCUTANEOUS at 09:06

## 2020-05-08 RX ADMIN — Medication 1 APPLICATION(S): at 12:06

## 2020-05-08 RX ADMIN — RIVAROXABAN 15 MILLIGRAM(S): KIT at 21:32

## 2020-05-08 RX ADMIN — Medication 75 MILLIGRAM(S): at 10:00

## 2020-05-08 RX ADMIN — PIPERACILLIN AND TAZOBACTAM 200 GRAM(S): 4; .5 INJECTION, POWDER, LYOPHILIZED, FOR SOLUTION INTRAVENOUS at 01:38

## 2020-05-08 RX ADMIN — AMIODARONE HYDROCHLORIDE 200 MILLIGRAM(S): 400 TABLET ORAL at 17:47

## 2020-05-08 RX ADMIN — Medication 10 MILLIGRAM(S): at 11:38

## 2020-05-08 RX ADMIN — HEPARIN SODIUM 10 UNIT(S)/HR: 5000 INJECTION INTRAVENOUS; SUBCUTANEOUS at 02:35

## 2020-05-08 RX ADMIN — Medication 650 MILLIGRAM(S): at 02:12

## 2020-05-08 RX ADMIN — PIPERACILLIN AND TAZOBACTAM 200 GRAM(S): 4; .5 INJECTION, POWDER, LYOPHILIZED, FOR SOLUTION INTRAVENOUS at 06:30

## 2020-05-08 RX ADMIN — PIPERACILLIN AND TAZOBACTAM 200 GRAM(S): 4; .5 INJECTION, POWDER, LYOPHILIZED, FOR SOLUTION INTRAVENOUS at 17:48

## 2020-05-08 RX ADMIN — CLOPIDOGREL BISULFATE 75 MILLIGRAM(S): 75 TABLET, FILM COATED ORAL at 11:51

## 2020-05-08 RX ADMIN — PIPERACILLIN AND TAZOBACTAM 200 GRAM(S): 4; .5 INJECTION, POWDER, LYOPHILIZED, FOR SOLUTION INTRAVENOUS at 11:51

## 2020-05-08 RX ADMIN — OXYCODONE AND ACETAMINOPHEN 1 TABLET(S): 5; 325 TABLET ORAL at 14:56

## 2020-05-08 RX ADMIN — Medication 1 MILLIGRAM(S): at 21:32

## 2020-05-08 RX ADMIN — ATORVASTATIN CALCIUM 40 MILLIGRAM(S): 80 TABLET, FILM COATED ORAL at 21:32

## 2020-05-08 NOTE — PROGRESS NOTE ADULT - ASSESSMENT
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 BRANDYN falsely elevated due to history of DM now s/p LLE Angiogram DEStent x2, L 3rd and 4th toe amputation, and debridement. Increase in Cr. from 1.99 to 2.71 in AM. Will restart gentle hydration. Clinically stable.    NPO/IVF  Home meds  Vanc/Zosyn  f/u Renal, EP, and Endocrine recs  f/u 3pm Vanc level   Hep ggt  AM labs 84 M PMH HTN, DM, A fib on Xarelto (Last taken 8am) HF EF 35%, PAD, CKD III 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 BRANDYN falsely elevated due to history of DM now s/p LLE Angiogram DEStent x2, L 3rd and 4th toe amputation, and debridement. Increase in Cr. from 1.99 to 2.71 in AM. Will restart gentle hydration. Clinically stable.    NPO/IVF  Home meds  Vanc/Zosyn  f/u Renal, EP, and Endocrine recs  f/u 3pm Vanc level   Hep ggt  AM labs    Wound Care: Wound  6cm x 3cm x3cm vac over wound.

## 2020-05-08 NOTE — PROGRESS NOTE ADULT - ASSESSMENT
84 M PMH HTN, DM, A fib on Xarelto (Last taken 8am day of admission, compliant) HFrEF 35%, PAD, CKD III 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 BRANDYN falsely elevated due to history of DM now s/p LLE Angiogram DEStent x2, L 3rd and 4th toe amputation, and debridement. He has been in persistent AF since admission with increasing rates - EP following. Upon arrival to the hospital, Xarelto was stopped and heparin gtt started which was briefly interrupted for OR and PICC insertion but not for more than 24H.     Initially the plan was to focus on rate control with BB and the patient's home AMIO dose was stopped. 84 M PMH HTN, DM, A fib on Xarelto (Last taken 8am day of admission, compliant) HFrEF 35%, PAD, CKD III 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 BRANDYN falsely elevated due to history of DM now s/p LLE Angiogram DEStent x2, L 3rd and 4th toe amputation, and debridement. He has been in persistent AF since admission with increasing rates - EP following. Upon arrival to the hospital, Xarelto was stopped and heparin gtt started which was briefly interrupted for OR and PICC insertion but not for more than 24H.     - Initially the plan was to focus on rate control with BB and the patient's home AMIO dose was stopped. Rate control has been limited by the patient's low BP.     - Long discussion had with the patient about available options. We discussed cardioversion to try and restore sinus rhythm. Given the patient's dilated heart, this is unlikely to work/ be long-lasting, however, it's a safe and potentially beneficial option. He reports having cardioversions in the past and is refusing this procedure. He doesn't want to undergo any further procedures during this admission.     - Discussed that the permanent solution is to implant a ppm and ablate his AV node. Given his low EF, he should have a BIV PPM/ICD to prevent his HF from worsening considering he will be ventricularly pacing 100%. Unfortunately, this type of implant carries a higher infection risk because it involves multiple leads in the heart. This also cannot be done while he has an active infection.     - Given limited options, recommend maximizing oral medications. Restart oral AMIO 200mg daily. He has normal LFTs and TFTs. Give maximal doses of BB in two  doses: Toprol 50mg BID. Increase as tolerated. The patient has been tolerating systolic BP in 90s. This shouldn't limit Toprol admission. 84 M PMH HTN, DM, A fib on Xarelto (Last taken 8am day of admission, compliant) HFrEF 35%, PAD, CKD III 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 BRANDYN falsely elevated due to history of DM now s/p LLE Angiogram DEStent x2, L 3rd and 4th toe amputation, and debridement. He has been in persistent AF since admission with increasing rates - EP following. Upon arrival to the hospital, Xarelto was stopped and heparin gtt started which was briefly interrupted for OR and PICC insertion but not for more than 24H.     - Initially the plan was to focus on rate control with BB and the patient's home AMIO dose was stopped. Rate control has been limited by the patient's low BP.     - Long discussion had with the patient about available options. We discussed cardioversion to try and restore sinus rhythm. Given the patient's dilated heart, this is unlikely to work/ be long-lasting, however, it's a safe and potentially beneficial option. He reports having cardioversions in the past and is refusing this procedure. He doesn't want to undergo any further procedures during this admission.     - Discussed that the permanent solution is to implant a ppm and ablate his AV node. Given his low EF, he should have a BIV PPM/ICD to prevent his HF from worsening considering he will be ventricularly pacing 100%. Unfortunately, this type of implant carries a higher infection risk because it involves multiple leads in the heart. This also cannot be done while he has an active infection.     - Given limited options, recommend maximizing oral medications. Restart oral AMIO 200mg daily. He has normal LFTs and TFTs. Give maximal doses of BB in two  doses: Toprol 50mg BID. Increase as tolerated. The patient has been tolerating systolic BP in 90s. This shouldn't limit Toprol administration. 84 M PMH HTN, DM, A fib on Xarelto (Last taken 8am day of admission, compliant) HFrEF 35%, PAD, CKD III 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 BRANDYN falsely elevated due to history of DM now s/p LLE Angiogram DEStent x2, L 3rd and 4th toe amputation, and debridement. He has been in persistent AF since admission with increasing rates - EP following. Upon arrival to the hospital, Xarelto was stopped and heparin gtt started which was briefly interrupted for OR and PICC insertion but not for more than 24H.     - Initially the plan was to focus on rate control with BB and the patient's home AMIO dose was stopped. Rate control has been limited by the patient's low BP.     - Long discussion had with the patient about available options. We discussed cardioversion to try and restore sinus rhythm. Given the patient's dilated heart, this is unlikely to work/ be long-lasting, however, it's a safe and potentially beneficial option. He reports having cardioversions in the past and is refusing this procedure. He doesn't want to undergo any further procedures during this admission.     - Discussed that the permanent solution is to implant a ppm and ablate his AV node. Given his low EF, he should have a BIV PPM/ICD to prevent his HF from worsening considering he will be ventricularly pacing 100%. Unfortunately, this type of implant carries a higher infection risk because it involves multiple leads in the heart. This also cannot be done while he has an active infection.     - Given limited options, recommend maximizing oral medications. Restart oral AMIO 400mg daily. He has normal LFTs and TFTs. Give maximal doses of BB in two  doses: Toprol 50mg BID. Increase as tolerated. The patient has been tolerating systolic BP in 90s. This shouldn't limit Toprol administration. Goal HR <110bpm.        - He should follow-up with his outpatient cardiologist for an ischemic work-up to differentiate the cause of his HFrEF (ischemic/tachy-induced, etc.) and optimize him from a HF perspective. Once this has been done, we will have a better understanding of the best option for this patient.    - Case d/w Dr. Lockett. 84 M PMH HTN, DM, A fib on Xarelto (Last taken 8am day of admission, compliant) HFrEF 35%, PAD, CKD III 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 BRANDYN falsely elevated due to history of DM now s/p LLE Angiogram DEStent x2, L 3rd and 4th toe amputation, and debridement. He has been in persistent AF since admission with increasing rates - EP following. Upon arrival to the hospital, Xarelto was stopped and heparin gtt started which was briefly interrupted for OR and PICC insertion but not for more than 24H.     - Initially the plan was to focus on rate control with BB and the patient's home AMIO dose was stopped. Rate control has been limited by the patient's low BP.     - Long discussion had with the patient about available options. We discussed cardioversion to try and restore sinus rhythm. Given the patient's dilated heart, this is unlikely to work/ be long-lasting, however, it's a safe and potentially beneficial option. He reports having cardioversions in the past and is refusing this procedure. He doesn't want to undergo any further procedures during this admission.     - Discussed that the permanent solution is to implant a ppm and ablate his AV node. Given his low EF, he should have a BIV PPM/ICD to prevent his HF from worsening considering he will be ventricularly pacing 100%. Unfortunately, this type of implant carries a higher infection risk because it involves multiple leads in the heart. This also cannot be done while he has an active infection.     - Given limited options, recommend maximizing oral medications. Restart oral AMIO 200mg daily. He has normal LFTs and TFTs. Give maximal doses of BB in two  doses: Toprol 50mg BID. Increase as tolerated. The patient has been tolerating systolic BP in 90s. This shouldn't limit Toprol administration. Goal HR <110bpm.        - He should follow-up with his outpatient cardiologist for an ischemic work-up to differentiate the cause of his HFrEF (ischemic/tachy-induced, etc.) and optimize him from a HF perspective. Once this has been done, we will have a better understanding of the best option for this patient.    - Please also have this patient follow-up with Dr. Lockett in one month. He can call to schedule an appointment: [phone].     - Case d/w Dr. Lockett.

## 2020-05-08 NOTE — PROGRESS NOTE ADULT - ASSESSMENT
84 M PMH HTN, DM, A fib on Xarelto (Last taken 8am) HF EF 35%, PAD, CKD III presented with L 3rd toe gangrene s/p amputation. He has been afebrile during his stay. Started on Vanc/Zosyn on 5/4.  Pt is now S/P LLE angiogram with placement of 2 KECIA in the proximal PT trunk with amputation of the 3rd and 4th toes and partial amputation of 3rd metatarsal.  OR cultures were not sent so patient will need to be on broad spectrum coverage. Pathology showed acute osteomyelitis.    Suggest:  - C/w Zosyn 2.25 q6h  - C/w Vancomycin 1g q24h  - Pt will need 6 weeks of Vancomycin 1g q24h, and Zosyn 2.25 q6h through a picc line. Last dose will be 6/15/20  - Will need weekly CBC, CMP, ESR, CRP, and Vanc Trough  - F/U blood cx's from 5/4  - ID will follow 84 M PMH HTN, DM, A fib on Xarelto (Last taken 8am) HF EF 35%, PAD, CKD III presented with L 3rd toe gangrene s/p amputation. He has been afebrile during his stay. Started on Vanc/Zosyn on 5/4.  Pt is now S/P LLE angiogram with placement of 2 KECIA in the proximal PT trunk with amputation of the 3rd and 4th toes and partial amputation of 3rd metatarsal.  OR cultures were not sent so patient will need to be on broad spectrum coverage. Vanc trough of 16.7 is appropriate. Pathology showed acute osteomyelitis.    Suggest:  - C/w Zosyn 2.25 q6h  - C/w Vancomycin 1g q24h  - Pt will need 6 weeks of Vancomycin 1g q24h, and Zosyn 2.25 q6h through a picc line. Last dose will be 6/15/20  - Will need weekly CBC, CMP, ESR, CRP, and Vanc Trough  - F/U blood cx's from 5/4  - ID will follow

## 2020-05-08 NOTE — PROCEDURE NOTE - NSPOSTCAREGUIDE_GEN_A_CORE
Keep the cast/splint/dressing clean and dry/Care for catheter as per unit/ICU protocols/Instructed patient/caregiver regarding signs and symptoms of infection/Verbal/written post procedure instructions were given to patient/caregiver

## 2020-05-08 NOTE — PROGRESS NOTE ADULT - SUBJECTIVE AND OBJECTIVE BOX
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 BRANDYN falsely elevated due to history of DM now s/p LLE Angiogram DEStent x2, L 3rd and 4th toe amputation, and debridement. Clinically stable.     Home meds  Vanc/Zosyn  f/u Renal, EP, and Endocrine recs  f/u 3pm Vanc level   Hep ggt  AM labs

## 2020-05-08 NOTE — PROGRESS NOTE ADULT - SUBJECTIVE AND OBJECTIVE BOX
STATUS POST:  POD # 2 s/p LLE Angiogram DEStent x2, L 3rd and 4th toe amputation, and debridement    INTERVAL HPI/OVERNIGHT EVENTS: Metoprolol increased to 75mg QD per EP, Received 25mg in AM, Vanc Trough 16.1, 1gm Vanc ordered     SUBJECTIVE: Examined at the bedside with chief resident. Left foot pain present, denies numbness and tingling. Denies sob, cp, fevers, chills. No acute complaints    clopidogrel Tablet 75 milliGRAM(s) Oral daily  heparin  Infusion 1000 Unit(s)/Hr IV Continuous <Continuous>  metoprolol succinate ER 75 milliGRAM(s) Oral daily  piperacillin/tazobactam IVPB.. 2.25 Gram(s) IV Intermittent every 6 hours      Vital Signs Last 24 Hrs  T(C): 36.7 (08 May 2020 05:39), Max: 37.2 (07 May 2020 17:39)  T(F): 98 (08 May 2020 05:39), Max: 98.9 (07 May 2020 17:39)  HR: 114 (08 May 2020 07:09) (109 - 114)  BP: 91/62 (08 May 2020 07:09) (91/62 - 105/67)  BP(mean): --  RR: 16 (08 May 2020 07:09) (14 - 17)  SpO2: 96% (08 May 2020 07:09) (95% - 99%)  I&O's Detail    07 May 2020 07:01  -  08 May 2020 07:00  --------------------------------------------------------  IN:    heparin Infusion: 100 mL    heparin Infusion: 66.5 mL    heparin Infusion: 60 mL    IV PiggyBack: 650 mL  Total IN: 876.5 mL    OUT:    Voided: 350 mL  Total OUT: 350 mL    Total NET: 526.5 mL          Physical Exam  General: NAD, resting comfortably in bed  C/V: NSR  Pulm: Nonlabored breathing, no respiratory distress  Abd: soft, non-tender, non-distended.  Extrem: WWP, LLE swelling, Left foot wound with granulation, no purulence or drainage.  Neuro: A/O x 3, no focal deficits, normal sensation  Pulses: LLE PT biphasic    LABS:                        8.1    9.30  )-----------( 219      ( 08 May 2020 07:15 )             25.4     05-08    136  |  100  |  39<H>  ----------------------------<  95  4.0   |  22  |  2.71<H>    Ca    8.2<L>      08 May 2020 07:15  Phos  3.8     05-08  Mg     2.1     05-08      PTT - ( 08 May 2020 02:03 )  PTT:56.8 sec        RADIOLOGY & ADDITIONAL STUDIES:

## 2020-05-08 NOTE — PROVIDER CONTACT NOTE (OTHER) - ASSESSMENT
BP 94/57 . Pt asymptomatic. Metoprolol 75mg po ordered for 6AM.
Pt complained of feeling "sore" on his left foot. Pain level 5/10. /61 .
Pt stated he started coughing this morning but "cough got worse" after drinking water. Pt sounds congested and frequent. Pt denies chest pain or sob. Lungs clear upon auscultation. O2 sat 94-97% in room air.
Pt's last BM on 5/3. Asked if pt can get an order for a laxative.
While assessing pt's right foot with doppler, pt's dorsalis pedis and posterior tibia pulses were not audible. Pt's right foot felt cool.
No complaints of dizziness, A+Ox4
Patient has no complaints of lightheadedness, A+Ox4.
Patient in rapid afib HR at this time 119
Pt resting comfortably. BP 96/58,  (Afib), Temp 98.4, O2 100% on 2L NC.
Dressing soiled, Bleeding noted on surgical bandage.

## 2020-05-08 NOTE — PROGRESS NOTE ADULT - SUBJECTIVE AND OBJECTIVE BOX
EPS Progress Note    S: Pt evaluated at the bedside. His HR was 120 bpm while we were speaking and patient reported feeling well. Denied palpitations, sob, c/p, and lightheadedness.     T(C): 36.4 (05-08-20 @ 09:35), Max: 37.2 (05-07-20 @ 17:39)  HR: 120 (05-08-20 @ 09:35) (109 - 120)  BP: 104/68 (05-08-20 @ 09:35) (91/62 - 104/68)  RR: 15 (05-08-20 @ 09:35) (14 - 16)  SpO2: 98% (05-08-20 @ 09:35) (95% - 98%)     Telemetry: -130 bpm           Constitutional: No acute Distress  Psych: Normal affect, normal mood  Neuro: A/o x 3, No focal deficits  Neck: Supple, NO JVD  CVS: rapid, irregular S1 S2, No M/R/G  Pulmonary: CTAB, Breathing unlabored, No Rhonchi/Rales/Wheezing  GI: Soft, Non -tender, +BS x 4 quads  Skin: No rash, warm and dry, no erythematous areas   MSK: Gauze wrap over L foot       MEDICATIONS  (STANDING):  atorvastatin 40 milliGRAM(s) Oral at bedtime  chlorhexidine 2% Cloths 1 Application(s) Topical <User Schedule>  clopidogrel Tablet 75 milliGRAM(s) Oral daily  Dakins Solution - 1/2 Strength 1 Application(s) Topical daily  dextrose 5%. 1000 milliLiter(s) (50 mL/Hr) IV Continuous <Continuous>  dextrose 50% Injectable 12.5 Gram(s) IV Push once  dextrose 50% Injectable 25 Gram(s) IV Push once  heparin  Infusion 1000 Unit(s)/Hr (10.5 mL/Hr) IV Continuous <Continuous>  melatonin 1 milliGRAM(s) Oral at bedtime  metoprolol succinate ER 75 milliGRAM(s) Oral daily  piperacillin/tazobactam IVPB. 2.25 Gram(s) IV Intermittent every 6 hours  senna 2 Tablet(s) Oral at bedtime  sodium chloride 0.9%. 1000 milliLiter(s) (60 mL/Hr) IV Continuous <Continuous>    MEDICATIONS  (PRN):  acetaminophen   Tablet  650 milliGRAM(s) Oral every 6 hours PRN Mild Pain (1 - 3)  bisacodyl Suppository 10 milliGRAM(s) Rectal once PRN Constipation  dextrose 40% Gel 15 Gram(s) Oral once PRN Blood Glucose LESS THAN 70 milliGRAM(s)/deciliter  glucagon  Injectable 1 milliGRAM(s) IntraMuscular once PRN Glucose LESS THAN 70 milligrams/deciliter  oxycodone    5 mG/acetaminophen 325 mG 1 Tablet(s) Oral every 6 hours PRN Severe Pain (7 - 10)    LABS:                                                 8.1    9.30  )-----------( 219      ( 08 May 2020 07:15 )             25.4     05-08    136  |  100  |  39<H>  ----------------------------<  95  4.0   |  22  |  2.71<H>    Ca    8.2<L>      08 May 2020 07:15  Phos  3.8     05-08  Mg     2.1     05-08    PTT - ( 08 May 2020 09:27 )  PTT:47.7 sec    < from: TTE Echo Complete w/ Contrast w/ Doppler (03.05.20 @ 15:57) >  1. The left ventricle cavity size is moderately dilated. Left ventricular systolic function is moderately reduced with a calculated ejection fraction of 35% with global hypokinesis.   2. The right ventricle is mildly dilated. Right ventricular systolic function is normal.   3. Severely dilated left atrium.   4. Severely dilated right atrium.   5. Mild-to-moderate mitral regurgitation.   6. Mild-to-moderate tricuspid regurgitation.   7. Pulmonary hypertension present, pulmonary artery systolic pressure is 47 mmHg.   8. No pericardial effusion. EPS Progress Note    S: Pt evaluated at the bedside. His HR was 120 bpm while we were speaking and patient reported feeling well. Denied palpitations, sob, c/p, and lightheadedness.     T(C): 36.4 (05-08-20 @ 09:35), Max: 37.2 (05-07-20 @ 17:39)  HR: 120 (05-08-20 @ 09:35) (109 - 120)  BP: 104/68 (05-08-20 @ 09:35) (91/62 - 104/68)  RR: 15 (05-08-20 @ 09:35) (14 - 16)  SpO2: 98% (05-08-20 @ 09:35) (95% - 98%)     Telemetry: -130 bpm           Constitutional: No acute Distress  Psych: Normal affect, normal mood  Neuro: A/o x 3, No focal deficits  Neck: Supple, NO JVD  CVS: rapid, irregular S1 S2, No M/R/G  Pulmonary: CTAB, Breathing unlabored, No Rhonchi/Rales/Wheezing  GI: Soft, Non -tender, +BS x 4 quads  Skin: No rash, warm and dry, no erythematous areas   MSK: Gauze wrap over L foot     MEDICATIONS  (STANDING):  atorvastatin 40 milliGRAM(s) Oral at bedtime  chlorhexidine 2% Cloths 1 Application(s) Topical <User Schedule>  clopidogrel Tablet 75 milliGRAM(s) Oral daily  Dakins Solution - 1/2 Strength 1 Application(s) Topical daily  dextrose 5%. 1000 milliLiter(s) (50 mL/Hr) IV Continuous <Continuous>  dextrose 50% Injectable 12.5 Gram(s) IV Push once  dextrose 50% Injectable 25 Gram(s) IV Push once  heparin  Infusion 1000 Unit(s)/Hr (10.5 mL/Hr) IV Continuous <Continuous>  melatonin 1 milliGRAM(s) Oral at bedtime  metoprolol succinate ER 75 milliGRAM(s) Oral daily  piperacillin/tazobactam IVPB. 2.25 Gram(s) IV Intermittent every 6 hours  senna 2 Tablet(s) Oral at bedtime  sodium chloride 0.9%. 1000 milliLiter(s) (60 mL/Hr) IV Continuous <Continuous>    MEDICATIONS  (PRN):  acetaminophen   Tablet  650 milliGRAM(s) Oral every 6 hours PRN Mild Pain (1 - 3)  bisacodyl Suppository 10 milliGRAM(s) Rectal once PRN Constipation  dextrose 40% Gel 15 Gram(s) Oral once PRN Blood Glucose LESS THAN 70 milliGRAM(s)/deciliter  glucagon  Injectable 1 milliGRAM(s) IntraMuscular once PRN Glucose LESS THAN 70 milligrams/deciliter  oxycodone    5 mG/acetaminophen 325 mG 1 Tablet(s) Oral every 6 hours PRN Severe Pain (7 - 10)    LABS:                                                 8.1    9.30  )-----------( 219      ( 08 May 2020 07:15 )             25.4     05-08    136  |  100  |  39<H>  ----------------------------<  95  4.0   |  22  |  2.71<H>    Ca    8.2<L>      08 May 2020 07:15  Phos  3.8     05-08  Mg     2.1     05-08    PTT - ( 08 May 2020 09:27 )  PTT:47.7 sec    < from: TTE Echo Complete w/ Contrast w/ Doppler (03.05.20 @ 15:57) >  1. The left ventricle cavity size is moderately dilated. Left ventricular systolic function is moderately reduced with a calculated ejection fraction of 35% with global hypokinesis.   2. The right ventricle is mildly dilated. Right ventricular systolic function is normal.   3. Severely dilated left atrium.   4. Severely dilated right atrium.   5. Mild-to-moderate mitral regurgitation.   6. Mild-to-moderate tricuspid regurgitation.   7. Pulmonary hypertension present, pulmonary artery systolic pressure is 47 mmHg.   8. No pericardial effusion.

## 2020-05-08 NOTE — PROGRESS NOTE ADULT - SUBJECTIVE AND OBJECTIVE BOX
BUN/Cr up to 39/2.7 << 35/1.9   voided 950 ml/ 24hr   acceptable electrolytes  Afib/ HR not well controlled in 110-120's, soft sbp in 's   on RA, sat 96%, not in distress        Meds:  acetaminophen   Tablet .. 650 every 6 hours PRN  atorvastatin 40 at bedtime  chlorhexidine 2% Cloths 1 <User Schedule>  clopidogrel Tablet 75 daily  Dakins Solution - 1/2 Strength 1 daily  dextrose 40% Gel 15 once PRN  dextrose 5%. 1000 <Continuous>  dextrose 50% Injectable 12.5 once  dextrose 50% Injectable 25 once  glucagon  Injectable 1 once PRN  heparin  Infusion 1000 <Continuous>  melatonin 1 at bedtime  metoprolol succinate ER 75 daily  oxycodone    5 mG/acetaminophen 325 mG 1 every 6 hours PRN  piperacillin/tazobactam IVPB.. 2.25 every 6 hours  senna 2 at bedtime  sodium chloride 0.9% lock flush 10 every 1 hour PRN  sodium chloride 0.9%. 1000 <Continuous>      T(C): , Max: 37.2 (20 @ 17:39)  T(F): , Max: 98.9 (20 @ 17:39)  HR: 120 (20 @ 12:00)  BP: 117/77 (20 @ 12:00)  BP(mean): --  RR: 15 (20 @ 09:35)  SpO2: 94% (20 @ 12:00)  Wt(kg): --     07:01  -   @ 07:00  --------------------------------------------------------  IN: 876.5 mL / OUT: 350 mL / NET: 526.5 mL     @ 07:01  -   @ 15:39  --------------------------------------------------------  IN: 510.5 mL / OUT: 200 mL / NET: 310.5 mL      PHYSICAL EXAM:  Constitutional: alert, NAD  Neck: supple  Respiratory: CTA B/L  Cardiac: irregularly irregular tachy  Gastrointestinal: soft, NT, ND  Extremities: LLE swelling, Left foot wound w no purulence or drainage  Neurologic: AAO x3; non focal      LABS:                        8.1    9.30  )-----------( 219      ( 08 May 2020 07:15 )             25.4     05-08    136  |  100  |  39<H>  ----------------------------<  95  4.0   |  22  |  2.71<H>    Ca    8.2<L>      08 May 2020 07:15  Phos  3.8     05-  Mg     2.1     05-08        PTT - ( 08 May 2020 09:27 )  PTT:47.7 sec  Urinalysis Basic - ( 08 May 2020 12:16 )    Color: Yellow / Appearance: Clear / S.020 / pH: x  Gluc: x / Ketone: Trace mg/dL  / Bili: Negative / Urobili: 0.2 E.U./dL   Blood: x / Protein: Trace mg/dL / Nitrite: NEGATIVE   Leuk Esterase: NEGATIVE / RBC: < 5 /HPF / WBC < 5 /HPF   Sq Epi: x / Non Sq Epi: 0-5 /HPF / Bacteria: Present /HPF      Creatinine, Random Urine: 156 mg/dL ( @ 12:13)  Sodium, Random Urine: 22 mmol/L ( @ 12:13)        RADIOLOGY & ADDITIONAL STUDIES:    reviewed

## 2020-05-08 NOTE — PROGRESS NOTE ADULT - ASSESSMENT
Chief Complaint   Patient presents with    Hypertension         HPI:      Christiana Bautista is a 82 y.o. male. [place] [employer] [place]rs[place] T2DM, hyperlipidemia and orthostatic hypotension. History of essential tremor. He stopped the Lamictal and feels more alert and less fatigued. Physically active. Watches diet. BP remains well controlled with SBP < 130    No Known Allergies    Current Outpatient Prescriptions   Medication Sig    pantoprazole (PROTONIX) 40 mg tablet TAKE 1 TABLET DAILY    metFORMIN ER (GLUCOPHAGE XR) 500 mg tablet TAKE 2 TABLETS DAILY    LORazepam (ATIVAN) 0.5 mg tablet Take 1 Tab by mouth daily as needed for Anxiety.  pindolol (VISKIN) 5 mg tablet TAKE 1 TABLET TWICE A DAY    Blood-Glucose Meter monitoring kit Test blood sugar daily. Dx. E11.65, not on insulin    glucose blood VI test strips (BLOOD GLUCOSE TEST) strip Test blood sugar daily. Dx E11.65 not on insulin    cinnamon bark-chromium picolin 500-100 mg-mcg cap Take 1 Cap by mouth daily. Indications: DIABETES MELLITUS    aspirin delayed-release 81 mg tablet Take 1 Tab by mouth daily.  QUININE SULFATE PO Take 300 mg by mouth as needed.  ferrous sulfate (IRON) 325 mg (65 mg iron) tablet Take  by mouth Daily (before breakfast). Take 1 every other day    betamethasone valerate (VALISONE) 0.1 % ointment Apply  to affected area two (2) times a day. (Patient taking differently: Apply  to affected area two (2) times daily as needed.)    methylcellulose, laxative, (FIBER THERAPY) Powd Take  by mouth.  acetaminophen (TYLENOL) 500 mg tablet Take  by mouth every six (6) hours as needed.  B.infantis-B.ani-B.long-B.bifi (PROBIOTIC 4X) 10-15 mg TbEC Take  by mouth.  pravastatin (PRAVACHOL) 20 mg tablet Take 1 Tab by mouth nightly.  lamoTRIgine (LAMICTAL) 100 mg tablet Take 50 mg by mouth daily. Taking 50 mg daily    lamoTRIgine (LAMICTAL) 25 mg tablet Take  by mouth daily.      No current facility-administered medications for this visit. Past Medical History:   Diagnosis Date    Diabetes mellitus, type 2 ([facility]     DJD (degenerative joint disease)     GERD (gastroesophageal reflux disease)     Hypertension     Orthostatic hypotension     Parasomnia     PUD (peptic ulcer disease)     Pure hypercholesterolemia     [facility]          ROS:  Denies fever, chills, cough, chest pain, SOB,  nausea, vomiting, or diarrhea. Denies wt loss, wt gain, hemoptysis, hematochezia or melena. Physical Examination:    /60 (BP 1 Location: Left arm, BP Patient Position: Sitting)  Pulse 72  Temp 98.9 °F (37.2 °C) (Oral)   Resp 16  Ht 5' 6\" (1.676 m)  Wt 179 lb (81.2 kg)  SpO2 98%  BMI 28.89 kg/m2    General: Alert and Ox3, Fluent speech  HEENT:  NC/AT, EOMI, OP: clear  Neck:  Supple, no adenopathy, JVD, mass or bruit  Chest:  Clear to Ausculation, without wheezes, rales, rubs or ronchi  Cardiac: RRR  Abdomen:  +BS, soft, nontender without palpable HSM  Extremities:  No cyanosis, clubbing or edema  Neurologic:  Ambulatory without assist, CN 2-12 grossly intact. Moves all extremities. Skin: no rash  Lymphadenopathy: no cervical or supraclavicular nodes    Lab Results   Component Value Date/Time    Hemoglobin A1c 7.1 10/19/2017 09:10 AM     Lab Results   Component Value Date/Time    LDL, calculated 79 10/19/2017 09:10 AM         ASSESSMENT AND PLAN:     1. T2DM:  Due for labs  2. Well controlled HTN  3.  ET:  Doing well off Lamictal  4. RTC in 6 months    No orders of the defined types were placed in this encounter.       Gale Smith MD, [address] 85 y/o AAM w/PMH HTN/DM/A-fib on Xarelto/HF EF 35%, CKD III and PAD, recently admitted in march for toe gangrene s/p left 3rd toe amputation/ presented back to ED from Dr. Mcclellan's office for further work up of LLE pain and swelling, nephrology consulted for ZEENAT on CKD III  plan for OR and angiogram.    # ZEENAT on CKD stage III (baseline Cr 1.6-1.8)  - pre-renal on admission improved w gentle hydration, now BUN/Cr up to 39/2.7 << 35/1.9, could be combination of intrinsic 2/2 contrast exposure on 5/6 vs uncontrolled AFib w hypotension vs pre-renal   - repeat urinalysis, ulytes, if low Kelly keep on maintenance IVF w NS at 60 cc/hr  - repeat PVR bladder scan   - keep euvolemic, encourage oral intake/ hydration  - keep map >65 mmHg   - monitor BUN/Cr, lytes, uop   - adjust meds/ ABx to CrCl <30 ml/min   - avoid nephrotoxic meds (NSAIDs)   - monitor H/H, transfuse as indicated   - AFib/ HR not well controlled w hypotension, consider Cardiology evaluation/ recommendations

## 2020-05-08 NOTE — PROVIDER CONTACT NOTE (OTHER) - NAME OF MD/NP/PA/DO NOTIFIED:
Andrea Navarro MD
Andrea Navarro MD
UYEN Pillai
Dr. Andrea Navarro
MD Zohreh Team #4
Zohreh DILLARD, Team #3

## 2020-05-08 NOTE — PROVIDER CONTACT NOTE (OTHER) - DATE AND TIME:
07-May-2020 22:07
07-May-2020 22:07
08-May-2020 05:40
08-May-2020 06:55
08-May-2020 07:45
05-May-2020 11:35
05-May-2020 12:23
05-May-2020 15:25
07-May-2020 03:20
07-May-2020 05:50

## 2020-05-08 NOTE — PROGRESS NOTE ADULT - SUBJECTIVE AND OBJECTIVE BOX
INTERVAL HPI/OVERNIGHT EVENTS: Pt states that he feels about the same as yesterday.  He has pain in his amputation site.  Denies N/V/SOB.    CONSTITUTIONAL:  No fever, chills, night sweats  EYES:  No photophobia or visual changes  CARDIOVASCULAR:  No chest pain  RESPIRATORY:  No cough, wheezing, or SOB   GASTROINTESTINAL:  No nausea, vomiting, diarrhea, constipation, or abdominal pain  GENITOURINARY:  No frequency, urgency, dysuria or hematuria  NEUROLOGIC:  No headache, lightheadedness      ANTIBIOTICS/RELEVANT:  Zosyn 2.25 q6h  Vancomycin 1g q24h        Vital Signs Last 24 Hrs  T(C): 36.4 (08 May 2020 09:35), Max: 37.2 (07 May 2020 17:39)  T(F): 97.5 (08 May 2020 09:35), Max: 98.9 (07 May 2020 17:39)  HR: 120 (08 May 2020 12:00) (109 - 120)  BP: 117/77 (08 May 2020 12:00) (91/62 - 117/77)  BP(mean): --  RR: 15 (08 May 2020 09:35) (14 - 16)  SpO2: 94% (08 May 2020 12:00) (94% - 98%)    PE:  WDWN in no distress, laying comfortably in bed  HEENT:  NC, PERRL, sclerae anicteric, conjunctivae clear, EOMI.  No nasal exudate.  No buccal or pharyngeal lesions  Neck:  Supple, no adenopathy  Lungs:  Clear to auscultation B/L  Cor:  RRR  Abd:  Soft NTND  Extrem:  Left foot wrapped in dressing, C/D/I  Skin:  No rashes.      LABS:                        8.1    9.30  )-----------( 219      ( 08 May 2020 07:15 )             25.4         05-08    136  |  100  |  39<H>  ----------------------------<  95  4.0   |  22  |  2.71<H>    Ca    8.2<L>      08 May 2020 07:15  Phos  3.8     05-08  Mg     2.1     05-08        Urinalysis Basic - ( 08 May 2020 12:16 )    Color: Yellow / Appearance: Clear / S.020 / pH: x  Gluc: x / Ketone: Trace mg/dL  / Bili: Negative / Urobili: 0.2 E.U./dL   Blood: x / Protein: Trace mg/dL / Nitrite: NEGATIVE   Leuk Esterase: NEGATIVE / RBC: x / WBC x   Sq Epi: x / Non Sq Epi: x / Bacteria: x        MICROBIOLOGY:        RADIOLOGY & ADDITIONAL STUDIES: INTERVAL HPI/OVERNIGHT EVENTS: Pt has been afebrile. Pt states that he feels about the same as yesterday.  He has pain in his amputation site.  Denies N/V/SOB.    CONSTITUTIONAL:  No fever, chills, night sweats  EYES:  No photophobia or visual changes  CARDIOVASCULAR:  No chest pain  RESPIRATORY:  No cough, wheezing, or SOB   GASTROINTESTINAL:  No nausea, vomiting, diarrhea, constipation, or abdominal pain  GENITOURINARY:  No frequency, urgency, dysuria or hematuria  NEUROLOGIC:  No headache, lightheadedness      ANTIBIOTICS/RELEVANT:  Zosyn 2.25 q6h  Vancomycin 1g q24h        Vital Signs Last 24 Hrs  T(C): 36.4 (08 May 2020 09:35), Max: 37.2 (07 May 2020 17:39)  T(F): 97.5 (08 May 2020 09:35), Max: 98.9 (07 May 2020 17:39)  HR: 120 (08 May 2020 12:00) (109 - 120)  BP: 117/77 (08 May 2020 12:00) (91/62 - 117/77)  BP(mean): --  RR: 15 (08 May 2020 09:35) (14 - 16)  SpO2: 94% (08 May 2020 12:00) (94% - 98%)    PE:  WDWN in no distress, laying comfortably in bed  HEENT:  NC, PERRL, sclerae anicteric, conjunctivae clear, EOMI.  No nasal exudate.  No buccal or pharyngeal lesions  Neck:  Supple, no adenopathy  Lungs:  Clear to auscultation B/L  Cor:  RRR  Abd:  Soft NTND  Extrem:  Left foot wrapped in dressing, C/D/I  Skin:  No rashes.      LABS:                        8.1    9.30  )-----------( 219      ( 08 May 2020 07:15 )             25.4         05-08    136  |  100  |  39<H>  ----------------------------<  95  4.0   |  22  |  2.71<H>    Ca    8.2<L>      08 May 2020 07:15  Phos  3.8     05-08  Mg     2.1     05-08        Urinalysis Basic - ( 08 May 2020 12:16 )    Color: Yellow / Appearance: Clear / S.020 / pH: x  Gluc: x / Ketone: Trace mg/dL  / Bili: Negative / Urobili: 0.2 E.U./dL   Blood: x / Protein: Trace mg/dL / Nitrite: NEGATIVE   Leuk Esterase: NEGATIVE / RBC: x / WBC x   Sq Epi: x / Non Sq Epi: x / Bacteria: x        MICROBIOLOGY:        RADIOLOGY & ADDITIONAL STUDIES:

## 2020-05-08 NOTE — CONSULT NOTE ADULT - SUBJECTIVE AND OBJECTIVE BOX
Vascular Access Service Consult Note    [facility]:  ZEENAT (acute kidney injury): ZEENAT (acute kidney injury)  Hypertension, unspecified type: Hypertension, unspecified type  Afib: Afib  Heart failure: Heart failure  Preop cardiovascular exam: Preop cardiovascular exam             Diagnosis: osteomyelitis    Indications for Vascular Access (Check all that apply)  [ x ]  Antibiotic Therapy       Antibiotic Prescribed:             vanco and zosyn x 6 weeks                                                                     [  ]  IV Hydration  [  ]  Total Parenteral Nutrition  [  ]  Chemotherapy  [  ]  Difficult Venous Access  [  ]  CVP monitoring  [  ]  Medications with high potential for tissue necrosis on extravasation  [  ]  Other    Screening (Check all that apply)  Previous Radiation to chest  [  ] Yes      [ x ]  No  Breast Cancer                          [  ] Left     [  ]  Right    [x  ]  No  Lymph Node Dissection         [  ] Left     [  ]  Right    [x  ]  No  Pacemaker or ICD                   [  ] Left     [  ]  Right    [ x ]  No  Upper Extremity DVT             [  ] Left     [  ]  Right    [ x ]  No  Chronic Kidney Disease         [  ]  Yes     [x  ]  No  Hemodialysis                           [  ]  Yes     [ x ]  No  AV Fistula/ Graft                     [  ]  Left    [  ]  Right    [x  ]  No  Temp>101F in past 24 H       [  ]  Yes     [ x ]  No  H/O PICC/Midline                   [  ]  Yes     [ x ]  No    Lab data:                        8.1    9.30  )-----------( 219      ( 08 May 2020 07:15 )             25.4     05-08    136  |  100  |  39<H>  ----------------------------<  95  4.0   |  22  |  2.71<H>    Ca    8.2<L>      08 May 2020 07:15  Phos  3.8     05-08  Mg     2.1     05-08      PTT - ( 08 May 2020 09:27 )  PTT:47.7 sec          I have reviewed the chart, interviewed and examined the patient and determined that this patient:  [ x ] Is a candidate for a PICC line  [  ] Is a candidate for a Midline  [  ] Is not a candidate for vascular access device (reason)    Lumens:    [  ] Single  [x  ] Double

## 2020-05-09 LAB
ANION GAP SERPL CALC-SCNC: 15 MMOL/L — SIGNIFICANT CHANGE UP (ref 5–17)
APPEARANCE UR: CLEAR — SIGNIFICANT CHANGE UP
BILIRUB UR-MCNC: ABNORMAL
BUN SERPL-MCNC: 48 MG/DL — HIGH (ref 7–23)
CALCIUM SERPL-MCNC: 8.6 MG/DL — SIGNIFICANT CHANGE UP (ref 8.4–10.5)
CHLORIDE SERPL-SCNC: 99 MMOL/L — SIGNIFICANT CHANGE UP (ref 96–108)
CHOLEST SERPL-MCNC: 93 MG/DL — SIGNIFICANT CHANGE UP (ref 10–199)
CK SERPL-CCNC: 1309 U/L — HIGH (ref 30–200)
CO2 SERPL-SCNC: 20 MMOL/L — LOW (ref 22–31)
COLOR SPEC: YELLOW — SIGNIFICANT CHANGE UP
CREAT ?TM UR-MCNC: 153 MG/DL — SIGNIFICANT CHANGE UP
CREAT SERPL-MCNC: 3.52 MG/DL — HIGH (ref 0.5–1.3)
CULTURE RESULTS: SIGNIFICANT CHANGE UP
CULTURE RESULTS: SIGNIFICANT CHANGE UP
DIFF PNL FLD: ABNORMAL
GLUCOSE BLDC GLUCOMTR-MCNC: 119 MG/DL — HIGH (ref 70–99)
GLUCOSE SERPL-MCNC: 121 MG/DL — HIGH (ref 70–99)
GLUCOSE UR QL: NEGATIVE — SIGNIFICANT CHANGE UP
HCT VFR BLD CALC: 26.6 % — LOW (ref 39–50)
HDLC SERPL-MCNC: 29 MG/DL — LOW
HGB BLD-MCNC: 8.4 G/DL — LOW (ref 13–17)
KETONES UR-MCNC: ABNORMAL MG/DL
LEUKOCYTE ESTERASE UR-ACNC: NEGATIVE — SIGNIFICANT CHANGE UP
LIPID PNL WITH DIRECT LDL SERPL: 48 MG/DL — SIGNIFICANT CHANGE UP
MAGNESIUM SERPL-MCNC: 2.2 MG/DL — SIGNIFICANT CHANGE UP (ref 1.6–2.6)
MCHC RBC-ENTMCNC: 26.3 PG — LOW (ref 27–34)
MCHC RBC-ENTMCNC: 31.6 GM/DL — LOW (ref 32–36)
MCV RBC AUTO: 83.4 FL — SIGNIFICANT CHANGE UP (ref 80–100)
NITRITE UR-MCNC: NEGATIVE — SIGNIFICANT CHANGE UP
NRBC # BLD: 0 /100 WBCS — SIGNIFICANT CHANGE UP (ref 0–0)
PH UR: 5 — SIGNIFICANT CHANGE UP (ref 5–8)
PHOSPHATE SERPL-MCNC: 4.4 MG/DL — SIGNIFICANT CHANGE UP (ref 2.5–4.5)
PLATELET # BLD AUTO: 252 K/UL — SIGNIFICANT CHANGE UP (ref 150–400)
POTASSIUM SERPL-MCNC: 4.1 MMOL/L — SIGNIFICANT CHANGE UP (ref 3.5–5.3)
POTASSIUM SERPL-SCNC: 4.1 MMOL/L — SIGNIFICANT CHANGE UP (ref 3.5–5.3)
POTASSIUM UR-SCNC: 70 MMOL/L — SIGNIFICANT CHANGE UP
PROT UR-MCNC: ABNORMAL MG/DL
RBC # BLD: 3.19 M/UL — LOW (ref 4.2–5.8)
RBC # FLD: 15.4 % — HIGH (ref 10.3–14.5)
SODIUM SERPL-SCNC: 134 MMOL/L — LOW (ref 135–145)
SODIUM UR-SCNC: 20 MMOL/L — SIGNIFICANT CHANGE UP
SP GR SPEC: 1.02 — SIGNIFICANT CHANGE UP (ref 1–1.03)
SPECIMEN SOURCE: SIGNIFICANT CHANGE UP
SPECIMEN SOURCE: SIGNIFICANT CHANGE UP
TOTAL CHOLESTEROL/HDL RATIO MEASUREMENT: 3.2 RATIO — LOW (ref 3.4–9.6)
TRIGL SERPL-MCNC: 80 MG/DL — SIGNIFICANT CHANGE UP (ref 10–149)
UROBILINOGEN FLD QL: 0.2 E.U./DL — SIGNIFICANT CHANGE UP
UUN UR-MCNC: 437 MG/DL — SIGNIFICANT CHANGE UP
WBC # BLD: 9.73 K/UL — SIGNIFICANT CHANGE UP (ref 3.8–10.5)
WBC # FLD AUTO: 9.73 K/UL — SIGNIFICANT CHANGE UP (ref 3.8–10.5)

## 2020-05-09 PROCEDURE — 76770 US EXAM ABDO BACK WALL COMP: CPT | Mod: 26

## 2020-05-09 PROCEDURE — 71045 X-RAY EXAM CHEST 1 VIEW: CPT | Mod: 26

## 2020-05-09 PROCEDURE — 99232 SBSQ HOSP IP/OBS MODERATE 35: CPT

## 2020-05-09 RX ORDER — APIXABAN 2.5 MG/1
2.5 TABLET, FILM COATED ORAL
Refills: 0 | Status: DISCONTINUED | OUTPATIENT
Start: 2020-05-10 | End: 2020-05-13

## 2020-05-09 RX ORDER — DAPTOMYCIN 500 MG/10ML
INJECTION, POWDER, LYOPHILIZED, FOR SOLUTION INTRAVENOUS
Refills: 0 | Status: DISCONTINUED | OUTPATIENT
Start: 2020-05-09 | End: 2020-05-09

## 2020-05-09 RX ORDER — DAPTOMYCIN 500 MG/10ML
600 INJECTION, POWDER, LYOPHILIZED, FOR SOLUTION INTRAVENOUS
Refills: 0 | Status: DISCONTINUED | OUTPATIENT
Start: 2020-05-11 | End: 2020-05-21

## 2020-05-09 RX ORDER — PIPERACILLIN AND TAZOBACTAM 4; .5 G/20ML; G/20ML
2.25 INJECTION, POWDER, LYOPHILIZED, FOR SOLUTION INTRAVENOUS EVERY 8 HOURS
Refills: 0 | Status: DISCONTINUED | OUTPATIENT
Start: 2020-05-09 | End: 2020-05-09

## 2020-05-09 RX ORDER — DAPTOMYCIN 500 MG/10ML
600 INJECTION, POWDER, LYOPHILIZED, FOR SOLUTION INTRAVENOUS ONCE
Refills: 0 | Status: COMPLETED | OUTPATIENT
Start: 2020-05-09 | End: 2020-05-09

## 2020-05-09 RX ORDER — FUROSEMIDE 40 MG
40 TABLET ORAL ONCE
Refills: 0 | Status: COMPLETED | OUTPATIENT
Start: 2020-05-09 | End: 2020-05-09

## 2020-05-09 RX ADMIN — AMIODARONE HYDROCHLORIDE 200 MILLIGRAM(S): 400 TABLET ORAL at 06:38

## 2020-05-09 RX ADMIN — PIPERACILLIN AND TAZOBACTAM 200 GRAM(S): 4; .5 INJECTION, POWDER, LYOPHILIZED, FOR SOLUTION INTRAVENOUS at 16:52

## 2020-05-09 RX ADMIN — Medication 1 APPLICATION(S): at 12:00

## 2020-05-09 RX ADMIN — SODIUM CHLORIDE 60 MILLILITER(S): 9 INJECTION INTRAMUSCULAR; INTRAVENOUS; SUBCUTANEOUS at 00:50

## 2020-05-09 RX ADMIN — Medication 50 MILLIGRAM(S): at 16:53

## 2020-05-09 RX ADMIN — Medication 50 MILLIGRAM(S): at 06:38

## 2020-05-09 RX ADMIN — ATORVASTATIN CALCIUM 40 MILLIGRAM(S): 80 TABLET, FILM COATED ORAL at 21:02

## 2020-05-09 RX ADMIN — DAPTOMYCIN 124 MILLIGRAM(S): 500 INJECTION, POWDER, LYOPHILIZED, FOR SOLUTION INTRAVENOUS at 12:40

## 2020-05-09 RX ADMIN — PIPERACILLIN AND TAZOBACTAM 200 GRAM(S): 4; .5 INJECTION, POWDER, LYOPHILIZED, FOR SOLUTION INTRAVENOUS at 06:38

## 2020-05-09 RX ADMIN — SENNA PLUS 2 TABLET(S): 8.6 TABLET ORAL at 21:02

## 2020-05-09 RX ADMIN — RIVAROXABAN 15 MILLIGRAM(S): KIT at 16:53

## 2020-05-09 RX ADMIN — Medication 1 MILLIGRAM(S): at 21:02

## 2020-05-09 RX ADMIN — CLOPIDOGREL BISULFATE 75 MILLIGRAM(S): 75 TABLET, FILM COATED ORAL at 12:40

## 2020-05-09 RX ADMIN — PIPERACILLIN AND TAZOBACTAM 200 GRAM(S): 4; .5 INJECTION, POWDER, LYOPHILIZED, FOR SOLUTION INTRAVENOUS at 00:48

## 2020-05-09 RX ADMIN — CHLORHEXIDINE GLUCONATE 1 APPLICATION(S): 213 SOLUTION TOPICAL at 07:57

## 2020-05-09 RX ADMIN — Medication 650 MILLIGRAM(S): at 21:29

## 2020-05-09 RX ADMIN — Medication 40 MILLIGRAM(S): at 20:05

## 2020-05-09 NOTE — PROGRESS NOTE ADULT - SUBJECTIVE AND OBJECTIVE BOX
Patient is a 84y Male seen and evaluated at bedside.   No new complaints.  Vitals, meds, labs reviewed.      Meds:    acetaminophen   Tablet .. 650 every 6 hours PRN  aMIOdarone    Tablet 200 daily  atorvastatin 40 at bedtime  chlorhexidine 2% Cloths 1 <User Schedule>  clopidogrel Tablet 75 daily  Dakins Solution - 1/2 Strength 1 daily  dextrose 40% Gel 15 once PRN  dextrose 5%. 1000 <Continuous>  dextrose 50% Injectable 12.5 once  dextrose 50% Injectable 25 once  glucagon  Injectable 1 once PRN  melatonin 1 at bedtime  metoprolol succinate ER 50 two times a day  oxycodone    5 mG/acetaminophen 325 mG 1 every 6 hours PRN  piperacillin/tazobactam IVPB.. 2.25 every 8 hours  rivaroxaban 15 with dinner  senna 2 at bedtime  sodium chloride 0.9% lock flush 10 every 1 hour PRN  sodium chloride 0.9%. 1000 <Continuous>      Allergies    No Known Allergies    Intolerances        T(C): , Max: 36.8 (20 @ 16:49)  T(F): , Max: 98.3 (20 @ 16:49)  HR: 124 (20 @ 16:49)  BP: 150/83 (20 @ 16:49)  BP(mean): --  RR: 16 (20 @ 16:49)  SpO2: 100% (20 @ 16:49)  Wt(kg): --     @ 07:01  -   @ 07:00  --------------------------------------------------------  IN: 1769 mL / OUT: 350 mL / NET: 1419 mL     @ 07:01  -   @ 17:20  --------------------------------------------------------  IN: 700 mL / OUT: 0 mL / NET: 700 mL          Review of Systems:  CONSTITUTIONAL: No fever or chills, No fatigue or tiredness.  EYES: No blurred or double vision.  RESPIRATORY: No shortness of breath, cough, hemoptysis  CARDIOVASCULAR: No Chest pain or shortness of breath  GASTROINTESTINAL: NO abdominal or flank pain, No nausea or vomiting, No diarrhea  GENITOURINARY: No dysuria or urinary burning, No difficulty passing urine, No hematuria  NEUROLOGICAL: No headaches or blurred vision  SKIN: No skin rashes   MUSCULOSKELETAL: No arthralgia, Joint pain, leg edema, No muscle pains      PHYSICAL EXAM:  General: NAD, resting comfortably in bed  Pulmonary: Nonlabored breathing, no respiratory distress  Cardiovascular: Tachycardic  Abdominal: soft, NT, ND  Extremities: WWP, LLE swelling, L foot wound w good granulation tissue and no purulence.  Pulses: L 2+ DP          LABS:                        8.4    9.73  )-----------( 252      ( 09 May 2020 06:05 )             26.6     05-09    134<L>  |  99  |  48<H>  ----------------------------<  121<H>  4.1   |  20<L>  |  3.52<H>    Ca    8.6      09 May 2020 06:05  Phos  4.4     05-09  Mg     2.2     -      Cholesterol, Serum: 93 mg/dL [10 - 199] ( @ 06:05)    PTT - ( 08 May 2020 15:30 )  PTT:42.4 sec  Urinalysis Basic - ( 08 May 2020 12:16 )    Color: Yellow / Appearance: Clear / S.020 / pH: x  Gluc: x / Ketone: Trace mg/dL  / Bili: Negative / Urobili: 0.2 E.U./dL   Blood: x / Protein: Trace mg/dL / Nitrite: NEGATIVE   Leuk Esterase: NEGATIVE / RBC: < 5 /HPF / WBC < 5 /HPF   Sq Epi: x / Non Sq Epi: 0-5 /HPF / Bacteria: Present /HPF      Creatinine, Random Urine: 156 mg/dL ( @ 12:13)  Sodium, Random Urine: 22 mmol/L ( @ 12:13)        RADIOLOGY & ADDITIONAL STUDIES: Patient is a 84y Male seen and evaluated at bedside.   No new complaints.  Vitals, meds, labs reviewed.  on IVF   renal sono w/o hydro        Meds:    acetaminophen   Tablet .. 650 every 6 hours PRN  aMIOdarone    Tablet 200 daily  atorvastatin 40 at bedtime  chlorhexidine 2% Cloths 1 <User Schedule>  clopidogrel Tablet 75 daily  Dakins Solution - 1/2 Strength 1 daily  dextrose 40% Gel 15 once PRN  dextrose 5%. 1000 <Continuous>  dextrose 50% Injectable 12.5 once  dextrose 50% Injectable 25 once  glucagon  Injectable 1 once PRN  melatonin 1 at bedtime  metoprolol succinate ER 50 two times a day  oxycodone    5 mG/acetaminophen 325 mG 1 every 6 hours PRN  piperacillin/tazobactam IVPB.. 2.25 every 8 hours  rivaroxaban 15 with dinner  senna 2 at bedtime  sodium chloride 0.9% lock flush 10 every 1 hour PRN  sodium chloride 0.9%. 1000 <Continuous>      Allergies    No Known Allergies    Intolerances        T(C): , Max: 36.8 (20 @ 16:49)  T(F): , Max: 98.3 (20 @ 16:49)  HR: 124 (20 @ 16:49)  BP: 150/83 (20 @ 16:49)  BP(mean): --  RR: 16 (20 @ 16:49)  SpO2: 100% (20 @ 16:49)  Wt(kg): --     @ 07:  -   @ 07:00  --------------------------------------------------------  IN: 1769 mL / OUT: 350 mL / NET: 1419 mL     @ 07:01  -   @ 17:20  --------------------------------------------------------  IN: 700 mL / OUT: 0 mL / NET: 700 mL          Review of Systems:  CONSTITUTIONAL: No fever or chills, No fatigue or tiredness.  EYES: No blurred or double vision.  RESPIRATORY: No shortness of breath, cough, hemoptysis  CARDIOVASCULAR: No Chest pain or shortness of breath  GASTROINTESTINAL: NO abdominal or flank pain, No nausea or vomiting, No diarrhea  GENITOURINARY: No dysuria or urinary burning, No difficulty passing urine, No hematuria  NEUROLOGICAL: No headaches or blurred vision  SKIN: No skin rashes   MUSCULOSKELETAL: left foot pain post surgery      PHYSICAL EXAM:  General: NAD, resting comfortably in bed on RA  no JVD  Pulmonary: CTA ant   Cardiovascular: Tachycardic, irreg  Abdominal: soft, NT, ND  Extremities: WWP, LLE swelling, L foot with drain , no Rt edema  Pulses: L 2+ DP  no rash          LABS:                        8.4    9.73  )-----------( 252      ( 09 May 2020 06:05 )             26.6     05-09    134<L>  |  99  |  48<H>  ----------------------------<  121<H>  4.1   |  20<L>  |  3.52<H>    Ca    8.6      09 May 2020 06:05  Phos  4.4     05-09  Mg     2.2     05-09      Cholesterol, Serum: 93 mg/dL [10 - 199] ( @ 06:05)    PTT - ( 08 May 2020 15:30 )  PTT:42.4 sec  Urinalysis Basic - ( 08 May 2020 12:16 )    Color: Yellow / Appearance: Clear / S.020 / pH: x  Gluc: x / Ketone: Trace mg/dL  / Bili: Negative / Urobili: 0.2 E.U./dL   Blood: x / Protein: Trace mg/dL / Nitrite: NEGATIVE   Leuk Esterase: NEGATIVE / RBC: < 5 /HPF / WBC < 5 /HPF   Sq Epi: x / Non Sq Epi: 0-5 /HPF / Bacteria: Present /HPF      Creatinine, Random Urine: 156 mg/dL ( @ 12:13)  Sodium, Random Urine: 22 mmol/L ( @ 12:13)        RADIOLOGY & ADDITIONAL STUDIES:

## 2020-05-09 NOTE — PROGRESS NOTE ADULT - ASSESSMENT
83 yo M with HTN, DM, Afib, PDA, CKDIII s/p angiogram with TP trunk angioplasty/stent and L 3rd toe amputation on 5/6.  Superficial culture sent 2 d earlier, no OR cultures sent.  Reliability of superficial culture is low.  Would cover for mixed infection, including anaerobes in absence of data.  He has ZEENAT on CKD - received contrast for angiogram/stent.  Vancomycin trough supratherapeutic, likely contributing.  He is unlikely to tolerate a 6 week course of linezolid.  Per primary team, pip-tazo is being d/jacob.  His CPK went from 450 on admission to 1309 - per primary team is b/o OR procedure.  Suggest:  - Can give daptomycin 600 mg IV q48h   - Need to monitor CPK  Please recall if further ID input is desired - team 1.

## 2020-05-09 NOTE — PROGRESS NOTE ADULT - SUBJECTIVE AND OBJECTIVE BOX
O/N: ANIKA, VSS                                    84 M PMH HTN, DM, Afib on Xarelto HF EF 35%, PAD, CKD III 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 BRANDYN falsely elevated due to history of DM now s/p LLE Angiogram DEStent x2, L 3rd and 4th toe amputation, and debridement. Increase in Cr. from 1.99 to 2.71 in AM. Will restart gentle hydration. Clinically stable.      Home meds  Vanc/Zosyn  f/u Renal, EP, and Endocrine recs  f/u 2pm Vanc level   xeralta  AM labs O/N: ANIKA, VSS    SUBJECTIVE: Examined at the bedside with chief resident. Left foot pain present, denies numbness and tingling. Denies fevers, chills. No specific complaints        PMH:  Heart failure  HLD (hyperlipidemia)  Borderline diabetes mellitus  Afib  HTN (hypertension)      Medication:   piperacillin/tazobactam IVPB.. 2.25  aMIOdarone    Tablet 200  clopidogrel Tablet 75  metoprolol succinate ER 50  piperacillin/tazobactam IVPB.. 2.25  rivaroxaban 15        Vital Signs Last 24 Hrs  T(C): 36.4 (09 May 2020 08:25), Max: 36.8 (08 May 2020 16:45)  T(F): 97.5 (09 May 2020 08:25), Max: 98.2 (08 May 2020 16:45)  HR: 125 (09 May 2020 08:25) (108 - 125)  BP: 102/69 (09 May 2020 08:25) (98/74 - 117/77)  BP(mean): --  RR: 17 (09 May 2020 08:25) (15 - 17)  SpO2: 95% (09 May 2020 08:25) (94% - 100%)  I&O's Summary    08 May 2020 07:01  -  09 May 2020 07:00  --------------------------------------------------------  IN: 1769 mL / OUT: 350 mL / NET: 1419 mL        Physical Exam:  General: NAD, resting comfortably in bed  Pulmonary: Nonlabored breathing, no respiratory distress  Cardiovascular: Tachycardic  Abdominal: soft, NT, ND  Extremities: WWP, LLE swelling, L foot wound w good granulation tissue and no purulence.  Pulses: L 2+ DP        LABS:                        8.4    9.73  )-----------( 252      ( 09 May 2020 06:05 )             26.6     05-09    134<L>  |  99  |  48<H>  ----------------------------<  121<H>  4.1   |  20<L>  |  3.52<H>    Ca    8.6      09 May 2020 06:05  Phos  4.4     05-09  Mg     2.2     05-09      PTT - ( 08 May 2020 15:30 )  PTT:42.4 sec          84 M PMH HTN, DM, Afib on Xarelto HF EF 35%, PAD, CKD III 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 BRANDYN falsely elevated due to history of DM now s/p LLE Angiogram DEStent x2, L 3rd and 4th toe amputation, and debridement. Increase in Cr. from 1.99 to 2.71 in AM. Will restart gentle hydration. Clinically stable.    Home meds  Vanc/Zosyn  f/u Renal, EP, and Endocrine recs  f/u 2pm Vanc level   Xerelto   AM labs

## 2020-05-09 NOTE — PROGRESS NOTE ADULT - SUBJECTIVE AND OBJECTIVE BOX
INTERVAL HPI/OVERNIGHT EVENTS:  Wound vac placed by primary team - he feels he needs pain meds when dressing will be changed    CONSTITUTIONAL:  No fever, chills, night sweats  EYES:  No photophobia or visual changes  CARDIOVASCULAR:  No chest pain  RESPIRATORY:  Mild nonproductive cough, no wheezing, or SOB   GASTROINTESTINAL:  No nausea, vomiting, diarrhea, constipation, or abdominal pain  GENITOURINARY:  No frequency, urgency, dysuria or hematuria  NEUROLOGIC:  No headache, lightheadedness      ANTIBIOTICS/RELEVANT:  Vancomycin 5/4, 5/5, 5/6 and 5/7  Pip-tazo 2.25 g IV q6h (-present)        Vital Signs Last 24 Hrs  T(C): 36.8 (09 May 2020 16:49), Max: 36.8 (09 May 2020 16:49)  T(F): 98.3 (09 May 2020 16:49), Max: 98.3 (09 May 2020 16:49)  HR: 124 (09 May 2020 16:49) (90 - 134)  BP: 150/83 (09 May 2020 16:49) (98/67 - 150/83)  BP(mean): --  RR: 16 (09 May 2020 16:49) (16 - 18)  SpO2: 100% (09 May 2020 16:49) (95% - 100%)    PHYSICAL EXAM:  Constitutional:  Somewhat frail appearing  Eyes:  Sclerae anicteric, conjunctivae clear, PERRL  Ear/Nose/Throat:  No nasal exudate or sinus tenderness;  No buccal mucosal lesions, no pharyngeal erythema or exudate	  Neck:  Supple, no adenopathy  Respiratory:  Clear bilaterally  Cardiovascular:  Tachy irreg irreg, S1S2, no murmur appreciated  Gastrointestinal:  Symmetric, normoactive BS, soft, NT, no masses, guarding or rebound.  No HSM  Extremities:  Wound vac L foot      LABS:                        8.4    9.73  )-----------( 252      ( 09 May 2020 06:05 )             26.6         05-09    134<L>  |  99  |  48<H>  ----------------------------<  121<H>  4.1   |  20<L>  |  3.52<H>    Ca    8.6      09 May 2020 06:05  Phos  4.4     05-09  Mg     2.2     05-    Creatine Kinase, Serum: 1309 U/L (20 @ 06:05)    Creatine Kinase, Serum: 450 U/L (20 @ 21:33)        Vancomycin 22.0 ( at 15:30 - dosed at 16:16 on )    Urinalysis Basic - ( 09 May 2020 20:00 )    Color: Yellow / Appearance: Clear / S.025 / pH: x  Gluc: x / Ketone: Trace mg/dL  / Bili: Small / Urobili: 0.2 E.U./dL   Blood: x / Protein: Trace mg/dL / Nitrite: NEGATIVE   Leuk Esterase: NEGATIVE / RBC: < 5 /HPF / WBC < 5 /HPF   Sq Epi: x / Non Sq Epi: x / Bacteria: Present /HPF        MICROBIOLOGY:    Blood cultures 5/4 X 2 - NG  Superficial culture :  MRSA, VRE-faecalis, Dermabacter hominis    RADIOLOGY & ADDITIONAL STUDIES:

## 2020-05-09 NOTE — PROGRESS NOTE ADULT - SUBJECTIVE AND OBJECTIVE BOX
INTERVAL HPI/OVERNIGHT EVENTS:    Patient is a 84y old  Male who presents with a chief complaint of L toe amputation (08 May 2020 11:11)      Pt reports the following symptoms:    CONSTITUTIONAL:  Negative fever or chills, feels well, good appetite  EYES:  Negative  blurry vision or double vision  CARDIOVASCULAR:  Negative for chest pain or palpitations  RESPIRATORY:  Negative for cough, wheezing, or SOB   GASTROINTESTINAL:  Negative for nausea, vomiting, diarrhea, constipation, or abdominal pain  GENITOURINARY:  Negative frequency, urgency or dysuria  NEUROLOGIC:  No headache, confusion, dizziness, lightheadedness    MEDICATIONS  (STANDING):  aMIOdarone    Tablet 200 milliGRAM(s) Oral daily  atorvastatin 40 milliGRAM(s) Oral at bedtime  chlorhexidine 2% Cloths 1 Application(s) Topical <User Schedule>  clopidogrel Tablet 75 milliGRAM(s) Oral daily  Dakins Solution - 1/2 Strength 1 Application(s) Topical daily  DAPTOmycin IVPB 600 milliGRAM(s) IV Intermittent once  dextrose 5%. 1000 milliLiter(s) (50 mL/Hr) IV Continuous <Continuous>  dextrose 50% Injectable 12.5 Gram(s) IV Push once  dextrose 50% Injectable 25 Gram(s) IV Push once  melatonin 1 milliGRAM(s) Oral at bedtime  metoprolol succinate ER 50 milliGRAM(s) Oral two times a day  piperacillin/tazobactam IVPB.. 2.25 Gram(s) IV Intermittent every 8 hours  rivaroxaban 15 milliGRAM(s) Oral with dinner  senna 2 Tablet(s) Oral at bedtime  sodium chloride 0.9%. 1000 milliLiter(s) (60 mL/Hr) IV Continuous <Continuous>    MEDICATIONS  (PRN):  acetaminophen   Tablet .. 650 milliGRAM(s) Oral every 6 hours PRN Mild Pain (1 - 3)  dextrose 40% Gel 15 Gram(s) Oral once PRN Blood Glucose LESS THAN 70 milliGRAM(s)/deciliter  glucagon  Injectable 1 milliGRAM(s) IntraMuscular once PRN Glucose LESS THAN 70 milligrams/deciliter  oxycodone    5 mG/acetaminophen 325 mG 1 Tablet(s) Oral every 6 hours PRN Severe Pain (7 - 10)  sodium chloride 0.9% lock flush 10 milliLiter(s) IV Push every 1 hour PRN Pre/post blood products, medications, blood draw, and to maintain line patency      PHYSICAL EXAM  Vital Signs Last 24 Hrs  T(C): 36.4 (09 May 2020 08:25), Max: 36.8 (08 May 2020 16:45)  T(F): 97.5 (09 May 2020 08:25), Max: 98.2 (08 May 2020 16:45)  HR: 125 (09 May 2020 08:25) (108 - 125)  BP: 102/69 (09 May 2020 08:25) (98/74 - 107/74)  BP(mean): --  RR: 17 (09 May 2020 08:25) (16 - 17)  SpO2: 95% (09 May 2020 08:25) (95% - 100%)    Constitutional: wn/wd in NAD.   HEENT: NCAT, MMM, OP clear, EOMI, no proptosis or lid retraction  Neck: no thyromegaly or palpable thyroid nodules   Respiratory: lungs CTAB.  Cardiovascular: regular rhythm, normal S1 and S2, no audible murmurs, no peripheral edema  GI: soft, NT/ND, no masses/HSM appreciated.  Neurology: no tremors, DTR 2+  Skin: no visible rashes/lesions  Psychiatric: AAO x 3, normal affect/mood.    LABS:                        8.4    9.73  )-----------( 252      ( 09 May 2020 06:05 )             26.6     05-09    134<L>  |  99  |  48<H>  ----------------------------<  121<H>  4.1   |  20<L>  |  3.52<H>    Ca    8.6      09 May 2020 06:05  Phos  4.4     05-09  Mg     2.2     05-09      PTT - ( 08 May 2020 15:30 )  PTT:42.4 sec  Urinalysis Basic - ( 08 May 2020 12:16 )    Color: Yellow / Appearance: Clear / S.020 / pH: x  Gluc: x / Ketone: Trace mg/dL  / Bili: Negative / Urobili: 0.2 E.U./dL   Blood: x / Protein: Trace mg/dL / Nitrite: NEGATIVE   Leuk Esterase: NEGATIVE / RBC: < 5 /HPF / WBC < 5 /HPF   Sq Epi: x / Non Sq Epi: 0-5 /HPF / Bacteria: Present /HPF      Thyroid Stimulating Hormone, Serum: 0.585 uIU/mL ( @ 06:37)  Thyroid Stimulating Hormone, Serum: 0.558 uIU/mL ( @ 06:37)      HbA1C: 6.0 % ( @ 06:03)    CAPILLARY BLOOD GLUCOSE      POCT Blood Glucose.: 119 mg/dL (09 May 2020 07:42)  POCT Blood Glucose.: 145 mg/dL (08 May 2020 22:24)  POCT Blood Glucose.: 126 mg/dL (08 May 2020 18:48)      Insulin Sliding Scale requirements X 24 Hours:    RADIOLOGY & ADDITIONAL TESTS:    A/P: 84y Male with history of DM type II presenting for       1.  DM -     Please continue           units lantus at bedtime  / in the morning and        units lispro with meals and lispro moderate / low dose sliding scale 4 times daily with meals and at bedtime.  Please continue consistent carbohydrate diet.      Goal FSG is   Will continue to monitor   For discharge, pt can continue    Pt can follow up at discharge with [facility] by calling  to make an appointment.   Will discuss case with     and update primary team

## 2020-05-09 NOTE — PROGRESS NOTE ADULT - ASSESSMENT
85 y/o AAM w/PMH HTN/DM/A-fib on Xarelto/HF EF 35%, CKD III and PAD, recently admitted in march for toe gangrene s/p left 3rd toe amputation/ presented back to ED from Dr. Mcclellan's office for further work up of LLE pain and swelling, nephrology consulted for ZEENAT on CKD III  PlanNED for OR and angiogram.    # Nonoliguric ZEENAT on CKD stage III (baseline Cr 1.6-1.8)  - Ddx: includes SHERI due to contrast exposure on 5/6 vs uncontrolled AFib w hypotension vs pre-renal   - pre-renal on admission improved w gentle hydration,  - now BUN/Cr up to 48/3.5  from baseline Cr 1.4   - keep map >65 mmHg   - strict I/O  - adjust meds/ ABx to CrCl <30 ml/min   - avoid ACE/ARB/NSAIDS/Contrast  - dose adjust rivaroxaban   - dose adjust zosyn   - if renal function worsens will consider alternative antibiotic 83 y/o AAM w/PMH HTN/DM/A-fib on Xarelto/HF EF 35%, CKD III and PAD, recently admitted in march for toe gangrene s/p left 3rd toe amputation/ presented back to ED from Dr. Mcclellan's office for further work up of LLE pain and swelling, nephrology consulted for ZEENAT on CKD III  PlanNED for OR and angiogram.    # Nonoliguric ZEENAT on CKD stage III (baseline Cr 1.6-1.8)  - Ddx: includes SHERI due to contrast exposure on 5/6 vs uncontrolled AFib w hypotension vs pre-renal   - pre-renal on admission improved w gentle hydration,  - now BUN/Cr up to 48/3.5  from baseline Cr 1.4   - keep map >65 mmHg   - strict I/O  - adjust meds/ ABx to CrCl <30 ml/min   - avoid ACE/ARB/NSAIDS/Contrast  - consider switching rivaroxaban to Eliquis 2.5 bid  - dose adjust zosyn   - if renal function worsens will consider alternative antibiotic

## 2020-05-09 NOTE — PROGRESS NOTE ADULT - ATTENDING COMMENTS
ZEENAT on CKD appears likely ATN (hypotenaoin/contrast)  prob nonoliguric - but no uo ducemnted last shift  confirm uo   gentle IVF ok for now but hold if minimal uo as unlikely dry   consider change DOAC to Eliquis with ZEENAT   no indication for dialysis yet but will follow re need

## 2020-05-10 DIAGNOSIS — I48.0 PAROXYSMAL ATRIAL FIBRILLATION: ICD-10-CM

## 2020-05-10 DIAGNOSIS — E78.5 HYPERLIPIDEMIA, UNSPECIFIED: ICD-10-CM

## 2020-05-10 DIAGNOSIS — S98.131A COMPLETE TRAUMATIC AMPUTATION OF ONE RIGHT LESSER TOE, INITIAL ENCOUNTER: ICD-10-CM

## 2020-05-10 DIAGNOSIS — I10 ESSENTIAL (PRIMARY) HYPERTENSION: ICD-10-CM

## 2020-05-10 DIAGNOSIS — E16.2 HYPOGLYCEMIA, UNSPECIFIED: ICD-10-CM

## 2020-05-10 DIAGNOSIS — D64.9 ANEMIA, UNSPECIFIED: ICD-10-CM

## 2020-05-10 DIAGNOSIS — I50.22 CHRONIC SYSTOLIC (CONGESTIVE) HEART FAILURE: ICD-10-CM

## 2020-05-10 LAB
ALBUMIN SERPL ELPH-MCNC: 3.4 G/DL — SIGNIFICANT CHANGE UP (ref 3.3–5)
ALP SERPL-CCNC: 63 U/L — SIGNIFICANT CHANGE UP (ref 40–120)
ALT FLD-CCNC: 43 U/L — SIGNIFICANT CHANGE UP (ref 10–45)
ANION GAP SERPL CALC-SCNC: 16 MMOL/L — SIGNIFICANT CHANGE UP (ref 5–17)
ANION GAP SERPL CALC-SCNC: 20 MMOL/L — HIGH (ref 5–17)
APPEARANCE UR: ABNORMAL
AST SERPL-CCNC: 98 U/L — HIGH (ref 10–40)
BILIRUB DIRECT SERPL-MCNC: <0.2 MG/DL — SIGNIFICANT CHANGE UP (ref 0–0.2)
BILIRUB INDIRECT FLD-MCNC: >0.6 MG/DL — SIGNIFICANT CHANGE UP (ref 0.2–1)
BILIRUB SERPL-MCNC: 0.8 MG/DL — SIGNIFICANT CHANGE UP (ref 0.2–1.2)
BILIRUB UR-MCNC: ABNORMAL
BUN SERPL-MCNC: 53 MG/DL — HIGH (ref 7–23)
BUN SERPL-MCNC: 56 MG/DL — HIGH (ref 7–23)
CALCIUM SERPL-MCNC: 8.4 MG/DL — SIGNIFICANT CHANGE UP (ref 8.4–10.5)
CALCIUM SERPL-MCNC: 8.9 MG/DL — SIGNIFICANT CHANGE UP (ref 8.4–10.5)
CHLORIDE SERPL-SCNC: 93 MMOL/L — LOW (ref 96–108)
CHLORIDE SERPL-SCNC: 96 MMOL/L — SIGNIFICANT CHANGE UP (ref 96–108)
CK SERPL-CCNC: 1196 U/L — HIGH (ref 30–200)
CO2 SERPL-SCNC: 17 MMOL/L — LOW (ref 22–31)
CO2 SERPL-SCNC: 19 MMOL/L — LOW (ref 22–31)
COLOR SPEC: YELLOW — SIGNIFICANT CHANGE UP
CREAT SERPL-MCNC: 4.72 MG/DL — HIGH (ref 0.5–1.3)
CREAT SERPL-MCNC: 4.77 MG/DL — HIGH (ref 0.5–1.3)
DIFF PNL FLD: ABNORMAL
GLUCOSE BLDC GLUCOMTR-MCNC: 101 MG/DL — HIGH (ref 70–99)
GLUCOSE BLDC GLUCOMTR-MCNC: 102 MG/DL — HIGH (ref 70–99)
GLUCOSE BLDC GLUCOMTR-MCNC: 42 MG/DL — CRITICAL LOW (ref 70–99)
GLUCOSE SERPL-MCNC: 125 MG/DL — HIGH (ref 70–99)
GLUCOSE SERPL-MCNC: 84 MG/DL — SIGNIFICANT CHANGE UP (ref 70–99)
GLUCOSE UR QL: NEGATIVE — SIGNIFICANT CHANGE UP
HCT VFR BLD CALC: 25.5 % — LOW (ref 39–50)
HGB BLD-MCNC: 8.1 G/DL — LOW (ref 13–17)
KETONES UR-MCNC: ABNORMAL MG/DL
LEUKOCYTE ESTERASE UR-ACNC: ABNORMAL
MAGNESIUM SERPL-MCNC: 2.3 MG/DL — SIGNIFICANT CHANGE UP (ref 1.6–2.6)
MAGNESIUM SERPL-MCNC: 2.4 MG/DL — SIGNIFICANT CHANGE UP (ref 1.6–2.6)
MCHC RBC-ENTMCNC: 27 PG — SIGNIFICANT CHANGE UP (ref 27–34)
MCHC RBC-ENTMCNC: 31.8 GM/DL — LOW (ref 32–36)
MCV RBC AUTO: 85 FL — SIGNIFICANT CHANGE UP (ref 80–100)
NITRITE UR-MCNC: POSITIVE
NRBC # BLD: 0 /100 WBCS — SIGNIFICANT CHANGE UP (ref 0–0)
PH UR: 6.5 — SIGNIFICANT CHANGE UP (ref 5–8)
PHOSPHATE SERPL-MCNC: 5.9 MG/DL — HIGH (ref 2.5–4.5)
PHOSPHATE SERPL-MCNC: 5.9 MG/DL — HIGH (ref 2.5–4.5)
PLATELET # BLD AUTO: 251 K/UL — SIGNIFICANT CHANGE UP (ref 150–400)
POTASSIUM SERPL-MCNC: 4.4 MMOL/L — SIGNIFICANT CHANGE UP (ref 3.5–5.3)
POTASSIUM SERPL-MCNC: 4.5 MMOL/L — SIGNIFICANT CHANGE UP (ref 3.5–5.3)
POTASSIUM SERPL-SCNC: 4.4 MMOL/L — SIGNIFICANT CHANGE UP (ref 3.5–5.3)
POTASSIUM SERPL-SCNC: 4.5 MMOL/L — SIGNIFICANT CHANGE UP (ref 3.5–5.3)
PROT SERPL-MCNC: 7.5 G/DL — SIGNIFICANT CHANGE UP (ref 6–8.3)
PROT UR-MCNC: 100 MG/DL
RBC # BLD: 3 M/UL — LOW (ref 4.2–5.8)
RBC # FLD: 15.7 % — HIGH (ref 10.3–14.5)
SODIUM SERPL-SCNC: 130 MMOL/L — LOW (ref 135–145)
SODIUM SERPL-SCNC: 131 MMOL/L — LOW (ref 135–145)
SP GR SPEC: >=1.03 — SIGNIFICANT CHANGE UP (ref 1–1.03)
UROBILINOGEN FLD QL: 1 E.U./DL — SIGNIFICANT CHANGE UP
WBC # BLD: 11.34 K/UL — HIGH (ref 3.8–10.5)
WBC # FLD AUTO: 11.34 K/UL — HIGH (ref 3.8–10.5)

## 2020-05-10 PROCEDURE — 99232 SBSQ HOSP IP/OBS MODERATE 35: CPT

## 2020-05-10 PROCEDURE — 71045 X-RAY EXAM CHEST 1 VIEW: CPT | Mod: 26

## 2020-05-10 PROCEDURE — 99233 SBSQ HOSP IP/OBS HIGH 50: CPT | Mod: GC

## 2020-05-10 RX ORDER — PIPERACILLIN AND TAZOBACTAM 4; .5 G/20ML; G/20ML
2.25 INJECTION, POWDER, LYOPHILIZED, FOR SOLUTION INTRAVENOUS EVERY 8 HOURS
Refills: 0 | Status: DISCONTINUED | OUTPATIENT
Start: 2020-05-10 | End: 2020-05-13

## 2020-05-10 RX ORDER — FUROSEMIDE 40 MG
40 TABLET ORAL DAILY
Refills: 0 | Status: DISCONTINUED | OUTPATIENT
Start: 2020-05-10 | End: 2020-05-13

## 2020-05-10 RX ORDER — SODIUM BICARBONATE 1 MEQ/ML
650 SYRINGE (ML) INTRAVENOUS THREE TIMES A DAY
Refills: 0 | Status: DISCONTINUED | OUTPATIENT
Start: 2020-05-10 | End: 2020-05-13

## 2020-05-10 RX ADMIN — Medication 1 APPLICATION(S): at 11:03

## 2020-05-10 RX ADMIN — Medication 40 MILLIGRAM(S): at 12:25

## 2020-05-10 RX ADMIN — CLOPIDOGREL BISULFATE 75 MILLIGRAM(S): 75 TABLET, FILM COATED ORAL at 11:02

## 2020-05-10 RX ADMIN — Medication 1 MILLIGRAM(S): at 22:27

## 2020-05-10 RX ADMIN — APIXABAN 2.5 MILLIGRAM(S): 2.5 TABLET, FILM COATED ORAL at 17:26

## 2020-05-10 RX ADMIN — ATORVASTATIN CALCIUM 40 MILLIGRAM(S): 80 TABLET, FILM COATED ORAL at 21:19

## 2020-05-10 RX ADMIN — PIPERACILLIN AND TAZOBACTAM 200 GRAM(S): 4; .5 INJECTION, POWDER, LYOPHILIZED, FOR SOLUTION INTRAVENOUS at 15:45

## 2020-05-10 RX ADMIN — Medication 650 MILLIGRAM(S): at 15:47

## 2020-05-10 RX ADMIN — Medication 650 MILLIGRAM(S): at 21:20

## 2020-05-10 RX ADMIN — AMIODARONE HYDROCHLORIDE 200 MILLIGRAM(S): 400 TABLET ORAL at 05:57

## 2020-05-10 RX ADMIN — Medication 50 MILLIGRAM(S): at 05:57

## 2020-05-10 RX ADMIN — Medication 50 MILLIGRAM(S): at 17:26

## 2020-05-10 RX ADMIN — Medication 650 MILLIGRAM(S): at 21:19

## 2020-05-10 RX ADMIN — PIPERACILLIN AND TAZOBACTAM 200 GRAM(S): 4; .5 INJECTION, POWDER, LYOPHILIZED, FOR SOLUTION INTRAVENOUS at 22:15

## 2020-05-10 RX ADMIN — CHLORHEXIDINE GLUCONATE 1 APPLICATION(S): 213 SOLUTION TOPICAL at 05:55

## 2020-05-10 NOTE — PROGRESS NOTE ADULT - ATTENDING COMMENTS
ZENEAT suspect ATN -- relatively low uo  seems likely euvolemic and doesnt need IVF as eating  no uremic sx  no indication for dialysis-- discussed could need soon if continues to progress  follow uo, chem  hopefully will improve soon

## 2020-05-10 NOTE — CONSULT NOTE ADULT - SUBJECTIVE AND OBJECTIVE BOX
Patient is a 84y old  Male who presents with a chief complaint of L toe amputation (08 May 2020 11:11)      HPI:  84 M PMH HTN, DM, A fib on Xarelto (Last taken 8am) HF EF 35%, CKD III PAD p/w L 3rd toe gangrene s/p amputation. Patient was admitted on March of this year and underwent L toe amputation on 3/06 of which cultures grew MRSA and was subsequently discharged on Doxycycline. He now returns with 3 weeks of LLE pain and swelling. Pt reports persistent pain of the 3rd toe and swelling of the LLE, denies numbness or weakness. Was was seen in the office by Dr. Mcclellan and was recommended to the ED for worsening appearance of the left 3rd toe. He denies fever, chills, nausea, sob, coughing, or recent COVID contacts. (04 May 2020 14:24)      Allergies    No Known Allergies    Intolerances        MEDICATIONS  (STANDING):  aMIOdarone    Tablet 200 milliGRAM(s) Oral daily  apixaban 2.5 milliGRAM(s) Oral two times a day  atorvastatin 40 milliGRAM(s) Oral at bedtime  chlorhexidine 2% Cloths 1 Application(s) Topical <User Schedule>  clopidogrel Tablet 75 milliGRAM(s) Oral daily  Dakins Solution - 1/2 Strength 1 Application(s) Topical daily  dextrose 5%. 1000 milliLiter(s) (50 mL/Hr) IV Continuous <Continuous>  dextrose 50% Injectable 12.5 Gram(s) IV Push once  dextrose 50% Injectable 25 Gram(s) IV Push once  furosemide    Tablet 40 milliGRAM(s) Oral daily  melatonin 1 milliGRAM(s) Oral at bedtime  metoprolol succinate ER 50 milliGRAM(s) Oral two times a day  senna 2 Tablet(s) Oral at bedtime    MEDICATIONS  (PRN):  acetaminophen   Tablet .. 650 milliGRAM(s) Oral every 6 hours PRN Mild Pain (1 - 3)  dextrose 40% Gel 15 Gram(s) Oral once PRN Blood Glucose LESS THAN 70 milliGRAM(s)/deciliter  glucagon  Injectable 1 milliGRAM(s) IntraMuscular once PRN Glucose LESS THAN 70 milligrams/deciliter  oxycodone    5 mG/acetaminophen 325 mG 1 Tablet(s) Oral every 6 hours PRN Severe Pain (7 - 10)  sodium chloride 0.9% lock flush 10 milliLiter(s) IV Push every 1 hour PRN Pre/post blood products, medications, blood draw, and to maintain line patency      Drug Dosing Weight  Height (cm): 177.8 (05 May 2020 02:21)  Weight (kg): 77.1 (05 May 2020 02:21)  BMI (kg/m2): 24.4 (05 May 2020 02:21)  BSA (m2): 1.95 (05 May 2020 02:21)    PAST MEDICAL & SURGICAL HISTORY:  Heart failure  HLD (hyperlipidemia)  Borderline diabetes mellitus  Afib  HTN (hypertension)  H/O laryngectomy      FAMILY HISTORY:  FHx: diabetes mellitus      SOCIAL HISTORY:    ADVANCE DIRECTIVES:    REVIEW OF SYSTEMS      General:	    Skin/Breast:  	  Ophthalmologic:  	  ENMT:	    Respiratory and Thorax:  	  Cardiovascular:	    Gastrointestinal:	    Genitourinary:	    Musculoskeletal:	    Neurological:	    Psychiatric:	    Hematology/Lymphatics:	    Endocrine:	    Allergic/Immunologic:	        ICU Vital Signs Last 24 Hrs  T(C): 35.8 (10 May 2020 09:14), Max: 36.8 (09 May 2020 16:49)  T(F): 96.4 (10 May 2020 09:14), Max: 98.3 (09 May 2020 16:49)  HR: 61 (10 May 2020 09:14) (61 - 124)  BP: 101/6 (10 May 2020 09:14) (99/57 - 150/83)  BP(mean): --  ABP: --  ABP(mean): --  RR: 17 (10 May 2020 09:14) (16 - 17)  SpO2: 97% (10 May 2020 09:14) (94% - 100%)          I&O's Detail    09 May 2020 07:01  -  10 May 2020 07:00  --------------------------------------------------------  IN:    Oral Fluid: 370 mL    sodium chloride 0.9%: 600 mL  Total IN: 970 mL    OUT:    Indwelling Catheter - Urethral: 275 mL    VAC (Vacuum Assisted Closure) System: 25 mL  Total OUT: 300 mL    Total NET: 670 mL          PHYSICAL EXAM:      Constitutional:    Eyes:    ENMT:    Neck:    Breasts:    Back:    Respiratory:    Cardiovascular:    Gastrointestinal:    Genitourinary:    Rectal:    Extremities:    Vascular:    Neurological:    Skin:    Lymph Nodes:    Musculoskeletal:    Psychiatric:        LABS:  CBC Full  -  ( 10 May 2020 06:14 )  WBC Count : 11.34 K/uL  RBC Count : 3.00 M/uL  Hemoglobin : 8.1 g/dL  Hematocrit : 25.5 %  Platelet Count - Automated : 251 K/uL  Mean Cell Volume : 85.0 fl  Mean Cell Hemoglobin : 27.0 pg  Mean Cell Hemoglobin Concentration : 31.8 gm/dL  Auto Neutrophil # : x  Auto Lymphocyte # : x  Auto Monocyte # : x  Auto Eosinophil # : x  Auto Basophil # : x  Auto Neutrophil % : x  Auto Lymphocyte % : x  Auto Monocyte % : x  Auto Eosinophil % : x  Auto Basophil % : x    05-10    130<L>  |  93<L>  |  56<H>  ----------------------------<  125<H>  4.5   |  17<L>  |  4.77<H>    Ca    8.9      10 May 2020 11:25  Phos  5.9     05-10  Mg     2.4     05-10      CAPILLARY BLOOD GLUCOSE      POCT Blood Glucose.: 101 mg/dL (10 May 2020 10:40)    PTT - ( 08 May 2020 15:30 )  PTT:42.4 sec  Urinalysis Basic - ( 09 May 2020 20:00 )    Color: Yellow / Appearance: Clear / S.025 / pH: x  Gluc: x / Ketone: Trace mg/dL  / Bili: Small / Urobili: 0.2 E.U./dL   Blood: x / Protein: Trace mg/dL / Nitrite: NEGATIVE   Leuk Esterase: NEGATIVE / RBC: < 5 /HPF / WBC < 5 /HPF   Sq Epi: x / Non Sq Epi: x / Bacteria: Present /HPF        EKG:    ECHO, US:    RADIOLOGY:    CRITICAL CARE TIME SPENT: HPI:  Pt is a 84 M PMH HTN, DM, A fib on Xarelto (Last taken 8am) HF EF 35%, CKD III PAD p/w L 3rd toe gangrene s/p amputation. Patient was admitted on March of this year and underwent L toe amputation on 3/06 of which cultures grew MRSA and was subsequently discharged on Doxycycline. He returned with 3 weeks of LLE pain and swelling now s/p LLE Angiogram DEStent x2, L 3rd and 4th toe amputation, and debridement. Pt's hospital course complicated by renal failure.     Pt currently has no acute complaints - overall feels well. Had a BM this AM. 12 pt ROS is negative.     Allergies    No Known Allergies    Intolerances    MEDICATIONS  (STANDING):  aMIOdarone    Tablet 200 milliGRAM(s) Oral daily  apixaban 2.5 milliGRAM(s) Oral two times a day  atorvastatin 40 milliGRAM(s) Oral at bedtime  chlorhexidine 2% Cloths 1 Application(s) Topical <User Schedule>  clopidogrel Tablet 75 milliGRAM(s) Oral daily  Dakins Solution - 1/2 Strength 1 Application(s) Topical daily  dextrose 5%. 1000 milliLiter(s) (50 mL/Hr) IV Continuous <Continuous>  dextrose 50% Injectable 12.5 Gram(s) IV Push once  dextrose 50% Injectable 25 Gram(s) IV Push once  furosemide    Tablet 40 milliGRAM(s) Oral daily  melatonin 1 milliGRAM(s) Oral at bedtime  metoprolol succinate ER 50 milliGRAM(s) Oral two times a day  senna 2 Tablet(s) Oral at bedtime    MEDICATIONS  (PRN):  acetaminophen   Tablet .. 650 milliGRAM(s) Oral every 6 hours PRN Mild Pain (1 - 3)  dextrose 40% Gel 15 Gram(s) Oral once PRN Blood Glucose LESS THAN 70 milliGRAM(s)/deciliter  glucagon  Injectable 1 milliGRAM(s) IntraMuscular once PRN Glucose LESS THAN 70 milligrams/deciliter  oxycodone    5 mG/acetaminophen 325 mG 1 Tablet(s) Oral every 6 hours PRN Severe Pain (7 - 10)  sodium chloride 0.9% lock flush 10 milliLiter(s) IV Push every 1 hour PRN Pre/post blood products, medications, blood draw, and to maintain line patency      Drug Dosing Weight  Height (cm): 177.8 (05 May 2020 02:21)  Weight (kg): 77.1 (05 May 2020 02:21)  BMI (kg/m2): 24.4 (05 May 2020 02:21)  BSA (m2): 1.95 (05 May 2020 02:21)    PAST MEDICAL & SURGICAL HISTORY:  Heart failure  HLD (hyperlipidemia)  Borderline diabetes mellitus  Afib  HTN (hypertension)  H/O laryngectomy      FAMILY HISTORY:  FHx: diabetes mellitus    SOCIAL HISTORY: no drug use    ADVANCE DIRECTIVES:    Vital Signs Last 24 Hrs  T(C): 35.8 (10 May 2020 09:14), Max: 36.8 (09 May 2020 16:49)  T(F): 96.4 (10 May 2020 09:14), Max: 98.3 (09 May 2020 16:49)  HR: 61 (10 May 2020 09:14) (61 - 124)  BP: 101/6 (10 May 2020 09:14) (99/57 - 150/83)  BP(mean): --  ABP: --  ABP(mean): --  RR: 17 (10 May 2020 09:14) (16 - 17)  SpO2: 97% (10 May 2020 09:14) (94% - 100%)    I&O's Detail    09 May 2020 07:01  -  10 May 2020 07:00  --------------------------------------------------------  IN:    Oral Fluid: 370 mL    sodium chloride 0.9%: 600 mL  Total IN: 970 mL    OUT:    Indwelling Catheter - Urethral: 275 mL    VAC (Vacuum Assisted Closure) System: 25 mL  Total OUT: 300 mL    Total NET: 670 mL          PHYSICAL EXAM:    Constitutional: NAD, sitting in chair  Eyes: PERRLA  ENMT: MMM  Neck: supple  Back: midline  Respiratory: decreased BS at bases  Cardiovascular: irregular rhythm, regular rate; no m/r/g  Gastrointestinal: soft, NTND, + BS  Genitourinary: FC in place, draining dark/bloody  Extremities: LLE with wound vac  Vascular: + 2 radial  Neurological:   Skin: no rash  Lymph Nodes: no LAD  Musculoskeletal: no joint swelling  Psychiatric: normal affect    LABS:  CBC Full  -  ( 10 May 2020 06:14 )  WBC Count : 11.34 K/uL  RBC Count : 3.00 M/uL  Hemoglobin : 8.1 g/dL  Hematocrit : 25.5 %  Platelet Count - Automated : 251 K/uL  Mean Cell Volume : 85.0 fl  Mean Cell Hemoglobin : 27.0 pg  Mean Cell Hemoglobin Concentration : 31.8 gm/dL    05-10    130<L>  |  93<L>  |  56<H>  ----------------------------<  125<H>  4.5   |  17<L>  |  4.77<H>    Ca    8.9      10 May 2020 11:25  Phos  5.9     05-10  Mg     2.4     05-10      CAPILLARY BLOOD GLUCOSE      POCT Blood Glucose.: 101 mg/dL (10 May 2020 10:40)    PTT - ( 08 May 2020 15:30 )  PTT:42.4 sec  Urinalysis Basic - ( 09 May 2020 20:00 )    Color: Yellow / Appearance: Clear / S.025 / pH: x  Gluc: x / Ketone: Trace mg/dL  / Bili: Small / Urobili: 0.2 E.U./dL   Blood: x / Protein: Trace mg/dL / Nitrite: NEGATIVE   Leuk Esterase: NEGATIVE / RBC: < 5 /HPF / WBC < 5 /HPF   Sq Epi: x / Non Sq Epi: x / Bacteria: Present /HPF    EK2020: A-fib    ECHO, US:   1. The left ventricle cavity size is moderately dilated. Left ventricular systolic function is moderately reduced with a calculated ejection fraction of 35% with global hypokinesis.   2. The right ventricle is mildly dilated. Right ventricular systolic function is normal.   3. Severely dilated left atrium.   4. Severely dilated right atrium.   5. Mild-to-moderate mitral regurgitation.   6. Mild-to-moderate tricuspid regurgitation.   7. Pulmonary hypertension present, pulmonary artery systolic pressure is 47 mmHg.   8. No pericardial effusion.    RADIOLOGY:  CXR 5/10/2020: no overt infiltrates

## 2020-05-10 NOTE — PROGRESS NOTE ADULT - SUBJECTIVE AND OBJECTIVE BOX
INTERVAL HPI/OVERNIGHT EVENTS:  Recalled for further antibiotic recommendations.  Foot is starting to feel better    CONSTITUTIONAL:  No fever, chills, night sweats  EYES:  No photophobia or visual changes  CARDIOVASCULAR:  No chest pain  RESPIRATORY:  Ongoing cough, no wheezing, or SOB   GASTROINTESTINAL:  No nausea, vomiting, diarrhea, constipation, or abdominal pain  GENITOURINARY:  No frequency, urgency, dysuria or hematuria  NEUROLOGIC:  No headache, lightheadedness      ANTIBIOTICS/RELEVANT:  Daptomycin 600 mg IV q48h  Piptazo 2.25 g IV q8h        Vital Signs Last 24 Hrs  T(C): 36.5 (10 May 2020 20:59), Max: 36.6 (10 May 2020 05:50)  T(F): 97.7 (10 May 2020 20:59), Max: 97.8 (10 May 2020 05:50)  HR: 90 (10 May 2020 20:59) (61 - 100)  BP: 107/73 (10 May 2020 20:59) (99/58 - 107/73)  BP(mean): --  RR: 17 (10 May 2020 20:59) (16 - 17)  SpO2: 100% (10 May 2020 20:59) (94% - 100%)    PHYSICAL EXAM:  Constitutional:  Frail appearing  Eyes:  Sclerae anicteric, conjunctivae clear, PERRL  Ear/Nose/Throat:  No nasal exudate or sinus tenderness;  No buccal mucosal lesions, no pharyngeal erythema or exudate	  Neck:  Supple, no adenopathy  Respiratory:  Clear bilaterally  Cardiovascular:  Tachy irreg irreg, S1S2, no murmur appreciated  Gastrointestinal:  Symmetric, normoactive BS, soft, NT, no masses, guarding or rebound.  No HSM  Extremities:  Wound vac L foot      LABS:                        8.1    11.34 )-----------( 251      ( 10 May 2020 06:14 )             25.5         05-10    130<L>  |  93<L>  |  56<H>  ----------------------------<  125<H>  4.5   |  17<L>  |  4.77<H>    Ca    8.9      10 May 2020 11:25  Phos  5.9     05-10  Mg     2.4     05-10    TPro  7.5  /  Alb  3.4  /  TBili  0.8  /  DBili  <0.2  /  AST  98<H>  /  ALT  43  /  AlkPhos  63  05-10    Creatine Kinase, Serum: 1196 U/L (05.10.20 @ 06:14)        Urinalysis Basic - ( 10 May 2020 13:05 )    Color: Yellow / Appearance: SL Cloudy / SG: >=1.030 / pH: x  Gluc: x / Ketone: Trace mg/dL  / Bili: Moderate / Urobili: 1.0 E.U./dL   Blood: x / Protein: 100 mg/dL / Nitrite: POSITIVE   Leuk Esterase: Trace / RBC: Many /HPF / WBC 5-10 /HPF   Sq Epi: x / Non Sq Epi: 0-5 /HPF / Bacteria: None /HPF        MICROBIOLOGY:        RADIOLOGY & ADDITIONAL STUDIES:  < from: Xray Chest 1 View- PORTABLE-Urgent (05.10.20 @ 07:01) >  Portable examination of the chest demonstrates no interval change lung pathology in comparison to prior examination of the chest 5/10/2020. Relatively. Right subclavian line noted with tip overlying superior vena cava.    Impression: No interval change lung pathology      < end of copied text >    < from: Xray Chest 1 View- PORTABLE-Urgent (05.09.20 @ 11:14) >  INTERPRETATION:  Clinical history: Fluid status    Portable examination of the chest demonstrates right subclavian line noted with tip overlying superior vena cava. Cardiomegaly. Prominent bronchovascular markings. Basilar infiltrates cannot be excluded. Possible left effusion.    Impression: Prominent bronchovascular markings. Basilar infiltrates cannot be excluded. Possible left effusion    < end of copied text >

## 2020-05-10 NOTE — CONSULT NOTE ADULT - PROBLEM SELECTOR RECOMMENDATION 3
Heparin drip preop.  Resume oral anticoagulation post op when vascular surgery team finds appropriate
-Appears to be better rate controlled at this time; at goal of HR < 110 as per EP  -C/w Toprol 50 mg PO BID and Amio 200 qd

## 2020-05-10 NOTE — PROGRESS NOTE ADULT - SUBJECTIVE AND OBJECTIVE BOX
Patient is a 84y Male seen and evaluated at bedside.   No new complaints.  Vitals, meds, labs reviewed.    Zosyn discontinued by primary team, switched to daptomycin.  Xarelto swictched to Eliquis 2.5 mg po bid      Meds:    acetaminophen   Tablet .. 650 every 6 hours PRN  aMIOdarone    Tablet 200 daily  apixaban 2.5 two times a day  atorvastatin 40 at bedtime  chlorhexidine 2% Cloths 1 <User Schedule>  clopidogrel Tablet 75 daily  Dakins Solution - 1/2 Strength 1 daily  dextrose 40% Gel 15 once PRN  dextrose 5%. 1000 <Continuous>  dextrose 50% Injectable 12.5 once  dextrose 50% Injectable 25 once  furosemide    Tablet 40 daily  glucagon  Injectable 1 once PRN  melatonin 1 at bedtime  metoprolol succinate ER 50 two times a day  oxycodone    5 mG/acetaminophen 325 mG 1 every 6 hours PRN  senna 2 at bedtime  sodium chloride 0.9% lock flush 10 every 1 hour PRN      Allergies    No Known Allergies    Intolerances        T(C): , Max: 36.8 (05-09-20 @ 16:49)  T(F): , Max: 98.3 (05-09-20 @ 16:49)  HR: 61 (05-10-20 @ 09:14)  BP: 101/6 (05-10-20 @ 09:14)  BP(mean): --  RR: 17 (05-10-20 @ 09:14)  SpO2: 97% (05-10-20 @ 09:14)  Wt(kg): --    05-09 @ 07:01  -  05-10 @ 07:00  --------------------------------------------------------  IN: 970 mL / OUT: 300 mL / NET: 670 mL          Review of Systems:  CONSTITUTIONAL: No fever or chills, No fatigue or tiredness.  EYES: No blurred or double vision.  RESPIRATORY: No shortness of breath, cough, hemoptysis  CARDIOVASCULAR: No Chest pain or shortness of breath  GASTROINTESTINAL: NO abdominal or flank pain, No nausea or vomiting, No diarrhea  GENITOURINARY: No dysuria or urinary burning, No difficulty passing urine, No hematuria  NEUROLOGICAL: No headaches or blurred vision  SKIN: No skin rashes   MUSCULOSKELETAL: No arthralgia, Joint pain, leg edema, No muscle pains      PHYSICAL EXAM:  General: NAD, resting comfortably in bed  C/V: s1s2+  Pulm: Nonlabored breathing, no respiratory distress, Crackles LLB, Decreased BS RLB  Abd: soft, non-tender, non-distended.  Extrem: WWP, Left foot wound with vac in place  Neuro: A/O x 3, no focal deficits, normal sensation  Pulses: LLE: biphasic PT  : Moe with turbid urine              LABS:                        8.1    11.34 )-----------( 251      ( 10 May 2020 06:14 )             25.5     05-10    130<L>  |  93<L>  |  56<H>  ----------------------------<  125<H>  4.5   |  17<L>  |  4.77<H>    Ca    8.9      10 May 2020 11:25  Phos  5.9     05-10  Mg     2.4     05-10        PTT - ( 08 May 2020 15:30 )  PTT:42.4 sec  Urinalysis Basic - ( 10 May 2020 13:05 )    Color: Yellow / Appearance: SL Cloudy / SG: >=1.030 / pH: x  Gluc: x / Ketone: Trace mg/dL  / Bili: Moderate / Urobili: 1.0 E.U./dL   Blood: x / Protein: 100 mg/dL / Nitrite: POSITIVE   Leuk Esterase: Trace / RBC: Many /HPF / WBC 5-10 /HPF   Sq Epi: x / Non Sq Epi: 0-5 /HPF / Bacteria: None /HPF      Sodium, Random Urine: 20 mmol/L (05-09 @ 20:00)  Creatinine, Random Urine: 153 mg/dL (05-09 @ 20:00)  Potassium, Random Urine: 70 mmol/L (05-09 @ 20:00)        RADIOLOGY & ADDITIONAL STUDIES: Patient is a 84y Male seen and evaluated at bedside.   No new complaints.  got montes . notes some cough but mainly after certain foods (not new)  Vitals, meds, labs reviewed.    Zosyn discontinued by primary team, switched to daptomycin.  Xarelto swictched to Eliquis 2.5 mg po bid      Meds:    acetaminophen   Tablet .. 650 every 6 hours PRN  aMIOdarone    Tablet 200 daily  apixaban 2.5 two times a day  atorvastatin 40 at bedtime  chlorhexidine 2% Cloths 1 <User Schedule>  clopidogrel Tablet 75 daily  Dakins Solution - 1/2 Strength 1 daily  dextrose 40% Gel 15 once PRN  dextrose 5%. 1000 <Continuous>  dextrose 50% Injectable 12.5 once  dextrose 50% Injectable 25 once  furosemide    Tablet 40 daily  glucagon  Injectable 1 once PRN  melatonin 1 at bedtime  metoprolol succinate ER 50 two times a day  oxycodone    5 mG/acetaminophen 325 mG 1 every 6 hours PRN  senna 2 at bedtime  sodium chloride 0.9% lock flush 10 every 1 hour PRN      Allergies    No Known Allergies    Intolerances        T(C): , Max: 36.8 (05-09-20 @ 16:49)  T(F): , Max: 98.3 (05-09-20 @ 16:49)  HR: 61 (05-10-20 @ 09:14)  BP: 101/6 (05-10-20 @ 09:14)  BP(mean): --  RR: 17 (05-10-20 @ 09:14)  SpO2: 97% (05-10-20 @ 09:14)  Wt(kg): --    05-09 @ 07:01  -  05-10 @ 07:00  --------------------------------------------------------  IN: 970 mL / OUT: 300 mL / NET: 670 mL          Review of Systems:  CONSTITUTIONAL: No fever or chills, No fatigue or tiredness.  EYES: No blurred or double vision.  RESPIRATORY: No shortness of breath, occasional cough, no hemoptysis  CARDIOVASCULAR: No Chest pain or shortness of breath  GASTROINTESTINAL: NO abdominal or flank pain, No nausea or vomiting, No diarrhea  GENITOURINARY: No dysuria or urinary burning, No difficulty passing urine, No hematuria  NEUROLOGICAL: No headaches or blurred vision  SKIN: No skin rashes   MUSCULOSKELETAL: No arthralgia, Joint pain, leg edema, No muscle pains      PHYSICAL EXAM:  General: NAD, resting comfortably  on RA  C/V: s1s2 tachy, irreg   Pulm: Nonlabored breathing, no respiratory distress, CTA b/l   Abd: soft, non-tender, non-distended.  Extrem: WWP, Left foot wound with vac in place  Neuro: A/O x 3, no focal deficits, normal sensation  Pulses: LLE: biphasic PT  : Montes with turbid urine              LABS:                        8.1    11.34 )-----------( 251      ( 10 May 2020 06:14 )             25.5     05-10    130<L>  |  93<L>  |  56<H>  ----------------------------<  125<H>  4.5   |  17<L>  |  4.77<H>    Ca    8.9      10 May 2020 11:25  Phos  5.9     05-10  Mg     2.4     05-10        PTT - ( 08 May 2020 15:30 )  PTT:42.4 sec  Urinalysis Basic - ( 10 May 2020 13:05 )    Color: Yellow / Appearance: SL Cloudy / SG: >=1.030 / pH: x  Gluc: x / Ketone: Trace mg/dL  / Bili: Moderate / Urobili: 1.0 E.U./dL   Blood: x / Protein: 100 mg/dL / Nitrite: POSITIVE   Leuk Esterase: Trace / RBC: Many /HPF / WBC 5-10 /HPF   Sq Epi: x / Non Sq Epi: 0-5 /HPF / Bacteria: None /HPF      Sodium, Random Urine: 20 mmol/L (05-09 @ 20:00)  Creatinine, Random Urine: 153 mg/dL (05-09 @ 20:00)  Potassium, Random Urine: 70 mmol/L (05-09 @ 20:00)        RADIOLOGY & ADDITIONAL STUDIES:

## 2020-05-10 NOTE — PROGRESS NOTE ADULT - ASSESSMENT
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 BRANDYN falsely elevated due to history of DM now s/p LLE Angiogram DEStent x2, L 3rd and 4th toe amputation, and debridement. Clinically stable.     Regular  Home meds  Dapto/Zosyn  f/u Renal, EP, Cards, and Endocrine recs  Eliquis  Lasix 40 PO daily  AM labs

## 2020-05-10 NOTE — PROGRESS NOTE ADULT - ASSESSMENT
83 yo M with HTN, DM, Afib, PDA, CKDIII s/p angiogram with TP trunk angioplasty/stent and L 3rd toe amputation on 5/6.  Superficial culture sent 2 d earlier, no OR cultures sent.  Reliability of superficial culture is low.  Would cover for mixed infection, including anaerobes in absence of data.  He has ZEENAT on CKD - received contrast for angiogram/stent.  Vancomycin trough supratherapeutic, likely contributing.  He is unlikely to tolerate a 6 week course of linezolid.  His CPK seems to be starting to trend downward.  Suggest:  - Daptomycin 600 mg IV q48h   - Continue to monitor CPK  - Pip-tazo 2.25 g IV q8h  - Would plan for 6 week course of antibiotics.  If he is to go home and his insurance will cover, may consider changing pip-tazo to ertapenem if his wife is unable to do 4 infusions daily.  Recommendations discussed with Dr. Mcclellan. Will follow with you - team 1.

## 2020-05-10 NOTE — CONSULT NOTE ADULT - PROBLEM SELECTOR RECOMMENDATION 4
pain control, augment with hydralazine 10 IVP PRN SBP>150mm Hg
-Pt appears euvolemic at this time  -C/w Lasix 40 mg PO qd  -BB as above  -Statin

## 2020-05-10 NOTE — CONSULT NOTE ADULT - PROBLEM SELECTOR RECOMMENDATION 2
Known HFrEF 35% and Afib  Continue Toprol XL 50mg
-Likely ATN in setting of contrast, supra therapeutic Vanc trough  -Given decreasing Bicarb (now 17) - start Bicarb PO tabs 650 TID  -Monitor UOP  -Trend BMP  -Renal following

## 2020-05-10 NOTE — CONSULT NOTE ADULT - ASSESSMENT
83 yo M with HTN, DM, Afib, PDA, CKDIII s/p angiogram with TP trunk angioplasty/stent and L 3rd toe amputation on 5/6 medicine consulted for medical co-management.

## 2020-05-10 NOTE — PROGRESS NOTE ADULT - ASSESSMENT
83 y/o AAM w/PMH HTN/DM/A-fib on Xarelto/HF EF 35%, CKD III and PAD, recently admitted in march for toe gangrene s/p left 3rd toe amputation/ presented back to ED from Dr. Mcclellan's office for further work up of LLE pain and swelling, nephrology consulted for ZEENAT on CKD III  PlanNED for OR and angiogram.    # Nonoliguric ZEENAT on CKD stage III (baseline Cr 1.6-1.8)  - Ddx: includes SHERI due to contrast exposure on 5/6 vs ATN due to uncontrolled AFib w hypotension vs pre-renal   - Cr 4.7  today - will monitor for plateau  - pre-renal on admission improved w gentle hydration,  - baseline Cr 1.4   - keep map >65 mmHg   - strict I/O  - adjust meds/ ABx to CrCl <30 ml/min   - avoid ACE/ARB/NSAIDS/Contrast  - rivaroxaban switched to Eliquis 2.5 bid  - zosyn switched to daptomycin  - patient started on Lasix 40 mg po daily - suggest using Lasix 80 mg IV stat prn for improved oxygenation   - ensure renal dosing of antibiotics

## 2020-05-10 NOTE — PROGRESS NOTE ADULT - ATTENDING COMMENTS
84yMale with toe gangrene, stable.   - hypoglycemia dx likely due to falsely low reading on fingerstick given peripheral vascular diease in patient.    - pt also with poor po intake at this time.   - continue to monitor.     Mone Jacobs MD, PhD  Endocrinology  [address]  [place] [address]  [phone] Tel  [phone] Fax  [email or web address]

## 2020-05-10 NOTE — PROGRESS NOTE ADULT - SUBJECTIVE AND OBJECTIVE BOX
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 (10 May 2020 13:10)      Pt reports the following symptoms:    CONSTITUTIONAL:  Negative fever or chills, feels well, good appetite  EYES:  Negative  blurry vision or double vision  CARDIOVASCULAR:  Negative for chest pain or palpitations  RESPIRATORY:  Negative for cough, wheezing, or SOB   GASTROINTESTINAL:  Negative for nausea, vomiting, diarrhea, constipation, or abdominal pain  GENITOURINARY:  Negative frequency, urgency or dysuria  NEUROLOGIC:  No headache, confusion, dizziness, lightheadedness    MEDICATIONS  (STANDING):  aMIOdarone    Tablet 200 milliGRAM(s) Oral daily  apixaban 2.5 milliGRAM(s) Oral two times a day  atorvastatin 40 milliGRAM(s) Oral at bedtime  chlorhexidine 2% Cloths 1 Application(s) Topical <User Schedule>  clopidogrel Tablet 75 milliGRAM(s) Oral daily  Dakins Solution - 1/2 Strength 1 Application(s) Topical daily  dextrose 5%. 1000 milliLiter(s) (50 mL/Hr) IV Continuous <Continuous>  dextrose 50% Injectable 12.5 Gram(s) IV Push once  dextrose 50% Injectable 25 Gram(s) IV Push once  furosemide    Tablet 40 milliGRAM(s) Oral daily  melatonin 1 milliGRAM(s) Oral at bedtime  metoprolol succinate ER 50 milliGRAM(s) Oral two times a day  piperacillin/tazobactam IVPB.. 2.25 Gram(s) IV Intermittent every 8 hours  senna 2 Tablet(s) Oral at bedtime  sodium bicarbonate 650 milliGRAM(s) Oral three times a day    MEDICATIONS  (PRN):  acetaminophen   Tablet .. 650 milliGRAM(s) Oral every 6 hours PRN Mild Pain (1 - 3)  dextrose 40% Gel 15 Gram(s) Oral once PRN Blood Glucose LESS THAN 70 milliGRAM(s)/deciliter  glucagon  Injectable 1 milliGRAM(s) IntraMuscular once PRN Glucose LESS THAN 70 milligrams/deciliter  oxycodone    5 mG/acetaminophen 325 mG 1 Tablet(s) Oral every 6 hours PRN Severe Pain (7 - 10)  sodium chloride 0.9% lock flush 10 milliLiter(s) IV Push every 1 hour PRN Pre/post blood products, medications, blood draw, and to maintain line patency      Past medical history reviewed  Family history reviewed  Social history reviewed    PHYSICAL EXAM  Vital Signs Last 24 Hrs  T(C): 36.5 (10 May 2020 20:59), Max: 36.6 (10 May 2020 05:50)  T(F): 97.7 (10 May 2020 20:59), Max: 97.8 (10 May 2020 05:50)  HR: 90 (10 May 2020 20:59) (61 - 100)  BP: 107/73 (10 May 2020 20:59) (99/58 - 107/73)  BP(mean): --  RR: 17 (10 May 2020 20:59) (16 - 17)  SpO2: 100% (10 May 2020 20:59) (94% - 100%)    Constitutional: wn/wd in NAD.   HEENT: NCAT, MMM, OP clear, EOMI, no proptosis or lid retraction  Neck: no thyromegaly or palpable thyroid nodules   Respiratory: lungs CTAB.  Cardiovascular: regular rhythm, normal S1 and S2, no audible murmurs, no peripheral edema  GI: soft, NT/ND, no masses/HSM appreciated.  Neurology: no tremors, DTR 2+  Skin: no visible rashes/lesions  Psychiatric: AAO x 3, normal affect/mood.    LABS:                        8.1    11.34 )-----------( 251      ( 10 May 2020 06:14 )             25.5     05-10    130<L>  |  93<L>  |  56<H>  ----------------------------<  125<H>  4.5   |  17<L>  |  4.77<H>    Ca    8.9      10 May 2020 11:25  Phos  5.9     05-10  Mg     2.4     05-10    TPro  7.5  /  Alb  3.4  /  TBili  0.8  /  DBili  <0.2  /  AST  98<H>  /  ALT  43  /  AlkPhos  63  05-10      Urinalysis Basic - ( 10 May 2020 13:05 )    Color: Yellow / Appearance: SL Cloudy / SG: >=1.030 / pH: x  Gluc: x / Ketone: Trace mg/dL  / Bili: Moderate / Urobili: 1.0 E.U./dL   Blood: x / Protein: 100 mg/dL / Nitrite: POSITIVE   Leuk Esterase: Trace / RBC: Many /HPF / WBC 5-10 /HPF   Sq Epi: x / Non Sq Epi: 0-5 /HPF / Bacteria: None /HPF      Thyroid Stimulating Hormone, Serum: 0.585 uIU/mL (05-07 @ 06:37)  Thyroid Stimulating Hormone, Serum: 0.558 uIU/mL (05-07 @ 06:37)      HbA1C: 6.0 % (01-16 @ 06:03)    CAPILLARY BLOOD GLUCOSE      POCT Blood Glucose.: 102 mg/dL (10 May 2020 21:37)  POCT Blood Glucose.: 101 mg/dL (10 May 2020 10:40)  POCT Blood Glucose.: 42 mg/dL (10 May 2020 10:14)      Direct LDL: 48 mg/dL (05-09-20 @ 06:05) 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 (10 May 2020 13:10)  no acute events  poor po intake  fsg of 42 noted, pt asymptomatic at the time.     Pt reports the following symptoms:    CONSTITUTIONAL:  Negative fever or chills, feels well, good appetite  EYES:  Negative  blurry vision or double vision  CARDIOVASCULAR:  Negative for chest pain or palpitations  RESPIRATORY:  Negative for cough, wheezing, or SOB   GASTROINTESTINAL:  Negative for nausea, vomiting, diarrhea, constipation, or abdominal pain  GENITOURINARY:  Negative frequency, urgency or dysuria  NEUROLOGIC:  No headache, confusion, dizziness, lightheadedness    MEDICATIONS  (STANDING):  aMIOdarone    Tablet 200 milliGRAM(s) Oral daily  apixaban 2.5 milliGRAM(s) Oral two times a day  atorvastatin 40 milliGRAM(s) Oral at bedtime  chlorhexidine 2% Cloths 1 Application(s) Topical <User Schedule>  clopidogrel Tablet 75 milliGRAM(s) Oral daily  Dakins Solution - 1/2 Strength 1 Application(s) Topical daily  dextrose 5%. 1000 milliLiter(s) (50 mL/Hr) IV Continuous <Continuous>  dextrose 50% Injectable 12.5 Gram(s) IV Push once  dextrose 50% Injectable 25 Gram(s) IV Push once  furosemide    Tablet 40 milliGRAM(s) Oral daily  melatonin 1 milliGRAM(s) Oral at bedtime  metoprolol succinate ER 50 milliGRAM(s) Oral two times a day  piperacillin/tazobactam IVPB.. 2.25 Gram(s) IV Intermittent every 8 hours  senna 2 Tablet(s) Oral at bedtime  sodium bicarbonate 650 milliGRAM(s) Oral three times a day    MEDICATIONS  (PRN):  acetaminophen   Tablet .. 650 milliGRAM(s) Oral every 6 hours PRN Mild Pain (1 - 3)  dextrose 40% Gel 15 Gram(s) Oral once PRN Blood Glucose LESS THAN 70 milliGRAM(s)/deciliter  glucagon  Injectable 1 milliGRAM(s) IntraMuscular once PRN Glucose LESS THAN 70 milligrams/deciliter  oxycodone    5 mG/acetaminophen 325 mG 1 Tablet(s) Oral every 6 hours PRN Severe Pain (7 - 10)  sodium chloride 0.9% lock flush 10 milliLiter(s) IV Push every 1 hour PRN Pre/post blood products, medications, blood draw, and to maintain line patency      Past medical history reviewed  Family history reviewed  Social history reviewed    PHYSICAL EXAM  Vital Signs Last 24 Hrs  T(C): 36.5 (10 May 2020 20:59), Max: 36.6 (10 May 2020 05:50)  T(F): 97.7 (10 May 2020 20:59), Max: 97.8 (10 May 2020 05:50)  HR: 90 (10 May 2020 20:59) (61 - 100)  BP: 107/73 (10 May 2020 20:59) (99/58 - 107/73)  BP(mean): --  RR: 17 (10 May 2020 20:59) (16 - 17)  SpO2: 100% (10 May 2020 20:59) (94% - 100%)    Constitutional: wn/wd in NAD.   HEENT: NCAT, MMM, OP clear, EOMI, no proptosis or lid retraction  Neck: no thyromegaly or palpable thyroid nodules   Respiratory: lungs CTAB.  Cardiovascular: regular rhythm, normal S1 and S2, no audible murmurs, no peripheral edema  GI: soft, NT/ND, no masses/HSM appreciated.      LABS:                        8.1    11.34 )-----------( 251      ( 10 May 2020 06:14 )             25.5     05-10    130<L>  |  93<L>  |  56<H>  ----------------------------<  125<H>  4.5   |  17<L>  |  4.77<H>    Ca    8.9      10 May 2020 11:25  Phos  5.9     05-10  Mg     2.4     05-10    TPro  7.5  /  Alb  3.4  /  TBili  0.8  /  DBili  <0.2  /  AST  98<H>  /  ALT  43  /  AlkPhos  63  05-10      Urinalysis Basic - ( 10 May 2020 13:05 )    Color: Yellow / Appearance: SL Cloudy / SG: >=1.030 / pH: x  Gluc: x / Ketone: Trace mg/dL  / Bili: Moderate / Urobili: 1.0 E.U./dL   Blood: x / Protein: 100 mg/dL / Nitrite: POSITIVE   Leuk Esterase: Trace / RBC: Many /HPF / WBC 5-10 /HPF   Sq Epi: x / Non Sq Epi: 0-5 /HPF / Bacteria: None /HPF      Thyroid Stimulating Hormone, Serum: 0.585 uIU/mL (05-07 @ 06:37)  Thyroid Stimulating Hormone, Serum: 0.558 uIU/mL (05-07 @ 06:37)      HbA1C: 6.0 % (01-16 @ 06:03)    CAPILLARY BLOOD GLUCOSE      POCT Blood Glucose.: 102 mg/dL (10 May 2020 21:37)  POCT Blood Glucose.: 101 mg/dL (10 May 2020 10:40)  POCT Blood Glucose.: 42 mg/dL (10 May 2020 10:14)      Direct LDL: 48 mg/dL (05-09-20 @ 06:05)

## 2020-05-10 NOTE — PROGRESS NOTE ADULT - SUBJECTIVE AND OBJECTIVE BOX
STATUS POST:      INTERVAL HPI/OVERNIGHT EVENTS:      SUBJECTIVE:     aMIOdarone    Tablet 200 milliGRAM(s) Oral daily  apixaban 2.5 milliGRAM(s) Oral two times a day  clopidogrel Tablet 75 milliGRAM(s) Oral daily  metoprolol succinate ER 50 milliGRAM(s) Oral two times a day      Vital Signs Last 24 Hrs  T(C): 36.6 (10 May 2020 05:50), Max: 36.8 (09 May 2020 16:49)  T(F): 97.8 (10 May 2020 05:50), Max: 98.3 (09 May 2020 16:49)  HR: 100 (10 May 2020 05:50) (90 - 134)  BP: 100/64 (10 May 2020 05:50) (98/67 - 150/83)  BP(mean): --  RR: 17 (10 May 2020 05:50) (16 - 18)  SpO2: 94% (10 May 2020 05:50) (94% - 100%)  I&O's Detail    09 May 2020 07:01  -  10 May 2020 07:00  --------------------------------------------------------  IN:    Oral Fluid: 370 mL    sodium chloride 0.9%: 600 mL  Total IN: 970 mL    OUT:    Indwelling Catheter - Urethral: 275 mL    VAC (Vacuum Assisted Closure) System: 25 mL  Total OUT: 300 mL    Total NET: 670 mL          Physical Exam      LABS:                        8.1    11.34 )-----------( 251      ( 10 May 2020 06:14 )             25.5     05-10    131<L>  |  96  |  53<H>  ----------------------------<  84  4.4   |  19<L>  |  4.72<H>    Ca    8.4      10 May 2020 06:14  Phos  5.9     05-10  Mg     2.3     05-10      PTT - ( 08 May 2020 15:30 )  PTT:42.4 sec  Urinalysis Basic - ( 09 May 2020 20:00 )    Color: Yellow / Appearance: Clear / S.025 / pH: x  Gluc: x / Ketone: Trace mg/dL  / Bili: Small / Urobili: 0.2 E.U./dL   Blood: x / Protein: Trace mg/dL / Nitrite: NEGATIVE   Leuk Esterase: NEGATIVE / RBC: < 5 /HPF / WBC < 5 /HPF   Sq Epi: x / Non Sq Epi: x / Bacteria: Present /HPF        RADIOLOGY & ADDITIONAL STUDIES: STATUS POST: POD # 4  s/p LLE Angiogram DEStent x2, L 3rd and 4th toe amputation, and debridement     INTERVAL HPI/OVERNIGHT EVENTS: : d/c vanc started dapto and dec zosyn per ID concern for kidney fxn, CXR for fluid status--overloaded, encouraged PO intake keeping IV rate same, called renal, PT seeing him, renal duplex w/ echogenic renal parenchyma c/w new medical renal disease, d/w renal who says they aren't that worried about him and note to come later. Moe placed, cards paged to see if lasix okay, rivaroxaban switched to eliquis     SUBJECTIVE: Examined with chief resident at the bedside, resting comfortably. This morning, he feels well; his pain is well-controlled, no numbness or tingling. No nausea or vomiting. Passing flatus and having BMs. No acute complaints.     aMIOdarone    Tablet 200 milliGRAM(s) Oral daily  apixaban 2.5 milliGRAM(s) Oral two times a day  clopidogrel Tablet 75 milliGRAM(s) Oral daily  metoprolol succinate ER 50 milliGRAM(s) Oral two times a day      Vital Signs Last 24 Hrs  T(C): 36.6 (10 May 2020 05:50), Max: 36.8 (09 May 2020 16:49)  T(F): 97.8 (10 May 2020 05:50), Max: 98.3 (09 May 2020 16:49)  HR: 100 (10 May 2020 05:50) (90 - 134)  BP: 100/64 (10 May 2020 05:50) (98/67 - 150/83)  BP(mean): --  RR: 17 (10 May 2020 05:50) (16 - 18)  SpO2: 94% (10 May 2020 05:50) (94% - 100%)  I&O's Detail    09 May 2020 07:01  -  10 May 2020 07:00  --------------------------------------------------------  IN:    Oral Fluid: 370 mL    sodium chloride 0.9%: 600 mL  Total IN: 970 mL    OUT:    Indwelling Catheter - Urethral: 275 mL    VAC (Vacuum Assisted Closure) System: 25 mL  Total OUT: 300 mL    Total NET: 670 mL          Physical Exam  General: NAD, resting comfortably in bed  C/V: Tachycardic  Pulm: Nonlabored breathing, no respiratory distress, Crackles LLB, Decreased BS RLB  Abd: soft, non-tender, non-distended.  Extrem: WWP, Left foot wound with vac in place  Neuro: A/O x 3, no focal deficits, normal sensation  Pulses: LLE: biphasic PT  : Moe with turbid urine      LABS:                        8.1    11.34 )-----------( 251      ( 10 May 2020 06:14 )             25.5     05-10    131<L>  |  96  |  53<H>  ----------------------------<  84  4.4   |  19<L>  |  4.72<H>    Ca    8.4      10 May 2020 06:14  Phos  5.9     05-10  Mg     2.3     05-10      PTT - ( 08 May 2020 15:30 )  PTT:42.4 sec  Urinalysis Basic - ( 09 May 2020 20:00 )    Color: Yellow / Appearance: Clear / S.025 / pH: x  Gluc: x / Ketone: Trace mg/dL  / Bili: Small / Urobili: 0.2 E.U./dL   Blood: x / Protein: Trace mg/dL / Nitrite: NEGATIVE   Leuk Esterase: NEGATIVE / RBC: < 5 /HPF / WBC < 5 /HPF   Sq Epi: x / Non Sq Epi: x / Bacteria: Present /HPF        RADIOLOGY & ADDITIONAL STUDIES:  < from: Xray Chest 1 View- PORTABLE-Routine (05.10.20 @ 03:47) >  NTERPRETATION:    Clinical history: Pleural effusion    Portable examination of the chest demonstrates no interval change lung pathology in comparison to prior examination of the chest 2020. Right subclavian line noted with tip overlying superior vena cava. Cardiomegaly.    Impression: No interval change lung pathology    < end of copied text >

## 2020-05-10 NOTE — CONSULT NOTE ADULT - PROBLEM SELECTOR RECOMMENDATION 9
Post op care as per Vascular Team  -Pain control  -Wound care  -Bowel regimen  -Incentive courtney     -Regarding antibiotics - discussed case with Dr. Arriaga (ID) - optimal regimen would include Daptomycin and Zosyn (Ertapenem instead of Zosyn as a consideration) for 6 weeks

## 2020-05-11 LAB
ALBUMIN SERPL ELPH-MCNC: 2.7 G/DL — LOW (ref 3.3–5)
ALP SERPL-CCNC: 50 U/L — SIGNIFICANT CHANGE UP (ref 40–120)
ALT FLD-CCNC: 35 U/L — SIGNIFICANT CHANGE UP (ref 10–45)
ANION GAP SERPL CALC-SCNC: 16 MMOL/L — SIGNIFICANT CHANGE UP (ref 5–17)
AST SERPL-CCNC: 63 U/L — HIGH (ref 10–40)
BILIRUB SERPL-MCNC: 0.6 MG/DL — SIGNIFICANT CHANGE UP (ref 0.2–1.2)
BUN SERPL-MCNC: 60 MG/DL — HIGH (ref 7–23)
C PEPTIDE SERPL-MCNC: 7.9 NG/ML — HIGH (ref 1.1–4.4)
CALCIUM SERPL-MCNC: 7.9 MG/DL — LOW (ref 8.4–10.5)
CHLORIDE SERPL-SCNC: 94 MMOL/L — LOW (ref 96–108)
CO2 SERPL-SCNC: 18 MMOL/L — LOW (ref 22–31)
CREAT SERPL-MCNC: 5.44 MG/DL — HIGH (ref 0.5–1.3)
GLUCOSE BLDC GLUCOMTR-MCNC: 127 MG/DL — HIGH (ref 70–99)
GLUCOSE BLDC GLUCOMTR-MCNC: 93 MG/DL — SIGNIFICANT CHANGE UP (ref 70–99)
GLUCOSE SERPL-MCNC: 109 MG/DL — HIGH (ref 70–99)
HCT VFR BLD CALC: 24.8 % — LOW (ref 39–50)
HCT VFR BLD CALC: 25.8 % — LOW (ref 39–50)
HGB BLD-MCNC: 7.8 G/DL — LOW (ref 13–17)
HGB BLD-MCNC: 8.3 G/DL — LOW (ref 13–17)
INSULIN SERPL-MCNC: 7.2 UU/ML — SIGNIFICANT CHANGE UP (ref 2.6–24.9)
MAGNESIUM SERPL-MCNC: 2.3 MG/DL — SIGNIFICANT CHANGE UP (ref 1.6–2.6)
MCHC RBC-ENTMCNC: 26.5 PG — LOW (ref 27–34)
MCHC RBC-ENTMCNC: 27.4 PG — SIGNIFICANT CHANGE UP (ref 27–34)
MCHC RBC-ENTMCNC: 31.5 GM/DL — LOW (ref 32–36)
MCHC RBC-ENTMCNC: 32.2 GM/DL — SIGNIFICANT CHANGE UP (ref 32–36)
MCV RBC AUTO: 84.4 FL — SIGNIFICANT CHANGE UP (ref 80–100)
MCV RBC AUTO: 85.1 FL — SIGNIFICANT CHANGE UP (ref 80–100)
NRBC # BLD: 0 /100 WBCS — SIGNIFICANT CHANGE UP (ref 0–0)
NRBC # BLD: 0 /100 WBCS — SIGNIFICANT CHANGE UP (ref 0–0)
PHOSPHATE SERPL-MCNC: 5.8 MG/DL — HIGH (ref 2.5–4.5)
PLATELET # BLD AUTO: 256 K/UL — SIGNIFICANT CHANGE UP (ref 150–400)
PLATELET # BLD AUTO: 270 K/UL — SIGNIFICANT CHANGE UP (ref 150–400)
POTASSIUM SERPL-MCNC: 3.8 MMOL/L — SIGNIFICANT CHANGE UP (ref 3.5–5.3)
POTASSIUM SERPL-SCNC: 3.8 MMOL/L — SIGNIFICANT CHANGE UP (ref 3.5–5.3)
PROT SERPL-MCNC: 5.9 G/DL — LOW (ref 6–8.3)
RBC # BLD: 2.94 M/UL — LOW (ref 4.2–5.8)
RBC # BLD: 3.03 M/UL — LOW (ref 4.2–5.8)
RBC # FLD: 15.6 % — HIGH (ref 10.3–14.5)
RBC # FLD: 15.6 % — HIGH (ref 10.3–14.5)
SODIUM SERPL-SCNC: 128 MMOL/L — LOW (ref 135–145)
WBC # BLD: 7.19 K/UL — SIGNIFICANT CHANGE UP (ref 3.8–10.5)
WBC # BLD: 7.39 K/UL — SIGNIFICANT CHANGE UP (ref 3.8–10.5)
WBC # FLD AUTO: 7.19 K/UL — SIGNIFICANT CHANGE UP (ref 3.8–10.5)
WBC # FLD AUTO: 7.39 K/UL — SIGNIFICANT CHANGE UP (ref 3.8–10.5)

## 2020-05-11 PROCEDURE — 71045 X-RAY EXAM CHEST 1 VIEW: CPT | Mod: 26

## 2020-05-11 PROCEDURE — 99232 SBSQ HOSP IP/OBS MODERATE 35: CPT

## 2020-05-11 PROCEDURE — 99231 SBSQ HOSP IP/OBS SF/LOW 25: CPT | Mod: GC

## 2020-05-11 RX ORDER — INSULIN LISPRO 100/ML
VIAL (ML) SUBCUTANEOUS
Refills: 0 | Status: DISCONTINUED | OUTPATIENT
Start: 2020-05-11 | End: 2020-05-21

## 2020-05-11 RX ORDER — HYDROMORPHONE HYDROCHLORIDE 2 MG/ML
0.5 INJECTION INTRAMUSCULAR; INTRAVENOUS; SUBCUTANEOUS ONCE
Refills: 0 | Status: DISCONTINUED | OUTPATIENT
Start: 2020-05-11 | End: 2020-05-11

## 2020-05-11 RX ADMIN — APIXABAN 2.5 MILLIGRAM(S): 2.5 TABLET, FILM COATED ORAL at 05:41

## 2020-05-11 RX ADMIN — CLOPIDOGREL BISULFATE 75 MILLIGRAM(S): 75 TABLET, FILM COATED ORAL at 12:03

## 2020-05-11 RX ADMIN — Medication 40 MILLIGRAM(S): at 05:42

## 2020-05-11 RX ADMIN — DAPTOMYCIN 124 MILLIGRAM(S): 500 INJECTION, POWDER, LYOPHILIZED, FOR SOLUTION INTRAVENOUS at 12:03

## 2020-05-11 RX ADMIN — OXYCODONE AND ACETAMINOPHEN 1 TABLET(S): 5; 325 TABLET ORAL at 16:17

## 2020-05-11 RX ADMIN — PIPERACILLIN AND TAZOBACTAM 200 GRAM(S): 4; .5 INJECTION, POWDER, LYOPHILIZED, FOR SOLUTION INTRAVENOUS at 05:41

## 2020-05-11 RX ADMIN — Medication 650 MILLIGRAM(S): at 14:10

## 2020-05-11 RX ADMIN — Medication 1 MILLIGRAM(S): at 21:23

## 2020-05-11 RX ADMIN — PIPERACILLIN AND TAZOBACTAM 200 GRAM(S): 4; .5 INJECTION, POWDER, LYOPHILIZED, FOR SOLUTION INTRAVENOUS at 21:22

## 2020-05-11 RX ADMIN — APIXABAN 2.5 MILLIGRAM(S): 2.5 TABLET, FILM COATED ORAL at 17:20

## 2020-05-11 RX ADMIN — CHLORHEXIDINE GLUCONATE 1 APPLICATION(S): 213 SOLUTION TOPICAL at 07:34

## 2020-05-11 RX ADMIN — Medication 650 MILLIGRAM(S): at 21:23

## 2020-05-11 RX ADMIN — PIPERACILLIN AND TAZOBACTAM 200 GRAM(S): 4; .5 INJECTION, POWDER, LYOPHILIZED, FOR SOLUTION INTRAVENOUS at 14:10

## 2020-05-11 RX ADMIN — Medication 1 APPLICATION(S): at 12:04

## 2020-05-11 RX ADMIN — HYDROMORPHONE HYDROCHLORIDE 0.5 MILLIGRAM(S): 2 INJECTION INTRAMUSCULAR; INTRAVENOUS; SUBCUTANEOUS at 15:39

## 2020-05-11 RX ADMIN — SENNA PLUS 2 TABLET(S): 8.6 TABLET ORAL at 21:23

## 2020-05-11 RX ADMIN — Medication 50 MILLIGRAM(S): at 05:42

## 2020-05-11 RX ADMIN — ATORVASTATIN CALCIUM 40 MILLIGRAM(S): 80 TABLET, FILM COATED ORAL at 21:23

## 2020-05-11 RX ADMIN — Medication 50 MILLIGRAM(S): at 17:20

## 2020-05-11 RX ADMIN — Medication 650 MILLIGRAM(S): at 05:41

## 2020-05-11 RX ADMIN — AMIODARONE HYDROCHLORIDE 200 MILLIGRAM(S): 400 TABLET ORAL at 05:42

## 2020-05-11 NOTE — PROGRESS NOTE ADULT - SUBJECTIVE AND OBJECTIVE BOX
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 (10 May 2020 13:10)      Pt reports the following symptoms:    CONSTITUTIONAL:  Negative fever or chills, feels well, good appetite  EYES:  Negative  blurry vision or double vision  CARDIOVASCULAR:  Negative for chest pain or palpitations  RESPIRATORY:  Negative for cough, wheezing, or SOB   GASTROINTESTINAL:  Negative for nausea, vomiting, diarrhea, constipation, or abdominal pain  GENITOURINARY:  Negative frequency, urgency or dysuria  NEUROLOGIC:  No headache, confusion, dizziness, lightheadedness    MEDICATIONS  (STANDING):  aMIOdarone    Tablet 200 milliGRAM(s) Oral daily  apixaban 2.5 milliGRAM(s) Oral two times a day  atorvastatin 40 milliGRAM(s) Oral at bedtime  chlorhexidine 2% Cloths 1 Application(s) Topical <User Schedule>  clopidogrel Tablet 75 milliGRAM(s) Oral daily  Dakins Solution - 1/2 Strength 1 Application(s) Topical daily  DAPTOmycin IVPB 600 milliGRAM(s) IV Intermittent every 48 hours  dextrose 5%. 1000 milliLiter(s) (50 mL/Hr) IV Continuous <Continuous>  dextrose 50% Injectable 12.5 Gram(s) IV Push once  dextrose 50% Injectable 25 Gram(s) IV Push once  furosemide    Tablet 40 milliGRAM(s) Oral daily  melatonin 1 milliGRAM(s) Oral at bedtime  metoprolol succinate ER 50 milliGRAM(s) Oral two times a day  piperacillin/tazobactam IVPB.. 2.25 Gram(s) IV Intermittent every 8 hours  senna 2 Tablet(s) Oral at bedtime  sodium bicarbonate 650 milliGRAM(s) Oral three times a day    MEDICATIONS  (PRN):  acetaminophen   Tablet .. 650 milliGRAM(s) Oral every 6 hours PRN Mild Pain (1 - 3)  dextrose 40% Gel 15 Gram(s) Oral once PRN Blood Glucose LESS THAN 70 milliGRAM(s)/deciliter  glucagon  Injectable 1 milliGRAM(s) IntraMuscular once PRN Glucose LESS THAN 70 milligrams/deciliter  oxycodone    5 mG/acetaminophen 325 mG 1 Tablet(s) Oral every 6 hours PRN Severe Pain (7 - 10)  sodium chloride 0.9% lock flush 10 milliLiter(s) IV Push every 1 hour PRN Pre/post blood products, medications, blood draw, and to maintain line patency      PHYSICAL EXAM  Vital Signs Last 24 Hrs  T(C): 36.3 (11 May 2020 09:02), Max: 36.7 (11 May 2020 00:30)  T(F): 97.4 (11 May 2020 09:02), Max: 98.1 (11 May 2020 00:30)  HR: 96 (11 May 2020 09:02) (89 - 100)  BP: 107/72 (11 May 2020 09:02) (99/58 - 107/73)  BP(mean): --  RR: 20 (11 May 2020 09:02) (16 - 20)  SpO2: 96% (11 May 2020 09:02) (96% - 100%)    Constitutional: wn/wd in NAD.   HEENT: NCAT, MMM, OP clear, EOMI, no proptosis or lid retraction  Neck: no thyromegaly or palpable thyroid nodules   Respiratory: lungs CTAB.  Cardiovascular: regular rhythm, normal S1 and S2, no audible murmurs, no peripheral edema  GI: soft, NT/ND, no masses/HSM appreciated.  Neurology: no tremors, DTR 2+  Skin: no visible rashes/lesions  Psychiatric: AAO x 3, normal affect/mood.    LABS:                        7.8    7.19  )-----------( 256      ( 11 May 2020 06:36 )             24.8     05-11    128<L>  |  94<L>  |  60<H>  ----------------------------<  109<H>  3.8   |  18<L>  |  5.44<H>    Ca    7.9<L>      11 May 2020 06:36  Phos  5.8     05-11  Mg     2.3     05-11    TPro  5.9<L>  /  Alb  2.7<L>  /  TBili  0.6  /  DBili  x   /  AST  63<H>  /  ALT  35  /  AlkPhos  50  05-11      Urinalysis Basic - ( 10 May 2020 13:05 )    Color: Yellow / Appearance: SL Cloudy / SG: >=1.030 / pH: x  Gluc: x / Ketone: Trace mg/dL  / Bili: Moderate / Urobili: 1.0 E.U./dL   Blood: x / Protein: 100 mg/dL / Nitrite: POSITIVE   Leuk Esterase: Trace / RBC: Many /HPF / WBC 5-10 /HPF   Sq Epi: x / Non Sq Epi: 0-5 /HPF / Bacteria: None /HPF      Thyroid Stimulating Hormone, Serum: 0.585 uIU/mL (05-07 @ 06:37)  Thyroid Stimulating Hormone, Serum: 0.558 uIU/mL (05-07 @ 06:37)      HbA1C: 6.0 % (01-16 @ 06:03)    CAPILLARY BLOOD GLUCOSE      POCT Blood Glucose.: 127 mg/dL (11 May 2020 06:31)  POCT Blood Glucose.: 102 mg/dL (10 May 2020 21:37)      Insulin Sliding Scale requirements X 24 Hours:    RADIOLOGY & ADDITIONAL TESTS:    A/P: 84y Male with history of DM type II presenting for       1.  DM -     Please continue           units lantus at bedtime  / in the morning and        units lispro with meals and lispro moderate / low dose sliding scale 4 times daily with meals and at bedtime.  Please continue consistent carbohydrate diet.      Goal FSG is   Will continue to monitor   For discharge, pt can continue    Pt can follow up at discharge with [facility] by calling  to make an appointment.   Will discuss case with     and update primary team 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 (10 May 2020 13:10)  Patient seen and examined at the bedside.  he had an episode of hypoglycemia - FSG 42 - at around 1000 AM yesterday - asymptomatic.  The FSg was taken from the left arm -which was cold.  Patient was given juice before the blood was drawn  He said that he having some difficulty eating if the food is hard. he was asking for some soft food.  His renal function is getting worse. Cr was 5.44 and GFR 10. he is not making much urine - Had about 290cc of urine in the past 24 hours.  Was given lasix as per nephro. Sodium was 128 today.  Pertinent labs  5/10  600 BMP glucose was 84 ( BF with eggs, sausage, toast  10:14 FSG - 42 - was given juice - Repeat . Labs at that time. BMP blood glucose was 125, c-peptide was 7.9 and insulin level 7.2  2100 FSG - 102  5/11  600 AM         Pt reports the following symptoms:  CONSTITUTIONAL:  Negative fever or chills  CARDIOVASCULAR:  Negative for chest pain or palpitations  RESPIRATORY:  Negative for cough, wheezing, or SOB   GASTROINTESTINAL:  Negative for vomiting, diarrhea, constipation, or abdominal pain  NEUROLOGIC:  No  confusion, dizziness, lightheadedness    MEDICATIONS  (STANDING):  aMIOdarone    Tablet 200 milliGRAM(s) Oral daily  apixaban 2.5 milliGRAM(s) Oral two times a day  atorvastatin 40 milliGRAM(s) Oral at bedtime  chlorhexidine 2% Cloths 1 Application(s) Topical <User Schedule>  clopidogrel Tablet 75 milliGRAM(s) Oral daily  Dakins Solution - 1/2 Strength 1 Application(s) Topical daily  DAPTOmycin IVPB 600 milliGRAM(s) IV Intermittent every 48 hours  dextrose 5%. 1000 milliLiter(s) (50 mL/Hr) IV Continuous <Continuous>  dextrose 50% Injectable 12.5 Gram(s) IV Push once  dextrose 50% Injectable 25 Gram(s) IV Push once  furosemide    Tablet 40 milliGRAM(s) Oral daily  melatonin 1 milliGRAM(s) Oral at bedtime  metoprolol succinate ER 50 milliGRAM(s) Oral two times a day  piperacillin/tazobactam IVPB.. 2.25 Gram(s) IV Intermittent every 8 hours  senna 2 Tablet(s) Oral at bedtime  sodium bicarbonate 650 milliGRAM(s) Oral three times a day    MEDICATIONS  (PRN):  acetaminophen   Tablet .. 650 milliGRAM(s) Oral every 6 hours PRN Mild Pain (1 - 3)  dextrose 40% Gel 15 Gram(s) Oral once PRN Blood Glucose LESS THAN 70 milliGRAM(s)/deciliter  glucagon  Injectable 1 milliGRAM(s) IntraMuscular once PRN Glucose LESS THAN 70 milligrams/deciliter  oxycodone    5 mG/acetaminophen 325 mG 1 Tablet(s) Oral every 6 hours PRN Severe Pain (7 - 10)  sodium chloride 0.9% lock flush 10 milliLiter(s) IV Push every 1 hour PRN Pre/post blood products, medications, blood draw, and to maintain line patency      PHYSICAL EXAM  Vital Signs Last 24 Hrs  T(C): 36.3 (11 May 2020 09:02), Max: 36.7 (11 May 2020 00:30)  T(F): 97.4 (11 May 2020 09:02), Max: 98.1 (11 May 2020 00:30)  HR: 96 (11 May 2020 09:02) (89 - 100)  BP: 107/72 (11 May 2020 09:02) (99/58 - 107/73)  BP(mean): --  RR: 20 (11 May 2020 09:02) (16 - 20)  SpO2: 96% (11 May 2020 09:02) (96% - 100%)    Constitutional: wn/wd in NAD.   Respiratory: lungs CTAB.  Cardiovascular: irregular rhythm, normal S1 and S2, no peripheral edema  GI: soft, NT/ND, no masses/HSM appreciated.  Neurology: no tremors, DTR 2+, cold extremities    LABS:                        7.8    7.19  )-----------( 256      ( 11 May 2020 06:36 )             24.8     05-11    128<L>  |  94<L>  |  60<H>  ----------------------------<  109<H>  3.8   |  18<L>  |  5.44<H>    Ca    7.9<L>      11 May 2020 06:36  Phos  5.8     05-11  Mg     2.3     05-11    TPro  5.9<L>  /  Alb  2.7<L>  /  TBili  0.6  /  DBili  x   /  AST  63<H>  /  ALT  35  /  AlkPhos  50  05-11      Urinalysis Basic - ( 10 May 2020 13:05 )    Color: Yellow / Appearance: SL Cloudy / SG: >=1.030 / pH: x  Gluc: x / Ketone: Trace mg/dL  / Bili: Moderate / Urobili: 1.0 E.U./dL   Blood: x / Protein: 100 mg/dL / Nitrite: POSITIVE   Leuk Esterase: Trace / RBC: Many /HPF / WBC 5-10 /HPF   Sq Epi: x / Non Sq Epi: 0-5 /HPF / Bacteria: None /HPF      Thyroid Stimulating Hormone, Serum: 0.585 uIU/mL (05-07 @ 06:37)  Thyroid Stimulating Hormone, Serum: 0.558 uIU/mL (05-07 @ 06:37)      HbA1C: 6.0 % (01-16 @ 06:03)    CAPILLARY BLOOD GLUCOSE      POCT Blood Glucose.: 127 mg/dL (11 May 2020 06:31)  POCT Blood Glucose.: 102 mg/dL (10 May 2020 21:37)      Insulin Sliding Scale requirements X 24 Hours:    RADIOLOGY & ADDITIONAL TESTS:    A/P: 84 M PMH HTN, DM, A fib on Xarelto (Last taken 8am) HF EF 35%, CKD III PAD p/w 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 He is s/p left fourth toe amputation and angiogram 5/6/20. He is s/p 2 KECIA placement in TP trunk. currently on antibiotics.    1.  Hypoglycemia   - likely factitious at this time given the cold extremities ( vascular). Unlikely to be reactive hypoglycemia given no risk factors.   - had CKD - now in renal failure. Normal liver function  - Cr 2.06 and GFR 34  - Wt 77.1 kg with BMI 24.4  Hba1c 6  TSH 0.585, Free T4 1.09  - 5/10 10 Am FSG - 42 - was given juice - Repeat . Labs at that time. BMP blood glucose was 125, c-peptide was 7.9 and insulin level 7.2  - Please start on FSG monitoring - four times a day - before meals and bedtime  - Please obtain Basic metabolic panel, c-peptide level, insulin level and pro-insulin level when the FSG is less than 60. These labs should be drawn before administering any dextrose containing   solution ( Juice or D50)  - Please consider warming the fingers before obtaining the FSG.   - PLease consider changing the diet to soft carb consistent and add nephro shakes - 1 can - TID to help with his appetite.  Will continue to monitor     For discharge, TBD    Pt can follow up at discharge with [facility] by calling  to make an appointment.   discussed case with Dr. Bello    and updated primary team

## 2020-05-11 NOTE — PROGRESS NOTE ADULT - SUBJECTIVE AND OBJECTIVE BOX
INTERVAL HPI/OVERNIGHT EVENTS: Pt had a temperature of 96.3 early morning. He is now normothermic.  He has pain in his foot with a VAC.    CONSTITUTIONAL:  No fever, chills, night sweats  EYES:  No photophobia or visual changes  CARDIOVASCULAR:  No chest pain  RESPIRATORY:  No cough, wheezing, or SOB   GASTROINTESTINAL:  No nausea, vomiting, diarrhea, constipation, or abdominal pain  GENITOURINARY:  No frequency, urgency, dysuria or hematuria  NEUROLOGIC:  No headache, lightheadedness      ANTIBIOTICS/RELEVANT:    Daptomycin 600mg q48h  Zosyn 2.25 q6h      Vital Signs Last 24 Hrs  T(C): 36.3 (11 May 2020 09:02), Max: 36.7 (11 May 2020 00:30)  T(F): 97.4 (11 May 2020 09:02), Max: 98.1 (11 May 2020 00:30)  HR: 98 (11 May 2020 11:49) (89 - 100)  BP: 115/64 (11 May 2020 11:49) (99/58 - 115/64)  BP(mean): 85 (11 May 2020 11:49) (85 - 85)  RR: 16 (11 May 2020 11:49) (16 - 20)  SpO2: 92% (11 May 2020 11:49) (92% - 100%)    PHYSICAL EXAM:  Constitutional:  Somewhat frail appearing  Eyes:  Sclerae anicteric, conjunctivae clear, PERRL  Ear/Nose/Throat:  No nasal exudate or sinus tenderness;  No buccal mucosal lesions, no pharyngeal erythema or exudate	  Neck:  Supple, no adenopathy  Respiratory:  Clear bilaterally  Cardiovascular:  Tachy, S1S2, no murmur appreciated  Gastrointestinal:  Symmetric, normoactive BS, soft, NT, no masses, guarding or rebound  Extremities:  Wound vac L foot      LABS:                        7.8    7.19  )-----------( 256      ( 11 May 2020 06:36 )             24.8         05-11    128<L>  |  94<L>  |  60<H>  ----------------------------<  109<H>  3.8   |  18<L>  |  5.44<H>    Ca    7.9<L>      11 May 2020 06:36  Phos  5.8     05-11  Mg     2.3     05-11    TPro  5.9<L>  /  Alb  2.7<L>  /  TBili  0.6  /  DBili  x   /  AST  63<H>  /  ALT  35  /  AlkPhos  50  05-11      Urinalysis Basic - ( 10 May 2020 13:05 )    Color: Yellow / Appearance: SL Cloudy / SG: >=1.030 / pH: x  Gluc: x / Ketone: Trace mg/dL  / Bili: Moderate / Urobili: 1.0 E.U./dL   Blood: x / Protein: 100 mg/dL / Nitrite: POSITIVE   Leuk Esterase: Trace / RBC: Many /HPF / WBC 5-10 /HPF   Sq Epi: x / Non Sq Epi: 0-5 /HPF / Bacteria: None /HPF

## 2020-05-11 NOTE — PROGRESS NOTE ADULT - ATTENDING COMMENTS
ATN- no improvement yet but no indication for dialysis -- appears euvolemic and no uremic sx  cont to follow chem ,uo  reviewed with pt indications for dialysis

## 2020-05-11 NOTE — PROGRESS NOTE ADULT - SUBJECTIVE AND OBJECTIVE BOX
O/N Events:  ANIKA  Subjective:  denies any acute complaints, NAD on RA, renal function continues to worsen associated with oliguria,  cc for the past 24 hours   appears non uremic and euvolemic   sNa 128/ Bicarb 18/ Phos 5.8  did not respond to lasix     VITALS  Vital Signs Last 24 Hrs  T(C): 36.2 (11 May 2020 13:04), Max: 36.7 (11 May 2020 00:30)  T(F): 97.1 (11 May 2020 13:04), Max: 98.1 (11 May 2020 00:30)  HR: 98 (11 May 2020 13:55) (89 - 100)  BP: 100/70 (11 May 2020 13:55) (99/58 - 115/64)  BP(mean): 81 (11 May 2020 13:55) (81 - 85)  RR: 15 (11 May 2020 13:55) (15 - 20)  SpO2: 100% (11 May 2020 13:55) (92% - 100%)    PHYSICAL EXAM  General: A&Ox 3; NAD  Eyes: PERRL; EOMI  Neck: no JVD  Respiratory: CTA B/L  Cardiovascular: A-fib in 80 to 90s, S1/S2, no rubs   Gastrointestinal: Soft; NTND  Extremities: WWP; left foot wound with VAC in place   Neurological:  CNII-XII grossly intact; no obvious focal deficits, no asterixis present     MEDICATIONS  (STANDING):  aMIOdarone    Tablet 200 milliGRAM(s) Oral daily  apixaban 2.5 milliGRAM(s) Oral two times a day  atorvastatin 40 milliGRAM(s) Oral at bedtime  chlorhexidine 2% Cloths 1 Application(s) Topical <User Schedule>  clopidogrel Tablet 75 milliGRAM(s) Oral daily  Dakins Solution - 1/2 Strength 1 Application(s) Topical daily  DAPTOmycin IVPB 600 milliGRAM(s) IV Intermittent every 48 hours  dextrose 5%. 1000 milliLiter(s) (50 mL/Hr) IV Continuous <Continuous>  dextrose 50% Injectable 12.5 Gram(s) IV Push once  dextrose 50% Injectable 25 Gram(s) IV Push once  furosemide    Tablet 40 milliGRAM(s) Oral daily  melatonin 1 milliGRAM(s) Oral at bedtime  metoprolol succinate ER 50 milliGRAM(s) Oral two times a day  piperacillin/tazobactam IVPB.. 2.25 Gram(s) IV Intermittent every 8 hours  senna 2 Tablet(s) Oral at bedtime  sodium bicarbonate 650 milliGRAM(s) Oral three times a day    MEDICATIONS  (PRN):  acetaminophen   Tablet .. 650 milliGRAM(s) Oral every 6 hours PRN Mild Pain (1 - 3)  dextrose 40% Gel 15 Gram(s) Oral once PRN Blood Glucose LESS THAN 70 milliGRAM(s)/deciliter  glucagon  Injectable 1 milliGRAM(s) IntraMuscular once PRN Glucose LESS THAN 70 milligrams/deciliter  oxycodone    5 mG/acetaminophen 325 mG 1 Tablet(s) Oral every 6 hours PRN Severe Pain (7 - 10)  sodium chloride 0.9% lock flush 10 milliLiter(s) IV Push every 1 hour PRN Pre/post blood products, medications, blood draw, and to maintain line patency      LABS                        7.8    7.19  )-----------( 256      ( 11 May 2020 06:36 )             24.8     05-11    128<L>  |  94<L>  |  60<H>  ----------------------------<  109<H>  3.8   |  18<L>  |  5.44<H>    Ca    7.9<L>      11 May 2020 06:36  Phos  5.8     05-11  Mg     2.3     05-11    TPro  5.9<L>  /  Alb  2.7<L>  /  TBili  0.6  /  DBili  x   /  AST  63<H>  /  ALT  35  /  AlkPhos  50  05-11    LIVER FUNCTIONS - ( 11 May 2020 06:36 )  Alb: 2.7 g/dL / Pro: 5.9 g/dL / ALK PHOS: 50 U/L / ALT: 35 U/L / AST: 63 U/L / GGT: x             Urinalysis Basic - ( 10 May 2020 13:05 )    Color: Yellow / Appearance: SL Cloudy / SG: >=1.030 / pH: x  Gluc: x / Ketone: Trace mg/dL  / Bili: Moderate / Urobili: 1.0 E.U./dL   Blood: x / Protein: 100 mg/dL / Nitrite: POSITIVE   Leuk Esterase: Trace / RBC: Many /HPF / WBC 5-10 /HPF   Sq Epi: x / Non Sq Epi: 0-5 /HPF / Bacteria: None /HPF      CARDIAC MARKERS ( 10 May 2020 06:14 )  x     / x     / 1196 U/L / x     / x            IMAGING/EKG/ETC  EKG:   Xray:  CT:

## 2020-05-11 NOTE — PROGRESS NOTE ADULT - ASSESSMENT
83 yo M with HTN, DM, Afib, PDA, CKDIII s/p angiogram with TP trunk angioplasty/stent and L 3rd toe amputation on 5/6.  Superficial culture sent 2 d earlier, no OR cultures sent.  Reliability of superficial culture is low.  Would cover for mixed infection, including anaerobes in absence of data.  He has ZEENAT on CKD - received contrast for angiogram/stent.  He is unlikely to tolerate a 6 week course of linezolid.    Suggest:  - Daptomycin 600 mg IV q48h   - Continue to monitor CPK  - Pip-tazo 2.25 g IV q8h  - Would plan for 6 week course of antibiotics

## 2020-05-11 NOTE — PROGRESS NOTE ADULT - ASSESSMENT
A/p  83 y/o AAM w/PMH HTN/DM/A-fib on Xarelto/HF EF 35%, CKD III and PAD, recently admitted in march for toe gangrene s/p left 3rd toe amputation/ presented back to ED from Dr. Mcclellan's office for further work up of LLE pain and swelling, nephrology consulted for ZEENAT on CKD III  s/p Angio with angioplasty/stent 5/6

## 2020-05-11 NOTE — PROGRESS NOTE ADULT - PROBLEM SELECTOR PLAN 1
oliguric ZEENAT, likely ATN with component of contrast exposure injury and elevated Vanc trough   Renal fx worsening, however lytes and volume status still in acceptable range  hyponatremia from hemofiltration from ZEENAT  pt appears euvolemic and non uremic  - no urgent indication for RRT   - cw renal restricted diet and Nabicarb 650 mg TID   Will follow

## 2020-05-11 NOTE — PROGRESS NOTE ADULT - ATTENDING COMMENTS
I have reviewed the medical record, including laboratory and radiographic studies, interviewed and examined the patient and discussed the plan with Dr. Linares, the ID Resident.  Agree with above. He is now planned for [facility], which will not cover dapto.  Both doxy and linezolid are at breakpoint MICs.  He already is very anemic - will be unlikely to tolerate long course of linezolid.  VRE faecalis will be covered by pip-tazo.  When he leaves, can cover MRSA with doxycycline 100 mg po q12h, with recognition that therapy will be suboptimal.  Will continue to follow with you – ID Team 1.

## 2020-05-11 NOTE — PROGRESS NOTE ADULT - ATTENDING COMMENTS
Pt seen on rounds this afternoon and events of the weekend reviewed.  Had one fingerstick down to 42 mg% over the weekend, but this was post-prandial, and not associated with symptoms.  Was treated with juice before a met panel could be drawn for confirmation.    Still suspect that the low fingersticks are an artifact of cutaneous vasoconstriction, but will need to follow, and risk of hypoglycemia (vamshi fasting) is now increased by his ZEENAT.

## 2020-05-11 NOTE — PROGRESS NOTE ADULT - SUBJECTIVE AND OBJECTIVE BOX
24hr Events:  O/N: ID rec 6wks of Abx, Per endo continue to monitor, VSS  5/10: CXR unchanged, Restarted home Lasix 40, FS 45 in AM, Endocrine labs sent with serum Glucose 125, Zosyn 2.25 restarted per ID, Bicarb 650 BID started        Assessment/Plan:  84 M PMH HTN, DM, A fib on Xarelto (Last taken 8am) HF EF 35%, PAD, CKD III 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 BRANDYN falsely elevated due to history of DM now s/p LLE Angiogram DEStent x2, L 3rd and 4th toe amputation, and debridement. Clinically stable.     Regular  Home meds  Dapto/Zosyn  f/u Renal, EP, Cards, and Endocrine recs  Eliquis  Lasix 40 PO daily  AM labs STATUS POST: POD # 5  s/p LLE Angiogram DEStent x2, L 3rd and 4th toe amputation, and debridement     24hr Events:  O/N: ID rec 6wks of Abx, Per endo continue to monitor, VSS  5/10: CXR unchanged, Restarted home Lasix 40, FS 45 in AM, Endocrine labs sent with serum Glucose 125, Zosyn 2.25 restarted per ID, Bicarb 650 BID started    DAPTOmycin IVPB 600  piperacillin/tazobactam IVPB.. 2.25  aMIOdarone    Tablet 200  apixaban 2.5  clopidogrel Tablet 75  DAPTOmycin IVPB 600  furosemide    Tablet 40  metoprolol succinate ER 50  piperacillin/tazobactam IVPB.. 2.25        Vital Signs Last 24 Hrs  T(C): 36.3 (11 May 2020 09:02), Max: 36.7 (11 May 2020 00:30)  T(F): 97.4 (11 May 2020 09:02), Max: 98.1 (11 May 2020 00:30)  HR: 96 (11 May 2020 09:02) (89 - 100)  BP: 107/72 (11 May 2020 09:02) (99/58 - 107/73)  BP(mean): --  RR: 20 (11 May 2020 09:02) (16 - 20)  SpO2: 96% (11 May 2020 09:02) (96% - 100%)  I&O's Summary    10 May 2020 07:01  -  11 May 2020 07:00  --------------------------------------------------------  IN: 680 mL / OUT: 290 mL / NET: 390 mL    11 May 2020 07:01  -  11 May 2020 11:24  --------------------------------------------------------  IN: 0 mL / OUT: 30 mL / NET: -30 mL        Physical Exam:  General: NAD, resting comfortably in bed  Pulmonary: Nonlabored breathing, no respiratory distress  Extrem: WWP, Left foot wound with vac in place  Neuro: A/O x 3, no focal deficits, normal sensation  Pulses: LLE: biphasic PT  : Moe with turbid urine      LABS:                        7.8    7.19  )-----------( 256      ( 11 May 2020 06:36 )             24.8     05-11    128<L>  |  94<L>  |  60<H>  ----------------------------<  109<H>  3.8   |  18<L>  |  5.44<H>    Ca    7.9<L>      11 May 2020 06:36  Phos  5.8     05-11  Mg     2.3     05-11    TPro  5.9<L>  /  Alb  2.7<L>  /  TBili  0.6  /  DBili  x   /  AST  63<H>  /  ALT  35  /  AlkPhos  50  05-11          Assessment/Plan:  84 M PMH HTN, DM, A fib on Xarelto (Last taken 8am) HF EF 35%, PAD, CKD III 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 BRANDYN falsely elevated due to history of DM now s/p LLE Angiogram DEStent x2, L 3rd and 4th toe amputation, and debridement. Clinically stable.     consistent carb diet  Home meds  Dapto/Zosyn  ISS  pain control  f/u Renal, EP, Cards, and Endocrine recs  Eliquis/plavix  Lasix 40 PO daily  AM labs    f/u am cxr  f/u PT recs STATUS POST: POD # 5  s/p LLE Angiogram DEStent x2, L 3rd and 4th toe amputation, and debridement     24hr Events:  O/N: ID rec 6wks of Abx, Per endo continue to monitor, VSS  5/10: CXR unchanged, Restarted home Lasix 40, FS 45 in AM, Endocrine labs sent with serum Glucose 125, Zosyn 2.25 restarted per ID, Bicarb 650 BID started    DAPTOmycin IVPB 600  piperacillin/tazobactam IVPB.. 2.25  aMIOdarone    Tablet 200  apixaban 2.5  clopidogrel Tablet 75  DAPTOmycin IVPB 600  furosemide    Tablet 40  metoprolol succinate ER 50  piperacillin/tazobactam IVPB.. 2.25        Vital Signs Last 24 Hrs  T(C): 36.3 (11 May 2020 09:02), Max: 36.7 (11 May 2020 00:30)  T(F): 97.4 (11 May 2020 09:02), Max: 98.1 (11 May 2020 00:30)  HR: 96 (11 May 2020 09:02) (89 - 100)  BP: 107/72 (11 May 2020 09:02) (99/58 - 107/73)  BP(mean): --  RR: 20 (11 May 2020 09:02) (16 - 20)  SpO2: 96% (11 May 2020 09:02) (96% - 100%)  I&O's Summary    10 May 2020 07:01  -  11 May 2020 07:00  --------------------------------------------------------  IN: 680 mL / OUT: 290 mL / NET: 390 mL    11 May 2020 07:01  -  11 May 2020 11:24  --------------------------------------------------------  IN: 0 mL / OUT: 30 mL / NET: -30 mL        Physical Exam:  General: NAD, resting comfortably in bed  Pulmonary: Nonlabored breathing, no respiratory distress  Extrem: WWP, Left foot wound with vac in place  Neuro: A/O x 3, no focal deficits, normal sensation  Pulses: LLE: biphasic PT  : Moe with turbid urine      LABS:                        7.8    7.19  )-----------( 256      ( 11 May 2020 06:36 )             24.8     05-11    128<L>  |  94<L>  |  60<H>  ----------------------------<  109<H>  3.8   |  18<L>  |  5.44<H>    Ca    7.9<L>      11 May 2020 06:36  Phos  5.8     05-11  Mg     2.3     05-11    TPro  5.9<L>  /  Alb  2.7<L>  /  TBili  0.6  /  DBili  x   /  AST  63<H>  /  ALT  35  /  AlkPhos  50  05-11          Assessment/Plan:  84 M PMH HTN, DM, A fib on Xarelto (Last taken 8am) HF EF 35%, PAD, CKD III 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 BRANDYN falsely elevated due to history of DM now s/p LLE Angiogram DEStent x2, L 3rd and 4th toe amputation, and debridement. Clinically stable. Hyponatremia per renal will watch.    consistent carb diet  Home meds  Dapto/Zosyn  ISS  pain control  f/u Renal, EP, Cards, and Endocrine recs  Eliquis/plavix  Lasix 40 PO daily  AM labs    f/u am cxr  f/u PT recs STATUS POST: POD # 5  s/p LLE Angiogram DEStent x2, L 3rd and 4th toe amputation, and debridement     24hr Events:  O/N: ID rec 6wks of Abx, Per endo continue to monitor, VSS  5/10: CXR unchanged, Restarted home Lasix 40, FS 45 in AM, Endocrine labs sent with serum Glucose 125, Zosyn 2.25 restarted per ID, Bicarb 650 BID started    DAPTOmycin IVPB 600  piperacillin/tazobactam IVPB.. 2.25  aMIOdarone    Tablet 200  apixaban 2.5  clopidogrel Tablet 75  DAPTOmycin IVPB 600  furosemide    Tablet 40  metoprolol succinate ER 50  piperacillin/tazobactam IVPB.. 2.25        Vital Signs Last 24 Hrs  T(C): 36.3 (11 May 2020 09:02), Max: 36.7 (11 May 2020 00:30)  T(F): 97.4 (11 May 2020 09:02), Max: 98.1 (11 May 2020 00:30)  HR: 96 (11 May 2020 09:02) (89 - 100)  BP: 107/72 (11 May 2020 09:02) (99/58 - 107/73)  BP(mean): --  RR: 20 (11 May 2020 09:02) (16 - 20)  SpO2: 96% (11 May 2020 09:02) (96% - 100%)  I&O's Summary    10 May 2020 07:01  -  11 May 2020 07:00  --------------------------------------------------------  IN: 680 mL / OUT: 290 mL / NET: 390 mL    11 May 2020 07:01  -  11 May 2020 11:24  --------------------------------------------------------  IN: 0 mL / OUT: 30 mL / NET: -30 mL        Physical Exam:  General: NAD, resting comfortably in bed  Pulmonary: Nonlabored breathing, no respiratory distress  Extrem: WWP, Left foot wound with vac in place  Neuro: A/O x 3, no focal deficits, normal sensation  Pulses: LLE: biphasic PT  : Moe with turbid urine      LABS:                        7.8    7.19  )-----------( 256      ( 11 May 2020 06:36 )             24.8     05-11    128<L>  |  94<L>  |  60<H>  ----------------------------<  109<H>  3.8   |  18<L>  |  5.44<H>    Ca    7.9<L>      11 May 2020 06:36  Phos  5.8     05-11  Mg     2.3     05-11    TPro  5.9<L>  /  Alb  2.7<L>  /  TBili  0.6  /  DBili  x   /  AST  63<H>  /  ALT  35  /  AlkPhos  50  05-11      PLEASE CHECK WHEN PRESENT:     [  ] Heart Failure     [  ] Acute     [  ] Acute on Chronic     [  ] Chronic  -------------------------------------------------------------------     [  ]Diastolic [HFpEF]     [  ]Systolic [HFrEF]     [  ]Combined [HFpEF & HFrEF]     [ X ]Other: hypertensive heart disease    [ X ] afib  -------------------------------------------------------------------  [ ] Respiratory failure  [ ] Acute cor pulmonale  [ ] Asthma/COPD Exacerbation  [ ] Pleural effusion  [ ] Aspiration pneumonia  -------------------------------------------------------------------  [  ]ZEENAT     [  ]ATN     [  ]Reneal Medullary Necrosis     [  ]Renal Cortical Necrosis     [  ]Other Pathological Lesions:    [  ]CKD 1  [  ]CKD 2  [X  ]CKD 3  [  ]CKD 4  [  ]CKD 5  [  ]Other  -------------------------------------------------------------------  [  ]Diabetes  [  ] Diabetic PVD Ulcer  [  ] Neuropathic ulcer to DM  [  ] Diabetes with Nephropathy  [  ] Osteomyelitis due to diabetes  --------------------------------------------------------------------  [  ]Malnutrition: See Nutrition Note  [  ]Cachexia  [  ]Other:   [  ]Supplement Ordered:  [  ]Morbid Obesity (BMI >=40]  ---------------------------------------------------------------------  [ ] Sepsis/severe sepsis/septic shock  [ ] UTI  [ ] Pneumonia  -----------------------------------------------------------------------  [ ] Acidosis/alkalosis  [ ] Fluid overload  [ ] Hypokalemia  [ ] Hyperkalemia  [ ] Hypomagnesemia  [ ] Hypophosphatemia  [ ] Hyperphosphatemia  ------------------------------------------------------------------------  [ ] Acute blood loss anemia  [ ] Post op blood loss anemia  [ ] Iron deficiency anemia  [X ] Anemia due to chronic disease  [ ] Hypercoagulable state  ----------------------------------------------------------------------  [ ] Cerebral infarction  [ ] Transient ischemia attack  [ ] Encephalopathy    Assessment/Plan:  84 M PMH HTN, DM, A fib on Xarelto (Last taken 8am) HF EF 35%, PAD, CKD III 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 BRANDYN falsely elevated due to history of DM now s/p LLE Angiogram DEStent x2, L 3rd and 4th toe amputation, and debridement. Clinically stable. Hyponatremia per renal will watch.    consistent carb diet  Home meds  Dapto/Zosyn  ISS  pain control  f/u Renal, EP, Cards, and Endocrine recs  Eliquis/plavix  Lasix 40 PO daily  AM labs    f/u am cxr  f/u PT recs

## 2020-05-12 LAB
ANION GAP SERPL CALC-SCNC: 18 MMOL/L — HIGH (ref 5–17)
BUN SERPL-MCNC: 68 MG/DL — HIGH (ref 7–23)
CALCIUM SERPL-MCNC: 8.2 MG/DL — LOW (ref 8.4–10.5)
CHLORIDE SERPL-SCNC: 95 MMOL/L — LOW (ref 96–108)
CK SERPL-CCNC: 771 U/L — HIGH (ref 30–200)
CO2 SERPL-SCNC: 18 MMOL/L — LOW (ref 22–31)
CREAT SERPL-MCNC: 5.91 MG/DL — HIGH (ref 0.5–1.3)
GLUCOSE BLDC GLUCOMTR-MCNC: 120 MG/DL — HIGH (ref 70–99)
GLUCOSE BLDC GLUCOMTR-MCNC: 130 MG/DL — HIGH (ref 70–99)
GLUCOSE BLDC GLUCOMTR-MCNC: 133 MG/DL — HIGH (ref 70–99)
GLUCOSE BLDC GLUCOMTR-MCNC: 94 MG/DL — SIGNIFICANT CHANGE UP (ref 70–99)
GLUCOSE SERPL-MCNC: 78 MG/DL — SIGNIFICANT CHANGE UP (ref 70–99)
HCT VFR BLD CALC: 26.3 % — LOW (ref 39–50)
HGB BLD-MCNC: 8.4 G/DL — LOW (ref 13–17)
MAGNESIUM SERPL-MCNC: 2.3 MG/DL — SIGNIFICANT CHANGE UP (ref 1.6–2.6)
MCHC RBC-ENTMCNC: 26.7 PG — LOW (ref 27–34)
MCHC RBC-ENTMCNC: 31.9 GM/DL — LOW (ref 32–36)
MCV RBC AUTO: 83.5 FL — SIGNIFICANT CHANGE UP (ref 80–100)
NRBC # BLD: 0 /100 WBCS — SIGNIFICANT CHANGE UP (ref 0–0)
PHOSPHATE SERPL-MCNC: 6.1 MG/DL — HIGH (ref 2.5–4.5)
PLATELET # BLD AUTO: 267 K/UL — SIGNIFICANT CHANGE UP (ref 150–400)
POTASSIUM SERPL-MCNC: 4.1 MMOL/L — SIGNIFICANT CHANGE UP (ref 3.5–5.3)
POTASSIUM SERPL-SCNC: 4.1 MMOL/L — SIGNIFICANT CHANGE UP (ref 3.5–5.3)
RBC # BLD: 3.15 M/UL — LOW (ref 4.2–5.8)
RBC # FLD: 15.7 % — HIGH (ref 10.3–14.5)
SODIUM SERPL-SCNC: 131 MMOL/L — LOW (ref 135–145)
WBC # BLD: 7.97 K/UL — SIGNIFICANT CHANGE UP (ref 3.8–10.5)
WBC # FLD AUTO: 7.97 K/UL — SIGNIFICANT CHANGE UP (ref 3.8–10.5)

## 2020-05-12 PROCEDURE — 71045 X-RAY EXAM CHEST 1 VIEW: CPT | Mod: 26

## 2020-05-12 PROCEDURE — 99232 SBSQ HOSP IP/OBS MODERATE 35: CPT

## 2020-05-12 RX ADMIN — Medication 50 MILLIGRAM(S): at 05:18

## 2020-05-12 RX ADMIN — Medication 40 MILLIGRAM(S): at 05:18

## 2020-05-12 RX ADMIN — Medication 1 TABLET(S): at 18:02

## 2020-05-12 RX ADMIN — CLOPIDOGREL BISULFATE 75 MILLIGRAM(S): 75 TABLET, FILM COATED ORAL at 12:31

## 2020-05-12 RX ADMIN — Medication 650 MILLIGRAM(S): at 21:55

## 2020-05-12 RX ADMIN — Medication 650 MILLIGRAM(S): at 05:18

## 2020-05-12 RX ADMIN — APIXABAN 2.5 MILLIGRAM(S): 2.5 TABLET, FILM COATED ORAL at 18:02

## 2020-05-12 RX ADMIN — Medication 50 MILLIGRAM(S): at 17:48

## 2020-05-12 RX ADMIN — APIXABAN 2.5 MILLIGRAM(S): 2.5 TABLET, FILM COATED ORAL at 05:18

## 2020-05-12 RX ADMIN — PIPERACILLIN AND TAZOBACTAM 200 GRAM(S): 4; .5 INJECTION, POWDER, LYOPHILIZED, FOR SOLUTION INTRAVENOUS at 12:40

## 2020-05-12 RX ADMIN — AMIODARONE HYDROCHLORIDE 200 MILLIGRAM(S): 400 TABLET ORAL at 10:17

## 2020-05-12 RX ADMIN — CHLORHEXIDINE GLUCONATE 1 APPLICATION(S): 213 SOLUTION TOPICAL at 05:21

## 2020-05-12 RX ADMIN — PIPERACILLIN AND TAZOBACTAM 200 GRAM(S): 4; .5 INJECTION, POWDER, LYOPHILIZED, FOR SOLUTION INTRAVENOUS at 05:18

## 2020-05-12 RX ADMIN — ATORVASTATIN CALCIUM 40 MILLIGRAM(S): 80 TABLET, FILM COATED ORAL at 21:55

## 2020-05-12 RX ADMIN — SENNA PLUS 2 TABLET(S): 8.6 TABLET ORAL at 21:55

## 2020-05-12 RX ADMIN — PIPERACILLIN AND TAZOBACTAM 200 GRAM(S): 4; .5 INJECTION, POWDER, LYOPHILIZED, FOR SOLUTION INTRAVENOUS at 21:56

## 2020-05-12 RX ADMIN — Medication 650 MILLIGRAM(S): at 12:40

## 2020-05-12 NOTE — SWALLOW BEDSIDE ASSESSMENT ADULT - ASR SWALLOW ASPIRATION MONITOR
oral hygiene/change of breathing pattern/position upright (90Y)/cough/gurgly voice/upper respiratory infection/fever/pneumonia/throat clearing

## 2020-05-12 NOTE — PROGRESS NOTE ADULT - ASSESSMENT
85 yo M with HTN, DM, Afib, PDA, CKDIII s/p angiogram with TP trunk angioplasty/stent and L 3rd toe amputation on 5/6.  Superficial culture sent 2 d earlier, no OR cultures sent.  Reliability of superficial culture is low.  Would cover for mixed infection, including anaerobes in absence of data.  He has ZEENAT on CKD - received contrast for angiogram/stent.  His creatinine is uptrending still.  He is unlikely to tolerate a 6 week course of linezolid, Linezolid is also at the breakpoint.      Suggest:  - Daptomycin 600 mg IV q48h until he is discharged to [facility]  - When d/jacob, he can be switched to Doxycyline po 100mg BID.  - Continue to monitor CPK  - Pip-tazo 2.25 g IV q8h  - Would plan for 6 week course of antibiotics  - Pt will need weekly CBC, CMP, ESR, CRP 85 yo M with HTN, DM, Afib, PDA, CKDIII s/p angiogram with TP trunk angioplasty/stent and L 3rd toe amputation on 5/6.  Superficial culture sent 2 d earlier, no OR cultures sent.  Reliability of superficial culture is low.  Would cover for mixed infection, including anaerobes in absence of data.  He has ZEENAT on CKD - received contrast for angiogram/stent.  His creatinine is uptrending still.  He is unlikely to tolerate a 6 week course of linezolid, Linezolid is also at the breakpoint.      Suggest:  - Daptomycin 600 mg IV q48h until he is discharged to [facility]  - When d/jacob, he can be switched to Doxycyline po 100mg BID.  - Continue to monitor CPK  - Pip-tazo 2.25 g IV q8h  - Would plan for 6 week course of antibiotics, last days for antibiotics will be June 17th, 2020.  - Pt will need weekly CBC, CMP, ESR, CRP 83 yo M with HTN, DM, Afib, PDA, CKDIII s/p angiogram with TP trunk angioplasty/stent and L 3rd toe amputation on 5/6.  Superficial culture sent 2 d earlier, no OR cultures sent.  Reliability of superficial culture is low.  Would cover for mixed infection, including anaerobes in absence of data.  He has ZEENAT on CKD - received contrast for angiogram/stent.  His creatinine is uptrending still.  He is unlikely to tolerate a 6 week course of linezolid, Linezolid is also at the breakpoint.      Suggest:  - Daptomycin 600 mg IV q48h until he is discharged to [facility]  - When d/jacob, he can be switched to Doxycyline po 100mg BID.  - Continue to monitor CPK  - Pip-tazo 2.25 g IV q8h  - Chest CT to evaluate for pneumonia  - Would plan for 6 week course of antibiotics, last days for antibiotics will be June 17th, 2020.  - Pt will need weekly CBC, CMP, ESR, CRP 85 yo M with HTN, DM, Afib, PDA, CKDIII s/p angiogram with TP trunk angioplasty/stent and L 3rd toe amputation on 5/6.  Superficial culture sent 2 d earlier, no OR cultures sent.  Reliability of superficial culture is low.  Would cover for mixed infection, including anaerobes in absence of data.  He has ZEENAT on CKD - received contrast for angiogram/stent.  His creatinine is uptrending still.  He is unlikely to tolerate a 6 week course of linezolid, Linezolid is also at the breakpoint.      Suggest:  - Daptomycin 600 mg IV q48h until he is discharged to [facility]  - When d/jacob, he can be switched to Doxycyline po 100mg BID.  - Continue to monitor CPK - please send  - Pip-tazo 2.25 g IV q8h  - Chest CT to evaluate for pneumonia  - Would plan for 6 week course of antibiotics, last days for antibiotics will be June 17th, 2020.  - Pt will need weekly CBC, CMP, ESR, CRP

## 2020-05-12 NOTE — PROGRESS NOTE ADULT - SUBJECTIVE AND OBJECTIVE BOX
O/N Events:  ANIKA, remained HDS and afeb  Subjective:  complaints of pain on amputation site, otherwise no issues, BUN,Cr keep rising however hyponatremia and acidosis are improving, remained oliguric, UOP ~ 400 cc for the past 24 hours      VITALS  Vital Signs Last 24 Hrs  T(C): 36.4 (12 May 2020 09:10), Max: 36.7 (11 May 2020 18:50)  T(F): 97.5 (12 May 2020 09:10), Max: 98.1 (11 May 2020 18:50)  HR: 111 (12 May 2020 08:25) (95 - 120)  BP: 109/69 (12 May 2020 08:25) (98/67 - 140/56)  BP(mean): 83 (12 May 2020 08:25) (75 - 87)  RR: 20 (12 May 2020 08:25) (13 - 25)  SpO2: 100% (12 May 2020 08:25) (96% - 100%)    PHYSICAL EXAM  General: A&Ox 3; NAD  Eyes: PERRL; EOMI; anicteric sclera  Neck: JVP appreciated at the level of jaw  Respiratory: CTA B/L  Cardiovascular: Regular rhythm/rate; S1/S2  Gastrointestinal: Soft; NTND   Extremities: WWP; trace left pedal edema, wound vac in place  Neurological:  CNII-XII grossly intact; no obvious focal deficits  D: montes with terrie color urine     MEDICATIONS  (STANDING):  aMIOdarone    Tablet 200 milliGRAM(s) Oral daily  apixaban 2.5 milliGRAM(s) Oral two times a day  atorvastatin 40 milliGRAM(s) Oral at bedtime  chlorhexidine 2% Cloths 1 Application(s) Topical <User Schedule>  clopidogrel Tablet 75 milliGRAM(s) Oral daily  Dakins Solution - 1/2 Strength 1 Application(s) Topical daily  DAPTOmycin IVPB 600 milliGRAM(s) IV Intermittent every 48 hours  dextrose 5%. 1000 milliLiter(s) (50 mL/Hr) IV Continuous <Continuous>  dextrose 50% Injectable 12.5 Gram(s) IV Push once  dextrose 50% Injectable 25 Gram(s) IV Push once  furosemide    Tablet 40 milliGRAM(s) Oral daily  insulin lispro (HumaLOG) corrective regimen sliding scale   SubCutaneous Before meals and at bedtime  melatonin 1 milliGRAM(s) Oral at bedtime  metoprolol succinate ER 50 milliGRAM(s) Oral two times a day  piperacillin/tazobactam IVPB.. 2.25 Gram(s) IV Intermittent every 8 hours  senna 2 Tablet(s) Oral at bedtime  sodium bicarbonate 650 milliGRAM(s) Oral three times a day    MEDICATIONS  (PRN):  acetaminophen   Tablet .. 650 milliGRAM(s) Oral every 6 hours PRN Mild Pain (1 - 3)  dextrose 40% Gel 15 Gram(s) Oral once PRN Blood Glucose LESS THAN 70 milliGRAM(s)/deciliter  glucagon  Injectable 1 milliGRAM(s) IntraMuscular once PRN Glucose LESS THAN 70 milligrams/deciliter  oxycodone    5 mG/acetaminophen 325 mG 1 Tablet(s) Oral every 6 hours PRN Severe Pain (7 - 10)  sodium chloride 0.9% lock flush 10 milliLiter(s) IV Push every 1 hour PRN Pre/post blood products, medications, blood draw, and to maintain line patency      LABS                        8.4    7.97  )-----------( 267      ( 12 May 2020 06:43 )             26.3     05-12    131<L>  |  95<L>  |  68<H>  ----------------------------<  78  4.1   |  18<L>  |  5.91<H>    Ca    8.2<L>      12 May 2020 06:43  Phos  6.1     05-12  Mg     2.3     05-12    TPro  5.9<L>  /  Alb  2.7<L>  /  TBili  0.6  /  DBili  x   /  AST  63<H>  /  ALT  35  /  AlkPhos  50  05-11    LIVER FUNCTIONS - ( 11 May 2020 06:36 )  Alb: 2.7 g/dL / Pro: 5.9 g/dL / ALK PHOS: 50 U/L / ALT: 35 U/L / AST: 63 U/L / GGT: x             Urinalysis Basic - ( 10 May 2020 13:05 )    Color: Yellow / Appearance: SL Cloudy / SG: >=1.030 / pH: x  Gluc: x / Ketone: Trace mg/dL  / Bili: Moderate / Urobili: 1.0 E.U./dL   Blood: x / Protein: 100 mg/dL / Nitrite: POSITIVE   Leuk Esterase: Trace / RBC: Many /HPF / WBC 5-10 /HPF   Sq Epi: x / Non Sq Epi: 0-5 /HPF / Bacteria: None /HPF O/N Events:  ANIKA, remained HDS and afeb  Subjective:  complaints of pain on amputation site, otherwise no issues, BUN,Cr keep rising however hyponatremia and acidosis are improving, remained oliguric, UOP ~ 400 cc for the past 24 hours      VITALS  Vital Signs Last 24 Hrs  T(C): 36.4 (12 May 2020 09:10), Max: 36.7 (11 May 2020 18:50)  T(F): 97.5 (12 May 2020 09:10), Max: 98.1 (11 May 2020 18:50)  HR: 111 (12 May 2020 08:25) (95 - 120)  BP: 109/69 (12 May 2020 08:25) (98/67 - 140/56)  BP(mean): 83 (12 May 2020 08:25) (75 - 87)  RR: 20 (12 May 2020 08:25) (13 - 25)  SpO2: 100% (12 May 2020 08:25) (96% - 100%)    PHYSICAL EXAM  General: A&Ox 3; NAD  Eyes: PERRL; EOMI; anicteric sclera  Neck: JVP appreciated at the level of jaw  Respiratory: CTA B/L  Cardiovascular: A-fib  bpm, S1/S2, no rubs   Gastrointestinal: Soft; NTND   Extremities: WWP; trace left pedal edema, wound vac in place  Neurological:  CNII-XII grossly intact; no obvious focal deficits, no asterixis appreciated   D: montes with terrie color urine     MEDICATIONS  (STANDING):  aMIOdarone    Tablet 200 milliGRAM(s) Oral daily  apixaban 2.5 milliGRAM(s) Oral two times a day  atorvastatin 40 milliGRAM(s) Oral at bedtime  chlorhexidine 2% Cloths 1 Application(s) Topical <User Schedule>  clopidogrel Tablet 75 milliGRAM(s) Oral daily  Dakins Solution - 1/2 Strength 1 Application(s) Topical daily  DAPTOmycin IVPB 600 milliGRAM(s) IV Intermittent every 48 hours  dextrose 5%. 1000 milliLiter(s) (50 mL/Hr) IV Continuous <Continuous>  dextrose 50% Injectable 12.5 Gram(s) IV Push once  dextrose 50% Injectable 25 Gram(s) IV Push once  furosemide    Tablet 40 milliGRAM(s) Oral daily  insulin lispro (HumaLOG) corrective regimen sliding scale   SubCutaneous Before meals and at bedtime  melatonin 1 milliGRAM(s) Oral at bedtime  metoprolol succinate ER 50 milliGRAM(s) Oral two times a day  piperacillin/tazobactam IVPB.. 2.25 Gram(s) IV Intermittent every 8 hours  senna 2 Tablet(s) Oral at bedtime  sodium bicarbonate 650 milliGRAM(s) Oral three times a day    MEDICATIONS  (PRN):  acetaminophen   Tablet .. 650 milliGRAM(s) Oral every 6 hours PRN Mild Pain (1 - 3)  dextrose 40% Gel 15 Gram(s) Oral once PRN Blood Glucose LESS THAN 70 milliGRAM(s)/deciliter  glucagon  Injectable 1 milliGRAM(s) IntraMuscular once PRN Glucose LESS THAN 70 milligrams/deciliter  oxycodone    5 mG/acetaminophen 325 mG 1 Tablet(s) Oral every 6 hours PRN Severe Pain (7 - 10)  sodium chloride 0.9% lock flush 10 milliLiter(s) IV Push every 1 hour PRN Pre/post blood products, medications, blood draw, and to maintain line patency      LABS                        8.4    7.97  )-----------( 267      ( 12 May 2020 06:43 )             26.3     05-12    131<L>  |  95<L>  |  68<H>  ----------------------------<  78  4.1   |  18<L>  |  5.91<H>    Ca    8.2<L>      12 May 2020 06:43  Phos  6.1     05-12  Mg     2.3     05-12    TPro  5.9<L>  /  Alb  2.7<L>  /  TBili  0.6  /  DBili  x   /  AST  63<H>  /  ALT  35  /  AlkPhos  50  05-11    LIVER FUNCTIONS - ( 11 May 2020 06:36 )  Alb: 2.7 g/dL / Pro: 5.9 g/dL / ALK PHOS: 50 U/L / ALT: 35 U/L / AST: 63 U/L / GGT: x             Urinalysis Basic - ( 10 May 2020 13:05 )    Color: Yellow / Appearance: SL Cloudy / SG: >=1.030 / pH: x  Gluc: x / Ketone: Trace mg/dL  / Bili: Moderate / Urobili: 1.0 E.U./dL   Blood: x / Protein: 100 mg/dL / Nitrite: POSITIVE   Leuk Esterase: Trace / RBC: Many /HPF / WBC 5-10 /HPF   Sq Epi: x / Non Sq Epi: 0-5 /HPF / Bacteria: None /HPF

## 2020-05-12 NOTE — PROGRESS NOTE ADULT - SUBJECTIVE AND OBJECTIVE BOX
INTERVAL HPI/OVERNIGHT EVENTS: Pt has pain in his foot.  His creatinine is uptrending.     CONSTITUTIONAL:  No fever, chills, night sweats  EYES:  No photophobia or visual changes  CARDIOVASCULAR:  No chest pain  RESPIRATORY:  No cough, wheezing, or SOB   GASTROINTESTINAL:  No nausea, vomiting, diarrhea, constipation, or abdominal pain  GENITOURINARY:  No frequency, urgency, dysuria or hematuria  NEUROLOGIC:  No headache, lightheadedness      ANTIBIOTICS/RELEVANT:  Daptomycin 600mg q48h  Zosyn 2.25 q6h        Vital Signs Last 24 Hrs  T(C): 36.4 (12 May 2020 09:10), Max: 36.7 (11 May 2020 18:50)  T(F): 97.5 (12 May 2020 09:10), Max: 98.1 (11 May 2020 18:50)  HR: 104 (12 May 2020 11:30) (95 - 120)  BP: 101/61 (12 May 2020 11:30) (98/67 - 140/56)  BP(mean): 83 (12 May 2020 08:25) (75 - 87)  RR: 20 (12 May 2020 08:25) (13 - 25)  SpO2: 95% (12 May 2020 11:30) (95% - 100%)    PHYSICAL EXAM:  Constitutional:  Somewhat frail appearing  Eyes:  Sclerae anicteric, conjunctivae clear, PERRL  Ear/Nose/Throat:  No nasal exudate or sinus tenderness;  No buccal mucosal lesions, no pharyngeal erythema or exudate	  Neck:  Supple, no adenopathy  Respiratory:  Clear bilaterally  Cardiovascular:  Tachy, S1S2, no murmur appreciated  Gastrointestinal:  Symmetric, normoactive BS, soft, NT, no masses, guarding or rebound  Extremities:  Wound vac L foot      LABS:                        8.4    7.97  )-----------( 267      ( 12 May 2020 06:43 )             26.3         05-12    131<L>  |  95<L>  |  68<H>  ----------------------------<  78  4.1   |  18<L>  |  5.91<H>    Ca    8.2<L>      12 May 2020 06:43  Phos  6.1     05-12  Mg     2.3     05-12    TPro  5.9<L>  /  Alb  2.7<L>  /  TBili  0.6  /  DBili  x   /  AST  63<H>  /  ALT  35  /  AlkPhos  50  05-11      Urinalysis Basic - ( 10 May 2020 13:05 )    Color: Yellow / Appearance: SL Cloudy / SG: >=1.030 / pH: x  Gluc: x / Ketone: Trace mg/dL  / Bili: Moderate / Urobili: 1.0 E.U./dL   Blood: x / Protein: 100 mg/dL / Nitrite: POSITIVE   Leuk Esterase: Trace / RBC: Many /HPF / WBC 5-10 /HPF   Sq Epi: x / Non Sq Epi: 0-5 /HPF / Bacteria: None /HPF INTERVAL HPI/OVERNIGHT EVENTS: Pt has pain in his foot.  His creatinine is uptrending.     CONSTITUTIONAL:  No fever, chills, night sweats  EYES:  No photophobia or visual changes  CARDIOVASCULAR:  No chest pain  RESPIRATORY:  No cough, wheezing, or SOB   GASTROINTESTINAL:  No nausea, vomiting, diarrhea, constipation, or abdominal pain  GENITOURINARY:  No frequency, urgency, dysuria or hematuria  NEUROLOGIC:  No headache, lightheadedness      ANTIBIOTICS/RELEVANT:  Daptomycin 600mg q48h  Zosyn 2.25 q6h        Vital Signs Last 24 Hrs  T(C): 36.4 (12 May 2020 09:10), Max: 36.7 (11 May 2020 18:50)  T(F): 97.5 (12 May 2020 09:10), Max: 98.1 (11 May 2020 18:50)  HR: 104 (12 May 2020 11:30) (95 - 120)  BP: 101/61 (12 May 2020 11:30) (98/67 - 140/56)  BP(mean): 83 (12 May 2020 08:25) (75 - 87)  RR: 20 (12 May 2020 08:25) (13 - 25)  SpO2: 95% (12 May 2020 11:30) (95% - 100%)    PHYSICAL EXAM:  Constitutional:  Somewhat frail appearing  Eyes:  Sclerae anicteric, conjunctivae clear, PERRL  Ear/Nose/Throat:  No nasal exudate or sinus tenderness;  No buccal mucosal lesions, no pharyngeal erythema or exudate	  Neck:  Supple, no adenopathy  Respiratory:  Clear bilaterally  Cardiovascular:  Tachy, S1S2, no murmur appreciated  Gastrointestinal:  Symmetric, normoactive BS, soft, NT, no masses, guarding or rebound  Extremities:  Wound vac L foot      LABS:                        8.4    7.97  )-----------( 267      ( 12 May 2020 06:43 )             26.3         05-12    131<L>  |  95<L>  |  68<H>  ----------------------------<  78  4.1   |  18<L>  |  5.91<H>    Ca    8.2<L>      12 May 2020 06:43  Phos  6.1     05-12  Mg     2.3     05-12    TPro  5.9<L>  /  Alb  2.7<L>  /  TBili  0.6  /  DBili  x   /  AST  63<H>  /  ALT  35  /  AlkPhos  50  05-11      Urinalysis Basic - ( 10 May 2020 13:05 )    Color: Yellow / Appearance: SL Cloudy / SG: >=1.030 / pH: x  Gluc: x / Ketone: Trace mg/dL  / Bili: Moderate / Urobili: 1.0 E.U./dL   Blood: x / Protein: 100 mg/dL / Nitrite: POSITIVE   Leuk Esterase: Trace / RBC: Many /HPF / WBC 5-10 /HPF   Sq Epi: x / Non Sq Epi: 0-5 /HPF / Bacteria: None /HPF      Radiology:  < from: Xray Chest 1 View- PORTABLE-Urgent (05.12.20 @ 09:45) >  IMPRESSION:  Small retrocardiac pneumonia versus atelectasis and small left pleural effusion, unchanged. Cardiomegaly.      < end of copied text >

## 2020-05-12 NOTE — SWALLOW BEDSIDE ASSESSMENT ADULT - NS SPL SWALLOW CLINIC TRIAL FT
Pt presents with functional janet-pharyngeal swallow on this exam with no overt signs of airway protection deficits. Pt reports occasional coughing with liquids and solids when he does not chew his food properly. Pt denies any recent worsening of symptoms. Pt was educated regarding safe eating strategies and aspiration precautions  - upright position during PO, taking small bites and sips, minimizing distraction, cutting up food into small pieces. He verbalized understanding. Pt may benefit from dietary consult sine he reports poor appetite which is affecting his PO intake.

## 2020-05-12 NOTE — PROGRESS NOTE ADULT - PROBLEM SELECTOR PLAN 1
oliguric ZEENAT on CKD stage III, likely ATN with component of contrast induced injury and elevated Vanc trough   Renal fx worsening, however lytes and volume status still in acceptable range  hyponatremia from hemofiltration related to ZEENAT, improved from yesterday sNa 131   bicarb acceptable  pt appears euvolemic and non uremic  - no urgent indication for RRT   - cw renal restricted diet and Nabicarb 650 mg TID   - back on home dose lasix 40 mg PO daily, unlikely to be effective with severely low eGFR, no need for diuretic challenge at this time given acceptable volume status and pretty compensated from HF stand point  Will follow

## 2020-05-12 NOTE — SWALLOW BEDSIDE ASSESSMENT ADULT - SLP PERTINENT HISTORY OF CURRENT PROBLEM
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

## 2020-05-12 NOTE — PROGRESS NOTE ADULT - SUBJECTIVE AND OBJECTIVE BOX
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 (10 May 2020 13:10)      Pt reports the following symptoms:    CONSTITUTIONAL:  Negative fever or chills, feels well, good appetite  EYES:  Negative  blurry vision or double vision  CARDIOVASCULAR:  Negative for chest pain or palpitations  RESPIRATORY:  Negative for cough, wheezing, or SOB   GASTROINTESTINAL:  Negative for nausea, vomiting, diarrhea, constipation, or abdominal pain  GENITOURINARY:  Negative frequency, urgency or dysuria  NEUROLOGIC:  No headache, confusion, dizziness, lightheadedness    MEDICATIONS  (STANDING):  aMIOdarone    Tablet 200 milliGRAM(s) Oral daily  apixaban 2.5 milliGRAM(s) Oral two times a day  atorvastatin 40 milliGRAM(s) Oral at bedtime  chlorhexidine 2% Cloths 1 Application(s) Topical <User Schedule>  clopidogrel Tablet 75 milliGRAM(s) Oral daily  Dakins Solution - 1/2 Strength 1 Application(s) Topical daily  DAPTOmycin IVPB 600 milliGRAM(s) IV Intermittent every 48 hours  dextrose 5%. 1000 milliLiter(s) (50 mL/Hr) IV Continuous <Continuous>  dextrose 50% Injectable 12.5 Gram(s) IV Push once  dextrose 50% Injectable 25 Gram(s) IV Push once  furosemide    Tablet 40 milliGRAM(s) Oral daily  insulin lispro (HumaLOG) corrective regimen sliding scale   SubCutaneous Before meals and at bedtime  melatonin 1 milliGRAM(s) Oral at bedtime  metoprolol succinate ER 50 milliGRAM(s) Oral two times a day  piperacillin/tazobactam IVPB.. 2.25 Gram(s) IV Intermittent every 8 hours  senna 2 Tablet(s) Oral at bedtime  sodium bicarbonate 650 milliGRAM(s) Oral three times a day    MEDICATIONS  (PRN):  acetaminophen   Tablet .. 650 milliGRAM(s) Oral every 6 hours PRN Mild Pain (1 - 3)  dextrose 40% Gel 15 Gram(s) Oral once PRN Blood Glucose LESS THAN 70 milliGRAM(s)/deciliter  glucagon  Injectable 1 milliGRAM(s) IntraMuscular once PRN Glucose LESS THAN 70 milligrams/deciliter  oxycodone    5 mG/acetaminophen 325 mG 1 Tablet(s) Oral every 6 hours PRN Severe Pain (7 - 10)  sodium chloride 0.9% lock flush 10 milliLiter(s) IV Push every 1 hour PRN Pre/post blood products, medications, blood draw, and to maintain line patency      PHYSICAL EXAM  Vital Signs Last 24 Hrs  T(C): 36.4 (12 May 2020 09:10), Max: 36.7 (11 May 2020 18:50)  T(F): 97.5 (12 May 2020 09:10), Max: 98.1 (11 May 2020 18:50)  HR: 106 (12 May 2020 12:40) (95 - 120)  BP: 103/54 (12 May 2020 12:40) (98/67 - 140/56)  BP(mean): 74 (12 May 2020 12:40) (74 - 87)  RR: 16 (12 May 2020 12:40) (13 - 25)  SpO2: 94% (12 May 2020 12:40) (94% - 100%)    Constitutional: wn/wd in NAD.   HEENT: NCAT, MMM, OP clear, EOMI, no proptosis or lid retraction  Neck: no thyromegaly or palpable thyroid nodules   Respiratory: lungs CTAB.  Cardiovascular: regular rhythm, normal S1 and S2, no audible murmurs, no peripheral edema  GI: soft, NT/ND, no masses/HSM appreciated.  Neurology: no tremors, DTR 2+  Skin: no visible rashes/lesions  Psychiatric: AAO x 3, normal affect/mood.    LABS:                        8.4    7.97  )-----------( 267      ( 12 May 2020 06:43 )             26.3     05-12    131<L>  |  95<L>  |  68<H>  ----------------------------<  78  4.1   |  18<L>  |  5.91<H>    Ca    8.2<L>      12 May 2020 06:43  Phos  6.1     05-12  Mg     2.3     05-12    TPro  5.9<L>  /  Alb  2.7<L>  /  TBili  0.6  /  DBili  x   /  AST  63<H>  /  ALT  35  /  AlkPhos  50  05-11        Thyroid Stimulating Hormone, Serum: 0.585 uIU/mL (05-07 @ 06:37)  Thyroid Stimulating Hormone, Serum: 0.558 uIU/mL (05-07 @ 06:37)      HbA1C: 6.0 % (01-16 @ 06:03)    CAPILLARY BLOOD GLUCOSE      POCT Blood Glucose.: 130 mg/dL (12 May 2020 11:36)  POCT Blood Glucose.: 94 mg/dL (12 May 2020 07:01)  POCT Blood Glucose.: 93 mg/dL (11 May 2020 21:34)      Insulin Sliding Scale requirements X 24 Hours:    RADIOLOGY & ADDITIONAL TESTS:    A/P: 84y Male with history of DM type II presenting for       1.  DM -     Please continue           units lantus at bedtime  / in the morning and        units lispro with meals and lispro moderate / low dose sliding scale 4 times daily with meals and at bedtime.  Please continue consistent carbohydrate diet.      Goal FSG is   Will continue to monitor   For discharge, pt can continue    Pt can follow up at discharge with [facility] by calling  to make an appointment.   Will discuss case with     and update primary team

## 2020-05-12 NOTE — PROGRESS NOTE ADULT - ATTENDING COMMENTS
I have reviewed the medical record, including laboratory and radiographic studies, interviewed and examined the patient and discussed the plan with Dr. Linares, the ID Resident.  Agree with above.  He has been complaining of cough, now with dyspnea with speaking.  CXR shows possible retrocardiac infiltrate.  He is afebrile with nl WBC.  Would obtain CT chest to evaluate for HAP, developing on pip-tazo.  Will continue to follow with you – ID Team 1.

## 2020-05-12 NOTE — PROGRESS NOTE ADULT - ATTENDING COMMENTS
ATN  uo may be improving a bit  volume, lytes ok  no uremic sx   cont to monitor uo, chem   no indication for dialysis yet

## 2020-05-12 NOTE — PROGRESS NOTE ADULT - SUBJECTIVE AND OBJECTIVE BOX
24hr events:  O/N: ID rec doxycycline 100 mg po q12h and Zosyn, remains tachycardic, rest of VSS  5/11: nephro- nothing to do for hyponatremia or creatinine of 5.44 continue to monitor, Am hgb 7.8, repeat @ 2pm 8.3; endo recs- changed diet to [facility] soft with nepro TID since the patient says having a hard time. 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 BRANDYN falsely elevated due to history of DM now s/p LLE Angiogram DEStent x2, L 3rd and 4th toe amputation, and debridement. Clinically stable. Hyponatremia per renal will watch.    consistent carb diet  Home meds  Dapto/Zosyn  ISS  pain control  f/u Renal, EP, Cards, and Endocrine recs  Eliquis/plavix  Lasix 40 PO daily  AM labs    f/u PT recs 24hr events:  O/N: ID rec doxycycline 100 mg po q12h and Zosyn, remains tachycardic, rest of VSS  5/11: nephro- nothing to do for hyponatremia or creatinine of 5.44 continue to monitor, Am hgb 7.8, repeat @ 2pm 8.3; endo recs- changed diet to mech soft with nepro TID since the patient says having a hard time. Vac changed    DAPTOmycin IVPB 600  piperacillin/tazobactam IVPB.. 2.25  aMIOdarone    Tablet 200  apixaban 2.5  clopidogrel Tablet 75  DAPTOmycin IVPB 600  furosemide    Tablet 40  metoprolol succinate ER 50  piperacillin/tazobactam IVPB.. 2.25        Vital Signs Last 24 Hrs  T(C): 36.4 (12 May 2020 05:27), Max: 36.7 (11 May 2020 18:50)  T(F): 97.5 (12 May 2020 05:27), Max: 98.1 (11 May 2020 18:50)  HR: 111 (12 May 2020 08:25) (95 - 120)  BP: 109/69 (12 May 2020 08:25) (98/67 - 140/56)  BP(mean): 83 (12 May 2020 08:25) (75 - 87)  RR: 20 (12 May 2020 08:25) (13 - 25)  SpO2: 100% (12 May 2020 08:25) (92% - 100%)  I&O's Summary    11 May 2020 07:01  -  12 May 2020 07:00  --------------------------------------------------------  IN: 250 mL / OUT: 370 mL / NET: -120 mL        Physical Exam:  General: NAD, resting comfortably in bed  Pulmonary: Nonlabored breathing, no respiratory distress  Extrem: WWP, Left foot wound with vac in place  Neuro: A/O x 3, no focal deficits, normal sensation  Pulses: LLE: biphasic PT  : Moe in place and functioning      LABS:                        8.4    7.97  )-----------( 267      ( 12 May 2020 06:43 )             26.3     05-12    131<L>  |  95<L>  |  68<H>  ----------------------------<  78  4.1   |  18<L>  |  5.91<H>    Ca    8.2<L>      12 May 2020 06:43  Phos  6.1     05-12  Mg     2.3     05-12    TPro  5.9<L>  /  Alb  2.7<L>  /  TBili  0.6  /  DBili  x   /  AST  63<H>  /  ALT  35  /  AlkPhos  50  05-11      PLEASE CHECK WHEN PRESENT:     [  ] Heart Failure     [  ] Acute     [  ] Acute on Chronic     [  ] Chronic  -------------------------------------------------------------------     [  ]Diastolic [HFpEF]     [  ]Systolic [HFrEF]     [  ]Combined [HFpEF & HFrEF]     [ X ]Other: hypertensive heart disease    [ X ] afib  -------------------------------------------------------------------  [ ] Respiratory failure  [ ] Acute cor pulmonale  [ ] Asthma/COPD Exacerbation  [ ] Pleural effusion  [ ] Aspiration pneumonia  -------------------------------------------------------------------  [  ]ZEENAT     [  ]ATN     [  ]Reneal Medullary Necrosis     [  ]Renal Cortical Necrosis     [  ]Other Pathological Lesions:    [  ]CKD 1  [  ]CKD 2  [X  ]CKD 3  [  ]CKD 4  [  ]CKD 5  [  ]Other  -------------------------------------------------------------------  [  ]Diabetes  [  ] Diabetic PVD Ulcer  [  ] Neuropathic ulcer to DM  [  ] Diabetes with Nephropathy  [  ] Osteomyelitis due to diabetes  --------------------------------------------------------------------  [  ]Malnutrition: See Nutrition Note  [  ]Cachexia  [  ]Other:   [  ]Supplement Ordered:  [  ]Morbid Obesity (BMI >=40]  ---------------------------------------------------------------------  [ ] Sepsis/severe sepsis/septic shock  [ ] UTI  [ ] Pneumonia  -----------------------------------------------------------------------  [ ] Acidosis/alkalosis  [ ] Fluid overload  [ ] Hypokalemia  [ ] Hyperkalemia  [ ] Hypomagnesemia  [ ] Hypophosphatemia  [ ] Hyperphosphatemia  [ X] hyponatremia- resolving no treatment   ------------------------------------------------------------------------  [ ] Acute blood loss anemia  [ ] Post op blood loss anemia  [ ] Iron deficiency anemia  [X ] Anemia due to chronic disease  [ ] Hypercoagulable state  ----------------------------------------------------------------------  [ ] Cerebral infarction  [ ] Transient ischemia attack  [ ] Encephalopathy    Assessment/Plan:  84 M PMH HTN, DM, A fib on Xarelto (Last taken 8am) HF EF 35%, PAD, CKD III 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 BRANDYN falsely elevated due to history of DM now s/p LLE Angiogram DEStent x2, L 3rd and 4th toe amputation, and debridement. Clinically stable. Hyponatremia per renal will watch.    consistent carb diet  Home meds  Dapto/Zosyn  ISS  pain control  f/u Renal, EP, Cards, and Endocrine recs  Eliquis/plavix  Lasix 40 PO daily  AM labs    PT recs- [facility]

## 2020-05-13 DIAGNOSIS — D72.829 ELEVATED WHITE BLOOD CELL COUNT, UNSPECIFIED: ICD-10-CM

## 2020-05-13 LAB
ALBUMIN SERPL ELPH-MCNC: 2.6 G/DL — LOW (ref 3.3–5)
ALP SERPL-CCNC: 50 U/L — SIGNIFICANT CHANGE UP (ref 40–120)
ALT FLD-CCNC: 30 U/L — SIGNIFICANT CHANGE UP (ref 10–45)
ANION GAP SERPL CALC-SCNC: 20 MMOL/L — HIGH (ref 5–17)
ANION GAP SERPL CALC-SCNC: 20 MMOL/L — HIGH (ref 5–17)
ANION GAP SERPL CALC-SCNC: 22 MMOL/L — HIGH (ref 5–17)
APTT BLD: 33.7 SEC — SIGNIFICANT CHANGE UP (ref 27.5–36.3)
AST SERPL-CCNC: 47 U/L — HIGH (ref 10–40)
BASOPHILS # BLD AUTO: 0 K/UL — SIGNIFICANT CHANGE UP (ref 0–0.2)
BASOPHILS # BLD AUTO: 0.02 K/UL — SIGNIFICANT CHANGE UP (ref 0–0.2)
BASOPHILS NFR BLD AUTO: 0 % — SIGNIFICANT CHANGE UP (ref 0–2)
BASOPHILS NFR BLD AUTO: 0.2 % — SIGNIFICANT CHANGE UP (ref 0–2)
BILIRUB SERPL-MCNC: 0.8 MG/DL — SIGNIFICANT CHANGE UP (ref 0.2–1.2)
BUN SERPL-MCNC: 76 MG/DL — HIGH (ref 7–23)
BUN SERPL-MCNC: 80 MG/DL — HIGH (ref 7–23)
BUN SERPL-MCNC: 83 MG/DL — HIGH (ref 7–23)
CALCIUM SERPL-MCNC: 8 MG/DL — LOW (ref 8.4–10.5)
CALCIUM SERPL-MCNC: 8.1 MG/DL — LOW (ref 8.4–10.5)
CALCIUM SERPL-MCNC: 8.6 MG/DL — SIGNIFICANT CHANGE UP (ref 8.4–10.5)
CHLORIDE SERPL-SCNC: 93 MMOL/L — LOW (ref 96–108)
CHLORIDE SERPL-SCNC: 93 MMOL/L — LOW (ref 96–108)
CHLORIDE SERPL-SCNC: 94 MMOL/L — LOW (ref 96–108)
CK SERPL-CCNC: 501 U/L — HIGH (ref 30–200)
CO2 SERPL-SCNC: 17 MMOL/L — LOW (ref 22–31)
CREAT SERPL-MCNC: 6.74 MG/DL — HIGH (ref 0.5–1.3)
CREAT SERPL-MCNC: 6.88 MG/DL — HIGH (ref 0.5–1.3)
CREAT SERPL-MCNC: 7.16 MG/DL — HIGH (ref 0.5–1.3)
EOSINOPHIL # BLD AUTO: 0 K/UL — SIGNIFICANT CHANGE UP (ref 0–0.5)
EOSINOPHIL # BLD AUTO: 0.04 K/UL — SIGNIFICANT CHANGE UP (ref 0–0.5)
EOSINOPHIL NFR BLD AUTO: 0 % — SIGNIFICANT CHANGE UP (ref 0–6)
EOSINOPHIL NFR BLD AUTO: 0.3 % — SIGNIFICANT CHANGE UP (ref 0–6)
GAS PNL BLDA: SIGNIFICANT CHANGE UP
GLUCOSE BLDC GLUCOMTR-MCNC: 109 MG/DL — HIGH (ref 70–99)
GLUCOSE BLDC GLUCOMTR-MCNC: 118 MG/DL — HIGH (ref 70–99)
GLUCOSE BLDC GLUCOMTR-MCNC: 125 MG/DL — HIGH (ref 70–99)
GLUCOSE BLDC GLUCOMTR-MCNC: 127 MG/DL — HIGH (ref 70–99)
GLUCOSE SERPL-MCNC: 108 MG/DL — HIGH (ref 70–99)
GLUCOSE SERPL-MCNC: 112 MG/DL — HIGH (ref 70–99)
GLUCOSE SERPL-MCNC: 119 MG/DL — HIGH (ref 70–99)
HBV SURFACE AG SER-ACNC: SIGNIFICANT CHANGE UP
HCT VFR BLD CALC: 22.4 % — LOW (ref 39–50)
HCT VFR BLD CALC: 23.8 % — LOW (ref 39–50)
HCT VFR BLD CALC: 26.6 % — LOW (ref 39–50)
HCV AB S/CO SERPL IA: 0.11 S/CO — SIGNIFICANT CHANGE UP
HCV AB SERPL-IMP: SIGNIFICANT CHANGE UP
HGB BLD-MCNC: 7.3 G/DL — LOW (ref 13–17)
HGB BLD-MCNC: 7.8 G/DL — LOW (ref 13–17)
HGB BLD-MCNC: 8.5 G/DL — LOW (ref 13–17)
IMM GRANULOCYTES NFR BLD AUTO: 0.3 % — SIGNIFICANT CHANGE UP (ref 0–1.5)
INR BLD: 1.78 — HIGH (ref 0.88–1.16)
LACTATE SERPL-SCNC: 1.7 MMOL/L — SIGNIFICANT CHANGE UP (ref 0.5–2)
LYMPHOCYTES # BLD AUTO: 0.15 K/UL — LOW (ref 1–3.3)
LYMPHOCYTES # BLD AUTO: 0.57 K/UL — LOW (ref 1–3.3)
LYMPHOCYTES # BLD AUTO: 0.9 % — LOW (ref 13–44)
LYMPHOCYTES # BLD AUTO: 4.3 % — LOW (ref 13–44)
MAGNESIUM SERPL-MCNC: 2.2 MG/DL — SIGNIFICANT CHANGE UP (ref 1.6–2.6)
MAGNESIUM SERPL-MCNC: 2.3 MG/DL — SIGNIFICANT CHANGE UP (ref 1.6–2.6)
MAGNESIUM SERPL-MCNC: 2.4 MG/DL — SIGNIFICANT CHANGE UP (ref 1.6–2.6)
MCHC RBC-ENTMCNC: 26.4 PG — LOW (ref 27–34)
MCHC RBC-ENTMCNC: 26.5 PG — LOW (ref 27–34)
MCHC RBC-ENTMCNC: 26.6 PG — LOW (ref 27–34)
MCHC RBC-ENTMCNC: 32 GM/DL — SIGNIFICANT CHANGE UP (ref 32–36)
MCHC RBC-ENTMCNC: 32.6 GM/DL — SIGNIFICANT CHANGE UP (ref 32–36)
MCHC RBC-ENTMCNC: 32.8 GM/DL — SIGNIFICANT CHANGE UP (ref 32–36)
MCV RBC AUTO: 81 FL — SIGNIFICANT CHANGE UP (ref 80–100)
MCV RBC AUTO: 81.8 FL — SIGNIFICANT CHANGE UP (ref 80–100)
MCV RBC AUTO: 82.6 FL — SIGNIFICANT CHANGE UP (ref 80–100)
MONOCYTES # BLD AUTO: 0.55 K/UL — SIGNIFICANT CHANGE UP (ref 0–0.9)
MONOCYTES # BLD AUTO: 0.74 K/UL — SIGNIFICANT CHANGE UP (ref 0–0.9)
MONOCYTES NFR BLD AUTO: 4.2 % — SIGNIFICANT CHANGE UP (ref 2–14)
MONOCYTES NFR BLD AUTO: 4.4 % — SIGNIFICANT CHANGE UP (ref 2–14)
NEUTROPHILS # BLD AUTO: 11.92 K/UL — HIGH (ref 1.8–7.4)
NEUTROPHILS # BLD AUTO: 15.91 K/UL — HIGH (ref 1.8–7.4)
NEUTROPHILS NFR BLD AUTO: 90.7 % — HIGH (ref 43–77)
NEUTROPHILS NFR BLD AUTO: 94.7 % — HIGH (ref 43–77)
NRBC # BLD: 0 /100 WBCS — SIGNIFICANT CHANGE UP (ref 0–0)
NRBC # BLD: 0 /100 WBCS — SIGNIFICANT CHANGE UP (ref 0–0)
NT-PROBNP SERPL-SCNC: HIGH PG/ML (ref 0–300)
PHOSPHATE SERPL-MCNC: 6.3 MG/DL — HIGH (ref 2.5–4.5)
PHOSPHATE SERPL-MCNC: 6.5 MG/DL — HIGH (ref 2.5–4.5)
PHOSPHATE SERPL-MCNC: 6.6 MG/DL — HIGH (ref 2.5–4.5)
PLATELET # BLD AUTO: 283 K/UL — SIGNIFICANT CHANGE UP (ref 150–400)
PLATELET # BLD AUTO: 297 K/UL — SIGNIFICANT CHANGE UP (ref 150–400)
PLATELET # BLD AUTO: 302 K/UL — SIGNIFICANT CHANGE UP (ref 150–400)
POTASSIUM SERPL-MCNC: 3.8 MMOL/L — SIGNIFICANT CHANGE UP (ref 3.5–5.3)
POTASSIUM SERPL-MCNC: 3.8 MMOL/L — SIGNIFICANT CHANGE UP (ref 3.5–5.3)
POTASSIUM SERPL-MCNC: 4.1 MMOL/L — SIGNIFICANT CHANGE UP (ref 3.5–5.3)
POTASSIUM SERPL-SCNC: 3.8 MMOL/L — SIGNIFICANT CHANGE UP (ref 3.5–5.3)
POTASSIUM SERPL-SCNC: 3.8 MMOL/L — SIGNIFICANT CHANGE UP (ref 3.5–5.3)
POTASSIUM SERPL-SCNC: 4.1 MMOL/L — SIGNIFICANT CHANGE UP (ref 3.5–5.3)
PROINSULIN SERPL-MCNC: 17.2 PMOL/L — HIGH (ref 0–10)
PROT SERPL-MCNC: 5.7 G/DL — LOW (ref 6–8.3)
PROTHROM AB SERPL-ACNC: 20.7 SEC — HIGH (ref 10–12.9)
RBC # BLD: 2.74 M/UL — LOW (ref 4.2–5.8)
RBC # BLD: 2.94 M/UL — LOW (ref 4.2–5.8)
RBC # BLD: 3.22 M/UL — LOW (ref 4.2–5.8)
RBC # FLD: 15.6 % — HIGH (ref 10.3–14.5)
RBC # FLD: 15.6 % — HIGH (ref 10.3–14.5)
RBC # FLD: 15.8 % — HIGH (ref 10.3–14.5)
SODIUM SERPL-SCNC: 130 MMOL/L — LOW (ref 135–145)
SODIUM SERPL-SCNC: 130 MMOL/L — LOW (ref 135–145)
SODIUM SERPL-SCNC: 133 MMOL/L — LOW (ref 135–145)
TROPONIN T SERPL-MCNC: 0.14 NG/ML — CRITICAL HIGH (ref 0–0.01)
VANCOMYCIN FLD-MCNC: 14.6 UG/ML — SIGNIFICANT CHANGE UP
WBC # BLD: 12.55 K/UL — HIGH (ref 3.8–10.5)
WBC # BLD: 16.8 K/UL — HIGH (ref 3.8–10.5)
WBC # BLD: 19.19 K/UL — HIGH (ref 3.8–10.5)
WBC # FLD AUTO: 12.55 K/UL — HIGH (ref 3.8–10.5)
WBC # FLD AUTO: 16.8 K/UL — HIGH (ref 3.8–10.5)
WBC # FLD AUTO: 19.19 K/UL — HIGH (ref 3.8–10.5)

## 2020-05-13 PROCEDURE — 99291 CRITICAL CARE FIRST HOUR: CPT

## 2020-05-13 PROCEDURE — 99233 SBSQ HOSP IP/OBS HIGH 50: CPT | Mod: GC

## 2020-05-13 PROCEDURE — 99232 SBSQ HOSP IP/OBS MODERATE 35: CPT

## 2020-05-13 PROCEDURE — 71045 X-RAY EXAM CHEST 1 VIEW: CPT | Mod: 26

## 2020-05-13 RX ORDER — ALBUMIN HUMAN 25 %
50 VIAL (ML) INTRAVENOUS
Refills: 0 | Status: COMPLETED | OUTPATIENT
Start: 2020-05-13 | End: 2020-05-13

## 2020-05-13 RX ORDER — VASOPRESSIN 20 [USP'U]/ML
0.02 INJECTION INTRAVENOUS
Qty: 50 | Refills: 0 | Status: DISCONTINUED | OUTPATIENT
Start: 2020-05-13 | End: 2020-05-14

## 2020-05-13 RX ORDER — SODIUM CHLORIDE 9 MG/ML
250 INJECTION INTRAMUSCULAR; INTRAVENOUS; SUBCUTANEOUS ONCE
Refills: 0 | Status: COMPLETED | OUTPATIENT
Start: 2020-05-13 | End: 2020-05-13

## 2020-05-13 RX ORDER — ACETAMINOPHEN 500 MG
1000 TABLET ORAL ONCE
Refills: 0 | Status: COMPLETED | OUTPATIENT
Start: 2020-05-13 | End: 2020-05-13

## 2020-05-13 RX ORDER — PHENYLEPHRINE HYDROCHLORIDE 10 MG/ML
0.21 INJECTION INTRAVENOUS
Qty: 40 | Refills: 0 | Status: DISCONTINUED | OUTPATIENT
Start: 2020-05-13 | End: 2020-05-15

## 2020-05-13 RX ORDER — AMIODARONE HYDROCHLORIDE 400 MG/1
200 TABLET ORAL DAILY
Refills: 0 | Status: DISCONTINUED | OUTPATIENT
Start: 2020-05-13 | End: 2020-05-14

## 2020-05-13 RX ORDER — HYDROMORPHONE HYDROCHLORIDE 2 MG/ML
0.5 INJECTION INTRAMUSCULAR; INTRAVENOUS; SUBCUTANEOUS ONCE
Refills: 0 | Status: DISCONTINUED | OUTPATIENT
Start: 2020-05-13 | End: 2020-05-13

## 2020-05-13 RX ORDER — HEPARIN SODIUM 5000 [USP'U]/ML
900 INJECTION INTRAVENOUS; SUBCUTANEOUS
Qty: 25000 | Refills: 0 | Status: DISCONTINUED | OUTPATIENT
Start: 2020-05-13 | End: 2020-05-14

## 2020-05-13 RX ORDER — MEROPENEM 1 G/30ML
500 INJECTION INTRAVENOUS ONCE
Refills: 0 | Status: DISCONTINUED | OUTPATIENT
Start: 2020-05-13 | End: 2020-05-13

## 2020-05-13 RX ORDER — SODIUM BICARBONATE 1 MEQ/ML
1300 SYRINGE (ML) INTRAVENOUS THREE TIMES A DAY
Refills: 0 | Status: DISCONTINUED | OUTPATIENT
Start: 2020-05-13 | End: 2020-05-21

## 2020-05-13 RX ORDER — AMIODARONE HYDROCHLORIDE 400 MG/1
200 TABLET ORAL DAILY
Refills: 0 | Status: DISCONTINUED | OUTPATIENT
Start: 2020-05-13 | End: 2020-05-13

## 2020-05-13 RX ORDER — SODIUM CHLORIDE 9 MG/ML
250 INJECTION INTRAMUSCULAR; INTRAVENOUS; SUBCUTANEOUS ONCE
Refills: 0 | Status: DISCONTINUED | OUTPATIENT
Start: 2020-05-13 | End: 2020-05-13

## 2020-05-13 RX ORDER — OXYCODONE AND ACETAMINOPHEN 5; 325 MG/1; MG/1
1 TABLET ORAL EVERY 6 HOURS
Refills: 0 | Status: DISCONTINUED | OUTPATIENT
Start: 2020-05-13 | End: 2020-05-20

## 2020-05-13 RX ORDER — PIPERACILLIN AND TAZOBACTAM 4; .5 G/20ML; G/20ML
2.25 INJECTION, POWDER, LYOPHILIZED, FOR SOLUTION INTRAVENOUS EVERY 8 HOURS
Refills: 0 | Status: DISCONTINUED | OUTPATIENT
Start: 2020-05-13 | End: 2020-05-13

## 2020-05-13 RX ORDER — MEROPENEM 1 G/30ML
500 INJECTION INTRAVENOUS EVERY 24 HOURS
Refills: 0 | Status: DISCONTINUED | OUTPATIENT
Start: 2020-05-13 | End: 2020-05-18

## 2020-05-13 RX ORDER — FUROSEMIDE 40 MG
80 TABLET ORAL ONCE
Refills: 0 | Status: COMPLETED | OUTPATIENT
Start: 2020-05-13 | End: 2020-05-13

## 2020-05-13 RX ADMIN — Medication 650 MILLIGRAM(S): at 06:33

## 2020-05-13 RX ADMIN — CLOPIDOGREL BISULFATE 75 MILLIGRAM(S): 75 TABLET, FILM COATED ORAL at 11:21

## 2020-05-13 RX ADMIN — Medication 1 TABLET(S): at 11:21

## 2020-05-13 RX ADMIN — Medication 1300 MILLIGRAM(S): at 22:15

## 2020-05-13 RX ADMIN — DAPTOMYCIN 124 MILLIGRAM(S): 500 INJECTION, POWDER, LYOPHILIZED, FOR SOLUTION INTRAVENOUS at 14:03

## 2020-05-13 RX ADMIN — PIPERACILLIN AND TAZOBACTAM 200 GRAM(S): 4; .5 INJECTION, POWDER, LYOPHILIZED, FOR SOLUTION INTRAVENOUS at 14:03

## 2020-05-13 RX ADMIN — Medication 40 MILLIGRAM(S): at 06:33

## 2020-05-13 RX ADMIN — PIPERACILLIN AND TAZOBACTAM 200 GRAM(S): 4; .5 INJECTION, POWDER, LYOPHILIZED, FOR SOLUTION INTRAVENOUS at 06:33

## 2020-05-13 RX ADMIN — AMIODARONE HYDROCHLORIDE 200 MILLIGRAM(S): 400 TABLET ORAL at 11:21

## 2020-05-13 RX ADMIN — Medication 80 MILLIGRAM(S): at 14:41

## 2020-05-13 RX ADMIN — CHLORHEXIDINE GLUCONATE 1 APPLICATION(S): 213 SOLUTION TOPICAL at 06:34

## 2020-05-13 RX ADMIN — Medication 1300 MILLIGRAM(S): at 14:03

## 2020-05-13 RX ADMIN — SODIUM CHLORIDE 500 MILLILITER(S): 9 INJECTION INTRAMUSCULAR; INTRAVENOUS; SUBCUTANEOUS at 19:40

## 2020-05-13 RX ADMIN — Medication 50 MILLILITER(S): at 21:05

## 2020-05-13 RX ADMIN — PIPERACILLIN AND TAZOBACTAM 200 GRAM(S): 4; .5 INJECTION, POWDER, LYOPHILIZED, FOR SOLUTION INTRAVENOUS at 22:15

## 2020-05-13 RX ADMIN — AMIODARONE HYDROCHLORIDE 200 MILLIGRAM(S): 400 TABLET ORAL at 22:14

## 2020-05-13 RX ADMIN — ATORVASTATIN CALCIUM 40 MILLIGRAM(S): 80 TABLET, FILM COATED ORAL at 22:18

## 2020-05-13 RX ADMIN — Medication 400 MILLIGRAM(S): at 18:19

## 2020-05-13 RX ADMIN — HYDROMORPHONE HYDROCHLORIDE 0.5 MILLIGRAM(S): 2 INJECTION INTRAMUSCULAR; INTRAVENOUS; SUBCUTANEOUS at 13:46

## 2020-05-13 RX ADMIN — SODIUM CHLORIDE 250 MILLILITER(S): 9 INJECTION INTRAMUSCULAR; INTRAVENOUS; SUBCUTANEOUS at 09:11

## 2020-05-13 RX ADMIN — APIXABAN 2.5 MILLIGRAM(S): 2.5 TABLET, FILM COATED ORAL at 06:33

## 2020-05-13 RX ADMIN — Medication 50 MILLIGRAM(S): at 06:33

## 2020-05-13 RX ADMIN — PHENYLEPHRINE HYDROCHLORIDE 6 MICROGRAM(S)/KG/MIN: 10 INJECTION INTRAVENOUS at 19:45

## 2020-05-13 RX ADMIN — SENNA PLUS 2 TABLET(S): 8.6 TABLET ORAL at 22:15

## 2020-05-13 NOTE — PROGRESS NOTE ADULT - SUBJECTIVE AND OBJECTIVE BOX
PAST MEDICAL & SURGICAL HISTORY:  Heart failure  HLD (hyperlipidemia)  Borderline diabetes mellitus  Afib  HTN (hypertension)  H/O laryngectomy      Home Medications:  amiodarone 200 mg oral tablet: 1 tab(s) orally once a day (05 Mar 2020 15:32)  atorvastatin 40 mg oral tablet: 1 tab(s) orally once a day (05 Mar 2020 15:32)  Lasix 40 mg oral tablet: 1 tab(s) orally once a day (05 Mar 2020 15:32)  Metoprolol Succinate ER 50 mg oral tablet, extended release: 1 tab(s) orally once a day (05 Mar 2020 15:32)  Xarelto 15 mg oral tablet: 1 tab(s) orally once a day (in the evening) (05 Mar 2020 15:32)      MEDICATIONS  (STANDING):  aMIOdarone    Tablet 200 milliGRAM(s) Oral daily  atorvastatin 40 milliGRAM(s) Oral at bedtime  chlorhexidine 2% Cloths 1 Application(s) Topical <User Schedule>  clopidogrel Tablet 75 milliGRAM(s) Oral daily  DAPTOmycin IVPB 600 milliGRAM(s) IV Intermittent every 48 hours  dextrose 5%. 1000 milliLiter(s) (50 mL/Hr) IV Continuous <Continuous>  dextrose 50% Injectable 12.5 Gram(s) IV Push once  dextrose 50% Injectable 25 Gram(s) IV Push once  insulin lispro (HumaLOG) corrective regimen sliding scale   SubCutaneous Before meals and at bedtime  meropenem  IVPB 500 milliGRAM(s) IV Intermittent every 24 hours  multivitamin 1 Tablet(s) Oral daily  phenylephrine    Infusion 0.208 MICROgram(s)/kG/Min (6 mL/Hr) IV Continuous <Continuous>  senna 2 Tablet(s) Oral at bedtime  sodium bicarbonate 1300 milliGRAM(s) Oral three times a day  vasopressin Infusion 0.02 Unit(s)/Min (1.2 mL/Hr) IV Continuous <Continuous>    MEDICATIONS  (PRN):  acetaminophen   Tablet .. 650 milliGRAM(s) Oral every 6 hours PRN Mild Pain (1 - 3)  dextrose 40% Gel 15 Gram(s) Oral once PRN Blood Glucose LESS THAN 70 milliGRAM(s)/deciliter  glucagon  Injectable 1 milliGRAM(s) IntraMuscular once PRN Glucose LESS THAN 70 milligrams/deciliter  oxycodone    5 mG/acetaminophen 325 mG 1 Tablet(s) Oral every 6 hours PRN Severe Pain (7 - 10)  sodium chloride 0.9% lock flush 10 milliLiter(s) IV Push every 1 hour PRN Pre/post blood products, medications, blood draw, and to maintain line patency      .  VITAL SIGNS:  T(C): 36.9 (05-13-20 @ 20:50), Max: 38.4 (05-13-20 @ 18:00)  T(F): 98.4 (05-13-20 @ 20:50), Max: 101.1 (05-13-20 @ 18:00)  HR: 112 (05-13-20 @ 22:00) (96 - 146)  BP: 91/39 (05-13-20 @ 22:00) (70/49 - 136/109)  BP(mean): 61 (05-13-20 @ 20:03) (41 - 118)  RR: 18 (05-13-20 @ 22:00) (12 - 29)  SpO2: 98% (05-13-20 @ 22:00) (92% - 99%)  Wt(kg): --    PHYSICAL EXAM:    INCOMPLETE    Constitutional: WDWN resting comfortably in bed; NAD  Head: NC/AT  Eyes: PERRL, EOMI, anicteric sclera  ENT: no nasal discharge; uvula midline, no oropharyngeal erythema or exudates; MMM  Neck: supple; no JVD or thyromegaly  Respiratory: CTA B/L; no W/R/R, no retractions  Cardiac: +S1/S2; RRR; no M/R/G; PMI non-displaced  Gastrointestinal: soft, NT/ND; no rebound or guarding; +BSx4  Genitourinary: normal external genitalia  Back: spine midline, no bony tenderness or step-offs; no CVAT B/L  Extremities: WWP, no clubbing or cyanosis; no peripheral edema  Musculoskeletal: NROM x4; no joint swelling, tenderness or erythema  Vascular: 2+ radial, femoral, DP/PT pulses B/L  Dermatologic: skin warm, dry and intact; no rashes, wounds, or scars  Lymphatic: no submandibular or cervical LAD  Neurologic: AAOx3; CNII-XII grossly intact; no focal deficits  Psychiatric: affect and characteristics of appearance, verbalizations, behaviors are appropriate    .  LABS:                         7.8    19.19 )-----------( 302      ( 13 May 2020 22:03 )             23.8     05-13    133<L>  |  94<L>  |  83<H>  ----------------------------<  119<H>  3.8   |  17<L>  |  7.16<H>    Ca    8.0<L>      13 May 2020 19:19  Phos  6.6     05-13  Mg     2.2     05-13    TPro  5.7<L>  /  Alb  2.6<L>  /  TBili  0.8  /  DBili  x   /  AST  47<H>  /  ALT  30  /  AlkPhos  50  05-13    PT/INR - ( 13 May 2020 22:03 )   PT: 20.7 sec;   INR: 1.78          PTT - ( 13 May 2020 22:03 )  PTT:33.7 sec    CARDIAC MARKERS ( 13 May 2020 19:19 )  x     / 0.14 ng/mL / x     / x     / x      CARDIAC MARKERS ( 13 May 2020 14:25 )  x     / x     / 501 U/L / x     / x      CARDIAC MARKERS ( 12 May 2020 06:43 )  x     / x     / 771 U/L / x     / x          Serum Pro-Brain Natriuretic Peptide: 88322 pg/mL (05-13 @ 19:19)    Lactate, Blood: 1.7 mmol/L (05-13 @ 19:18)      RADIOLOGY, EKG & ADDITIONAL TESTS: Reviewed.     < from: TTE Echo Complete w/ Contrast w/ Doppler (03.05.20 @ 15:57) >  CONCLUSIONS:     1. The left ventricle cavity size is moderately dilated. Left ventricular systolic function is moderately reduced with a calculated ejection fraction of 35% with global hypokinesis.   2. The right ventricle is mildly dilated. Right ventricular systolic function is normal.   3. Severely dilated left atrium.   4. Severely dilated right atrium.   5. Mild-to-moderate mitral regurgitation.   6. Mild-to-moderate tricuspid regurgitation.   7. Pulmonary hypertension present, pulmonary artery systolic pressure is 47 mmHg.   8. No pericardial effusion.    < end of copied text >      < from: 12 Lead ECG (05.05.20 @ 14:45) >  Diagnosis Line Atrial fibrillation with rapid ventricular response  Nonspecific ST - T abnormalities  Confirmed by JEREMY ORO NEIL ([id number]) on 5/6/2020 12:47:27 PM    < end of copied text > PAST MEDICAL & SURGICAL HISTORY:  Heart failure  HLD (hyperlipidemia)  Borderline diabetes mellitus  Afib  HTN (hypertension)  H/O laryngectomy      Home Medications:  amiodarone 200 mg oral tablet: 1 tab(s) orally once a day (05 Mar 2020 15:32)  atorvastatin 40 mg oral tablet: 1 tab(s) orally once a day (05 Mar 2020 15:32)  Lasix 40 mg oral tablet: 1 tab(s) orally once a day (05 Mar 2020 15:32)  Metoprolol Succinate ER 50 mg oral tablet, extended release: 1 tab(s) orally once a day (05 Mar 2020 15:32)  Xarelto 15 mg oral tablet: 1 tab(s) orally once a day (in the evening) (05 Mar 2020 15:32)      MEDICATIONS  (STANDING):  aMIOdarone    Tablet 200 milliGRAM(s) Oral daily  atorvastatin 40 milliGRAM(s) Oral at bedtime  chlorhexidine 2% Cloths 1 Application(s) Topical <User Schedule>  clopidogrel Tablet 75 milliGRAM(s) Oral daily  DAPTOmycin IVPB 600 milliGRAM(s) IV Intermittent every 48 hours  dextrose 5%. 1000 milliLiter(s) (50 mL/Hr) IV Continuous <Continuous>  dextrose 50% Injectable 12.5 Gram(s) IV Push once  dextrose 50% Injectable 25 Gram(s) IV Push once  insulin lispro (HumaLOG) corrective regimen sliding scale   SubCutaneous Before meals and at bedtime  meropenem  IVPB 500 milliGRAM(s) IV Intermittent every 24 hours  multivitamin 1 Tablet(s) Oral daily  phenylephrine    Infusion 0.208 MICROgram(s)/kG/Min (6 mL/Hr) IV Continuous <Continuous>  senna 2 Tablet(s) Oral at bedtime  sodium bicarbonate 1300 milliGRAM(s) Oral three times a day  vasopressin Infusion 0.02 Unit(s)/Min (1.2 mL/Hr) IV Continuous <Continuous>    MEDICATIONS  (PRN):  acetaminophen   Tablet .. 650 milliGRAM(s) Oral every 6 hours PRN Mild Pain (1 - 3)  dextrose 40% Gel 15 Gram(s) Oral once PRN Blood Glucose LESS THAN 70 milliGRAM(s)/deciliter  glucagon  Injectable 1 milliGRAM(s) IntraMuscular once PRN Glucose LESS THAN 70 milligrams/deciliter  oxycodone    5 mG/acetaminophen 325 mG 1 Tablet(s) Oral every 6 hours PRN Severe Pain (7 - 10)  sodium chloride 0.9% lock flush 10 milliLiter(s) IV Push every 1 hour PRN Pre/post blood products, medications, blood draw, and to maintain line patency      .  VITAL SIGNS:  T(C): 36.9 (05-13-20 @ 20:50), Max: 38.4 (05-13-20 @ 18:00)  T(F): 98.4 (05-13-20 @ 20:50), Max: 101.1 (05-13-20 @ 18:00)  HR: 112 (05-13-20 @ 22:00) (96 - 146)  BP: 91/39 (05-13-20 @ 22:00) (70/49 - 136/109)  BP(mean): 61 (05-13-20 @ 20:03) (41 - 118)  RR: 18 (05-13-20 @ 22:00) (12 - 29)  SpO2: 98% (05-13-20 @ 22:00) (92% - 99%)  Wt(kg): --    PHYSICAL EXAM:    INCOMPLETE    Constitutional: Laying comfortably in NAD, now on NC, undergoing Dialysis  Head: NC/AT  Eyes: PERRL, EOMI, anicteric sclera  ENT: no nasal discharge; uvula midline, no oropharyngeal erythema or exudates; MMM  Neck: supple; no JVD or thyromegaly  Respiratory: CTA B/L; no W/R/R, no retractions  Cardiac: +S1/S2; RRR; no M/R/G; PMI non-displaced  Gastrointestinal: soft, NT/ND; no rebound or guarding; +BSx4  Genitourinary: normal external genitalia  Back: spine midline, no bony tenderness or step-offs; no CVAT B/L  Extremities: WWP, no clubbing or cyanosis; no peripheral edema  Musculoskeletal: NROM x4; no joint swelling, tenderness or erythema  Vascular: 2+ radial, femoral, DP/PT pulses B/L  Dermatologic: skin warm, dry and intact; no rashes, wounds, or scars  Lymphatic: no submandibular or cervical LAD  Neurologic: AAOx3; CNII-XII grossly intact; no focal deficits  Psychiatric: affect and characteristics of appearance, verbalizations, behaviors are appropriate    .  LABS:                         7.8    19.19 )-----------( 302      ( 13 May 2020 22:03 )             23.8     05-13    133<L>  |  94<L>  |  83<H>  ----------------------------<  119<H>  3.8   |  17<L>  |  7.16<H>    Ca    8.0<L>      13 May 2020 19:19  Phos  6.6     05-13  Mg     2.2     05-13    TPro  5.7<L>  /  Alb  2.6<L>  /  TBili  0.8  /  DBili  x   /  AST  47<H>  /  ALT  30  /  AlkPhos  50  05-13    PT/INR - ( 13 May 2020 22:03 )   PT: 20.7 sec;   INR: 1.78          PTT - ( 13 May 2020 22:03 )  PTT:33.7 sec    CARDIAC MARKERS ( 13 May 2020 19:19 )  x     / 0.14 ng/mL / x     / x     / x      CARDIAC MARKERS ( 13 May 2020 14:25 )  x     / x     / 501 U/L / x     / x      CARDIAC MARKERS ( 12 May 2020 06:43 )  x     / x     / 771 U/L / x     / x          Serum Pro-Brain Natriuretic Peptide: 33448 pg/mL (05-13 @ 19:19)    Lactate, Blood: 1.7 mmol/L (05-13 @ 19:18)      RADIOLOGY, EKG & ADDITIONAL TESTS: Reviewed.     < from: TTE Echo Complete w/ Contrast w/ Doppler (03.05.20 @ 15:57) >  CONCLUSIONS:     1. The left ventricle cavity size is moderately dilated. Left ventricular systolic function is moderately reduced with a calculated ejection fraction of 35% with global hypokinesis.   2. The right ventricle is mildly dilated. Right ventricular systolic function is normal.   3. Severely dilated left atrium.   4. Severely dilated right atrium.   5. Mild-to-moderate mitral regurgitation.   6. Mild-to-moderate tricuspid regurgitation.   7. Pulmonary hypertension present, pulmonary artery systolic pressure is 47 mmHg.   8. No pericardial effusion.    < end of copied text >      < from: 12 Lead ECG (05.05.20 @ 14:45) >  Diagnosis Line Atrial fibrillation with rapid ventricular response  Nonspecific ST - T abnormalities  Confirmed by JEREMY ORO NEIL ([id number]) on 5/6/2020 12:47:27 PM    < end of copied text > Interval HPI: Patient with RR earlier in the day. Noted to be Tachypneic, Hypoxic to the 70's. Hypoxic Resp Failure deemed 2/2 Worsening Pulmonary venous congestion in setting of Anuric Renal Failure. Patient had catheter placed for emergent planned dialysis. Course Further c/b Shock, likely septic in Nature with patient Febrile with worsening leukocytosis, now on High Phenylephrine requirement.s    PAST MEDICAL & SURGICAL HISTORY:  Heart failure  HLD (hyperlipidemia)  Borderline diabetes mellitus  Afib  HTN (hypertension)  H/O laryngectomy      Home Medications:  amiodarone 200 mg oral tablet: 1 tab(s) orally once a day (05 Mar 2020 15:32)  atorvastatin 40 mg oral tablet: 1 tab(s) orally once a day (05 Mar 2020 15:32)  Lasix 40 mg oral tablet: 1 tab(s) orally once a day (05 Mar 2020 15:32)  Metoprolol Succinate ER 50 mg oral tablet, extended release: 1 tab(s) orally once a day (05 Mar 2020 15:32)  Xarelto 15 mg oral tablet: 1 tab(s) orally once a day (in the evening) (05 Mar 2020 15:32)      MEDICATIONS  (STANDING):  aMIOdarone    Tablet 200 milliGRAM(s) Oral daily  atorvastatin 40 milliGRAM(s) Oral at bedtime  chlorhexidine 2% Cloths 1 Application(s) Topical <User Schedule>  clopidogrel Tablet 75 milliGRAM(s) Oral daily  DAPTOmycin IVPB 600 milliGRAM(s) IV Intermittent every 48 hours  dextrose 5%. 1000 milliLiter(s) (50 mL/Hr) IV Continuous <Continuous>  dextrose 50% Injectable 12.5 Gram(s) IV Push once  dextrose 50% Injectable 25 Gram(s) IV Push once  insulin lispro (HumaLOG) corrective regimen sliding scale   SubCutaneous Before meals and at bedtime  meropenem  IVPB 500 milliGRAM(s) IV Intermittent every 24 hours  multivitamin 1 Tablet(s) Oral daily  phenylephrine    Infusion 0.208 MICROgram(s)/kG/Min (6 mL/Hr) IV Continuous <Continuous>  senna 2 Tablet(s) Oral at bedtime  sodium bicarbonate 1300 milliGRAM(s) Oral three times a day  vasopressin Infusion 0.02 Unit(s)/Min (1.2 mL/Hr) IV Continuous <Continuous>    MEDICATIONS  (PRN):  acetaminophen   Tablet .. 650 milliGRAM(s) Oral every 6 hours PRN Mild Pain (1 - 3)  dextrose 40% Gel 15 Gram(s) Oral once PRN Blood Glucose LESS THAN 70 milliGRAM(s)/deciliter  glucagon  Injectable 1 milliGRAM(s) IntraMuscular once PRN Glucose LESS THAN 70 milligrams/deciliter  oxycodone    5 mG/acetaminophen 325 mG 1 Tablet(s) Oral every 6 hours PRN Severe Pain (7 - 10)  sodium chloride 0.9% lock flush 10 milliLiter(s) IV Push every 1 hour PRN Pre/post blood products, medications, blood draw, and to maintain line patency      .  VITAL SIGNS:  T(C): 36.9 (05-13-20 @ 20:50), Max: 38.4 (05-13-20 @ 18:00)  T(F): 98.4 (05-13-20 @ 20:50), Max: 101.1 (05-13-20 @ 18:00)  HR: 112 (05-13-20 @ 22:00) (96 - 146)  BP: 91/39 (05-13-20 @ 22:00) (70/49 - 136/109)  BP(mean): 61 (05-13-20 @ 20:03) (41 - 118)  RR: 18 (05-13-20 @ 22:00) (12 - 29)  SpO2: 98% (05-13-20 @ 22:00) (92% - 99%)  Wt(kg): --    PHYSICAL EXAM:      Constitutional: Laying comfortably in NAD, now on NC, undergoing Dialysis  Head: NC/AT  ENT: Dry MM  Neck: supple; no JVD   Respiratory: Fine crackles b/l and most pronounced at the RML/RLL. poor inspiratory effort  Cardiac: +S1/S2; Irregular RR; Tachy   Gastrointestinal: soft, NT/ND; no rebound or guarding; +BS  Extremities: WWP, 1+ peripheral edema with chronic venous statis changed in lower extremity. Wound vac present on left leg  Neurologic: AAOx3; CNII-XII grossly intact; no focal deficits      .  LABS:                         7.8    19.19 )-----------( 302      ( 13 May 2020 22:03 )             23.8     05-13    133<L>  |  94<L>  |  83<H>  ----------------------------<  119<H>  3.8   |  17<L>  |  7.16<H>    Ca    8.0<L>      13 May 2020 19:19  Phos  6.6     05-13  Mg     2.2     05-13    TPro  5.7<L>  /  Alb  2.6<L>  /  TBili  0.8  /  DBili  x   /  AST  47<H>  /  ALT  30  /  AlkPhos  50  05-13    PT/INR - ( 13 May 2020 22:03 )   PT: 20.7 sec;   INR: 1.78          PTT - ( 13 May 2020 22:03 )  PTT:33.7 sec    CARDIAC MARKERS ( 13 May 2020 19:19 )  x     / 0.14 ng/mL / x     / x     / x      CARDIAC MARKERS ( 13 May 2020 14:25 )  x     / x     / 501 U/L / x     / x      CARDIAC MARKERS ( 12 May 2020 06:43 )  x     / x     / 771 U/L / x     / x          Serum Pro-Brain Natriuretic Peptide: 87818 pg/mL (05-13 @ 19:19)    Lactate, Blood: 1.7 mmol/L (05-13 @ 19:18)      RADIOLOGY, EKG & ADDITIONAL TESTS: Reviewed.     < from: TTE Echo Complete w/ Contrast w/ Doppler (03.05.20 @ 15:57) >  CONCLUSIONS:     1. The left ventricle cavity size is moderately dilated. Left ventricular systolic function is moderately reduced with a calculated ejection fraction of 35% with global hypokinesis.   2. The right ventricle is mildly dilated. Right ventricular systolic function is normal.   3. Severely dilated left atrium.   4. Severely dilated right atrium.   5. Mild-to-moderate mitral regurgitation.   6. Mild-to-moderate tricuspid regurgitation.   7. Pulmonary hypertension present, pulmonary artery systolic pressure is 47 mmHg.   8. No pericardial effusion.    < end of copied text >      < from: 12 Lead ECG (05.05.20 @ 14:45) >  Diagnosis Line Atrial fibrillation with rapid ventricular response  Nonspecific ST - T abnormalities  Confirmed by JEREMY ORO NEIL ([id number]) on 5/6/2020 12:47:27 PM    < end of copied text >

## 2020-05-13 NOTE — DISCHARGE NOTE PROVIDER - CARE PROVIDER_API CALL
Queenie Mcclellan)  [facility]X [facility]  Vascular  [address]  [place], [place] [address]  Phone: [phone]  Fax: [phone]  Follow Up Time:

## 2020-05-13 NOTE — PROGRESS NOTE ADULT - SUBJECTIVE AND OBJECTIVE BOX
24hr Event:  O/N: Hypotesive to SBP 89, repeat SBP 93, Place hold parameters for amlodipine (hold for SBP <100)  5/12: AM CXR unchanged, speach and swallow rec Continue mechanical soft diet with thin liquids.        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 BRANDYN falsely elevated due to history of DM now s/p LLE Angiogram DEStent x2, L 3rd and 4th toe amputation, and debridement. Clinically stable. Hyponatremia per renal will watch.    consistent carb diet  Home meds  Dapto/Zosyn  ISS  pain control  f/u Renal, EP, Cards, and Endocrine recs  Eliquis/plavix  Lasix 40 PO daily  AM labs    PT recs- [facility] 24hr Event:  O/N: Hypotesive to SBP 89, repeat SBP 93, Place hold parameters for amlodipine (hold for SBP <100)  5/12: AM CXR unchanged, speach and swallow rec Continue mechanical soft diet with thin liquids.    DAPTOmycin IVPB 600  piperacillin/tazobactam IVPB.. 2.25  aMIOdarone    Tablet 200  apixaban 2.5  clopidogrel Tablet 75  DAPTOmycin IVPB 600  furosemide    Tablet 40  metoprolol succinate ER 50  piperacillin/tazobactam IVPB.. 2.25        Vital Signs Last 24 Hrs  T(C): 36.6 (13 May 2020 06:01), Max: 36.7 (12 May 2020 22:50)  T(F): 97.9 (13 May 2020 06:01), Max: 98 (12 May 2020 22:50)  HR: 108 (13 May 2020 06:20) (97 - 118)  BP: 103/57 (13 May 2020 06:20) (89/53 - 117/63)  BP(mean): 72 (13 May 2020 06:20) (64 - 83)  RR: 20 (13 May 2020 06:20) (16 - 20)  SpO2: 95% (13 May 2020 06:20) (94% - 100%)  I&O's Summary    12 May 2020 07:01  -  13 May 2020 07:00  --------------------------------------------------------  IN: 300 mL / OUT: 280 mL / NET: 20 mL        Physical Exam:  General: NAD, resting comfortably in bed  Pulmonary: Nonlabored breathing, no respiratory distress  Extrem: WWP, Left foot wound with vac in place  Neuro: A/O x 3, no focal deficits, normal sensation  Pulses: LLE: biphasic PT  : Abhi in place and functioning      LABS:                        8.5    12.55 )-----------( 297      ( 13 May 2020 06:35 )             26.6     05-13    130<L>  |  93<L>  |  80<H>  ----------------------------<  112<H>  4.1   |  17<L>  |  6.88<H>    Ca    8.6      13 May 2020 06:35  Phos  6.3     05-13  Mg     2.4     05-13      PLEASE CHECK WHEN PRESENT:     [  ] Heart Failure     [  ] Acute     [  ] Acute on Chronic     [  ] Chronic  -------------------------------------------------------------------     [  ]Diastolic [HFpEF]     [  ]Systolic [HFrEF]     [  ]Combined [HFpEF & HFrEF]     [ X ]Other: hypertensive heart disease    [ X ] afib  -------------------------------------------------------------------  [ ] Respiratory failure  [ ] Acute cor pulmonale  [ ] Asthma/COPD Exacerbation  [ ] Pleural effusion  [ ] Aspiration pneumonia  -------------------------------------------------------------------  [  ]ZEENAT     [  ]ATN     [  ]Reneal Medullary Necrosis     [  ]Renal Cortical Necrosis     [  ]Other Pathological Lesions:    [  ]CKD 1  [  ]CKD 2  [X  ]CKD 3  [  ]CKD 4  [  ]CKD 5  [  ]Other  -------------------------------------------------------------------  [  ]Diabetes  [  ] Diabetic PVD Ulcer  [  ] Neuropathic ulcer to DM  [  ] Diabetes with Nephropathy  [  ] Osteomyelitis due to diabetes  --------------------------------------------------------------------  [  ]Malnutrition: See Nutrition Note  [  ]Cachexia  [  ]Other:   [  ]Supplement Ordered:  [  ]Morbid Obesity (BMI >=40]  ---------------------------------------------------------------------  [ ] Sepsis/severe sepsis/septic shock  [ ] UTI  [ ] Pneumonia  -----------------------------------------------------------------------  [ ] Acidosis/alkalosis  [ ] Fluid overload  [ ] Hypokalemia  [ ] Hyperkalemia  [ ] Hypomagnesemia  [ ] Hypophosphatemia  [ ] Hyperphosphatemia  [ X] hyponatremia- resolving no treatment   ------------------------------------------------------------------------  [ ] Acute blood loss anemia  [ ] Post op blood loss anemia  [ ] Iron deficiency anemia  [X ] Anemia due to chronic disease  [ ] Hypercoagulable state  ----------------------------------------------------------------------  [ ] Cerebral infarction  [ ] Transient ischemia attack  [ ] Encephalopathy      Assessment/Plan;  84 M PMH HTN, DM, A fib on Xarelto (Last taken 8am) HF EF 35%, PAD, CKD III 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 BRANDYN falsely elevated due to history of DM now s/p LLE Angiogram DEStent x2, L 3rd and 4th toe amputation, and debridement. Clinically stable. Hyponatremia per renal will watch.    consistent carb diet  Home meds  Dapto/Zosyn  ISS  pain control  f/u Renal, EP, Cards, and Endocrine recs  Eliquis/plavix  Lasix 40 PO daily  AM labs    PT recs- [facility]

## 2020-05-13 NOTE — PROGRESS NOTE ADULT - PROBLEM SELECTOR PLAN 2
-Likely ATN in setting of contrast, supra therapeutic Vanc trough  -Monitor UOP  -Trend BMP  -Renal following. Post op care as per Vascular Team  -Pain control/Bowel regimen  - Incentive spirometry, SpO2 mild decreasing    - Antibiotic regimen per ID - Daptomycin 600 mg IV q48h and Pip-tazo 2.25 g IV q8h until he is discharged to [facility] and when d/jacob plan to switch Doxycyline po 100mg BID.

## 2020-05-13 NOTE — CONSULT NOTE ADULT - SUBJECTIVE AND OBJECTIVE BOX
HPI:  84 M PMH HTN, DM, A fib on Xarelto (Last taken 8am) HF EF 35%, CKD III PAD p/w L 3rd toe gangrene s/p amputation. Patient was admitted on March of this year and underwent L toe amputation on 3/06 of which cultures grew MRSA and was subsequently discharged on Doxycycline. He now returns with 3 weeks of LLE pain and swelling. Pt reports persistent pain of the 3rd toe and swelling of the LLE, denies numbness or weakness. Was was seen in the office by Dr. Mcclellan and was recommended to the ED for worsening appearance of the left 3rd toe. He denies fever, chills, nausea, sob, coughing, or recent COVID contacts. (04 May 2020 14:24)      PAST MEDICAL & SURGICAL HISTORY:  Heart failure  HLD (hyperlipidemia)  Borderline diabetes mellitus  Afib  HTN (hypertension)  H/O laryngectomy      ROS: See HPI    MEDICATIONS  (STANDING):  aMIOdarone    Tablet 200 milliGRAM(s) Oral daily  apixaban 2.5 milliGRAM(s) Oral two times a day  atorvastatin 40 milliGRAM(s) Oral at bedtime  chlorhexidine 2% Cloths 1 Application(s) Topical <User Schedule>  clopidogrel Tablet 75 milliGRAM(s) Oral daily  Dakins Solution - 1/2 Strength 1 Application(s) Topical daily  DAPTOmycin IVPB 600 milliGRAM(s) IV Intermittent every 48 hours  dextrose 5%. 1000 milliLiter(s) (50 mL/Hr) IV Continuous <Continuous>  dextrose 50% Injectable 12.5 Gram(s) IV Push once  dextrose 50% Injectable 25 Gram(s) IV Push once  furosemide    Tablet 40 milliGRAM(s) Oral daily  insulin lispro (HumaLOG) corrective regimen sliding scale   SubCutaneous Before meals and at bedtime  melatonin 1 milliGRAM(s) Oral at bedtime  metoprolol succinate ER 50 milliGRAM(s) Oral two times a day  multivitamin 1 Tablet(s) Oral daily  piperacillin/tazobactam IVPB.. 2.25 Gram(s) IV Intermittent every 8 hours  senna 2 Tablet(s) Oral at bedtime  sodium bicarbonate 1300 milliGRAM(s) Oral three times a day    MEDICATIONS  (PRN):  acetaminophen   Tablet .. 650 milliGRAM(s) Oral every 6 hours PRN Mild Pain (1 - 3)  albumin human 25% IVPB 50 milliLiter(s) IV Intermittent every 1 hour PRN intradialysis hypotension  dextrose 40% Gel 15 Gram(s) Oral once PRN Blood Glucose LESS THAN 70 milliGRAM(s)/deciliter  glucagon  Injectable 1 milliGRAM(s) IntraMuscular once PRN Glucose LESS THAN 70 milligrams/deciliter  oxycodone    5 mG/acetaminophen 325 mG 1 Tablet(s) Oral every 6 hours PRN Severe Pain (7 - 10)  sodium chloride 0.9% lock flush 10 milliLiter(s) IV Push every 1 hour PRN Pre/post blood products, medications, blood draw, and to maintain line patency      Allergies    No Known Allergies    Intolerances        SOCIAL HISTORY:  Smoke: Never Smoker  EtOH: occasional    FAMILY HISTORY:  FHx: diabetes mellitus      Vital Signs Last 24 Hrs  T(C): 37.3 (13 May 2020 16:09), Max: 37.3 (13 May 2020 16:09)  T(F): 99.2 (13 May 2020 16:09), Max: 99.2 (13 May 2020 16:09)  HR: 137 (13 May 2020 17:10) (103 - 146)  BP: 131/102 (13 May 2020 16:09) (89/53 - 136/109)  BP(mean): 114 (13 May 2020 16:09) (64 - 118)  RR: 27 (13 May 2020 17:10) (14 - 27)  SpO2: 97% (13 May 2020 17:10) (93% - 97%)    PHYSICAL EXAM    Gen: NAD   Neuro: A&oX3 no neuro deficits  CV: Tachycardia [id number]  Pulm: CTA B/L [id number] BS in bases b/l  Abd: Soft, NT mild distention   Ext: No C/C/E + Left foot amp site with wound vac in place toes warm to touch   Skin: No rashes erythema or ecchymosis  MSK: No joint swelling noted  Psych: Normal affect     LABS:                        8.5    12.55 )-----------( 297      ( 13 May 2020 06:35 )             26.6     05-13    130<L>  |  93<L>  |  76<H>  ----------------------------<  108<H>  3.8   |  17<L>  |  6.74<H>    Ca    8.1<L>      13 May 2020 14:25  Phos  6.5     05-13  Mg     2.3     05-13            RADIOLOGY & ADDITIONAL STUDIES:    Assessment and Plan:  84yMale HPI:  84 M PMH HTN, DM, A fib on Xarelto (Last taken 8am) HF EF 35%, CKD III PAD p/w L 3rd toe gangrene s/p amputation. Patient was admitted on March of this year and underwent L toe amputation on 3/06 of which cultures grew MRSA and was subsequently discharged on Doxycycline. He now returns with 3 weeks of LLE pain and swelling. Pt reports persistent pain of the 3rd toe and swelling of the LLE, denies numbness or weakness. Was was seen in the office by Dr. Mcclellan and was recommended to the ED for worsening appearance of the left 3rd toe. He denies fever, chills, nausea, sob, coughing, or recent COVID contacts. (04 May 2020 14:24)      PAST MEDICAL & SURGICAL HISTORY:  Heart failure  HLD (hyperlipidemia)  Borderline diabetes mellitus  Afib  HTN (hypertension)  H/O laryngectomy      ROS: See HPI    MEDICATIONS  (STANDING):  aMIOdarone    Tablet 200 milliGRAM(s) Oral daily  apixaban 2.5 milliGRAM(s) Oral two times a day  atorvastatin 40 milliGRAM(s) Oral at bedtime  chlorhexidine 2% Cloths 1 Application(s) Topical <User Schedule>  clopidogrel Tablet 75 milliGRAM(s) Oral daily  Dakins Solution - 1/2 Strength 1 Application(s) Topical daily  DAPTOmycin IVPB 600 milliGRAM(s) IV Intermittent every 48 hours  dextrose 5%. 1000 milliLiter(s) (50 mL/Hr) IV Continuous <Continuous>  dextrose 50% Injectable 12.5 Gram(s) IV Push once  dextrose 50% Injectable 25 Gram(s) IV Push once  furosemide    Tablet 40 milliGRAM(s) Oral daily  insulin lispro (HumaLOG) corrective regimen sliding scale   SubCutaneous Before meals and at bedtime  melatonin 1 milliGRAM(s) Oral at bedtime  metoprolol succinate ER 50 milliGRAM(s) Oral two times a day  multivitamin 1 Tablet(s) Oral daily  piperacillin/tazobactam IVPB.. 2.25 Gram(s) IV Intermittent every 8 hours  senna 2 Tablet(s) Oral at bedtime  sodium bicarbonate 1300 milliGRAM(s) Oral three times a day    MEDICATIONS  (PRN):  acetaminophen   Tablet .. 650 milliGRAM(s) Oral every 6 hours PRN Mild Pain (1 - 3)  albumin human 25% IVPB 50 milliLiter(s) IV Intermittent every 1 hour PRN intradialysis hypotension  dextrose 40% Gel 15 Gram(s) Oral once PRN Blood Glucose LESS THAN 70 milliGRAM(s)/deciliter  glucagon  Injectable 1 milliGRAM(s) IntraMuscular once PRN Glucose LESS THAN 70 milligrams/deciliter  oxycodone    5 mG/acetaminophen 325 mG 1 Tablet(s) Oral every 6 hours PRN Severe Pain (7 - 10)  sodium chloride 0.9% lock flush 10 milliLiter(s) IV Push every 1 hour PRN Pre/post blood products, medications, blood draw, and to maintain line patency      Allergies    No Known Allergies    Intolerances        SOCIAL HISTORY:  Smoke: Never Smoker  EtOH: occasional    FAMILY HISTORY:  FHx: diabetes mellitus      Vital Signs Last 24 Hrs  T(C): 37.3 (13 May 2020 16:09), Max: 37.3 (13 May 2020 16:09)  T(F): 99.2 (13 May 2020 16:09), Max: 99.2 (13 May 2020 16:09)  HR: 137 (13 May 2020 17:10) (103 - 146)  BP: 131/102 (13 May 2020 16:09) (89/53 - 136/109)  BP(mean): 114 (13 May 2020 16:09) (64 - 118)  RR: 27 (13 May 2020 17:10) (14 - 27)  SpO2: 97% (13 May 2020 17:10) (93% - 97%)    PHYSICAL EXAM    Gen: NAD   Neuro: A&oX3 no neuro deficits  CV: Tachycardia Ftet3x2  Pulm: CTA B/L [id number] BS in bases b/l  Abd: Soft, NT mild distention   Ext: No C/C/E + Left foot amp site with wound vac in place toes warm to touch   Skin: No rashes erythema or ecchymosis  MSK: No joint swelling noted  Psych: Normal affect     LABS:                        8.5    12.55 )-----------( 297      ( 13 May 2020 06:35 )             26.6     05-13    130<L>  |  93<L>  |  76<H>  ----------------------------<  108<H>  3.8   |  17<L>  |  6.74<H>    Ca    8.1<L>      13 May 2020 14:25  Phos  6.5     05-13  Mg     2.3     05-13            RADIOLOGY & ADDITIONAL STUDIES:    Assessment and Plan:  84 M PMH HTN, DM, A fib on Xarelto (Last taken 8am) HF EF 35%, PAD, CKD III 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 BRANDYN falsely elevated due to history of DM now s/p LLE Angiogram DEStent x2, L 3rd and 4th toe amputation, and debridement. Now  with contrastinduced nephropathy, Rapid response called for tachycardia- pt placed on Bipap and transferred to SICU.    Neuro:  Tylenol Percocet prn   CV:  Afib on eliquis, Plavix Amiodarone metoprolol lipitor Lasix   Pulm: Plweural edema - cont Bipap40%12/5  GI: NPO except meds MVI senna   : Moe - ZEENAT - hd tonight Sodium bicarb tid    ID:MRSA, VRE( cont dapto( 5/13-) Cont ( zosyn ( 5/13-) f/u Cultures  Endo: ISS  PPx: SCD Eliquis  Lines: piv   Wounds: Left toes wound vac  PT/OT: Not ordered HPI:  84 M PMH HTN, DM, A fib on Xarelto (Last taken 8am) HF EF 35%, CKD III PAD p/w L 3rd toe gangrene s/p amputation. Patient was admitted on March of this year and underwent L toe amputation on 3/06 of which cultures grew MRSA and was subsequently discharged on Doxycycline. He now returns with 3 weeks of LLE pain and swelling. Pt reports persistent pain of the 3rd toe and swelling of the LLE, denies numbness or weakness. Was was seen in the office by Dr. Mcclellan and was recommended to the ED for worsening appearance of the left 3rd toe. He denies fever, chills, nausea, sob, coughing, or recent COVID contacts. (04 May 2020 14:24)      PAST MEDICAL & SURGICAL HISTORY:  Heart failure  HLD (hyperlipidemia)  Borderline diabetes mellitus  Afib  HTN (hypertension)  H/O laryngectomy      ROS: See HPI    MEDICATIONS  (STANDING):  aMIOdarone    Tablet 200 milliGRAM(s) Oral daily  apixaban 2.5 milliGRAM(s) Oral two times a day  atorvastatin 40 milliGRAM(s) Oral at bedtime  chlorhexidine 2% Cloths 1 Application(s) Topical <User Schedule>  clopidogrel Tablet 75 milliGRAM(s) Oral daily  Dakins Solution - 1/2 Strength 1 Application(s) Topical daily  DAPTOmycin IVPB 600 milliGRAM(s) IV Intermittent every 48 hours  dextrose 5%. 1000 milliLiter(s) (50 mL/Hr) IV Continuous <Continuous>  dextrose 50% Injectable 12.5 Gram(s) IV Push once  dextrose 50% Injectable 25 Gram(s) IV Push once  furosemide    Tablet 40 milliGRAM(s) Oral daily  insulin lispro (HumaLOG) corrective regimen sliding scale   SubCutaneous Before meals and at bedtime  melatonin 1 milliGRAM(s) Oral at bedtime  metoprolol succinate ER 50 milliGRAM(s) Oral two times a day  multivitamin 1 Tablet(s) Oral daily  piperacillin/tazobactam IVPB.. 2.25 Gram(s) IV Intermittent every 8 hours  senna 2 Tablet(s) Oral at bedtime  sodium bicarbonate 1300 milliGRAM(s) Oral three times a day    MEDICATIONS  (PRN):  acetaminophen   Tablet .. 650 milliGRAM(s) Oral every 6 hours PRN Mild Pain (1 - 3)  albumin human 25% IVPB 50 milliLiter(s) IV Intermittent every 1 hour PRN intradialysis hypotension  dextrose 40% Gel 15 Gram(s) Oral once PRN Blood Glucose LESS THAN 70 milliGRAM(s)/deciliter  glucagon  Injectable 1 milliGRAM(s) IntraMuscular once PRN Glucose LESS THAN 70 milligrams/deciliter  oxycodone    5 mG/acetaminophen 325 mG 1 Tablet(s) Oral every 6 hours PRN Severe Pain (7 - 10)  sodium chloride 0.9% lock flush 10 milliLiter(s) IV Push every 1 hour PRN Pre/post blood products, medications, blood draw, and to maintain line patency      Allergies    No Known Allergies    Intolerances        SOCIAL HISTORY:  Smoke: Never Smoker  EtOH: occasional    FAMILY HISTORY:  FHx: diabetes mellitus      Vital Signs Last 24 Hrs  T(C): 37.3 (13 May 2020 16:09), Max: 37.3 (13 May 2020 16:09)  T(F): 99.2 (13 May 2020 16:09), Max: 99.2 (13 May 2020 16:09)  HR: 137 (13 May 2020 17:10) (103 - 146)  BP: 131/102 (13 May 2020 16:09) (89/53 - 136/109)  BP(mean): 114 (13 May 2020 16:09) (64 - 118)  RR: 27 (13 May 2020 17:10) (14 - 27)  SpO2: 97% (13 May 2020 17:10) (93% - 97%)    PHYSICAL EXAM    Gen: NAD   Neuro: A&oX3 no neuro deficits  CV: Tachycardia [id number]  Pulm: CTA B/L [id number] BS in bases b/l  Abd: Soft, NT mild distention   Ext: No C/C/E + Left foot amp site with wound vac in place toes warm to touch   Skin: No rashes erythema or ecchymosis  MSK: No joint swelling noted  Psych: Normal affect     LABS:                        8.5    12.55 )-----------( 297      ( 13 May 2020 06:35 )             26.6     05-13    130<L>  |  93<L>  |  76<H>  ----------------------------<  108<H>  3.8   |  17<L>  |  6.74<H>    Ca    8.1<L>      13 May 2020 14:25  Phos  6.5     05-13  Mg     2.3     05-13            RADIOLOGY & ADDITIONAL STUDIES:    Assessment and Plan:  84 M PMH HTN, DM, A fib on Xarelto (Last taken 8am) HF EF 35%, PAD, CKD III 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 BRANDYN falsely elevated due to history of DM now s/p LLE Angiogram DEStent x2, L 3rd and 4th toe amputation, and debridement. Now  with contrastinduced nephropathy, Rapid response called for tachycardia- pt placed on Bipap and transferred to SICU.    Neuro:  Tylenol Percocet prn   CV:  Afib on eliquis, Plavix Amiodarone  lipitor. Low BP hold  Lasix & metoprolol  Pulm: Plweural edema - cont Bipap40%12/5  GI: NPO except meds MVI senna   : Moe - ZEENAT - hd tonight Sodium bicarb tid    ID:MRSA, VRE( cont dapto( 5/13-) Cont ( zosyn ( 5/13-) f/u Cultures  Endo: ISS  PPx: SCD Eliquis  Lines: piv   Wounds: Left toes wound vac  PT/OT: Not ordered

## 2020-05-13 NOTE — DISCHARGE NOTE PROVIDER - HOSPITAL COURSE
84 M PMH HTN, DM, A fib on Xarelto (Last taken 8am) HF EF 35%, CKD III PAD p/w L 3rd toe gangrene s/p amputation. Patient was admitted on March of this year and underwent L toe amputation on 3/06 of which cultures grew MRSA and was subsequently discharged on Doxycycline. He now returns with 3 weeks of LLE pain and swelling. Pt reports persistent pain of the 3rd toe and swelling of the LLE, denies numbness or weakness. Was was seen in the office by Dr. Mcclellan and was recommended to the ED for worsening appearance of the left 3rd toe. He denies fever, chills, nausea, sob, coughing, or recent COVID contacts.          During the hospital course

## 2020-05-13 NOTE — PROGRESS NOTE ADULT - PROBLEM SELECTOR PLAN 1
Post op care as per Vascular Team  -Pain control/Bowel regimen  - Antibiotic regimen per ID - Daptomycin 600 mg IV q48h and Pip-tazo 2.25 g IV q8h until he is discharged to [facility] and when d/jacob plan to switch Doxycyline po 100mg BID. New leukocytosis w/ no specific complaints, tachycardia worsening.   - f/u added differential   - rectal temp - if febrile consider repeat cultures and further w/u

## 2020-05-13 NOTE — PROGRESS NOTE ADULT - PROBLEM SELECTOR PLAN 1
oliguric ZEENAT on CKD stage III, likely ATN with component of contrast induced injury and elevated Vanc trough   Renal fx worsening, however lytes and volume status still in acceptable range  hyponatremia from hypofiltration related to ZEENAT, stable  pt appears euvolemic and non uremic  - agree with small fluid challenge of no adequate response would consider lasix challenge   - cw renal restricted diet/ increase Nabicarb to 1300 mg TID   no urgent indication for RRT   Will follow oliguric ZEENAT on CKD stage III, likely ATN with component of contrast induced injury and elevated Vanc trough   Renal fx worsening, however lytes and volume status still in acceptable range  hyponatremia from hypofiltration related to ZEENAT, stable  pt appears euvolemic and non uremic  - agree with small fluid challenge of no adequate response would consider lasix challenge   - cw renal restricted diet/ increase Nabicarb to 1300 mg TID   no urgent indication for RRT at present    Interval progress:  rapid response called in the afternoon as Patient went into RVR and was desating upon working with PT, labs and vitals reviewed on the gas Na level significantly dropped to 118 also with worsening of pulmonary edema and left sided pleural effusion on CXR, decision was made to proceed with acute hemodialysis once the HR is controlled with goal UF 2 L if tolerated   Will follow oliguric ZEENAT on CKD stage III, likely ATN with component of contrast induced injury and elevated Vanc trough   Renal fx worsening, however lytes and volume status still in acceptable range  hyponatremia from hypofiltration related to ZEENAT, stable  pt appears euvolemic and non uremic  - agree with small fluid challenge of no adequate response would consider lasix challenge   - cw renal restricted diet/ increase Nabicarb to 1300 mg TID   no urgent indication for RRT at present    Interval progress:  rapid response called in the afternoon as Patient went into RVR and was desating upon working with PT, labs and vitals reviewed on the gas Na level significantly dropped to 118( if true can contribute to lethargy however not c/w 3 pm bmp Na which is unchanged since am) also with worsening of pulmonary edema and left sided pleural effusion on CXR, decision was made to proceed with acute hemodialysis once the HR is controlled with goal UF 2 L if tolerated   Will follow

## 2020-05-13 NOTE — DISCHARGE NOTE PROVIDER - NSDCFUADDINST_GEN_ALL_CORE_FT
Follow-up with Dr. Mcclellan in 1-2 weeks in office at [phone].     Wound care: V.A.C. wound care. Change 3 times per week at 125 mmHg.  IF VAC malfunctions, then dampen gauze with saline solution. Pack into wound. Cover with dry gauze and Kerlix wrap daily.     Please call your doctor if you have a fever of over 101.9 or swelling/bleeding at wound.

## 2020-05-13 NOTE — PROGRESS NOTE ADULT - ATTENDING COMMENTS
ATN - appears  oliguric w/o recovery yet.  still appears well w/o sx. comfortable on RA, no signs uremia  got IVF - but doubt is dry-- suggest lasix  if poor response may consider dialysis soon

## 2020-05-13 NOTE — CONSULT NOTE ADULT - ATTENDING COMMENTS
I have reviewed the medical record, including laboratory and radiographic studies, interviewed and examined the patient and discussed the plan with Dr. Linares, the ID Resident.  Agree with above. Will continue to follow with you – ID Team 1.
Pt seen on rounds this afternoon while still in the PACU.  Hospital course reviewed.  Exam is notable for marked vasoconstriction of his distal fingers and his toes.  When I asked the pt whether this was unusual, he answered "they're always cold--they've been that way for years."  Strongly suspect that the low fingersticks are artifacts caused by the marked vasoconstriction--this is known to be a problem with fingerstick monitoring, and the pt had no symptoms with any of the episodes.  He also has no identifiable reason for the apparent hypoglycemia.  Would therefore try to warm up his fingers before doing sticks, also obtain a simultaneous venous blood for the next one or two low fingersticks to verify the low value (obviously drawn before he receives any juice, etc).  The venous blood should also be sent for simultaneous insulin and C-peptide level.  To also check TFTs given his history of external RT plus amiodarone therapy.
Patient seen and examined with house-staff during bedside rounds  Resident note read, including vitals, physical findings, laboratory data, and radiological reports.   Revisions included below.  Case discussed with House staff  Direct personal management at bedside  and extensive interpretation of data. Decision making of high complexity.
ZEENAT on CKD likely prerenal, getting hydration in prep for contrast exposure. Check post void bladder scan to r/o retention. Could be AIN 2/2 bactrim as well, now finished course

## 2020-05-13 NOTE — PROGRESS NOTE ADULT - ASSESSMENT
85 yo M with HTN, DM, Afib, PDA, CKDIII s/p angiogram with TP trunk angioplasty/stent and L 3rd toe amputation on 5/6.  Superficial culture sent 2 d earlier, no OR cultures sent.  Reliability of superficial culture is low.  Would cover for mixed infection, including anaerobes in absence of data.  He has ZEENAT on CKD - received contrast for angiogram/stent.  He is unlikely to tolerate a 6 week course of linezolid, Linezolid is also at the breakpoint.  Cr is still uptrending now above 6.    Suggest:  - Daptomycin 600 mg IV q48h until he is discharged to [facility]  - Order CPK, and random Vanc level  - When d/jacob, he can be switched to Doxycyline po 100mg BID.  - Pip-tazo 2.25 g IV q8h  - Would plan for 6 week course of antibiotics, last days for antibiotics will be June 17th, 2020.  - Pt will need weekly CBC, CMP, ESR, CRP 83 yo M with HTN, DM, Afib, PDA, CKDIII s/p angiogram with TP trunk angioplasty/stent and L 3rd toe amputation on 5/6.  Superficial culture sent 2 d earlier, no OR cultures sent.  Reliability of superficial culture is low.  Would cover for mixed infection, including anaerobes in absence of data.  He has ZEENAT on CKD - received contrast for angiogram/stent.  He is unlikely to tolerate a 6 week course of linezolid, Linezolid is also at the breakpoint.  Cr is still uptrending now above 6. CPK is 501, ok to continue daptomycin. He now has a white count, CT chest would rule out a pneumonia.    Suggest:  - Chest CT to evaluate for pneumonia  - Daptomycin 600 mg IV q48h until he is discharged to [facility]  - When d/jacob, he can be switched to Doxycyline po 100mg BID.  - Pip-tazo 2.25 g IV q8h  - Would plan for 6 week course of antibiotics, last days for antibiotics will be June 17th, 2020.  - Pt will need weekly CBC, CMP, ESR, CRP

## 2020-05-13 NOTE — PROGRESS NOTE ADULT - ASSESSMENT
83 yo M with HTN, DM, Afib, PDA, CKDIII s/p angiogram with TP trunk angioplasty/stent and L 3rd toe amputation with hospital course c/b Renal failure, Hypoxic Respiratory Failure requiring NIPV, worsening Shock, likely Septic in Nature, in Afib with RVR for which cardiology is being called. 83 yo M with HFrEF (Ef 35%), HTN, DM, Afib, PDA, CKDIII s/p angiogram with TP trunk angioplasty/stent and L 3rd toe amputation with hospital course c/b Anuric Renal failure now requiring Dialysis, Hypoxic Respiratory Failure requiring NIPPV, worsening Shock, likely Septic in Nature, in Afib with RVR for which cardiology is being called.    1) Shock, likely Septic in Nature  -Patient post Op from Left 3rd Toe amputation, now Hypotensive, Tachycardic, anuric requiring pressors.  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   Currently on Dapto and Zosyn per Previous Cultures, agree with primary's team decision to switch to Meropenem  -Patient currently on High doses of Phenylephrine (46cc) while getting Dialyzed (Ultrafiltration only). Given patient has marked pulmonary edema, to optimize fluid status, would consider adding Vasopressin (in lieu of Levo given Afib with RVR) to allow Fluid removal.  -Primary team sent Troponin which resulted at 0.14 in setting of Renal Failure now requiring emergent dialysis. Event unlikely to be ACS in nature. Trend to peak  -Continue patient on Plavix. Eliquis Held in setting of downtrending Hemoglobin with plan by primary to switch to heparin should Hg Stabilize    2) Afib with RVR  -Patient with Afib with RVR, previously evaluated by EP this admission. 85 yo M with HFrEF (Ef 35%), HTN, DM, Afib, PDA, CKDIII s/p angiogram with TP trunk angioplasty/stent and L 3rd toe amputation with hospital course c/b Anuric Renal failure now requiring Dialysis, Hypoxic Respiratory Failure requiring NIPPV, worsening Shock, likely Septic in Nature, in Afib with RVR for which cardiology is being called.    1) Shock, likely Septic in Nature  -Patient post Op from Left 3rd Toe amputation, now Hypotensive, Tachycardic, anuric requiring pressors.  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   Currently on Dapto and Zosyn per Previous Cultures, agree with primary's team decision to switch to Meropenem  -Patient currently on High doses of Phenylephrine (46cc) while getting Dialyzed (Ultrafiltration only). Given patient has marked pulmonary edema, to optimize fluid status, would consider adding Vasopressin (in lieu of Levo given Afib with RVR) to allow Fluid removal.  -Primary team sent Troponin which resulted at 0.14 in setting of Renal Failure now requiring emergent dialysis. Event unlikely to be ACS in nature. Trend to peak  -Continue patient on Plavix. Eliquis Held in setting of downtrending Hemoglobin with plan by primary to switch to heparin should Hg Stabilize    2) Afib with RVR  -Patient with Afib with RVR, previously evaluated by EP this admission. Patient would benefit from BIV PPM/ICD in setting of Afib with RVR and HF however given ongoing infection on broad spectrum ABx, option postponed this hospitalization until clinical improvement  -Pt continued on Amio 200 daily however Toprol 50 mg PO BID held in setting of shock  -Currently rated are in the 90's-110, likely exacerbated by P.Edema and Fevers. Temperature control with tylenol would help better rate.  -Given not in sustained rapid fib can continue with regimen now. Should hemodynamics change/ worsen and patient in sustained uncontrolled rapid fib he would require Cardioversion (Either with Amio reload or Electrical).  -EKG during  this event notable for new RBBB, not present on previous EKG. On Previous echo patient with Mildly dilated RV with normal function and PH of 47. Sudden increase of RV pressure such as in the case of a PE could cause new RBBB however patient has been consitently on eliquis this hospitalization and currently is satting 98% on 4 LNC.  -Myocardial Ischemia could also explain new RBBB. Clinically patient without chest pain, angina, discomfort. Denies palpitation, neck or arm discomfort. No nausea or epigastric tenderness. EKG is otherwise non Ichemic. SUspicion of ACS is low and patient remaisn on Plavix  -Please obtain Daily EKG    Cardiology will continue to follow, please call with any questions  All recommendations preliminary pending final attending addendum 85 yo M with HFrEF (Ef 35%), HTN, DM, Afib, PDA, CKDIII s/p angiogram with TP trunk angioplasty/stent and L 3rd toe amputation with hospital course c/b Anuric Renal failure now requiring Dialysis, Hypoxic Respiratory Failure requiring NIPPV, worsening Shock, likely Septic in Nature, in Afib with RVR for which cardiology is being called.    1) Shock, likely Septic in Nature  -Patient post Op from Left 3rd Toe amputation, now Hypotensive, Tachycardic, anuric requiring pressors.  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   Currently on Dapto and Zosyn per Previous Cultures, agree with primary's team decision to switch to Meropenem  -Patient currently on High doses of Phenylephrine (46cc) while getting Dialyzed (Ultrafiltration only). Given patient has marked pulmonary edema, to optimize fluid status, would consider adding Vasopressin (in lieu of Levo given Afib with RVR) to allow Fluid removal.  -Should Hypoxia persist and or worsen, would consider PE on the differential though less likely given patient has been consistently on Noac his admission  -Primary team sent Troponin which resulted at 0.14 in setting of Renal Failure now requiring emergent dialysis. Event unlikely to be ACS in nature. Trend to peak  -Continue patient on Plavix. Eliquis Held in setting of downtrending Hemoglobin with plan by primary to switch to heparin should Hg Stabilize    2) Afib with RVR  -Patient with Afib with RVR, previously evaluated by EP this admission. Patient would benefit from BIV PPM/ICD in setting of Afib with RVR and HF however given ongoing infection on broad spectrum ABx, option postponed this hospitalization until clinical improvement  -Pt continued on Amio 200 daily however Toprol 50 mg PO BID held in setting of shock  -Currently rated are in the 90's-110, likely exacerbated by P.Edema and Fevers. Temperature control with tylenol would help better rate.  -Given not in sustained rapid fib can continue with regimen now. Should hemodynamics change/ worsen and patient in sustained uncontrolled rapid fib he would require Cardioversion (Either with Amio reload or Electrical).  -EKG during  this event notable for new RBBB, not present on previous EKG. On Previous echo patient with Mildly dilated RV with normal function and PH of 47. Sudden increase of RV pressure such as in the case of a PE could cause new RBBB however patient has been consitently on eliquis this hospitalization and currently is satting 98% on 4 LNC.  -Myocardial Ischemia could also explain new RBBB. Clinically patient without chest pain, angina, discomfort. Denies palpitation, neck or arm discomfort. No nausea or epigastric tenderness. EKG is otherwise non Ichemic. SUspicion of ACS is low and patient remaisn on Plavix  -Please obtain Daily EKG    Cardiology will continue to follow, please call with any questions  All recommendations preliminary pending final attending addendum 85 yo M with HFrEF (Ef 35%), HTN, DM, Afib, PDA, CKDIII s/p angiogram with TP trunk angioplasty/stent and L 3rd toe amputation with hospital course c/b Anuric Renal failure now requiring Dialysis, Hypoxic Respiratory Failure requiring NIPPV, worsening Shock, likely Septic in Nature, in Afib with RVR for which cardiology is being called.    1) Shock, likely Septic in Nature  -Patient post Op from Left 3rd Toe amputation, now Hypotensive, Tachycardic, anuric requiring pressors.  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   Currently on Dapto and Zosyn per Previous Cultures, agree with primary's team decision to switch to Meropenem  -Patient currently on High doses of Phenylephrine (46cc) while getting Dialyzed (Ultrafiltration only). Given patient has marked pulmonary edema, to optimize fluid status, would consider adding Vasopressin (in lieu of Levo given Afib with RVR) to allow Fluid removal.  -Should Hypoxia persist and or worsen, would consider PE on the differential though less likely given patient has been consistently on Noac his admission  -Primary team sent Troponin which resulted at 0.14 in setting of Renal Failure now requiring emergent dialysis. Event unlikely to be ACS in nature. Trend to peak  -Continue patient on Plavix. Eliquis Held in setting of downtrending Hemoglobin with plan by primary to switch to heparin should Hg Stabilize    2) Afib with RVR  -Patient with Afib with RVR, previously evaluated by EP this admission. Patient would benefit from BIV PPM/ICD in setting of Afib with RVR and HF however given ongoing infection on broad spectrum ABx, option postponed this hospitalization until clinical improvement  -Pt continued on Amio 200 daily however Toprol 50 mg PO BID held in setting of shock  -Currently rated are in the 90's-110, likely exacerbated by P.Edema and Fevers. Temperature control with tylenol would help better rate.  -Given not in sustained rapid fib can continue with regimen now. Should hemodynamics change/ worsen and patient in sustained uncontrolled rapid fib he would require Cardioversion (Either with Amio reload or Electrical).  -CHADVASC of 6 placing patient at high thromboembolic risk. NOAC switched to Heparin Gtt.   -EKG during  this event notable for new RBBB, not present on previous EKG. On Previous echo patient with Mildly dilated RV with normal function and PH of 47. Sudden increase of RV pressure such as in the case of a PE could cause new RBBB however patient has been consitently on eliquis this hospitalization and currently is satting 98% on 4 LNC.  -Myocardial Ischemia could also explain new RBBB. Clinically patient without chest pain, angina, discomfort. Denies palpitation, neck or arm discomfort. No nausea or epigastric tenderness. EKG is otherwise non Ichemic. SUspicion of ACS is low and patient remaisn on Plavix  -Please obtain Daily EKG    Cardiology will continue to follow, please call with any questions  All recommendations preliminary pending final attending addendum

## 2020-05-13 NOTE — PROGRESS NOTE ADULT - ATTENDING COMMENTS
I have reviewed the medical record, including laboratory and radiographic studies, interviewed and examined the patient and discussed the plan with Dr. Linares, the ID Resident, Internal Medicine and Vascular teams.  Agree with above. Will continue to follow with you – ID Team 1.  Had recommended CT yesterday b/o concern for developing pneumonia.  Events of this evening noted.  He is febrile, hypotensive with bilateral pneumonia.  Please send blood cultures X 2 sets, COVID-19 PCR and nasal swab for MRSA/MSSA PCR.  Would initiate COVID-19 isolation precautions while swab is pending but not move him at this time.  He still has vancomycin in his system.  Would continue dapto, d/c pip-tazo, start meropenem.  Dr. Roach will assume care tomorrow.

## 2020-05-13 NOTE — PROGRESS NOTE ADULT - SUBJECTIVE AND OBJECTIVE BOX
INTERVAL HPI/OVERNIGHT EVENTS: ANIKA     SUBJECTIVE: Patient seen and examined at bedside. Patient     OBJECTIVE:    VITAL SIGNS:  ICU Vital Signs Last 24 Hrs  T(C): 36.8 (13 May 2020 09:25), Max: 36.8 (13 May 2020 09:25)  T(F): 98.3 (13 May 2020 09:25), Max: 98.3 (13 May 2020 09:25)  HR: 111 (13 May 2020 11:30) (97 - 118)  BP: 100/55 (13 May 2020 11:30) (89/53 - 117/63)  BP(mean): 70 (13 May 2020 11:30) (64 - 82)  ABP: --  ABP(mean): --  RR: 14 (13 May 2020 11:30) (14 - 20)  SpO2: 93% (13 May 2020 11:30) (93% - 96%)        05-12 @ 07:01  -  05-13 @ 07:00  --------------------------------------------------------  IN: 300 mL / OUT: 280 mL / NET: 20 mL      CAPILLARY BLOOD GLUCOSE      POCT Blood Glucose.: 125 mg/dL (13 May 2020 11:28)      PHYSICAL EXAM:    General: NAD  HEENT: NC/AT; PERRL, clear conjunctiva  Neck: supple  Respiratory: CTA b/l  Cardiovascular: +S1/S2; RRR  Abdomen: soft, NT/ND; +BS x4  Extremities: WWP, 2+ peripheral pulses b/l; no LE edema  Skin: normal color and turgor; no rash  Neurological:     MEDICATIONS:  MEDICATIONS  (STANDING):  aMIOdarone    Tablet 200 milliGRAM(s) Oral daily  apixaban 2.5 milliGRAM(s) Oral two times a day  atorvastatin 40 milliGRAM(s) Oral at bedtime  chlorhexidine 2% Cloths 1 Application(s) Topical <User Schedule>  clopidogrel Tablet 75 milliGRAM(s) Oral daily  Dakins Solution - 1/2 Strength 1 Application(s) Topical daily  DAPTOmycin IVPB 600 milliGRAM(s) IV Intermittent every 48 hours  dextrose 5%. 1000 milliLiter(s) (50 mL/Hr) IV Continuous <Continuous>  dextrose 50% Injectable 12.5 Gram(s) IV Push once  dextrose 50% Injectable 25 Gram(s) IV Push once  furosemide    Tablet 40 milliGRAM(s) Oral daily  insulin lispro (HumaLOG) corrective regimen sliding scale   SubCutaneous Before meals and at bedtime  melatonin 1 milliGRAM(s) Oral at bedtime  metoprolol succinate ER 50 milliGRAM(s) Oral two times a day  multivitamin 1 Tablet(s) Oral daily  piperacillin/tazobactam IVPB.. 2.25 Gram(s) IV Intermittent every 8 hours  senna 2 Tablet(s) Oral at bedtime  sodium bicarbonate 650 milliGRAM(s) Oral three times a day    MEDICATIONS  (PRN):  acetaminophen   Tablet .. 650 milliGRAM(s) Oral every 6 hours PRN Mild Pain (1 - 3)  dextrose 40% Gel 15 Gram(s) Oral once PRN Blood Glucose LESS THAN 70 milliGRAM(s)/deciliter  glucagon  Injectable 1 milliGRAM(s) IntraMuscular once PRN Glucose LESS THAN 70 milligrams/deciliter  oxycodone    5 mG/acetaminophen 325 mG 1 Tablet(s) Oral every 6 hours PRN Severe Pain (7 - 10)  sodium chloride 0.9% lock flush 10 milliLiter(s) IV Push every 1 hour PRN Pre/post blood products, medications, blood draw, and to maintain line patency      ALLERGIES:  Allergies    No Known Allergies    Intolerances        LABS:                        8.5    12.55 )-----------( 297      ( 13 May 2020 06:35 )             26.6     05-13    130<L>  |  93<L>  |  80<H>  ----------------------------<  112<H>  4.1   |  17<L>  |  6.88<H>    Ca    8.6      13 May 2020 06:35  Phos  6.3     05-13  Mg     2.4     05-13            RADIOLOGY & ADDITIONAL TESTS: Reviewed.    ASSESSMENT:     PLAN:    FEN - no IVF; replete lytes prn; NPO    PPx - HSQ    CODE - FULL.    DISPO - continue telemetry monitoring in ICU-step down level of care on 7laman. INTERVAL HPI/OVERNIGHT EVENTS: Overnight patient was hypotensive to 89/53 w/ repeat 93/51 w/ no acute intervention.     SUBJECTIVE: Patient seen and examined at bedside. Patient denies any complaints currently, was having difficulty swallowing - seen by speech and swallow and cleared for dysphagia diet. Otherwise states his SOB has been improving. Denies any acute complaints at this time.       OBJECTIVE:    VITAL SIGNS:  T(C): 36.8 (13 May 2020 09:25), Max: 36.8 (13 May 2020 09:25)  T(F): 98.3 (13 May 2020 09:25), Max: 98.3 (13 May 2020 09:25)  HR: 111 (13 May 2020 11:30) (97 - 118)  BP: 100/55 (13 May 2020 11:30) (89/53 - 117/63)  BP(mean): 70 (13 May 2020 11:30) (64 - 82)  ABP: --  ABP(mean): --  RR: 14 (13 May 2020 11:30) (14 - 20)  SpO2: 93% (13 May 2020 11:30) (93% - 96%)      05-12 @ 07:01  -  05-13 @ 07:00  --------------------------------------------------------  IN: 300 mL / OUT: 280 mL / NET: 20 mL      CAPILLARY BLOOD GLUCOSE      POCT Blood Glucose.: 125 mg/dL (13 May 2020 11:28)      PHYSICAL EXAM:  Constitutional:  Elderly AA male, appears to chronic ill appearing, AAOx3   HEENT: NC/AT; clear conjunctiva	  Neck:  Supple, no adenopathy  Respiratory:  CTA b/l   Cardiovascular: Tachycardic, +S1S2  Gastrointestinal: Soft; NTND   Extremities:  trace, left pedal edema, Wound vac L foot      MEDICATIONS:  MEDICATIONS  (STANDING):  aMIOdarone    Tablet 200 milliGRAM(s) Oral daily  apixaban 2.5 milliGRAM(s) Oral two times a day  atorvastatin 40 milliGRAM(s) Oral at bedtime  chlorhexidine 2% Cloths 1 Application(s) Topical <User Schedule>  clopidogrel Tablet 75 milliGRAM(s) Oral daily  Dakins Solution - 1/2 Strength 1 Application(s) Topical daily  DAPTOmycin IVPB 600 milliGRAM(s) IV Intermittent every 48 hours  dextrose 5%. 1000 milliLiter(s) (50 mL/Hr) IV Continuous <Continuous>  dextrose 50% Injectable 12.5 Gram(s) IV Push once  dextrose 50% Injectable 25 Gram(s) IV Push once  furosemide    Tablet 40 milliGRAM(s) Oral daily  insulin lispro (HumaLOG) corrective regimen sliding scale   SubCutaneous Before meals and at bedtime  melatonin 1 milliGRAM(s) Oral at bedtime  metoprolol succinate ER 50 milliGRAM(s) Oral two times a day  multivitamin 1 Tablet(s) Oral daily  piperacillin/tazobactam IVPB.. 2.25 Gram(s) IV Intermittent every 8 hours  senna 2 Tablet(s) Oral at bedtime  sodium bicarbonate 650 milliGRAM(s) Oral three times a day    MEDICATIONS  (PRN):  acetaminophen   Tablet .. 650 milliGRAM(s) Oral every 6 hours PRN Mild Pain (1 - 3)  dextrose 40% Gel 15 Gram(s) Oral once PRN Blood Glucose LESS THAN 70 milliGRAM(s)/deciliter  glucagon  Injectable 1 milliGRAM(s) IntraMuscular once PRN Glucose LESS THAN 70 milligrams/deciliter  oxycodone    5 mG/acetaminophen 325 mG 1 Tablet(s) Oral every 6 hours PRN Severe Pain (7 - 10)  sodium chloride 0.9% lock flush 10 milliLiter(s) IV Push every 1 hour PRN Pre/post blood products, medications, blood draw, and to maintain line patency      ALLERGIES:  Allergies    No Known Allergies    Intolerances        LABS:                        8.5    12.55 )-----------( 297      ( 13 May 2020 06:35 )             26.6     05-13    130<L>  |  93<L>  |  80<H>  ----------------------------<  112<H>  4.1   |  17<L>  |  6.88<H>    Ca    8.6      13 May 2020 06:35  Phos  6.3     05-13  Mg     2.4     05-13        RADIOLOGY & ADDITIONAL TESTS: Reviewed.

## 2020-05-13 NOTE — PROGRESS NOTE ADULT - PROBLEM SELECTOR PLAN 9
likely factitious at this time given the cold extremities ( vascular). Unlikely to be reactive hypoglycemia given no risk factors.   - f/u Endocrine

## 2020-05-13 NOTE — PROGRESS NOTE ADULT - ASSESSMENT
Patient is an 84y M PMH HTN, prediabetes, pAfib (on Xarelto-last taken 3/4), HF(35 %), PVD p/w nonhealing ulcer of L 3rd toe s/p toe amputation with plan for possible angio on Monday.    #PVD: s/p toe amputation  -plan for possible angio on Monday  -mgmt per primary team    #HTN: normotensive  -c/w toprol     #Prediabetes: glucose levels remain appropriate  -f/u AM glucose    #pAF  -c/w hep gtt, toprol, amiodarone  -cardiology consulted    #HF: no e/o active exacerbation  -cardiology consulted    #Normocytic anemia: stable hgb since January; no e/o active bleed  -c/w monitor    #CKD III: baseline Cr 1.6-1.8; patient within normal range  -c/w monitor  -renal consulted.     35 minutes spent on total encounter; more than 50% of the visit was spent counseling and/or coordinating care by the attending physician. 83 yo M with HTN, DM, Afib, PDA, CKDIII s/p angiogram with TP trunk angioplasty/stent and L 3rd toe amputation on 5/6 medicine consulted for medical co-management.

## 2020-05-13 NOTE — CONSULT NOTE ADULT - PROVIDER SPECIALTY LIST ADULT
Intervent Radiology
Nephrology
Endocrinology
Infectious Disease
Internal Medicine
SICU
Electrophysiology
Cardiology

## 2020-05-13 NOTE — PROGRESS NOTE ADULT - PROBLEM SELECTOR PLAN 4
-Pt appears euvolemic at this time  -C/w Lasix 40 mg PO qd  -BB as above, recommend hold parameters   -Statin. HR elevated to 111, w/ goal of HR < 110 as per EP. Unclear if any underlying worsening infection w/  leukocytosis.   - f/u rectal temperature   -C/w Toprol 50 mg PO BID and Amio 200 qd.

## 2020-05-13 NOTE — CONSULT NOTE ADULT - REASON FOR ADMISSION
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
for amputation for 3rd toe gangrene
gangrene
infected toe
Complex Repair And Modified Advancement Flap Text: The defect edges were debeveled with a #15 scalpel blade.  The primary defect was closed partially with a complex linear closure.  Given the location of the remaining defect, shape of the defect and the proximity to free margins a modified advancement flap was deemed most appropriate for complete closure of the defect.  Using a sterile surgical marker, an appropriate advancement flap was drawn incorporating the defect and placing the expected incisions within the relaxed skin tension lines where possible.    The area thus outlined was incised deep to adipose tissue with a #15 scalpel blade.  The skin margins were undermined to an appropriate distance in all directions utilizing iris scissors.

## 2020-05-13 NOTE — DISCHARGE NOTE PROVIDER - NSDCMRMEDTOKEN_GEN_ALL_CORE_FT
amiodarone 200 mg oral tablet: 1 tab(s) orally once a day  Aspirin Enteric Coated 81 mg oral delayed release tablet: 1 tab(s) orally once a day MDD:1  atorvastatin 40 mg oral tablet: 1 tab(s) orally once a day  doxycycline hyclate 100 mg oral capsule: 1 cap(s) orally 2 times a day MDD:2  Lasix 40 mg oral tablet: 1 tab(s) orally once a day  Metoprolol Succinate ER 50 mg oral tablet, extended release: 1 tab(s) orally once a day  Xarelto 15 mg oral tablet: 1 tab(s) orally once a day (in the evening)

## 2020-05-13 NOTE — PROGRESS NOTE ADULT - SUBJECTIVE AND OBJECTIVE BOX
O/N Events:  remained HDS, afeb, low UOP 80cc overnight  Subjective:  denies uremai symtoms, no N/V or abdominal pain, complaints of chocking on the food evalutaed by s&s and cleared for mechanical soft diet   BUN,Cr rising/ UOP ~ 300 cc for the past 24 hours/ Na and Bicarb unchanged, K WNL  appears euvolemic and non uremic     VITALS  Vital Signs Last 24 Hrs  T(C): 36.8 (13 May 2020 09:25), Max: 36.8 (13 May 2020 09:25)  T(F): 98.3 (13 May 2020 09:25), Max: 98.3 (13 May 2020 09:25)  HR: 105 (13 May 2020 08:49) (97 - 118)  BP: 100/63 (13 May 2020 08:49) (89/53 - 117/63)  BP(mean): 72 (13 May 2020 08:49) (64 - 82)  RR: 20 (13 May 2020 08:49) (16 - 20)  SpO2: 93% (13 May 2020 08:49) (93% - 96%)    PHYSICAL EXAM  General: A&Ox 3; NAD  Eyes: PERRL; EOMI; anicteric sclera  Neck: JVP appreciated at the level of jaw  Respiratory: CTA B/L  Cardiovascular: A-fib  bpm, S1/S2, no rubs   Gastrointestinal: Soft; NTND   Extremities: WWP; trace left pedal edema, wound vac in place  Neurological:  CNII-XII grossly intact; no obvious focal deficits, no asterixis appreciated   D: montes with terrie color urine     MEDICATIONS  (STANDING):  aMIOdarone    Tablet 200 milliGRAM(s) Oral daily  apixaban 2.5 milliGRAM(s) Oral two times a day  atorvastatin 40 milliGRAM(s) Oral at bedtime  chlorhexidine 2% Cloths 1 Application(s) Topical <User Schedule>  clopidogrel Tablet 75 milliGRAM(s) Oral daily  Dakins Solution - 1/2 Strength 1 Application(s) Topical daily  DAPTOmycin IVPB 600 milliGRAM(s) IV Intermittent every 48 hours  dextrose 5%. 1000 milliLiter(s) (50 mL/Hr) IV Continuous <Continuous>  dextrose 50% Injectable 12.5 Gram(s) IV Push once  dextrose 50% Injectable 25 Gram(s) IV Push once  furosemide    Tablet 40 milliGRAM(s) Oral daily  insulin lispro (HumaLOG) corrective regimen sliding scale   SubCutaneous Before meals and at bedtime  melatonin 1 milliGRAM(s) Oral at bedtime  metoprolol succinate ER 50 milliGRAM(s) Oral two times a day  multivitamin 1 Tablet(s) Oral daily  piperacillin/tazobactam IVPB.. 2.25 Gram(s) IV Intermittent every 8 hours  senna 2 Tablet(s) Oral at bedtime  sodium bicarbonate 650 milliGRAM(s) Oral three times a day    MEDICATIONS  (PRN):  acetaminophen   Tablet .. 650 milliGRAM(s) Oral every 6 hours PRN Mild Pain (1 - 3)  dextrose 40% Gel 15 Gram(s) Oral once PRN Blood Glucose LESS THAN 70 milliGRAM(s)/deciliter  glucagon  Injectable 1 milliGRAM(s) IntraMuscular once PRN Glucose LESS THAN 70 milligrams/deciliter  oxycodone    5 mG/acetaminophen 325 mG 1 Tablet(s) Oral every 6 hours PRN Severe Pain (7 - 10)  sodium chloride 0.9% lock flush 10 milliLiter(s) IV Push every 1 hour PRN Pre/post blood products, medications, blood draw, and to maintain line patency      LABS                        8.5    12.55 )-----------( 297      ( 13 May 2020 06:35 )             26.6     05-13    130<L>  |  93<L>  |  80<H>  ----------------------------<  112<H>  4.1   |  17<L>  |  6.88<H>    Ca    8.6      13 May 2020 06:35  Phos  6.3     05-13  Mg     2.4     05-13    CARDIAC MARKERS ( 12 May 2020 06:43 )  x     / x     / 771 U/L / x     / x O/N Events:  remained HDS, afeb, low UOP 80cc overnight  Subjective:  denies uremai symtoms, no N/V or abdominal pain, complaints of chocking on the food evalutaed by s&s and cleared for mechanical soft diet   BUN,Cr rising/ UOP ~ 300 cc for the past 24 hours/ Na and Bicarb unchanged, K WNL  appears euvolemic and non uremic     VITALS  Vital Signs Last 24 Hrs  T(C): 36.8 (13 May 2020 09:25), Max: 36.8 (13 May 2020 09:25)  T(F): 98.3 (13 May 2020 09:25), Max: 98.3 (13 May 2020 09:25)  HR: 105 (13 May 2020 08:49) (97 - 118)  BP: 100/63 (13 May 2020 08:49) (89/53 - 117/63)  BP(mean): 72 (13 May 2020 08:49) (64 - 82)  RR: 20 (13 May 2020 08:49) (16 - 20)  SpO2: 93% (13 May 2020 08:49) (93% - 96%)    PHYSICAL EXAM  General: A&Ox 3; NAD  Eyes: PERRL; EOMI; anicteric sclera  Neck: JVP appreciated at the level of jaw  Respiratory: diffuse mild crackles present  Cardiovascular: A-fib  bpm, S1/S2, no rubs   Gastrointestinal: Soft; NTND   Extremities: WWP; trace left pedal edema, wound vac in place  Neurological:  CNII-XII grossly intact; no obvious focal deficits, no asterixis appreciated   D: montes with terrie color urine     MEDICATIONS  (STANDING):  aMIOdarone    Tablet 200 milliGRAM(s) Oral daily  apixaban 2.5 milliGRAM(s) Oral two times a day  atorvastatin 40 milliGRAM(s) Oral at bedtime  chlorhexidine 2% Cloths 1 Application(s) Topical <User Schedule>  clopidogrel Tablet 75 milliGRAM(s) Oral daily  Dakins Solution - 1/2 Strength 1 Application(s) Topical daily  DAPTOmycin IVPB 600 milliGRAM(s) IV Intermittent every 48 hours  dextrose 5%. 1000 milliLiter(s) (50 mL/Hr) IV Continuous <Continuous>  dextrose 50% Injectable 12.5 Gram(s) IV Push once  dextrose 50% Injectable 25 Gram(s) IV Push once  furosemide    Tablet 40 milliGRAM(s) Oral daily  insulin lispro (HumaLOG) corrective regimen sliding scale   SubCutaneous Before meals and at bedtime  melatonin 1 milliGRAM(s) Oral at bedtime  metoprolol succinate ER 50 milliGRAM(s) Oral two times a day  multivitamin 1 Tablet(s) Oral daily  piperacillin/tazobactam IVPB.. 2.25 Gram(s) IV Intermittent every 8 hours  senna 2 Tablet(s) Oral at bedtime  sodium bicarbonate 650 milliGRAM(s) Oral three times a day    MEDICATIONS  (PRN):  acetaminophen   Tablet .. 650 milliGRAM(s) Oral every 6 hours PRN Mild Pain (1 - 3)  dextrose 40% Gel 15 Gram(s) Oral once PRN Blood Glucose LESS THAN 70 milliGRAM(s)/deciliter  glucagon  Injectable 1 milliGRAM(s) IntraMuscular once PRN Glucose LESS THAN 70 milligrams/deciliter  oxycodone    5 mG/acetaminophen 325 mG 1 Tablet(s) Oral every 6 hours PRN Severe Pain (7 - 10)  sodium chloride 0.9% lock flush 10 milliLiter(s) IV Push every 1 hour PRN Pre/post blood products, medications, blood draw, and to maintain line patency      LABS                        8.5    12.55 )-----------( 297      ( 13 May 2020 06:35 )             26.6     05-13    130<L>  |  93<L>  |  80<H>  ----------------------------<  112<H>  4.1   |  17<L>  |  6.88<H>    Ca    8.6      13 May 2020 06:35  Phos  6.3     05-13  Mg     2.4     05-13    CARDIAC MARKERS ( 12 May 2020 06:43 )  x     / x     / 771 U/L / x     / x

## 2020-05-13 NOTE — PROGRESS NOTE ADULT - SUBJECTIVE AND OBJECTIVE BOX
INTERVAL HPI/OVERNIGHT EVENTS: Pt has been afebrile. His  breathing is subjectively better.    CONSTITUTIONAL:  No fever, chills, night sweats  EYES:  No photophobia or visual changes  CARDIOVASCULAR:  No chest pain  RESPIRATORY:  No cough, wheezing, or SOB   GASTROINTESTINAL:  No nausea, vomiting, diarrhea, constipation, or abdominal pain  GENITOURINARY:  No frequency, urgency, dysuria or hematuria  NEUROLOGIC:  No headache, lightheadedness      ANTIBIOTICS/RELEVANT:    Daptomycin 600mg q48h  Zosyn 2.25 q6h      Vital Signs Last 24 Hrs  T(C): 36.8 (13 May 2020 09:25), Max: 36.8 (13 May 2020 09:25)  T(F): 98.3 (13 May 2020 09:25), Max: 98.3 (13 May 2020 09:25)  HR: 105 (13 May 2020 08:49) (97 - 118)  BP: 100/63 (13 May 2020 08:49) (89/53 - 117/63)  BP(mean): 72 (13 May 2020 08:49) (64 - 82)  RR: 20 (13 May 2020 08:49) (16 - 20)  SpO2: 93% (13 May 2020 08:49) (93% - 96%)    PHYSICAL EXAM:  Constitutional:  Somewhat frail appearing  Eyes:  Sclerae anicteric, conjunctivae clear, PERRL  Ear/Nose/Throat:  No nasal exudate or sinus tenderness;  No buccal mucosal lesions, no pharyngeal erythema or exudate	  Neck:  Supple, no adenopathy  Respiratory:  Clear bilaterally  Cardiovascular:  Tachy, S1S2, no murmur appreciated  Gastrointestinal:  Symmetric, normoactive BS, soft, NT, no masses, guarding or rebound  Extremities:  Wound vac L foot    LABS:                        8.5    12.55 )-----------( 297      ( 13 May 2020 06:35 )             26.6         05-13    130<L>  |  93<L>  |  80<H>  ----------------------------<  112<H>  4.1   |  17<L>  |  6.88<H>    Ca    8.6      13 May 2020 06:35  Phos  6.3     05-13  Mg     2.4     05-13            MICROBIOLOGY:        RADIOLOGY & ADDITIONAL STUDIES:    < from: Xray Chest 1 View- PORTABLE-Urgent (05.12.20 @ 09:45) >  IMPRESSION:  Small retrocardiac pneumonia versus atelectasis and small left pleural effusion, unchanged. Cardiomegaly.    < end of copied text >

## 2020-05-13 NOTE — PROGRESS NOTE ADULT - PROBLEM SELECTOR PLAN 3
Appears to be better rate controlled at this time; at goal of HR < 110 as per EP  -C/w Toprol 50 mg PO BID and Amio 200 qd. -Likely ATN in setting of contrast, supra therapeutic Vanc trough  -Monitor UOP  -Trend BMP  -Renal following.

## 2020-05-13 NOTE — PROGRESS NOTE ADULT - PROBLEM SELECTOR PLAN 5
c/w statin therapy -Pt appears euvolemic at this time  -C/w Lasix 40 mg PO qd  -BB as above, recommend hold parameters   -Statin.

## 2020-05-13 NOTE — PROGRESS NOTE ADULT - PROBLEM SELECTOR PLAN 7
Current HgB of 8.5, which appears stable from prior  -No overt bleed, likely ACD -SBP ~ low 100's which patient appears to be tolerating well. Mentating well.

## 2020-05-13 NOTE — DISCHARGE NOTE PROVIDER - CARE PROVIDERS DIRECT ADDRESSES
[email or web address]

## 2020-05-14 LAB
ANION GAP SERPL CALC-SCNC: 18 MMOL/L — HIGH (ref 5–17)
ANION GAP SERPL CALC-SCNC: 18 MMOL/L — HIGH (ref 5–17)
APTT BLD: 62.7 SEC — HIGH (ref 27.5–36.3)
BASE EXCESS BLDA CALC-SCNC: -1.1 MMOL/L — SIGNIFICANT CHANGE UP (ref -2–3)
BASE EXCESS BLDA CALC-SCNC: -2.2 MMOL/L — LOW (ref -2–3)
BUN SERPL-MCNC: 49 MG/DL — HIGH (ref 7–23)
BUN SERPL-MCNC: 49 MG/DL — HIGH (ref 7–23)
C PEPTIDE SERPL-MCNC: 7.1 NG/ML — HIGH (ref 1.1–4.4)
CALCIUM SERPL-MCNC: 8 MG/DL — LOW (ref 8.4–10.5)
CALCIUM SERPL-MCNC: 8.1 MG/DL — LOW (ref 8.4–10.5)
CHLORIDE SERPL-SCNC: 94 MMOL/L — LOW (ref 96–108)
CHLORIDE SERPL-SCNC: 95 MMOL/L — LOW (ref 96–108)
CO2 SERPL-SCNC: 20 MMOL/L — LOW (ref 22–31)
CO2 SERPL-SCNC: 20 MMOL/L — LOW (ref 22–31)
CREAT SERPL-MCNC: 4.71 MG/DL — HIGH (ref 0.5–1.3)
CREAT SERPL-MCNC: 4.84 MG/DL — HIGH (ref 0.5–1.3)
GAS PNL BLDA: SIGNIFICANT CHANGE UP
GAS PNL BLDA: SIGNIFICANT CHANGE UP
GLUCOSE BLDC GLUCOMTR-MCNC: 110 MG/DL — HIGH (ref 70–99)
GLUCOSE BLDC GLUCOMTR-MCNC: 113 MG/DL — HIGH (ref 70–99)
GLUCOSE BLDC GLUCOMTR-MCNC: 136 MG/DL — HIGH (ref 70–99)
GLUCOSE BLDC GLUCOMTR-MCNC: 158 MG/DL — HIGH (ref 70–99)
GLUCOSE BLDC GLUCOMTR-MCNC: 48 MG/DL — LOW (ref 70–99)
GLUCOSE BLDC GLUCOMTR-MCNC: 51 MG/DL — LOW (ref 70–99)
GLUCOSE SERPL-MCNC: 129 MG/DL — HIGH (ref 70–99)
GLUCOSE SERPL-MCNC: 87 MG/DL — SIGNIFICANT CHANGE UP (ref 70–99)
HBV CORE AB SER-ACNC: SIGNIFICANT CHANGE UP
HBV SURFACE AB SER-ACNC: SIGNIFICANT CHANGE UP
HCO3 BLDA-SCNC: 21 MMOL/L — SIGNIFICANT CHANGE UP (ref 21–28)
HCO3 BLDA-SCNC: 22 MMOL/L — SIGNIFICANT CHANGE UP (ref 21–28)
HCT VFR BLD CALC: 21.5 % — LOW (ref 39–50)
HCT VFR BLD CALC: 22.5 % — LOW (ref 39–50)
HCT VFR BLD CALC: 23.5 % — LOW (ref 39–50)
HGB BLD-MCNC: 7.3 G/DL — LOW (ref 13–17)
HGB BLD-MCNC: 7.6 G/DL — LOW (ref 13–17)
HGB BLD-MCNC: 7.8 G/DL — LOW (ref 13–17)
INR BLD: 1.97 — HIGH (ref 0.88–1.16)
LACTATE SERPL-SCNC: 1.4 MMOL/L — SIGNIFICANT CHANGE UP (ref 0.5–2)
MAGNESIUM SERPL-MCNC: 2.1 MG/DL — SIGNIFICANT CHANGE UP (ref 1.6–2.6)
MCHC RBC-ENTMCNC: 27.1 PG — SIGNIFICANT CHANGE UP (ref 27–34)
MCHC RBC-ENTMCNC: 27.1 PG — SIGNIFICANT CHANGE UP (ref 27–34)
MCHC RBC-ENTMCNC: 27.2 PG — SIGNIFICANT CHANGE UP (ref 27–34)
MCHC RBC-ENTMCNC: 33.2 GM/DL — SIGNIFICANT CHANGE UP (ref 32–36)
MCHC RBC-ENTMCNC: 33.8 GM/DL — SIGNIFICANT CHANGE UP (ref 32–36)
MCHC RBC-ENTMCNC: 34 GM/DL — SIGNIFICANT CHANGE UP (ref 32–36)
MCV RBC AUTO: 79.9 FL — LOW (ref 80–100)
MCV RBC AUTO: 80.4 FL — SIGNIFICANT CHANGE UP (ref 80–100)
MCV RBC AUTO: 81.9 FL — SIGNIFICANT CHANGE UP (ref 80–100)
NRBC # BLD: 0 /100 WBCS — SIGNIFICANT CHANGE UP (ref 0–0)
PCO2 BLDA: 29 MMHG — LOW (ref 35–48)
PCO2 BLDA: 31 MMHG — LOW (ref 35–48)
PH BLDA: 7.48 — HIGH (ref 7.35–7.45)
PH BLDA: 7.48 — HIGH (ref 7.35–7.45)
PHOSPHATE SERPL-MCNC: 5.7 MG/DL — HIGH (ref 2.5–4.5)
PLATELET # BLD AUTO: 277 K/UL — SIGNIFICANT CHANGE UP (ref 150–400)
PLATELET # BLD AUTO: 297 K/UL — SIGNIFICANT CHANGE UP (ref 150–400)
PLATELET # BLD AUTO: 304 K/UL — SIGNIFICANT CHANGE UP (ref 150–400)
PO2 BLDA: 100 MMHG — SIGNIFICANT CHANGE UP (ref 83–108)
PO2 BLDA: 66 MMHG — LOW (ref 83–108)
POTASSIUM SERPL-MCNC: 3.3 MMOL/L — LOW (ref 3.5–5.3)
POTASSIUM SERPL-MCNC: 3.6 MMOL/L — SIGNIFICANT CHANGE UP (ref 3.5–5.3)
POTASSIUM SERPL-SCNC: 3.3 MMOL/L — LOW (ref 3.5–5.3)
POTASSIUM SERPL-SCNC: 3.6 MMOL/L — SIGNIFICANT CHANGE UP (ref 3.5–5.3)
PROTHROM AB SERPL-ACNC: 22.9 SEC — HIGH (ref 10–12.9)
RBC # BLD: 2.69 M/UL — LOW (ref 4.2–5.8)
RBC # BLD: 2.8 M/UL — LOW (ref 4.2–5.8)
RBC # BLD: 2.87 M/UL — LOW (ref 4.2–5.8)
RBC # FLD: 15.3 % — HIGH (ref 10.3–14.5)
RBC # FLD: 15.6 % — HIGH (ref 10.3–14.5)
RBC # FLD: 15.8 % — HIGH (ref 10.3–14.5)
SAO2 % BLDA: 93 % — LOW (ref 95–100)
SAO2 % BLDA: 98 % — SIGNIFICANT CHANGE UP (ref 95–100)
SARS-COV-2 RNA SPEC QL NAA+PROBE: SIGNIFICANT CHANGE UP
SARS-COV-2 RNA SPEC QL NAA+PROBE: SIGNIFICANT CHANGE UP
SODIUM SERPL-SCNC: 132 MMOL/L — LOW (ref 135–145)
SODIUM SERPL-SCNC: 133 MMOL/L — LOW (ref 135–145)
TROPONIN T SERPL-MCNC: 0.13 NG/ML — CRITICAL HIGH (ref 0–0.01)
WBC # BLD: 14.28 K/UL — HIGH (ref 3.8–10.5)
WBC # BLD: 15.14 K/UL — HIGH (ref 3.8–10.5)
WBC # BLD: 17.48 K/UL — HIGH (ref 3.8–10.5)
WBC # FLD AUTO: 14.28 K/UL — HIGH (ref 3.8–10.5)
WBC # FLD AUTO: 15.14 K/UL — HIGH (ref 3.8–10.5)
WBC # FLD AUTO: 17.48 K/UL — HIGH (ref 3.8–10.5)

## 2020-05-14 PROCEDURE — 90935 HEMODIALYSIS ONE EVALUATION: CPT

## 2020-05-14 PROCEDURE — 99232 SBSQ HOSP IP/OBS MODERATE 35: CPT

## 2020-05-14 PROCEDURE — 99233 SBSQ HOSP IP/OBS HIGH 50: CPT | Mod: GC

## 2020-05-14 PROCEDURE — 71045 X-RAY EXAM CHEST 1 VIEW: CPT | Mod: 26

## 2020-05-14 PROCEDURE — 93321 DOPPLER ECHO F-UP/LMTD STD: CPT | Mod: 26

## 2020-05-14 PROCEDURE — 93308 TTE F-UP OR LMTD: CPT | Mod: 26

## 2020-05-14 PROCEDURE — 99231 SBSQ HOSP IP/OBS SF/LOW 25: CPT | Mod: GC

## 2020-05-14 RX ORDER — MIDODRINE HYDROCHLORIDE 2.5 MG/1
5 TABLET ORAL EVERY 8 HOURS
Refills: 0 | Status: DISCONTINUED | OUTPATIENT
Start: 2020-05-14 | End: 2020-05-15

## 2020-05-14 RX ORDER — SODIUM CHLORIDE 9 MG/ML
1000 INJECTION, SOLUTION INTRAVENOUS
Refills: 0 | Status: DISCONTINUED | OUTPATIENT
Start: 2020-05-14 | End: 2020-05-18

## 2020-05-14 RX ORDER — AMIODARONE HYDROCHLORIDE 400 MG/1
400 TABLET ORAL THREE TIMES A DAY
Refills: 0 | Status: DISCONTINUED | OUTPATIENT
Start: 2020-05-14 | End: 2020-05-15

## 2020-05-14 RX ORDER — POTASSIUM CHLORIDE 20 MEQ
40 PACKET (EA) ORAL EVERY 4 HOURS
Refills: 0 | Status: DISCONTINUED | OUTPATIENT
Start: 2020-05-14 | End: 2020-05-14

## 2020-05-14 RX ORDER — ALBUMIN HUMAN 25 %
50 VIAL (ML) INTRAVENOUS
Refills: 0 | Status: COMPLETED | OUTPATIENT
Start: 2020-05-14 | End: 2020-05-14

## 2020-05-14 RX ORDER — APIXABAN 2.5 MG/1
2.5 TABLET, FILM COATED ORAL EVERY 12 HOURS
Refills: 0 | Status: DISCONTINUED | OUTPATIENT
Start: 2020-05-14 | End: 2020-05-18

## 2020-05-14 RX ADMIN — Medication 1 TABLET(S): at 14:17

## 2020-05-14 RX ADMIN — CLOPIDOGREL BISULFATE 75 MILLIGRAM(S): 75 TABLET, FILM COATED ORAL at 12:19

## 2020-05-14 RX ADMIN — AMIODARONE HYDROCHLORIDE 200 MILLIGRAM(S): 400 TABLET ORAL at 06:23

## 2020-05-14 RX ADMIN — Medication 50 MILLILITER(S): at 15:45

## 2020-05-14 RX ADMIN — ATORVASTATIN CALCIUM 40 MILLIGRAM(S): 80 TABLET, FILM COATED ORAL at 22:24

## 2020-05-14 RX ADMIN — AMIODARONE HYDROCHLORIDE 400 MILLIGRAM(S): 400 TABLET ORAL at 17:48

## 2020-05-14 RX ADMIN — MEROPENEM 100 MILLIGRAM(S): 1 INJECTION INTRAVENOUS at 01:37

## 2020-05-14 RX ADMIN — APIXABAN 2.5 MILLIGRAM(S): 2.5 TABLET, FILM COATED ORAL at 12:19

## 2020-05-14 RX ADMIN — Medication 1300 MILLIGRAM(S): at 16:48

## 2020-05-14 RX ADMIN — Medication 50 MILLILITER(S): at 16:45

## 2020-05-14 RX ADMIN — Medication 0: at 14:15

## 2020-05-14 RX ADMIN — SENNA PLUS 2 TABLET(S): 8.6 TABLET ORAL at 22:24

## 2020-05-14 RX ADMIN — MIDODRINE HYDROCHLORIDE 5 MILLIGRAM(S): 2.5 TABLET ORAL at 22:29

## 2020-05-14 RX ADMIN — Medication 1300 MILLIGRAM(S): at 06:23

## 2020-05-14 RX ADMIN — Medication 1300 MILLIGRAM(S): at 22:24

## 2020-05-14 RX ADMIN — CHLORHEXIDINE GLUCONATE 1 APPLICATION(S): 213 SOLUTION TOPICAL at 06:16

## 2020-05-14 RX ADMIN — APIXABAN 2.5 MILLIGRAM(S): 2.5 TABLET, FILM COATED ORAL at 22:25

## 2020-05-14 RX ADMIN — AMIODARONE HYDROCHLORIDE 400 MILLIGRAM(S): 400 TABLET ORAL at 22:25

## 2020-05-14 NOTE — PROGRESS NOTE ADULT - ATTENDING COMMENTS
Pt seen on rounds this afternoon.  s/p emergency HD for pulmonary edema, with associated rapid AF.  At present appears comfortable after second dialysis session today.  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

## 2020-05-14 NOTE — PROGRESS NOTE ADULT - SUBJECTIVE AND OBJECTIVE BOX
24 hr events  ON: Started Anthony. HD tolerated well. Switch zosyn to Meropenem per ID. Cards consult: new RBBB, unlikely ACS, DAWSON to follow, trend trops. COVID swab sent.    5/13: 250cc bolus given for low UO; lasix 80IV x1 given per nephro, 25cc output; tachy to 140s after PT; rapid a-fib, given 10mg Lopressor with good response; lethargic, desaturation; will transfer to ICU for monitoring, HD, and BiPAP/// Transferred to SICU  switched to high flow NC. Will get HD line and HD tonight. Bld cxs and labs pending     Subjective:  Feels ok this morning, understands he felt "out of it" last night. Reports continued discomfort in L foot especially with dressing changes. Denies SOB, CP, N/V.    Vital Signs Last 24 Hrs  T(C): 36.2 (14 May 2020 05:00), Max: 38.4 (13 May 2020 18:00)  T(F): 97.2 (14 May 2020 05:00), Max: 101.1 (13 May 2020 18:00)  HR: 101 (14 May 2020 06:00) (94 - 146)  BP: 91/39 (13 May 2020 22:00) (70/49 - 136/109)  BP(mean): 61 (13 May 2020 20:03) (41 - 118)  RR: 20 (14 May 2020 06:00) (12 - 29)  SpO2: 100% (14 May 2020 06:00) (92% - 100%)    I&O's Summary  13 May 2020 07:01  -  14 May 2020 07:00  --------------------------------------------------------  IN: 1424.3 mL / OUT: 2330 mL / NET: -905.7 mL        Physical Exam:  General: NAD, resting comfortably in bed  Pulmonary: Nonlabored breathing, no respiratory distress, on HFNC  Extrem: WWP, Left foot wound with clean wound base, no purulence, drainage, or erythema; R groin HD catheter in place, soft, no hematoma  Neuro: A/O x 3, no focal deficits, normal sensation  Pulses: LLE: biphasic PT  : Moe in place    LABS:                    7.6    14.28 )-----------( 297      ( 14 May 2020 06:16 )             22.5     05-14    133<L>  |  95<L>  |  49<H>  ----------------------------<  87  3.6   |  20<L>  |  4.84<H>    Ca    8.0<L>      14 May 2020 06:16  Phos  5.7     05-14  Mg     2.1     05-14    TPro  5.7<L>  /  Alb  2.6<L>  /  TBili  0.8  /  DBili  x   /  AST  47<H>  /  ALT  30  /  AlkPhos  50  05-13    PT/INR - ( 14 May 2020 06:16 )   PT: 22.9 sec;   INR: 1.97          PTT - ( 14 May 2020 06:16 )  PTT:62.7 sec    LIVER FUNCTIONS - ( 13 May 2020 19:19 )  Alb: 2.6 g/dL / Pro: 5.7 g/dL / ALK PHOS: 50 U/L / ALT: 30 U/L / AST: 47 U/L / GGT: x           CAPILLARY BLOOD GLUCOSE  POCT Blood Glucose.: 109 mg/dL (13 May 2020 16:48)  POCT Blood Glucose.: 118 mg/dL (13 May 2020 16:23)  POCT Blood Glucose.: 125 mg/dL (13 May 2020 11:28)

## 2020-05-14 NOTE — PROGRESS NOTE ADULT - SUBJECTIVE AND OBJECTIVE BOX
Patient was seen and evaluated on dialysis.   HR: 117 (05-14-20 @ 16:53)  BP: 91/39 (05-13-20 @ 22:00)  05-14    132<L>  |  94<L>  |  49<H>  ----------------------------<  129<H>  3.3<L>   |  20<L>  |  4.71<H>    Ca    8.1<L>      14 May 2020 12:41  Phos  5.7     05-14  Mg     2.1     05-14    TPro  5.7<L>  /  Alb  2.6<L>  /  TBili  0.8  /  DBili  x   /  AST  47<H>  /  ALT  30  /  AlkPhos  50  05-13    Continue dialysis:   Dialyzer:   Optiflux 160      QB: 400 ml/min  K bath: 4  Goal UF: 1  Duration: 150 min    Patient is tolerating the procedure well, requiring Alb infusion to maintain MAPs/ hayden requirement almost unchanged   Continue full hemodialysis treatment as prescribed.

## 2020-05-14 NOTE — PROGRESS NOTE ADULT - ASSESSMENT
85 yo M with HFrEF (Ef 35%), HTN, DM, Afib, PDA, CKDIII s/p angiogram with TP trunk angioplasty/stent and L 3rd toe amputation with hospital course c/b Anuric Renal failure now requiring Dialysis, Hypoxic Respiratory Failure requiring NIPPV, worsening Shock, likely Septic in Nature, in Afib with RVR    #Atrial fibrillation with RVR: Multiple inciting factors (Sepsis, pulm edema, electrolytes imbalance)  - Recommend increasing Amiodarone 400 to mg po q8. If patient has episode of RVR with persistent HR >130 can reload IV amiodarone per protocol (150 mg IVP followed by 1 mg/min for 6 hrs followed 0.5 mg/min for 18 hours). Options are limited given his ESRD, and low blood pressures.   - c/w ID recs for source control and broad spectrum abx  - can start Lopressor 12.5 po q12 if patient off pressors  - Replete electrolytes to maintain K>4, Mg>2  - Incentive Spirometry. Encourage patient to get out of bed.    Plan discussed with primary team after rounds with cardiology attending. Please refer to cardiology attending addendum for additional recs.   Will continue to follow  Thank you for allowing to participate in the care of this patient    JENNIFER Jang  Cardiology Fellow, PGY 6 83 yo M with HFrEF (Ef 35%), HTN, DM, Afib, PDA, CKDIII s/p angiogram with TP trunk angioplasty/stent and L 3rd toe amputation with hospital course c/b Anuric Renal failure now requiring Dialysis, Hypoxic Respiratory Failure requiring NIPPV, worsening Shock, likely Septic in Nature, in Afib with RVR    #Atrial fibrillation with RVR: Multiple inciting factors (Sepsis, pulm edema, electrolytes imbalance)  - Recommend increasing Amiodarone 400 to mg po q8. If patient has episode of RVR with persistent HR >130 can reload IV amiodarone per protocol (150 mg IVP followed by 1 mg/min for 6 hrs followed 0.5 mg/min for 18 hours). Options are limited given his ESRD, and low blood pressures. D/w with our EP colleagues as well who have seen the patient in the past.  - ID recs for source control and broad spectrum abx  - can start Lopressor 12.5 po q12 if patient off pressors  - Replete electrolytes to maintain K>4, Mg>2  - Incentive Spirometry. Encourage patient to get out of bed.    Plan discussed with primary team after rounds with cardiology attending.   Please refer to cardiology attending addendum for additional recs.   Will continue to follow  Thank you for allowing to participate in the care of this patient    JENNIFER Jang  Cardiology Fellow, PGY 6

## 2020-05-14 NOTE — PROGRESS NOTE ADULT - ASSESSMENT
85 yo M with HTN, DM, Afib, PDA, CKDIII s/p angiogram with TP trunk angioplasty/stent and L 3rd toe amputation on 5/6.  Superficial culture sent 2 d earlier, no OR cultures sent. Course c/b isolated fever as well as flash pulmonary edema--improved with dialysis.  - f/u bcx--ngtd  - can defer sputum culture at this time as not producing  - continue daptomycin 600mg IV q48h while in house; anticipate transitioning to doxycycline 100mg PO q12h upon discharge through 6/17  - continue empiric meropenem 500mg IV q24h through 6/17; favor this agent over Zosyn due to decreased salt load with the former    ID Team 2

## 2020-05-14 NOTE — PROGRESS NOTE ADULT - ASSESSMENT
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 BRANDYN falsely elevated due to history of DM now s/p LLE Angiogram DEStent x2, L 3rd and 4th toe amputation, and debridement. Now  with contrastinduced nephropathy, Rapid response called for tachycardia- pt placed on Bipap and transferred to SICU.    Neuro:  Tylenol Percocet prn   CV:  Afib on Heparin (Repeat aptt at noon), Plavix Amiodarone  lipitor. Low BP hold  Lasix & metoprolol  Pulm: Plweural edema - cont Bipap40%12/5  GI: NPO except meds MVI senna   : Moe - ZEENAT - hd tonight Sodium bicarb tid    ID:MRSA, VRE( cont dapto( 5/13-)  zosyn ( 5/13-5/13) gelacio 5/14) f/u Cultures  Endo: ISS  PPx: SCD HSQ  Lines: piv   Wounds: Left toes wound vac  PT/OT: Not ordered

## 2020-05-14 NOTE — PROGRESS NOTE ADULT - SUBJECTIVE AND OBJECTIVE BOX
INTERVAL HPI/OVERNIGHT EVENTS: Breathing improved with HD. No new fevers. Denies L foot pain.    CONSTITUTIONAL:  Negative fever or chills, feels well, good appetite  EYES:  Negative  blurry vision or double vision  CARDIOVASCULAR:  Negative for chest pain or palpitations  RESPIRATORY:  Negative for cough, wheezing, or SOB   GASTROINTESTINAL:  Negative for nausea, vomiting, diarrhea, constipation, or abdominal pain  GENITOURINARY:  Negative frequency, urgency or dysuria  NEUROLOGIC:  No headache, confusion, dizziness, lightheadedness      ANTIBIOTICS/RELEVANT:    MEDICATIONS  (STANDING):  albumin human 25% IVPB 50 milliLiter(s) IV Intermittent every 1 hour  aMIOdarone    Tablet 200 milliGRAM(s) Oral daily  apixaban 2.5 milliGRAM(s) Oral every 12 hours  atorvastatin 40 milliGRAM(s) Oral at bedtime  chlorhexidine 2% Cloths 1 Application(s) Topical <User Schedule>  clopidogrel Tablet 75 milliGRAM(s) Oral daily  DAPTOmycin IVPB 600 milliGRAM(s) IV Intermittent every 48 hours  dextrose 5%. 1000 milliLiter(s) (50 mL/Hr) IV Continuous <Continuous>  dextrose 5%. 1000 milliLiter(s) (50 mL/Hr) IV Continuous <Continuous>  dextrose 50% Injectable 12.5 Gram(s) IV Push once  dextrose 50% Injectable 25 Gram(s) IV Push once  insulin lispro (HumaLOG) corrective regimen sliding scale   SubCutaneous Before meals and at bedtime  meropenem  IVPB 500 milliGRAM(s) IV Intermittent every 24 hours  multivitamin 1 Tablet(s) Oral daily  phenylephrine    Infusion 0.208 MICROgram(s)/kG/Min (6 mL/Hr) IV Continuous <Continuous>  senna 2 Tablet(s) Oral at bedtime  sodium bicarbonate 1300 milliGRAM(s) Oral three times a day    MEDICATIONS  (PRN):  acetaminophen   Tablet .. 650 milliGRAM(s) Oral every 6 hours PRN Mild Pain (1 - 3)  dextrose 40% Gel 15 Gram(s) Oral once PRN Blood Glucose LESS THAN 70 milliGRAM(s)/deciliter  glucagon  Injectable 1 milliGRAM(s) IntraMuscular once PRN Glucose LESS THAN 70 milligrams/deciliter  oxycodone    5 mG/acetaminophen 325 mG 1 Tablet(s) Oral every 6 hours PRN Severe Pain (7 - 10)  sodium chloride 0.9% lock flush 10 milliLiter(s) IV Push every 1 hour PRN Pre/post blood products, medications, blood draw, and to maintain line patency        Vital Signs Last 24 Hrs  T(C): 36.5 (14 May 2020 14:00), Max: 38.4 (13 May 2020 18:00)  T(F): 97.7 (14 May 2020 14:00), Max: 101.1 (13 May 2020 18:00)  HR: 120 (14 May 2020 15:00) (93 - 146)  BP: 91/39 (13 May 2020 22:00) (70/49 - 136/109)  BP(mean): 61 (13 May 2020 20:03) (41 - 118)  RR: 19 (14 May 2020 15:00) (12 - 29)  SpO2: 97% (14 May 2020 11:30) (92% - 100%)    PHYSICAL EXAM:  Constitutional:Well-developed, well nourished  Eyes:KASSIE, EOMI  Ear/Nose/Throat: no oral lesion, no sinus tenderness on percussion	  Neck:no JVD, no lymphadenopathy, supple  Respiratory: CTA luba  Cardiovascular: S1S2 RRR, no murmurs  Gastrointestinal:soft, (+) BS, no HSM  Extremities:no e/e/c; L foot dressing   Vascular: DP Pulse:	right normal; left normal      LABS:                        7.6    14.28 )-----------( 297      ( 14 May 2020 06:16 )             22.5     05-14    132<L>  |  94<L>  |  49<H>  ----------------------------<  129<H>  3.3<L>   |  20<L>  |  4.71<H>    Ca    8.1<L>      14 May 2020 12:41  Phos  5.7     05-14  Mg     2.1     05-14    TPro  5.7<L>  /  Alb  2.6<L>  /  TBili  0.8  /  DBili  x   /  AST  47<H>  /  ALT  30  /  AlkPhos  50  05-13    PT/INR - ( 14 May 2020 06:16 )   PT: 22.9 sec;   INR: 1.97          PTT - ( 14 May 2020 06:16 )  PTT:62.7 sec      MICROBIOLOGY: reviewed    RADIOLOGY & ADDITIONAL STUDIES: reviewed

## 2020-05-14 NOTE — PROGRESS NOTE ADULT - SUBJECTIVE AND OBJECTIVE BOX
ON: Started Anthony. Ultrafiltration 2L removed, On phenylephrine. Switch zosyn to Meropenem per ID. Cards consult: new RBBB, unlikely ACS, DAWSON to follow, trend trops. COVID swab sent.    5/13: 250cc bolus given for low UO; lasix 80IV x1 given per nephro, 25cc output; tachy to 140s after PT; rapid a-fib, given 10mg Lopressor with good response; lethargic, desaturation; will transfer to ICU for monitoring, HD, and BiPAP/// Transferred to SICU  switched to high flow NC. Will get HD line and HD tonight. Bld cxs and labs pending     Subjective:  Pt seen and examined at bedside, haemodynamically stable. Denies fever, chills, nausea, vomiting or diarrhoea. Still complains of pain in the left foot.         MEDICATIONS  (STANDING):  aMIOdarone    Tablet 200 milliGRAM(s) Oral daily  atorvastatin 40 milliGRAM(s) Oral at bedtime  chlorhexidine 2% Cloths 1 Application(s) Topical <User Schedule>  clopidogrel Tablet 75 milliGRAM(s) Oral daily  DAPTOmycin IVPB 600 milliGRAM(s) IV Intermittent every 48 hours  dextrose 5%. 1000 milliLiter(s) (50 mL/Hr) IV Continuous <Continuous>  dextrose 50% Injectable 12.5 Gram(s) IV Push once  dextrose 50% Injectable 25 Gram(s) IV Push once  heparin  Infusion 900 Unit(s)/Hr (9 mL/Hr) IV Continuous <Continuous>  insulin lispro (HumaLOG) corrective regimen sliding scale   SubCutaneous Before meals and at bedtime  meropenem  IVPB 500 milliGRAM(s) IV Intermittent every 24 hours  multivitamin 1 Tablet(s) Oral daily  phenylephrine    Infusion 0.208 MICROgram(s)/kG/Min (6 mL/Hr) IV Continuous <Continuous>  senna 2 Tablet(s) Oral at bedtime  sodium bicarbonate 1300 milliGRAM(s) Oral three times a day    MEDICATIONS  (PRN):  acetaminophen   Tablet .. 650 milliGRAM(s) Oral every 6 hours PRN Mild Pain (1 - 3)  dextrose 40% Gel 15 Gram(s) Oral once PRN Blood Glucose LESS THAN 70 milliGRAM(s)/deciliter  glucagon  Injectable 1 milliGRAM(s) IntraMuscular once PRN Glucose LESS THAN 70 milligrams/deciliter  oxycodone    5 mG/acetaminophen 325 mG 1 Tablet(s) Oral every 6 hours PRN Severe Pain (7 - 10)  sodium chloride 0.9% lock flush 10 milliLiter(s) IV Push every 1 hour PRN Pre/post blood products, medications, blood draw, and to maintain line patency      Moe:	  [ ] None	[ ] Daily Moe Order Placed	   Indication:	  [ ] Strict I and O's    [ ] Obstruction     [ ] Incontinence + Stage 3 or 4 Decubitus  Central Line:  [ ] None	   [ ]  Medication / TPN Administration     Drips:     ICU Vital Signs Last 24 Hrs  T(C): 36.2 (14 May 2020 05:00), Max: 38.4 (13 May 2020 18:00)  T(F): 97.2 (14 May 2020 05:00), Max: 101.1 (13 May 2020 18:00)  HR: 101 (14 May 2020 06:00) (94 - 146)  BP: 91/39 (13 May 2020 22:00) (70/49 - 136/109)  BP(mean): 61 (13 May 2020 20:03) (41 - 118)  ABP: 99/55 (14 May 2020 03:00) (97/52 - 112/62)  ABP(mean): 66 (14 May 2020 03:00) (66 - 70)  RR: 20 (14 May 2020 06:00) (12 - 29)  SpO2: 100% (14 May 2020 06:00) (92% - 100%)    Physical Exam:  General: NAD, resting comfortably in bed  Pulmonary: Nonlabored breathing, no respiratory distress, on HFNC  Extrem: WWP, Left foot dressing c/d/i changed by vascular; R groin HD catheter in place, soft, no hematoma  Neuro: A/O x 3, no focal deficits, normal sensation  Pulses: LLE: biphasic PT  : Moe in place  Lines/tubes/drains:    Vent settings:      I&O's Summary    13 May 2020 07:01  -  14 May 2020 07:00  --------------------------------------------------------  IN: 1424.3 mL / OUT: 2330 mL / NET: -905.7 mL        LABS:                        7.6    14.28 )-----------( 297      ( 14 May 2020 06:16 )             22.5     05-14    133<L>  |  95<L>  |  49<H>  ----------------------------<  87  3.6   |  20<L>  |  4.84<H>    Ca    8.0<L>      14 May 2020 06:16  Phos  5.7     05-14  Mg     2.1     05-14    TPro  5.7<L>  /  Alb  2.6<L>  /  TBili  0.8  /  DBili  x   /  AST  47<H>  /  ALT  30  /  AlkPhos  50  05-13    PT/INR - ( 14 May 2020 06:16 )   PT: 22.9 sec;   INR: 1.97          PTT - ( 14 May 2020 06:16 )  PTT:62.7 sec    CAPILLARY BLOOD GLUCOSE      POCT Blood Glucose.: 109 mg/dL (13 May 2020 16:48)  POCT Blood Glucose.: 118 mg/dL (13 May 2020 16:23)  POCT Blood Glucose.: 125 mg/dL (13 May 2020 11:28)    LIVER FUNCTIONS - ( 13 May 2020 19:19 )  Alb: 2.6 g/dL / Pro: 5.7 g/dL / ALK PHOS: 50 U/L / ALT: 30 U/L / AST: 47 U/L / GGT: x             Cultures:    RADIOLOGY & ADDITIONAL STUDIES:

## 2020-05-14 NOTE — PROGRESS NOTE ADULT - SUBJECTIVE AND OBJECTIVE BOX
INTERVAL HPI/OVERNIGHT EVENTS:    Patient is a 84y old  Male who presents with a chief complaint of 3rd and 4th toe gangrene (13 May 2020 17:49)      Pt reports the following symptoms:    CONSTITUTIONAL:  Negative fever or chills, feels well, good appetite  EYES:  Negative  blurry vision or double vision  CARDIOVASCULAR:  Negative for chest pain or palpitations  RESPIRATORY:  Negative for cough, wheezing, or SOB   GASTROINTESTINAL:  Negative for nausea, vomiting, diarrhea, constipation, or abdominal pain  GENITOURINARY:  Negative frequency, urgency or dysuria  NEUROLOGIC:  No headache, confusion, dizziness, lightheadedness    MEDICATIONS  (STANDING):  aMIOdarone    Tablet 200 milliGRAM(s) Oral daily  apixaban 2.5 milliGRAM(s) Oral every 12 hours  atorvastatin 40 milliGRAM(s) Oral at bedtime  chlorhexidine 2% Cloths 1 Application(s) Topical <User Schedule>  clopidogrel Tablet 75 milliGRAM(s) Oral daily  DAPTOmycin IVPB 600 milliGRAM(s) IV Intermittent every 48 hours  dextrose 5%. 1000 milliLiter(s) (50 mL/Hr) IV Continuous <Continuous>  dextrose 50% Injectable 12.5 Gram(s) IV Push once  dextrose 50% Injectable 25 Gram(s) IV Push once  insulin lispro (HumaLOG) corrective regimen sliding scale   SubCutaneous Before meals and at bedtime  meropenem  IVPB 500 milliGRAM(s) IV Intermittent every 24 hours  multivitamin 1 Tablet(s) Oral daily  phenylephrine    Infusion 0.208 MICROgram(s)/kG/Min (6 mL/Hr) IV Continuous <Continuous>  senna 2 Tablet(s) Oral at bedtime  sodium bicarbonate 1300 milliGRAM(s) Oral three times a day    MEDICATIONS  (PRN):  acetaminophen   Tablet .. 650 milliGRAM(s) Oral every 6 hours PRN Mild Pain (1 - 3)  dextrose 40% Gel 15 Gram(s) Oral once PRN Blood Glucose LESS THAN 70 milliGRAM(s)/deciliter  glucagon  Injectable 1 milliGRAM(s) IntraMuscular once PRN Glucose LESS THAN 70 milligrams/deciliter  oxycodone    5 mG/acetaminophen 325 mG 1 Tablet(s) Oral every 6 hours PRN Severe Pain (7 - 10)  sodium chloride 0.9% lock flush 10 milliLiter(s) IV Push every 1 hour PRN Pre/post blood products, medications, blood draw, and to maintain line patency      PHYSICAL EXAM  Vital Signs Last 24 Hrs  T(C): 36.8 (14 May 2020 10:00), Max: 38.4 (13 May 2020 18:00)  T(F): 98.2 (14 May 2020 10:00), Max: 101.1 (13 May 2020 18:00)  HR: 93 (14 May 2020 09:34) (93 - 146)  BP: 91/39 (13 May 2020 22:00) (70/49 - 136/109)  BP(mean): 61 (13 May 2020 20:03) (41 - 118)  RR: 15 (14 May 2020 09:34) (12 - 29)  SpO2: 97% (14 May 2020 09:34) (92% - 100%)    Constitutional: wn/wd in NAD.   HEENT: NCAT, MMM, OP clear, EOMI, no proptosis or lid retraction  Neck: no thyromegaly or palpable thyroid nodules   Respiratory: lungs CTAB.  Cardiovascular: regular rhythm, normal S1 and S2, no audible murmurs, no peripheral edema  GI: soft, NT/ND, no masses/HSM appreciated.  Neurology: no tremors, DTR 2+  Skin: no visible rashes/lesions  Psychiatric: AAO x 3, normal affect/mood.    LABS:                        7.6    14.28 )-----------( 297      ( 14 May 2020 06:16 )             22.5     05-14    133<L>  |  95<L>  |  49<H>  ----------------------------<  87  3.6   |  20<L>  |  4.84<H>    Ca    8.0<L>      14 May 2020 06:16  Phos  5.7     05-14  Mg     2.1     05-14    TPro  5.7<L>  /  Alb  2.6<L>  /  TBili  0.8  /  DBili  x   /  AST  47<H>  /  ALT  30  /  AlkPhos  50  05-13    PT/INR - ( 14 May 2020 06:16 )   PT: 22.9 sec;   INR: 1.97          PTT - ( 14 May 2020 06:16 )  PTT:62.7 sec    Thyroid Stimulating Hormone, Serum: 0.585 uIU/mL (05-07 @ 06:37)  Thyroid Stimulating Hormone, Serum: 0.558 uIU/mL (05-07 @ 06:37)      HbA1C: 6.0 % (01-16 @ 06:03)    CAPILLARY BLOOD GLUCOSE      POCT Blood Glucose.: 109 mg/dL (13 May 2020 16:48)  POCT Blood Glucose.: 118 mg/dL (13 May 2020 16:23)      Insulin Sliding Scale requirements X 24 Hours:    RADIOLOGY & ADDITIONAL TESTS:    A/P: 84y Male with history of DM type II presenting for       1.  DM -     Please continue           units lantus at bedtime  / in the morning and        units lispro with meals and lispro moderate / low dose sliding scale 4 times daily with meals and at bedtime.  Please continue consistent carbohydrate diet.      Goal FSG is   Will continue to monitor   For discharge, pt can continue    Pt can follow up at discharge with [facility] by calling  to make an appointment.   Will discuss case with     and update primary team INTERVAL HPI/OVERNIGHT EVENTS:    Patient is a 84y old  Male who presents with a chief complaint of 3rd and 4th toe gangrene (13 May 2020 17:49)  Patient seen and examined at the bedside.   Rapid response was called - became tachypnic, tachycardic - afib with RVR with HR in 110. and the patient was transferred to SICU.  he under went emergent HD - was removed 2 L yesterday. he made about 530cc of urine yesterday.  Posible consideration of septic shock - ABX was changed to  meropenam and daptomycin. on phenylpephrine drip  Repeat ECHO showed EF 20%.  He had a fever spike of 101.1 at 600 PM yesterday  FSG and insulin administration was reviewed.  he had an episode of hypoglycemia with FSG 51 and 43 - asymptomatic. Was given juice before the blood work was drawn  He did not eat anything. He had his lunch today which was less than 50%  FSG & Insulin received:  Yesterday:  pre-dinner fs  1900     Today:  600 BMP 87  1200 FSG 51 - 48 - was given juice - repeat       Pt reports the following symptoms:  CONSTITUTIONAL:  feels okay, good appetite  CARDIOVASCULAR:  Negative for chest pain or palpitations  RESPIRATORY:  Negative for cough, wheezing, or SOB   GASTROINTESTINAL:  Negative for vomiting, diarrhea, constipation  NEUROLOGIC:  No headache, confusion, dizziness, lightheadedness    MEDICATIONS  (STANDING):  aMIOdarone    Tablet 200 milliGRAM(s) Oral daily  apixaban 2.5 milliGRAM(s) Oral every 12 hours  atorvastatin 40 milliGRAM(s) Oral at bedtime  chlorhexidine 2% Cloths 1 Application(s) Topical <User Schedule>  clopidogrel Tablet 75 milliGRAM(s) Oral daily  DAPTOmycin IVPB 600 milliGRAM(s) IV Intermittent every 48 hours  dextrose 5%. 1000 milliLiter(s) (50 mL/Hr) IV Continuous <Continuous>  dextrose 50% Injectable 12.5 Gram(s) IV Push once  dextrose 50% Injectable 25 Gram(s) IV Push once  insulin lispro (HumaLOG) corrective regimen sliding scale   SubCutaneous Before meals and at bedtime  meropenem  IVPB 500 milliGRAM(s) IV Intermittent every 24 hours  multivitamin 1 Tablet(s) Oral daily  phenylephrine    Infusion 0.208 MICROgram(s)/kG/Min (6 mL/Hr) IV Continuous <Continuous>  senna 2 Tablet(s) Oral at bedtime  sodium bicarbonate 1300 milliGRAM(s) Oral three times a day    MEDICATIONS  (PRN):  acetaminophen   Tablet .. 650 milliGRAM(s) Oral every 6 hours PRN Mild Pain (1 - 3)  dextrose 40% Gel 15 Gram(s) Oral once PRN Blood Glucose LESS THAN 70 milliGRAM(s)/deciliter  glucagon  Injectable 1 milliGRAM(s) IntraMuscular once PRN Glucose LESS THAN 70 milligrams/deciliter  oxycodone    5 mG/acetaminophen 325 mG 1 Tablet(s) Oral every 6 hours PRN Severe Pain (7 - 10)  sodium chloride 0.9% lock flush 10 milliLiter(s) IV Push every 1 hour PRN Pre/post blood products, medications, blood draw, and to maintain line patency      PHYSICAL EXAM  Vital Signs Last 24 Hrs  T(C): 36.8 (14 May 2020 10:00), Max: 38.4 (13 May 2020 18:00)  T(F): 98.2 (14 May 2020 10:00), Max: 101.1 (13 May 2020 18:00)  HR: 93 (14 May 2020 09:34) (93 - 146)  BP: 91/39 (13 May 2020 22:00) (70/49 - 136/109)  BP(mean): 61 (13 May 2020 20:03) (41 - 118)  RR: 15 (14 May 2020 09:34) (12 - 29)  SpO2: 97% (14 May 2020 09:34) (92% - 100%)    Constitutional: On Bipap. lethargic, tired  Respiratory: lungs CTAB.  Cardiovascular: regular rhythm, normal S1 and S2, peripheral edema 1+  GI: soft, NT/ND, no masses/HSM appreciated.  Neurology: no tremors, DTR 2+, moves extremities      LABS:                        7.6    14.28 )-----------( 297      ( 14 May 2020 06:16 )             22.5     05-14    133<L>  |  95<L>  |  49<H>  ----------------------------<  87  3.6   |  20<L>  |  4.84<H>    Ca    8.0<L>      14 May 2020 06:16  Phos  5.7     -  Mg     2.1     -    TPro  5.7<L>  /  Alb  2.6<L>  /  TBili  0.8  /  DBili  x   /  AST  47<H>  /  ALT  30  /  AlkPhos  50  05-13    PT/INR - ( 14 May 2020 06:16 )   PT: 22.9 sec;   INR: 1.97          PTT - ( 14 May 2020 06:16 )  PTT:62.7 sec    Thyroid Stimulating Hormone, Serum: 0.585 uIU/mL ( @ 06:37)  Thyroid Stimulating Hormone, Serum: 0.558 uIU/mL ( @ 06:37)      HbA1C: 6.0 % ( @ 06:03)    CAPILLARY BLOOD GLUCOSE      POCT Blood Glucose.: 109 mg/dL (13 May 2020 16:48)  POCT Blood Glucose.: 118 mg/dL (13 May 2020 16:23)      Insulin Sliding Scale requirements X 24 Hours:    RADIOLOGY & ADDITIONAL TESTS:    A/P: 84 M PMH HTN, DM, A fib on Xarelto (Last taken 8am) HF EF 35%, CKD III PAD p/w 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 He is s/p left fourth toe amputation and angiogram 20. He is s/p 2 KECIA placement in TP trunk. currently on antibiotics.    1.  Hypoglycemia   -  Multifactorial at this time given vascular disease, sepsis and renal failure  - had CKD - now in renal failure. Normal liver function  - Cr 2.06 and GFR 34  - Wt 77.1 kg with BMI 24.4  Hba1c 6  TSH 0.585, Free T4 1.09  - 5/10 10 Am FSG - 42 - was given juice - Repeat . Labs at that time. BMP blood glucose was 125, c-peptide was 7.9 and insulin level 7.2   - FSG 51 and 43 - asymptomatic. Was given juice before the blood work was drawn  - Please start on FSG monitoring - four times a day - before meals and bedtime  - Please obtain Basic metabolic panel, c-peptide level, insulin level and pro-insulin level when the FSG is less than 60. These labs should be drawn before administering any dextrose containing   solution ( Juice or D50)  - Please consider warming the fingers before obtaining the FSG.   - please add AM cortisol   - on MSD - renal diet  Will continue to monitor     For discharge, TBD    Pt can follow up at discharge with [facility] by calling  to make an appointment.   discussed case with Dr. Bello    and updated primary team

## 2020-05-14 NOTE — PROGRESS NOTE ADULT - ATTENDING COMMENTS
plan HD again today for clearance and UF as tolerates  unclear cause of hypotension -- evaluate per cardiology and r/o infection

## 2020-05-14 NOTE — PROGRESS NOTE ADULT - ATTENDING COMMENTS
Pt seen on rounds this afternoon.  s/p emergency HD for pulmonary edema, with associated rapid AF.  At present appears comfortable after second dialysis session today.  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

## 2020-05-14 NOTE — PROGRESS NOTE ADULT - SUBJECTIVE AND OBJECTIVE BOX
Cardiology fellow Progress note    Subjective/Interval events: Overnight events reviewed. Patient denies chest pain, shortness of breath, orthopnea, paroxysmal nocturnal dyspnea, ankle swelling, lightheadedness, dizziness. Patient receiving his second HD session. Still having high grade fevers. Unclear source at this time. Still requiring phenylephrine    ROS: A 10-point review of systems was otherwise negative.    PHYSICAL EXAM:  T(C): 36.5 (05-14-20 @ 15:45), Max: 38.4 (05-13-20 @ 18:00)  HR: 117 (05-14-20 @ 16:53) (93 - 122)  BP: 91/39 (05-13-20 @ 22:00) (70/49 - 104/58)  RR: 16 (05-14-20 @ 16:53) (12 - 29)  SpO2: 97% (05-14-20 @ 16:53) (92% - 100%)  Wt(kg): --    GEN: Awake, Breathing comfortably on. NAD.   HEENT: Mucosa moist. No JVD.   RESP: Diffuse b/l crackles. Good inspiratory effort  CV: Tachycardic, irregular No m/r/g.  ABD: Soft, NTND. BS+  EXT: Warm. b/l pitting edema 1+  NEURO: AAOx3. No focal deficits.      I&O's Summary    13 May 2020 07:01  -  14 May 2020 07:00  --------------------------------------------------------  IN: 1424.3 mL / OUT: 2530 mL / NET: -1105.7 mL    14 May 2020 07:01  -  14 May 2020 17:45  --------------------------------------------------------  IN: 430 mL / OUT: 725 mL / NET: -295 mL      	  LABS:	 	                        7.8    15.14 )-----------( 304      ( 14 May 2020 17:28 )             23.5     05-14    132<L>  |  94<L>  |  49<H>  ----------------------------<  129<H>  3.3<L>   |  20<L>  |  4.71<H>    Ca    8.1<L>      14 May 2020 12:41  Phos  5.7     05-14  Mg     2.1     05-14    TPro  5.7<L>  /  Alb  2.6<L>  /  TBili  0.8  /  DBili  x   /  AST  47<H>  /  ALT  30  /  AlkPhos  50  05-13    CARDIAC MARKERS ( 14 May 2020 06:16 )  x     / 0.13 ng/mL / x     / x     / x      CARDIAC MARKERS ( 13 May 2020 19:19 )  x     / 0.14 ng/mL / x     / x     / x      CARDIAC MARKERS ( 13 May 2020 14:25 )  x     / x     / 501 U/L / x     / x          PT/INR - ( 14 May 2020 06:16 )   PT: 22.9 sec;   INR: 1.97          PTT - ( 14 May 2020 06:16 )  PTT:62.7 sec  proBNP: Serum Pro-Brain Natriuretic Peptide: 60967 pg/mL <H> [0 - 300] (05-13 @ 19:19)  Serum Pro-Brain Natriuretic Peptide: 7866 pg/mL <H> [0 - 300] (05-04 @ 21:33)    Lipid Profile:   HgA1c: Hemoglobin A1C, Whole Blood: 6.0 % (01-16 @ 06:03)    Thyroid Stimulating Hormone, Serum: 0.558 uIU/mL (05-07-20 @ 06:37)    ABG - ( 14 May 2020 06:29 )  pH, Arterial: 7.48  pH, Blood: x     /  pCO2: 31    /  pO2: 100   / HCO3: 22    / Base Excess: -1.1  /  SaO2: 98          TELEMETRY: Afib w RBBB with RVR in 120s	   	  TTE 5/14: EF 20% worse down from 35% on 03/5/20. Mild TR  CXR: Improving pulm congestion after HD

## 2020-05-14 NOTE — PROGRESS NOTE ADULT - SUBJECTIVE AND OBJECTIVE BOX
O/N Events:  s/p HD, remained on low dose or Anthony to maintain MAPs~ 65 mmhg  Subjective:  BUN,Cr down post HD/ sNa improving 133/ UOP picking up 400 cc for overnight shift/ FB -1L   looks good, alert, awake and communicative on HFNC, satting 99%/JVP+/ lung exam with bibasilar crackles L>R  CXR, pulm congestion improving , has no peripheral edema    VITALS  Vital Signs Last 24 Hrs  T(C): 36.8 (14 May 2020 10:00), Max: 38.4 (13 May 2020 18:00)  T(F): 98.2 (14 May 2020 10:00), Max: 101.1 (13 May 2020 18:00)  HR: 93 (14 May 2020 09:34) (93 - 146)  BP: 91/39 (13 May 2020 22:00) (70/49 - 136/109)  BP(mean): 61 (13 May 2020 20:03) (41 - 118)  RR: 15 (14 May 2020 09:34) (12 - 29)  SpO2: 97% (14 May 2020 09:34) (92% - 100%)    PHYSICAL EXAM  General: A&Ox 3; NAD  Neck: JVP+  Respiratory: bibasilar crackles/ L>R  Cardiovascular: A-fib, HR 90s  Gastrointestinal: Soft; NTND   Extremities: WWP; trace left pedal edema, wound vac in place  Neurological:  CNII-XII grossly intact; no obvious focal deficits, no asterixis appreciated     MEDICATIONS  (STANDING):  aMIOdarone    Tablet 200 milliGRAM(s) Oral daily  apixaban 2.5 milliGRAM(s) Oral every 12 hours  atorvastatin 40 milliGRAM(s) Oral at bedtime  chlorhexidine 2% Cloths 1 Application(s) Topical <User Schedule>  clopidogrel Tablet 75 milliGRAM(s) Oral daily  DAPTOmycin IVPB 600 milliGRAM(s) IV Intermittent every 48 hours  dextrose 5%. 1000 milliLiter(s) (50 mL/Hr) IV Continuous <Continuous>  dextrose 50% Injectable 12.5 Gram(s) IV Push once  dextrose 50% Injectable 25 Gram(s) IV Push once  insulin lispro (HumaLOG) corrective regimen sliding scale   SubCutaneous Before meals and at bedtime  meropenem  IVPB 500 milliGRAM(s) IV Intermittent every 24 hours  multivitamin 1 Tablet(s) Oral daily  phenylephrine    Infusion 0.208 MICROgram(s)/kG/Min (6 mL/Hr) IV Continuous <Continuous>  senna 2 Tablet(s) Oral at bedtime  sodium bicarbonate 1300 milliGRAM(s) Oral three times a day    MEDICATIONS  (PRN):  acetaminophen   Tablet .. 650 milliGRAM(s) Oral every 6 hours PRN Mild Pain (1 - 3)  dextrose 40% Gel 15 Gram(s) Oral once PRN Blood Glucose LESS THAN 70 milliGRAM(s)/deciliter  glucagon  Injectable 1 milliGRAM(s) IntraMuscular once PRN Glucose LESS THAN 70 milligrams/deciliter  oxycodone    5 mG/acetaminophen 325 mG 1 Tablet(s) Oral every 6 hours PRN Severe Pain (7 - 10)  sodium chloride 0.9% lock flush 10 milliLiter(s) IV Push every 1 hour PRN Pre/post blood products, medications, blood draw, and to maintain line patency      LABS                        7.6    14.28 )-----------( 297      ( 14 May 2020 06:16 )             22.5     05-14    133<L>  |  95<L>  |  49<H>  ----------------------------<  87  3.6   |  20<L>  |  4.84<H>    Ca    8.0<L>      14 May 2020 06:16  Phos  5.7     05-14  Mg     2.1     05-14    TPro  5.7<L>  /  Alb  2.6<L>  /  TBili  0.8  /  DBili  x   /  AST  47<H>  /  ALT  30  /  AlkPhos  50  05-13    LIVER FUNCTIONS - ( 13 May 2020 19:19 )  Alb: 2.6 g/dL / Pro: 5.7 g/dL / ALK PHOS: 50 U/L / ALT: 30 U/L / AST: 47 U/L / GGT: x           PT/INR - ( 14 May 2020 06:16 )   PT: 22.9 sec;   INR: 1.97          PTT - ( 14 May 2020 06:16 )  PTT:62.7 sec    CARDIAC MARKERS ( 14 May 2020 06:16 )  x     / 0.13 ng/mL / x     / x     / x      CARDIAC MARKERS ( 13 May 2020 19:19 )  x     / 0.14 ng/mL / x     / x     / x      CARDIAC MARKERS ( 13 May 2020 14:25 )  x     / x     / 501 U/L / x     / x

## 2020-05-14 NOTE — PROGRESS NOTE ADULT - ASSESSMENT
A/p  85 y/o AAM w/PMH HTN/DM/A-fib on Xarelto/HF EF 35%, CKD III and PAD, recently admitted in march for toe gangrene s/p left 3rd toe amputation/ presented back to ED from Dr. Mcclellan's office for further work up of LLE pain and swelling, nephrology consulted for ZEENAT on CKD III  s/p Angio with angioplasty/stent 5/6

## 2020-05-14 NOTE — PROGRESS NOTE ADULT - PROBLEM SELECTOR PLAN 1
oliguric ZEENAT on CKD stage III, likely ATN with component of contrast induced injury and elevated Vanc trough  failed fluid and diuretic challenge    started on HD 5/13 mostly for pul vasc congestion and hypoxia ? flash pulm edema related to RVR in the setting of HF and oliguric ZEENAT  - plan for HD and additional UF 2L if tolerated   - monitor closely UOP to check for renal recovery   Will follow

## 2020-05-14 NOTE — PROGRESS NOTE ADULT - ASSESSMENT
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 BRANDYN falsely elevated due to history of DM now s/p LLE Angiogram DEStent x2, L 3rd and 4th toe amputation, and debridement, transferred to SICU for fluid overload, respiratory distress, and urgent need for HD.    - Pain control PRN  - Wean pressors  - Wean HFNC  - f/u Renal recs  - f/u ID recs  - f/u Cards recs  - Care per SICU Assessment/Plan;  84 M PMH HTN, DM, A fib on Xarelto (Last taken 8am) HF EF 35%, PAD, CKD III 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 BRANDYN falsely elevated due to history of DM now s/p LLE Angiogram DEStent x2, L 3rd and 4th toe amputation, and debridement, transferred to SICU for fluid overload, respiratory distress, and urgent need for HD.    - Pain control PRN  - Wean pressors  - Wean HFNC to NC  - OOB, Chest PT  - f/u Renal recs  - f/u ID recs  - f/u Cards recs  - Care per SICU Assessment/Plan;  84 M PMH HTN, DM, A fib on Xarelto (Last taken 8am) HF EF 35%, PAD, CKD III 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 BRANDYN falsely elevated due to history of DM now s/p LLE Angiogram DEStent x2, L 3rd and 4th toe amputation, and debridement, transferred to SICU for fluid overload, respiratory distress, and urgent need for HD.    - Pain control PRN  - Wean pressors, keep MAP >65  - Wean HFNC to NC  - OOB, Chest PT  - f/u Renal recs  - f/u ID recs  - f/u Cards recs  - Care per SICU

## 2020-05-14 NOTE — CHART NOTE - NSCHARTNOTEFT_GEN_A_CORE
Admitting Diagnosis:   Patient is a 84y old  Male who presents with a chief complaint of 3rd and 4th toe gangrene (13 May 2020 17:49)      PAST MEDICAL & SURGICAL HISTORY:  Heart failure  HLD (hyperlipidemia)  Borderline diabetes mellitus  Afib  HTN (hypertension)  H/O laryngectomy      [facility]: [facility], Renal + Nepro with Carb Steady TID (1275 kcal, 57.3g protein, 516 mL free H2O)       PO Intake: Good (%) [   ]  Fair (50-75%) [   ] Poor (<25%) [   ]- NPO this AM for BiPap/ HFNC     GI Issues: no n/v/d/c    Pain: no pain observed    Skin Integrity: ecchymotic, surgical incision to toe    Labs:       133<L>  |  95<L>  |  49<H>  ----------------------------<  87  3.6   |  20<L>  |  4.84<H>    Ca    8.0<L>      14 May 2020 06:16  Phos  5.7       Mg     2.1         TPro  5.7<L>  /  Alb  2.6<L>  /  TBili  0.8  /  DBili  x   /  AST  47<H>  /  ALT  30  /  AlkPhos  50      CAPILLARY BLOOD GLUCOSE      POCT Blood Glucose.: 109 mg/dL (13 May 2020 16:48)  POCT Blood Glucose.: 118 mg/dL (13 May 2020 16:23)      Medications:  MEDICATIONS  (STANDING):  aMIOdarone    Tablet 200 milliGRAM(s) Oral daily  apixaban 2.5 milliGRAM(s) Oral every 12 hours  atorvastatin 40 milliGRAM(s) Oral at bedtime  chlorhexidine 2% Cloths 1 Application(s) Topical <User Schedule>  clopidogrel Tablet 75 milliGRAM(s) Oral daily  DAPTOmycin IVPB 600 milliGRAM(s) IV Intermittent every 48 hours  dextrose 5%. 1000 milliLiter(s) (50 mL/Hr) IV Continuous <Continuous>  dextrose 50% Injectable 12.5 Gram(s) IV Push once  dextrose 50% Injectable 25 Gram(s) IV Push once  insulin lispro (HumaLOG) corrective regimen sliding scale   SubCutaneous Before meals and at bedtime  meropenem  IVPB 500 milliGRAM(s) IV Intermittent every 24 hours  multivitamin 1 Tablet(s) Oral daily  phenylephrine    Infusion 0.208 MICROgram(s)/kG/Min (6 mL/Hr) IV Continuous <Continuous>  senna 2 Tablet(s) Oral at bedtime  sodium bicarbonate 1300 milliGRAM(s) Oral three times a day    MEDICATIONS  (PRN):  acetaminophen   Tablet .. 650 milliGRAM(s) Oral every 6 hours PRN Mild Pain (1 - 3)  dextrose 40% Gel 15 Gram(s) Oral once PRN Blood Glucose LESS THAN 70 milliGRAM(s)/deciliter  glucagon  Injectable 1 milliGRAM(s) IntraMuscular once PRN Glucose LESS THAN 70 milligrams/deciliter  oxycodone    5 mG/acetaminophen 325 mG 1 Tablet(s) Oral every 6 hours PRN Severe Pain (7 - 10)  sodium chloride 0.9% lock flush 10 milliLiter(s) IV Push every 1 hour PRN Pre/post blood products, medications, blood draw, and to maintain line patency      Weight:   79kg (5/3)  77kg ()   Daily Weight in k.7 (13 May 2020 23:20)    Weight Change: +5kg likely in setting of fluid shifts 2/2 renal dysfunction     Nutrition Focused Physical Exam: Completed [  x-unremarkable ]  Not Pertinent [   ]    Estimated energy needs:   ABW (77kg) used for calculations as pt between % of IBW. 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 CXR performed-small L sided pneumothorax placed on supplemental O2. Repeat Cxr with no pneumothorax. Questionable plan for left toe amputation with angiogram today. On assessment, pt resting in bed without complaints. Assessment was limited as pt was being prepped.  Pt noting no changes in appetite. Pt hasn't noted any unusual changes in weight since previous admission- per EMR, note a -4lb/2% wt loss over 2 months (not significant) likely 2/2 toe amputation. Course c/b contrast induced nephropathy, rapid response called  for tachycardia, placed on BiPap and transferred to SICU care for closer monitoring. Now transitioned to HFNC. HD line to be placed, and HD initiated tonight. Will follow per protocol.     Previous Nutrition Diagnosis: Increased kcal, pro needs RT increased nutrient demand 2/2 wound healing AEB s/p amputation    Active [ x  ]  Resolved [   ]    If resolved, new PES:      Goal: Consistently meet >75% est needs    Recommendations:  1. C/w ONS and renal diet   2. Trend wts pre and post HD  3. Monitor and replete lytes  4. Encourage intake through day    Education: n/a NPO this AM, diet now advanced     Risk Level: High [   ] Moderate [ x  ] Low [   ]

## 2020-05-15 LAB
ALBUMIN SERPL ELPH-MCNC: 2.6 G/DL — LOW (ref 3.3–5)
ALBUMIN SERPL ELPH-MCNC: 3 G/DL — LOW (ref 3.3–5)
ALP SERPL-CCNC: 49 U/L — SIGNIFICANT CHANGE UP (ref 40–120)
ALP SERPL-CCNC: 57 U/L — SIGNIFICANT CHANGE UP (ref 40–120)
ALT FLD-CCNC: 34 U/L — SIGNIFICANT CHANGE UP (ref 10–45)
ALT FLD-CCNC: 38 U/L — SIGNIFICANT CHANGE UP (ref 10–45)
ANION GAP SERPL CALC-SCNC: 16 MMOL/L — SIGNIFICANT CHANGE UP (ref 5–17)
ANION GAP SERPL CALC-SCNC: 17 MMOL/L — SIGNIFICANT CHANGE UP (ref 5–17)
AST SERPL-CCNC: 57 U/L — HIGH (ref 10–40)
AST SERPL-CCNC: 62 U/L — HIGH (ref 10–40)
BILIRUB DIRECT SERPL-MCNC: 0.4 MG/DL — HIGH (ref 0–0.2)
BILIRUB INDIRECT FLD-MCNC: 0.6 MG/DL — SIGNIFICANT CHANGE UP (ref 0.2–1)
BILIRUB SERPL-MCNC: 1 MG/DL — SIGNIFICANT CHANGE UP (ref 0.2–1.2)
BILIRUB SERPL-MCNC: 1 MG/DL — SIGNIFICANT CHANGE UP (ref 0.2–1.2)
BUN SERPL-MCNC: 36 MG/DL — HIGH (ref 7–23)
BUN SERPL-MCNC: 38 MG/DL — HIGH (ref 7–23)
CALCIUM SERPL-MCNC: 8.2 MG/DL — LOW (ref 8.4–10.5)
CALCIUM SERPL-MCNC: 8.5 MG/DL — SIGNIFICANT CHANGE UP (ref 8.4–10.5)
CHLORIDE SERPL-SCNC: 99 MMOL/L — SIGNIFICANT CHANGE UP (ref 96–108)
CHLORIDE SERPL-SCNC: 99 MMOL/L — SIGNIFICANT CHANGE UP (ref 96–108)
CO2 SERPL-SCNC: 21 MMOL/L — LOW (ref 22–31)
CO2 SERPL-SCNC: 22 MMOL/L — SIGNIFICANT CHANGE UP (ref 22–31)
CREAT SERPL-MCNC: 3.36 MG/DL — HIGH (ref 0.5–1.3)
CREAT SERPL-MCNC: 3.48 MG/DL — HIGH (ref 0.5–1.3)
GAS PNL BLDA: SIGNIFICANT CHANGE UP
GLUCOSE BLDC GLUCOMTR-MCNC: 120 MG/DL — HIGH (ref 70–99)
GLUCOSE BLDC GLUCOMTR-MCNC: 165 MG/DL — HIGH (ref 70–99)
GLUCOSE BLDC GLUCOMTR-MCNC: 74 MG/DL — SIGNIFICANT CHANGE UP (ref 70–99)
GLUCOSE SERPL-MCNC: 113 MG/DL — HIGH (ref 70–99)
GLUCOSE SERPL-MCNC: 119 MG/DL — HIGH (ref 70–99)
HCT VFR BLD CALC: 23.7 % — LOW (ref 39–50)
HGB BLD-MCNC: 7.9 G/DL — LOW (ref 13–17)
INSULIN FREE SERPL-ACNC: SIGNIFICANT CHANGE UP
LACTATE SERPL-SCNC: 1.9 MMOL/L — SIGNIFICANT CHANGE UP (ref 0.5–2)
MAGNESIUM SERPL-MCNC: 2 MG/DL — SIGNIFICANT CHANGE UP (ref 1.6–2.6)
MAGNESIUM SERPL-MCNC: 2 MG/DL — SIGNIFICANT CHANGE UP (ref 1.6–2.6)
MCHC RBC-ENTMCNC: 26.8 PG — LOW (ref 27–34)
MCHC RBC-ENTMCNC: 33.3 GM/DL — SIGNIFICANT CHANGE UP (ref 32–36)
MCV RBC AUTO: 80.3 FL — SIGNIFICANT CHANGE UP (ref 80–100)
NRBC # BLD: 0 /100 WBCS — SIGNIFICANT CHANGE UP (ref 0–0)
PHOSPHATE SERPL-MCNC: 3.7 MG/DL — SIGNIFICANT CHANGE UP (ref 2.5–4.5)
PHOSPHATE SERPL-MCNC: 3.8 MG/DL — SIGNIFICANT CHANGE UP (ref 2.5–4.5)
PLATELET # BLD AUTO: 325 K/UL — SIGNIFICANT CHANGE UP (ref 150–400)
POTASSIUM SERPL-MCNC: 3.2 MMOL/L — LOW (ref 3.5–5.3)
POTASSIUM SERPL-MCNC: 3.6 MMOL/L — SIGNIFICANT CHANGE UP (ref 3.5–5.3)
POTASSIUM SERPL-SCNC: 3.2 MMOL/L — LOW (ref 3.5–5.3)
POTASSIUM SERPL-SCNC: 3.6 MMOL/L — SIGNIFICANT CHANGE UP (ref 3.5–5.3)
PROT SERPL-MCNC: 5.8 G/DL — LOW (ref 6–8.3)
PROT SERPL-MCNC: 5.9 G/DL — LOW (ref 6–8.3)
RBC # BLD: 2.95 M/UL — LOW (ref 4.2–5.8)
RBC # FLD: 15.8 % — HIGH (ref 10.3–14.5)
SODIUM SERPL-SCNC: 137 MMOL/L — SIGNIFICANT CHANGE UP (ref 135–145)
SODIUM SERPL-SCNC: 137 MMOL/L — SIGNIFICANT CHANGE UP (ref 135–145)
WBC # BLD: 13.5 K/UL — HIGH (ref 3.8–10.5)
WBC # FLD AUTO: 13.5 K/UL — HIGH (ref 3.8–10.5)

## 2020-05-15 PROCEDURE — 99291 CRITICAL CARE FIRST HOUR: CPT

## 2020-05-15 PROCEDURE — 99233 SBSQ HOSP IP/OBS HIGH 50: CPT

## 2020-05-15 PROCEDURE — 71045 X-RAY EXAM CHEST 1 VIEW: CPT | Mod: 26

## 2020-05-15 PROCEDURE — 92960 CARDIOVERSION ELECTRIC EXT: CPT

## 2020-05-15 RX ORDER — AMIODARONE HYDROCHLORIDE 400 MG/1
150 TABLET ORAL ONCE
Refills: 0 | Status: COMPLETED | OUTPATIENT
Start: 2020-05-15 | End: 2020-05-15

## 2020-05-15 RX ORDER — POTASSIUM CHLORIDE 20 MEQ
10 PACKET (EA) ORAL
Refills: 0 | Status: DISCONTINUED | OUTPATIENT
Start: 2020-05-15 | End: 2020-05-15

## 2020-05-15 RX ORDER — PHENYLEPHRINE HYDROCHLORIDE 10 MG/ML
0.2 INJECTION INTRAVENOUS
Qty: 40 | Refills: 0 | Status: DISCONTINUED | OUTPATIENT
Start: 2020-05-15 | End: 2020-05-21

## 2020-05-15 RX ORDER — DIGOXIN 250 MCG
0.25 TABLET ORAL ONCE
Refills: 0 | Status: COMPLETED | OUTPATIENT
Start: 2020-05-15 | End: 2020-05-15

## 2020-05-15 RX ORDER — POTASSIUM CHLORIDE 20 MEQ
10 PACKET (EA) ORAL
Refills: 0 | Status: COMPLETED | OUTPATIENT
Start: 2020-05-15 | End: 2020-05-15

## 2020-05-15 RX ORDER — MIDODRINE HYDROCHLORIDE 2.5 MG/1
2.5 TABLET ORAL ONCE
Refills: 0 | Status: COMPLETED | OUTPATIENT
Start: 2020-05-15 | End: 2020-05-15

## 2020-05-15 RX ORDER — SODIUM HYPOCHLORITE 0.125 %
1 SOLUTION, NON-ORAL MISCELLANEOUS DAILY
Refills: 0 | Status: DISCONTINUED | OUTPATIENT
Start: 2020-05-15 | End: 2020-05-17

## 2020-05-15 RX ORDER — AMIODARONE HYDROCHLORIDE 400 MG/1
1 TABLET ORAL
Qty: 900 | Refills: 0 | Status: DISCONTINUED | OUTPATIENT
Start: 2020-05-15 | End: 2020-05-15

## 2020-05-15 RX ORDER — AMIODARONE HYDROCHLORIDE 400 MG/1
0.5 TABLET ORAL
Qty: 900 | Refills: 0 | Status: DISCONTINUED | OUTPATIENT
Start: 2020-05-15 | End: 2020-05-15

## 2020-05-15 RX ORDER — AMIODARONE HYDROCHLORIDE 400 MG/1
1 TABLET ORAL
Qty: 900 | Refills: 0 | Status: DISCONTINUED | OUTPATIENT
Start: 2020-05-15 | End: 2020-05-16

## 2020-05-15 RX ORDER — FUROSEMIDE 40 MG
80 TABLET ORAL ONCE
Refills: 0 | Status: COMPLETED | OUTPATIENT
Start: 2020-05-15 | End: 2020-05-15

## 2020-05-15 RX ORDER — VASOPRESSIN 20 [USP'U]/ML
0.02 INJECTION INTRAVENOUS
Qty: 50 | Refills: 0 | Status: DISCONTINUED | OUTPATIENT
Start: 2020-05-15 | End: 2020-05-16

## 2020-05-15 RX ORDER — MIDODRINE HYDROCHLORIDE 2.5 MG/1
7.5 TABLET ORAL EVERY 8 HOURS
Refills: 0 | Status: DISCONTINUED | OUTPATIENT
Start: 2020-05-15 | End: 2020-05-16

## 2020-05-15 RX ORDER — MAGNESIUM SULFATE 500 MG/ML
1 VIAL (ML) INJECTION ONCE
Refills: 0 | Status: COMPLETED | OUTPATIENT
Start: 2020-05-15 | End: 2020-05-15

## 2020-05-15 RX ADMIN — MEROPENEM 100 MILLIGRAM(S): 1 INJECTION INTRAVENOUS at 06:51

## 2020-05-15 RX ADMIN — Medication 1 APPLICATION(S): at 09:00

## 2020-05-15 RX ADMIN — AMIODARONE HYDROCHLORIDE 400 MILLIGRAM(S): 400 TABLET ORAL at 14:48

## 2020-05-15 RX ADMIN — AMIODARONE HYDROCHLORIDE 600 MILLIGRAM(S): 400 TABLET ORAL at 16:50

## 2020-05-15 RX ADMIN — Medication 1 TABLET(S): at 12:25

## 2020-05-15 RX ADMIN — PHENYLEPHRINE HYDROCHLORIDE 6 MICROGRAM(S)/KG/MIN: 10 INJECTION INTRAVENOUS at 09:43

## 2020-05-15 RX ADMIN — MIDODRINE HYDROCHLORIDE 7.5 MILLIGRAM(S): 2.5 TABLET ORAL at 14:08

## 2020-05-15 RX ADMIN — Medication 100 MILLIEQUIVALENT(S): at 07:42

## 2020-05-15 RX ADMIN — PHENYLEPHRINE HYDROCHLORIDE 5.78 MICROGRAM(S)/KG/MIN: 10 INJECTION INTRAVENOUS at 19:07

## 2020-05-15 RX ADMIN — Medication 80 MILLIGRAM(S): at 12:26

## 2020-05-15 RX ADMIN — Medication 100 MILLIEQUIVALENT(S): at 07:25

## 2020-05-15 RX ADMIN — APIXABAN 2.5 MILLIGRAM(S): 2.5 TABLET, FILM COATED ORAL at 06:56

## 2020-05-15 RX ADMIN — AMIODARONE HYDROCHLORIDE 400 MILLIGRAM(S): 400 TABLET ORAL at 06:55

## 2020-05-15 RX ADMIN — AMIODARONE HYDROCHLORIDE 33.3 MG/MIN: 400 TABLET ORAL at 19:10

## 2020-05-15 RX ADMIN — CHLORHEXIDINE GLUCONATE 1 APPLICATION(S): 213 SOLUTION TOPICAL at 06:49

## 2020-05-15 RX ADMIN — MIDODRINE HYDROCHLORIDE 5 MILLIGRAM(S): 2.5 TABLET ORAL at 06:55

## 2020-05-15 RX ADMIN — Medication 100 GRAM(S): at 17:22

## 2020-05-15 RX ADMIN — Medication 0.25 MILLIGRAM(S): at 21:58

## 2020-05-15 RX ADMIN — Medication 100 MILLIEQUIVALENT(S): at 07:30

## 2020-05-15 RX ADMIN — MIDODRINE HYDROCHLORIDE 2.5 MILLIGRAM(S): 2.5 TABLET ORAL at 09:41

## 2020-05-15 RX ADMIN — Medication 1300 MILLIGRAM(S): at 06:55

## 2020-05-15 RX ADMIN — Medication 1300 MILLIGRAM(S): at 14:45

## 2020-05-15 RX ADMIN — DAPTOMYCIN 124 MILLIGRAM(S): 500 INJECTION, POWDER, LYOPHILIZED, FOR SOLUTION INTRAVENOUS at 14:43

## 2020-05-15 RX ADMIN — CLOPIDOGREL BISULFATE 75 MILLIGRAM(S): 75 TABLET, FILM COATED ORAL at 12:25

## 2020-05-15 NOTE — PROGRESS NOTE ADULT - SUBJECTIVE AND OBJECTIVE BOX
Attempted cardioversion for atrial fibrillation with rapid ventricular response and hemodynamic compromise.  Verbal consent obtained from patient prior to procedure. Is on anticoagulation.  Blood pressure 86 systolic on pressor.  Heart rates 130-140 with wide complex aberrancy.  Cardioverted with a single 120 joule synchronized shock (pads in AP position).  No sedation given the low blood pressure and pressor requirement; discussed with patient prior to procedure.  converted to sinus with nearly Immediate Return to Atrial Fibrillation (IRAF).  Being converted to iv amiodarone. Being treated for sepsis.  Can consider load of digoxin with no subsequent doses given renal failure for additional rate control.  Will follow.

## 2020-05-15 NOTE — PROGRESS NOTE ADULT - ASSESSMENT
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 BRANDYN falsely elevated due to history of DM now s/p LLE Angiogram DEStent x2, L 3rd and 4th toe amputation, and debridement, transferred to SICU for fluid overload, respiratory distress, and urgent need for HD.    - Pain control PRN  - Wean pressors, titrate midodrine  - Wean HFNC to NC  - OOB, Chest PT  - f/u Renal recs  - f/u ID recs - Abx  - f/u Cards recs  - Care per SICU

## 2020-05-15 NOTE — PROGRESS NOTE ADULT - ASSESSMENT
83 yo M with HTN, DM, Afib, PDA, CKDIII s/p angiogram with TP trunk angioplasty/stent and L 3rd toe amputation on 5/6.  Superficial culture sent 2 d earlier, no OR cultures sent. Course c/b isolated fever as well as flash pulmonary edema--improved with dialysis.  - f/u bcx--ngtd  - continue daptomycin 600mg IV q48h while in house; anticipate transitioning to doxycycline 100mg PO q12h upon discharge through 6/17  - continue empiric meropenem 500mg IV q24h through 6/17; favor this agent over Zosyn due to decreased salt load with the former  - weekly CBC, CMP, ESR, CRP, CK (only while on daptomycin) to be faxed to [phone]    Will arrange post hospital follow up with Dr. Arriaga in 2 weeks    Please reconsult with ?    ID Team 2

## 2020-05-15 NOTE — CHART NOTE - NSCHARTNOTEFT_GEN_A_CORE
84M PMH of HTN, DM, Afib, HF EF 35%, CKD3, PAD admitted with left 3rd/4th toe gangrene s/p amputation, debridement and LLE angiogram with KECIA stents x2 (5/6) Post-op course complicated by anuric renal failure secondary to likely contrast-induced nephropathy leading to flash pulmonary oedema requiring dialysis on 5/13 and 5/14, hypoxic respiratory failure requiring NIPPV, worsening septic shock requiring phenylephrine, and Afib with RVR. On 5/15, pt's rate went to 170s not breaking despite amiodarone bolus and cardioversion. Pt will complete a full 24hr IV amiodarone load and transferred to medicine for further management management.       Neuro:  Tylenol Percocet prn   CV: Afib on eliquis, Plavix, Amiodarone gtt, lipitor. flash pulmonary edema followed by renal, dialysis as needed.  Echo: Severely reduced left ventricular systolic function (EF 25%, cardiology following) started midodrine 7.5 tid to titrate down phenylephrine  Pulm: Pleural edema on HFNC  GI: Renal Mech Soft. MVI senna   : Moe - ZEENAT - hd tonight Sodium bicarb tid    ID: MRSA, VRE cont dapto( 5/13-) Cont  Meche( 5/13-) D/c Zosyn( 5/13) f/u Cultures  Endo: ISS  PPx: SCD Eliquis  Lines: piv, Right midline, Right groin dialysis catheter.    Wounds: Left toes wound vac

## 2020-05-15 NOTE — PROGRESS NOTE ADULT - ASSESSMENT
83 yo M with HFrEF (Ef 35%), HTN, DM, Afib, PDA, CKDIII s/p angiogram with TP trunk angioplasty/stent and L 3rd toe amputation with hospital course c/b Anuric Renal failure now requiring Dialysis, Hypoxic Respiratory Failure requiring NIPPV, worsening Shock, likely Septic in Nature, in Afib with RVR    #Atrial fibrillation with RVR: Multiple inciting factors (Sepsis, pulm edema, electrolytes imbalance)  - c/w Amiodarone 400 to mg po q8. If patient has episode of RVR with persistent HR >130 can reload IV amiodarone per protocol (150 mg IVP followed by 1 mg/min for 6 hrs followed 0.5 mg/min for 18 hours). Options are limited given his ESRD, and low blood pressures. D/w with our EP colleagues as well who have seen the patient in the past. Will consider AV constantino ablation with either dual chamber ppm with ICD or a BiV given to the low EF  - Left heart cath or CCTA prior to discharge or as OP.   - ID recs for source control and broad spectrum abx  - can start Lopressor 12.5 po q12 if patient off pressors  - Replete electrolytes to maintain K>4, Mg>2  - Incentive Spirometry. Encourage patient to get out of bed.    Plan discussed with primary team after rounds with cardiology attending.   Please refer to cardiology attending addendum for additional recs.   Will continue to follow  Thank you for allowing to participate in the care of this patient    JENNIFER Jang  Cardiology Fellow, PGY 6

## 2020-05-15 NOTE — PROGRESS NOTE ADULT - ASSESSMENT
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 BRANDYN falsely elevated due to history of DM now s/p LLE Angiogram DEStent x2, L 3rd and 4th toe amputation, and debridement. Now  with contrastinduced nephropathy, Rapid response called for tachycardia- pt placed on Bipap and transferred to SICU.    Neuro:  Tylenol Percocet prn   CV: Afib on eliquis, Plavix Amiodarone 400 tid  lipitor. flash pulmonary edema- now resolved f/u with cards.  Echo: Severely reduced left ventricular systolic function. started midodrine 5 tid to titrate down phenylephrine  Pulm: Pleural edema improved post HD - on HFNC: change to NC   GI: Renal Mech Soft. MVI senna   : Moe - ZEENAT - hd tonight Sodium bicarb tid    ID:MRSA, VRE  cont dapto( 5/13-) Cont  zosyn ( 5/13-) f/u Cultures  Endo: ISS  PPx: SCD Eliquis  Lines: piv   Wounds: Left toes wound vac  PT/OT: Not ordered

## 2020-05-15 NOTE — PROGRESS NOTE ADULT - PROBLEM SELECTOR PLAN 1
oliguric ZEENAT on CKD stage III, likely ATN with component of contrast induced injury and elevated Vanc trough  failed fluid and diuretic challenge, s/p HD 5/13 and 5/14 mostly for fluid overload  now with evidence of recovery given rising UOP/ would hold off on further RRT at this time and would diurese for optimization of volume overload   - lasix challenge with 80 mg IV lasix/ monitor UOP if adequate response with down trending BUN/Cr on following labs can consider to dc HD cath  - gently replete K as he is also getting diurese   Will follow

## 2020-05-15 NOTE — PROGRESS NOTE ADULT - SUBJECTIVE AND OBJECTIVE BOX
24 hr events  ON: Tachy 110-120's, no amio bolus until >130's per vasc.   5/14: CXR improved weaned off HFNC to NC, Attempted to wean Anthony ( cuff overreading) but unable to Started midodrine.  Trop 0.12- no changes CBC stable  PTT in AM wnl  however pt changed back to eliquis and heparin held. Started Diet. Covid:negative again. Echo: . Severely reduced left ventricular systolic function. cards aware. Hypoglycemic- endocrine sending labs: awaiting their direction. given  juice and improved. Increased Amio to 400 tid for increased HR in afternoon. Vascular requesting CBC- drawn @5pm: hgb 7.8 - no changes. Resp to wean to NC    Subjective:  No acute complaints. Denies SOB/CP. Denies pain in foot, only with dressing changes.    Vital Signs Last 24 Hrs  T(C): 37.4 (14 May 2020 22:00), Max: 37.4 (14 May 2020 22:00)  T(F): 99.3 (14 May 2020 22:00), Max: 99.3 (14 May 2020 22:00)  HR: 121 (15 May 2020 07:00) (93 - 133)  BP: --  BP(mean): --  RR: 19 (15 May 2020 07:00) (15 - 26)  SpO2: 95% (15 May 2020 07:00) (93% - 100%)    I&O's Summary  14 May 2020 07:01  -  15 May 2020 07:00  --------------------------------------------------------  IN: 1036.1 mL / OUT: 2145 mL / NET: -1108.9 mL    Physical Exam:  General: NAD, resting comfortably in bed  Pulmonary: Nonlabored breathing, no respiratory distress, on HFNC  Extrem: WWP, Left foot wound with clean wound base, no purulence, drainage, or erythema; R groin HD catheter in place, soft, no hematoma  Neuro: A/O x 3, no focal deficits, normal sensation  Pulses: LLE: biphasic PT  : Moe in place    LABS:                     7.9    13.50 )-----------( 325      ( 15 May 2020 06:19 )             23.7     05-15  137  |  99  |  36<H>  ----------------------------<  113<H>  3.2<L>   |  22  |  3.48<H>    Ca    8.2<L>      15 May 2020 06:19  Phos  3.8     05-15  Mg     2.0     05-15    TPro  5.8<L>  /  Alb  2.6<L>  /  TBili  1.0  /  DBili  0.4<H>  /  AST  57<H>  /  ALT  34  /  AlkPhos  49  05-15    PT/INR - ( 14 May 2020 06:16 )   PT: 22.9 sec;   INR: 1.97     PTT - ( 14 May 2020 06:16 )  PTT:62.7 sec    LIVER FUNCTIONS - ( 15 May 2020 06:19 )  Alb: 2.6 g/dL / Pro: 5.8 g/dL / ALK PHOS: 49 U/L / ALT: 34 U/L / AST: 57 U/L / GGT: x           CAPILLARY BLOOD GLUCOSE  POCT Blood Glucose.: 136 mg/dL (14 May 2020 22:22)  POCT Blood Glucose.: 110 mg/dL (14 May 2020 17:05)  POCT Blood Glucose.: 158 mg/dL (14 May 2020 15:04)  POCT Blood Glucose.: 113 mg/dL (14 May 2020 13:09)  POCT Blood Glucose.: 48 mg/dL (14 May 2020 12:10)  POCT Blood Glucose.: 51 mg/dL (14 May 2020 12:08)

## 2020-05-15 NOTE — PROGRESS NOTE ADULT - SUBJECTIVE AND OBJECTIVE BOX
INTERVAL HPI/OVERNIGHT EVENTS: No new fevers. Denies SOB or L foot pain.    CONSTITUTIONAL:  Negative fever or chills, feels well, good appetite  EYES:  Negative  blurry vision or double vision  CARDIOVASCULAR:  Negative for chest pain or palpitations  RESPIRATORY:  Negative for cough, wheezing, or SOB   GASTROINTESTINAL:  Negative for nausea, vomiting, diarrhea, constipation, or abdominal pain  GENITOURINARY:  Negative frequency, urgency or dysuria  NEUROLOGIC:  No headache, confusion, dizziness, lightheadedness      ANTIBIOTICS/RELEVANT:    MEDICATIONS  (STANDING):  aMIOdarone    Tablet 400 milliGRAM(s) Oral three times a day  apixaban 2.5 milliGRAM(s) Oral every 12 hours  atorvastatin 40 milliGRAM(s) Oral at bedtime  chlorhexidine 2% Cloths 1 Application(s) Topical <User Schedule>  clopidogrel Tablet 75 milliGRAM(s) Oral daily  Dakins Solution - 1/4 Strength 1 Application(s) Topical daily  DAPTOmycin IVPB 600 milliGRAM(s) IV Intermittent every 48 hours  dextrose 5%. 1000 milliLiter(s) (50 mL/Hr) IV Continuous <Continuous>  dextrose 5%. 1000 milliLiter(s) (50 mL/Hr) IV Continuous <Continuous>  dextrose 50% Injectable 12.5 Gram(s) IV Push once  dextrose 50% Injectable 25 Gram(s) IV Push once  insulin lispro (HumaLOG) corrective regimen sliding scale   SubCutaneous Before meals and at bedtime  meropenem  IVPB 500 milliGRAM(s) IV Intermittent every 24 hours  midodrine 7.5 milliGRAM(s) Oral every 8 hours  multivitamin 1 Tablet(s) Oral daily  phenylephrine    Infusion 0.208 MICROgram(s)/kG/Min (6 mL/Hr) IV Continuous <Continuous>  senna 2 Tablet(s) Oral at bedtime  sodium bicarbonate 1300 milliGRAM(s) Oral three times a day    MEDICATIONS  (PRN):  acetaminophen   Tablet .. 650 milliGRAM(s) Oral every 6 hours PRN Mild Pain (1 - 3)  dextrose 40% Gel 15 Gram(s) Oral once PRN Blood Glucose LESS THAN 70 milliGRAM(s)/deciliter  glucagon  Injectable 1 milliGRAM(s) IntraMuscular once PRN Glucose LESS THAN 70 milligrams/deciliter  oxycodone    5 mG/acetaminophen 325 mG 1 Tablet(s) Oral every 6 hours PRN Severe Pain (7 - 10)  sodium chloride 0.9% lock flush 10 milliLiter(s) IV Push every 1 hour PRN Pre/post blood products, medications, blood draw, and to maintain line patency        Vital Signs Last 24 Hrs  T(C): 37.4 (14 May 2020 22:00), Max: 37.4 (14 May 2020 22:00)  T(F): 99.3 (14 May 2020 22:00), Max: 99.3 (14 May 2020 22:00)  HR: 116 (15 May 2020 12:00) (103 - 133)  BP: 112/86 (15 May 2020 10:00) (110/86 - 112/86)  BP(mean): 91 (15 May 2020 10:00) (91 - 91)  RR: 22 (15 May 2020 12:00) (16 - 26)  SpO2: 99% (15 May 2020 12:00) (93% - 99%)    PHYSICAL EXAM:  Constitutional:Well-developed, well nourished  Eyes:KASSIE, EOMI  Ear/Nose/Throat: no oral lesion, no sinus tenderness on percussion	  Neck:no JVD, no lymphadenopathy, supple  Respiratory: CTA luba  Cardiovascular: S1S2 RRR, no murmurs  Gastrointestinal:soft, (+) BS, no HSM  Extremities:no e/e/c; L foot dressing   Vascular: DP Pulse:	right normal; left normal      LABS:                        7.9    13.50 )-----------( 325      ( 15 May 2020 06:19 )             23.7     05-15    137  |  99  |  36<H>  ----------------------------<  113<H>  3.2<L>   |  22  |  3.48<H>    Ca    8.2<L>      15 May 2020 06:19  Phos  3.8     05-15  Mg     2.0     05-15    TPro  5.8<L>  /  Alb  2.6<L>  /  TBili  1.0  /  DBili  0.4<H>  /  AST  57<H>  /  ALT  34  /  AlkPhos  49  05-15    PT/INR - ( 14 May 2020 06:16 )   PT: 22.9 sec;   INR: 1.97          PTT - ( 14 May 2020 06:16 )  PTT:62.7 sec      MICROBIOLOGY: reviewed    RADIOLOGY & ADDITIONAL STUDIES: reviewed

## 2020-05-15 NOTE — PROGRESS NOTE ADULT - SUBJECTIVE AND OBJECTIVE BOX
INTERVAL HPI/OVERNIGHT EVENTS:    Patient is a 84y old  Male who presents with a chief complaint of toe ischemia (15 May 2020 07:35)      Pt reports the following symptoms:    CONSTITUTIONAL:  Negative fever or chills, feels well, good appetite  EYES:  Negative  blurry vision or double vision  CARDIOVASCULAR:  Negative for chest pain or palpitations  RESPIRATORY:  Negative for cough, wheezing, or SOB   GASTROINTESTINAL:  Negative for nausea, vomiting, diarrhea, constipation, or abdominal pain  GENITOURINARY:  Negative frequency, urgency or dysuria  NEUROLOGIC:  No headache, confusion, dizziness, lightheadedness    MEDICATIONS  (STANDING):  aMIOdarone    Tablet 400 milliGRAM(s) Oral three times a day  apixaban 2.5 milliGRAM(s) Oral every 12 hours  atorvastatin 40 milliGRAM(s) Oral at bedtime  chlorhexidine 2% Cloths 1 Application(s) Topical <User Schedule>  clopidogrel Tablet 75 milliGRAM(s) Oral daily  Dakins Solution - 1/4 Strength 1 Application(s) Topical daily  DAPTOmycin IVPB 600 milliGRAM(s) IV Intermittent every 48 hours  dextrose 5%. 1000 milliLiter(s) (50 mL/Hr) IV Continuous <Continuous>  dextrose 5%. 1000 milliLiter(s) (50 mL/Hr) IV Continuous <Continuous>  dextrose 50% Injectable 12.5 Gram(s) IV Push once  dextrose 50% Injectable 25 Gram(s) IV Push once  furosemide   Injectable 80 milliGRAM(s) IV Push once  insulin lispro (HumaLOG) corrective regimen sliding scale   SubCutaneous Before meals and at bedtime  meropenem  IVPB 500 milliGRAM(s) IV Intermittent every 24 hours  midodrine 7.5 milliGRAM(s) Oral every 8 hours  multivitamin 1 Tablet(s) Oral daily  phenylephrine    Infusion 0.208 MICROgram(s)/kG/Min (6 mL/Hr) IV Continuous <Continuous>  senna 2 Tablet(s) Oral at bedtime  sodium bicarbonate 1300 milliGRAM(s) Oral three times a day    MEDICATIONS  (PRN):  acetaminophen   Tablet .. 650 milliGRAM(s) Oral every 6 hours PRN Mild Pain (1 - 3)  dextrose 40% Gel 15 Gram(s) Oral once PRN Blood Glucose LESS THAN 70 milliGRAM(s)/deciliter  glucagon  Injectable 1 milliGRAM(s) IntraMuscular once PRN Glucose LESS THAN 70 milligrams/deciliter  oxycodone    5 mG/acetaminophen 325 mG 1 Tablet(s) Oral every 6 hours PRN Severe Pain (7 - 10)  sodium chloride 0.9% lock flush 10 milliLiter(s) IV Push every 1 hour PRN Pre/post blood products, medications, blood draw, and to maintain line patency      PHYSICAL EXAM  Vital Signs Last 24 Hrs  T(C): 37.4 (14 May 2020 22:00), Max: 37.4 (14 May 2020 22:00)  T(F): 99.3 (14 May 2020 22:00), Max: 99.3 (14 May 2020 22:00)  HR: 123 (15 May 2020 11:00) (103 - 133)  BP: 112/86 (15 May 2020 10:00) (110/86 - 112/86)  BP(mean): 91 (15 May 2020 10:00) (91 - 91)  RR: 24 (15 May 2020 11:00) (16 - 26)  SpO2: 99% (15 May 2020 11:00) (93% - 99%)    Constitutional: wn/wd in NAD.   HEENT: NCAT, MMM, OP clear, EOMI, no proptosis or lid retraction  Neck: no thyromegaly or palpable thyroid nodules   Respiratory: lungs CTAB.  Cardiovascular: regular rhythm, normal S1 and S2, no audible murmurs, no peripheral edema  GI: soft, NT/ND, no masses/HSM appreciated.  Neurology: no tremors, DTR 2+  Skin: no visible rashes/lesions  Psychiatric: AAO x 3, normal affect/mood.    LABS:                        7.9    13.50 )-----------( 325      ( 15 May 2020 06:19 )             23.7     05-15    137  |  99  |  36<H>  ----------------------------<  113<H>  3.2<L>   |  22  |  3.48<H>    Ca    8.2<L>      15 May 2020 06:19  Phos  3.8     05-15  Mg     2.0     05-15    TPro  5.8<L>  /  Alb  2.6<L>  /  TBili  1.0  /  DBili  0.4<H>  /  AST  57<H>  /  ALT  34  /  AlkPhos  49  05-15    PT/INR - ( 14 May 2020 06:16 )   PT: 22.9 sec;   INR: 1.97          PTT - ( 14 May 2020 06:16 )  PTT:62.7 sec    Thyroid Stimulating Hormone, Serum: 0.585 uIU/mL (05-07 @ 06:37)  Thyroid Stimulating Hormone, Serum: 0.558 uIU/mL (05-07 @ 06:37)      HbA1C: 6.0 % (01-16 @ 06:03)    CAPILLARY BLOOD GLUCOSE      POCT Blood Glucose.: 120 mg/dL (15 May 2020 11:27)  POCT Blood Glucose.: 136 mg/dL (14 May 2020 22:22)  POCT Blood Glucose.: 110 mg/dL (14 May 2020 17:05)  POCT Blood Glucose.: 158 mg/dL (14 May 2020 15:04)  POCT Blood Glucose.: 113 mg/dL (14 May 2020 13:09)      Insulin Sliding Scale requirements X 24 Hours:    RADIOLOGY & ADDITIONAL TESTS:    A/P: 84y Male with history of DM type II presenting for       1.  DM -     Please continue           units lantus at bedtime  / in the morning and        units lispro with meals and lispro moderate / low dose sliding scale 4 times daily with meals and at bedtime.  Please continue consistent carbohydrate diet.      Goal FSG is   Will continue to monitor   For discharge, pt can continue    Pt can follow up at discharge with [facility] by calling  to make an appointment.   Will discuss case with     and update primary team

## 2020-05-15 NOTE — PROGRESS NOTE ADULT - SUBJECTIVE AND OBJECTIVE BOX
S: Pt reports feeling well this morning having slept well, no HA/CP/SOB.     Vitals: ICU Vital Signs Last 24 Hrs  T(C): 37.4 (14 May 2020 22:00), Max: 37.4 (14 May 2020 22:00)  T(F): 99.3 (14 May 2020 22:00), Max: 99.3 (14 May 2020 22:00)  HR: 121 (15 May 2020 07:00) (93 - 133)  BP: --  BP(mean): --  ABP: 95/51 (15 May 2020 05:00) (83/43 - 99/58)  ABP(mean): 65 (15 May 2020 05:00) (56 - 69)  RR: 19 (15 May 2020 07:00) (15 - 26)  SpO2: 95% (15 May 2020 07:00) (93% - 100%)            I&O:  I&O's Detail    14 May 2020 07:01  -  15 May 2020 07:00  --------------------------------------------------------  IN:    Albumin 25%: 100 mL    heparin Infusion: 27 mL    phenylephrine   Infusion: 909.1 mL  Total IN: 1036.1 mL    OUT:    Indwelling Catheter - Urethral: 1045 mL    Other: 1100 mL  Total OUT: 2145 mL    Total NET: -1108.9 mL            ABG - ( 14 May 2020 06:29 )  pH, Arterial: 7.48  pH, Blood: x     /  pCO2: 31    /  pO2: 100   / HCO3: 22    / Base Excess: -1.1  /  SaO2: 98              CAPILLARY BLOOD GLUCOSE      POCT Blood Glucose.: 136 mg/dL (14 May 2020 22:22)  POCT Blood Glucose.: 110 mg/dL (14 May 2020 17:05)  POCT Blood Glucose.: 158 mg/dL (14 May 2020 15:04)  POCT Blood Glucose.: 113 mg/dL (14 May 2020 13:09)  POCT Blood Glucose.: 48 mg/dL (14 May 2020 12:10)  POCT Blood Glucose.: 51 mg/dL (14 May 2020 12:08)      Labs:                         7.9    13.50 )-----------( 325      ( 15 May 2020 06:19 )             23.7   05-15    137  |  99  |  36<H>  ----------------------------<  113<H>  3.2<L>   |  22  |  3.48<H>    Ca    8.2<L>      15 May 2020 06:19  Phos  3.8     05-15  Mg     2.0     05-15    TPro  5.8<L>  /  Alb  2.6<L>  /  TBili  1.0  /  DBili  0.4<H>  /  AST  57<H>  /  ALT  34  /  AlkPhos  49  05-15  PT/INR - ( 14 May 2020 06:16 )   PT: 22.9 sec;   INR: 1.97          PTT - ( 14 May 2020 06:16 )  PTT:62.7 sec  ABG - ( 14 May 2020 06:29 )  pH, Arterial: 7.48  pH, Blood: x     /  pCO2: 31    /  pO2: 100   / HCO3: 22    / Base Excess: -1.1  /  SaO2: 98                  Electrolytes repleated to goals as follows: Potassium 4, Phosphorus 3 and Magnesium 2 where appropriate and necessary    Exam:  Neuro: A&Ox3, NAD, grossly neuro intact  HEENT: PERRL < EOMI, MMM  Neck: Supple  CV: RRR, no MRGs  Pulm: CTAB, no wheezes, rales, or rhonchi  Abd: BS (+), Soft, NT, ND  : Moe in place  Ext: No edema peripherally or centrally  Vasc: Extremities warm to palpation, LLE with clean kurlix wrapping over incision site.   MSK: no joint swelling  Skin: no rash  Psych: affect appropriate S: Pt reports feeling well this morning having slept well, no HA/CP/SOB.     Vitals: ICU Vital Signs Last 24 Hrs  T(C): 37.4 (14 May 2020 22:00), Max: 37.4 (14 May 2020 22:00)  T(F): 99.3 (14 May 2020 22:00), Max: 99.3 (14 May 2020 22:00)  HR: 121 (15 May 2020 07:00) (93 - 133)  BP: --  BP(mean): --  ABP: 95/51 (15 May 2020 05:00) (83/43 - 99/58)  ABP(mean): 65 (15 May 2020 05:00) (56 - 69)  RR: 19 (15 May 2020 07:00) (15 - 26)  SpO2: 95% (15 May 2020 07:00) (93% - 100%)            I&O:  I&O's Detail    14 May 2020 07:01  -  15 May 2020 07:00  --------------------------------------------------------  IN:    Albumin 25%: 100 mL    heparin Infusion: 27 mL    phenylephrine   Infusion: 909.1 mL  Total IN: 1036.1 mL    OUT:    Indwelling Catheter - Urethral: 1045 mL    Other: 1100 mL  Total OUT: 2145 mL    Total NET: -1108.9 mL            ABG - ( 14 May 2020 06:29 )  pH, Arterial: 7.48  pH, Blood: x     /  pCO2: 31    /  pO2: 100   / HCO3: 22    / Base Excess: -1.1  /  SaO2: 98              CAPILLARY BLOOD GLUCOSE      POCT Blood Glucose.: 136 mg/dL (14 May 2020 22:22)  POCT Blood Glucose.: 110 mg/dL (14 May 2020 17:05)  POCT Blood Glucose.: 158 mg/dL (14 May 2020 15:04)  POCT Blood Glucose.: 113 mg/dL (14 May 2020 13:09)  POCT Blood Glucose.: 48 mg/dL (14 May 2020 12:10)  POCT Blood Glucose.: 51 mg/dL (14 May 2020 12:08)      Labs:                         7.9    13.50 )-----------( 325      ( 15 May 2020 06:19 )             23.7   05-15    137  |  99  |  36<H>  ----------------------------<  113<H>  3.2<L>   |  22  |  3.48<H>    Ca    8.2<L>      15 May 2020 06:19  Phos  3.8     05-15  Mg     2.0     05-15    TPro  5.8<L>  /  Alb  2.6<L>  /  TBili  1.0  /  DBili  0.4<H>  /  AST  57<H>  /  ALT  34  /  AlkPhos  49  05-15  PT/INR - ( 14 May 2020 06:16 )   PT: 22.9 sec;   INR: 1.97          PTT - ( 14 May 2020 06:16 )  PTT:62.7 sec  ABG - ( 14 May 2020 06:29 )  pH, Arterial: 7.48  pH, Blood: x     /  pCO2: 31    /  pO2: 100   / HCO3: 22    / Base Excess: -1.1  /  SaO2: 98                  Electrolytes repleated to goals as follows: Potassium 4, Phosphorus 3 and Magnesium 2 where appropriate and necessary    Exam:  Neuro: A&Ox3, NAD, grossly neuro intact  HEENT: PERRL < EOMI, MMM  Neck: Supple  CV: RRR, no MRGs  Pulm: R sided lung with decreased breath sounds throughout, L side clear   Abd: BS (+), Soft, NT, ND  : Moe in place  Ext: No edema peripherally or centrally  Vasc: Extremities warm to palpation, LLE with clean kurlix wrapping over incision site.   MSK: no joint swelling  Skin: no rash  Psych: affect appropriate

## 2020-05-15 NOTE — PROGRESS NOTE ADULT - SUBJECTIVE AND OBJECTIVE BOX
Cardiology fellow Progress note    Subjective/Interval events: Overnight events reviewed. Patient denies chest pain, shortness of breath, orthopnea, paroxysmal nocturnal dyspnea, ankle swelling, lightheadedness, dizziness. Unclear sepsis source at this time. Still requiring phenylephrine    ROS: A 10-point review of systems was otherwise negative.    PHYSICAL EXAM:  ICU Vital Signs Last 24 Hrs  T(C): 37.4 (14 May 2020 22:00), Max: 37.4 (14 May 2020 22:00)  T(F): 99.3 (14 May 2020 22:00), Max: 99.3 (14 May 2020 22:00)  HR: 116 (15 May 2020 12:00) (103 - 133)  BP: 112/86 (15 May 2020 10:00) (110/86 - 112/86)  BP(mean): 91 (15 May 2020 10:00) (91 - 91)  ABP: 100/52 (15 May 2020 12:00) (83/43 - 104/56)  ABP(mean): 69 (15 May 2020 12:00) (56 - 76)  RR: 22 (15 May 2020 12:00) (16 - 26)  SpO2: 99% (15 May 2020 12:00) (93% - 99%)    GEN: Awake, Breathing comfortably on. NAD.   HEENT: Mucosa moist. No JVD.   RESP: Diffuse b/l crackles. Good inspiratory effort  CV: Tachycardic, irregular No m/r/g.  ABD: Soft, NTND. BS+  EXT: Warm. b/l pitting edema 1+  NEURO: AAOx3. No focal deficits.    I&O's Summary    14 May 2020 07:01  -  15 May 2020 07:00  --------------------------------------------------------  IN: 1036.1 mL / OUT: 2145 mL / NET: -1108.9 mL    15 May 2020 07:01  -  15 May 2020 13:34  --------------------------------------------------------  IN: 140 mL / OUT: 125 mL / NET: 15 mL    LABS:	 	                        7.9    13.50 )-----------( 325      ( 15 May 2020 06:19 )             23.7   05-15    137  |  99  |  36<H>  ----------------------------<  113<H>  3.2<L>   |  22  |  3.48<H>    Ca    8.2<L>      15 May 2020 06:19  Phos  3.8     05-15  Mg     2.0     05-15    TPro  5.8<L>  /  Alb  2.6<L>  /  TBili  1.0  /  DBili  0.4<H>  /  AST  57<H>  /  ALT  34  /  AlkPhos  49  05-15      CARDIAC MARKERS ( 14 May 2020 06:16 )  x     / 0.13 ng/mL / x     / x     / x      CARDIAC MARKERS ( 13 May 2020 19:19 )  x     / 0.14 ng/mL / x     / x     / x      CARDIAC MARKERS ( 13 May 2020 14:25 )  x     / x     / 501 U/L / x     / x          PT/INR - ( 14 May 2020 06:16 )   PT: 22.9 sec;   INR: 1.97          PTT - ( 14 May 2020 06:16 )  PTT:62.7 sec  proBNP: Serum Pro-Brain Natriuretic Peptide: 08028 pg/mL <H> [0 - 300] (05-13 @ 19:19)  Serum Pro-Brain Natriuretic Peptide: 7866 pg/mL <H> [0 - 300] (05-04 @ 21:33)    Lipid Profile:   HgA1c: Hemoglobin A1C, Whole Blood: 6.0 % (01-16 @ 06:03)    Thyroid Stimulating Hormone, Serum: 0.558 uIU/mL (05-07-20 @ 06:37)    ABG - ( 14 May 2020 06:29 )  pH, Arterial: 7.48  pH, Blood: x     /  pCO2: 31    /  pO2: 100   / HCO3: 22    / Base Excess: -1.1  /  SaO2: 98          TELEMETRY: Afib w RBBB with RVR in 100-120s	   	  TTE 5/14: EF 20% worse down from 35% with global hypokinesis on 03/5/20. Mild TR  CXR: Improving pulm congestion after HD

## 2020-05-15 NOTE — PROGRESS NOTE ADULT - ATTENDING COMMENTS
to get lasix IV - if poor response and still vascular response plan HD again for UF  unclear why still hypotension - optimize cardiac status per cardiology to get lasix IV - if poor response and still vascular response plan HD again for UF  unclear why still hypotension - optimize cardiac status per cardiology - for cardioversion- and rx infection per ID to get lasix IV - if poor response and still vascular congestion plan HD again for UF  unclear why still hypotension - optimize cardiac status per cardiology - for cardioversion- and rx infection per ID

## 2020-05-15 NOTE — PROGRESS NOTE ADULT - SUBJECTIVE AND OBJECTIVE BOX
O/N Events:  ANIKA, remained on hayden  Subjective:  feels good, denies any complaints, on HFNC satting 96%, CXR with pulm vasc congestion     VITALS  Vital Signs Last 24 Hrs  T(C): 37.4 (14 May 2020 22:00), Max: 37.4 (14 May 2020 22:00)  T(F): 99.3 (14 May 2020 22:00), Max: 99.3 (14 May 2020 22:00)  HR: 125 (15 May 2020 15:00) (103 - 133)  BP: 112/86 (15 May 2020 10:00) (110/86 - 112/86)  BP(mean): 91 (15 May 2020 10:00) (91 - 91)  RR: 22 (15 May 2020 12:00) (16 - 26)  SpO2: 99% (15 May 2020 12:00) (93% - 99%)    PHYSICAL EXAM  General: A&Ox 3; NAD  Neck: JVP+  Respiratory: bibasilar crackles  Cardiovascular: A-fib, 120-130s  Gastrointestinal: Soft; NTND   Extremities: WWP; trace left pedal edema, wound vac in place  Neurological:  CNII-XII grossly intact; no obvious focal deficits, no asterixis appreciated   Access: Right fem HD cath    MEDICATIONS  (STANDING):  aMIOdarone    Tablet 400 milliGRAM(s) Oral three times a day  apixaban 2.5 milliGRAM(s) Oral every 12 hours  atorvastatin 40 milliGRAM(s) Oral at bedtime  chlorhexidine 2% Cloths 1 Application(s) Topical <User Schedule>  clopidogrel Tablet 75 milliGRAM(s) Oral daily  Dakins Solution - 1/4 Strength 1 Application(s) Topical daily  DAPTOmycin IVPB 600 milliGRAM(s) IV Intermittent every 48 hours  dextrose 5%. 1000 milliLiter(s) (50 mL/Hr) IV Continuous <Continuous>  dextrose 5%. 1000 milliLiter(s) (50 mL/Hr) IV Continuous <Continuous>  dextrose 50% Injectable 12.5 Gram(s) IV Push once  dextrose 50% Injectable 25 Gram(s) IV Push once  insulin lispro (HumaLOG) corrective regimen sliding scale   SubCutaneous Before meals and at bedtime  meropenem  IVPB 500 milliGRAM(s) IV Intermittent every 24 hours  midodrine 7.5 milliGRAM(s) Oral every 8 hours  multivitamin 1 Tablet(s) Oral daily  phenylephrine    Infusion 0.208 MICROgram(s)/kG/Min (6 mL/Hr) IV Continuous <Continuous>  senna 2 Tablet(s) Oral at bedtime  sodium bicarbonate 1300 milliGRAM(s) Oral three times a day    MEDICATIONS  (PRN):  acetaminophen   Tablet .. 650 milliGRAM(s) Oral every 6 hours PRN Mild Pain (1 - 3)  dextrose 40% Gel 15 Gram(s) Oral once PRN Blood Glucose LESS THAN 70 milliGRAM(s)/deciliter  glucagon  Injectable 1 milliGRAM(s) IntraMuscular once PRN Glucose LESS THAN 70 milligrams/deciliter  oxycodone    5 mG/acetaminophen 325 mG 1 Tablet(s) Oral every 6 hours PRN Severe Pain (7 - 10)  sodium chloride 0.9% lock flush 10 milliLiter(s) IV Push every 1 hour PRN Pre/post blood products, medications, blood draw, and to maintain line patency      LABS                        7.9    13.50 )-----------( 325      ( 15 May 2020 06:19 )             23.7     05-15    137  |  99  |  36<H>  ----------------------------<  113<H>  3.2<L>   |  22  |  3.48<H>    Ca    8.2<L>      15 May 2020 06:19  Phos  3.8     05-15  Mg     2.0     05-15    TPro  5.8<L>  /  Alb  2.6<L>  /  TBili  1.0  /  DBili  0.4<H>  /  AST  57<H>  /  ALT  34  /  AlkPhos  49  05-15    LIVER FUNCTIONS - ( 15 May 2020 06:19 )  Alb: 2.6 g/dL / Pro: 5.8 g/dL / ALK PHOS: 49 U/L / ALT: 34 U/L / AST: 57 U/L / GGT: x           PT/INR - ( 14 May 2020 06:16 )   PT: 22.9 sec;   INR: 1.97          PTT - ( 14 May 2020 06:16 )  PTT:62.7 sec    CARDIAC MARKERS ( 14 May 2020 06:16 )  x     / 0.13 ng/mL / x     / x     / x      CARDIAC MARKERS ( 13 May 2020 19:19 )  x     / 0.14 ng/mL / x     / x     / x

## 2020-05-16 LAB
ANION GAP SERPL CALC-SCNC: 16 MMOL/L — SIGNIFICANT CHANGE UP (ref 5–17)
BUN SERPL-MCNC: 27 MG/DL — HIGH (ref 7–23)
CALCIUM SERPL-MCNC: 8.5 MG/DL — SIGNIFICANT CHANGE UP (ref 8.4–10.5)
CHLORIDE SERPL-SCNC: 99 MMOL/L — SIGNIFICANT CHANGE UP (ref 96–108)
CO2 SERPL-SCNC: 23 MMOL/L — SIGNIFICANT CHANGE UP (ref 22–31)
CORTIS AM PEAK SERPL-MCNC: 24.1 UG/DL — SIGNIFICANT CHANGE UP (ref 3.9–37.5)
CREAT SERPL-MCNC: 2.53 MG/DL — HIGH (ref 0.5–1.3)
GLUCOSE BLDC GLUCOMTR-MCNC: 112 MG/DL — HIGH (ref 70–99)
GLUCOSE BLDC GLUCOMTR-MCNC: 116 MG/DL — HIGH (ref 70–99)
GLUCOSE BLDC GLUCOMTR-MCNC: 116 MG/DL — HIGH (ref 70–99)
GLUCOSE BLDC GLUCOMTR-MCNC: 121 MG/DL — HIGH (ref 70–99)
GLUCOSE SERPL-MCNC: 127 MG/DL — HIGH (ref 70–99)
HCT VFR BLD CALC: 23.9 % — LOW (ref 39–50)
HGB BLD-MCNC: 7.7 G/DL — LOW (ref 13–17)
MAGNESIUM SERPL-MCNC: 2 MG/DL — SIGNIFICANT CHANGE UP (ref 1.6–2.6)
MCHC RBC-ENTMCNC: 26.5 PG — LOW (ref 27–34)
MCHC RBC-ENTMCNC: 32.2 GM/DL — SIGNIFICANT CHANGE UP (ref 32–36)
MCV RBC AUTO: 82.1 FL — SIGNIFICANT CHANGE UP (ref 80–100)
NRBC # BLD: 0 /100 WBCS — SIGNIFICANT CHANGE UP (ref 0–0)
PHOSPHATE SERPL-MCNC: 3.6 MG/DL — SIGNIFICANT CHANGE UP (ref 2.5–4.5)
PLATELET # BLD AUTO: 293 K/UL — SIGNIFICANT CHANGE UP (ref 150–400)
POTASSIUM SERPL-MCNC: 4 MMOL/L — SIGNIFICANT CHANGE UP (ref 3.5–5.3)
POTASSIUM SERPL-SCNC: 4 MMOL/L — SIGNIFICANT CHANGE UP (ref 3.5–5.3)
RBC # BLD: 2.91 M/UL — LOW (ref 4.2–5.8)
RBC # FLD: 15.8 % — HIGH (ref 10.3–14.5)
SODIUM SERPL-SCNC: 138 MMOL/L — SIGNIFICANT CHANGE UP (ref 135–145)
WBC # BLD: 12.78 K/UL — HIGH (ref 3.8–10.5)
WBC # FLD AUTO: 12.78 K/UL — HIGH (ref 3.8–10.5)

## 2020-05-16 PROCEDURE — 99291 CRITICAL CARE FIRST HOUR: CPT

## 2020-05-16 PROCEDURE — 71045 X-RAY EXAM CHEST 1 VIEW: CPT | Mod: 26

## 2020-05-16 PROCEDURE — 99233 SBSQ HOSP IP/OBS HIGH 50: CPT | Mod: GC

## 2020-05-16 RX ORDER — MIDODRINE HYDROCHLORIDE 2.5 MG/1
10 TABLET ORAL EVERY 8 HOURS
Refills: 0 | Status: DISCONTINUED | OUTPATIENT
Start: 2020-05-16 | End: 2020-05-21

## 2020-05-16 RX ORDER — DIGOXIN 250 MCG
0.12 TABLET ORAL EVERY 6 HOURS
Refills: 0 | Status: COMPLETED | OUTPATIENT
Start: 2020-05-16 | End: 2020-05-16

## 2020-05-16 RX ORDER — FUROSEMIDE 40 MG
80 TABLET ORAL EVERY 12 HOURS
Refills: 0 | Status: DISCONTINUED | OUTPATIENT
Start: 2020-05-16 | End: 2020-05-19

## 2020-05-16 RX ORDER — AMIODARONE HYDROCHLORIDE 400 MG/1
0.5 TABLET ORAL
Qty: 900 | Refills: 0 | Status: DISCONTINUED | OUTPATIENT
Start: 2020-05-16 | End: 2020-05-17

## 2020-05-16 RX ADMIN — APIXABAN 2.5 MILLIGRAM(S): 2.5 TABLET, FILM COATED ORAL at 07:01

## 2020-05-16 RX ADMIN — MEROPENEM 100 MILLIGRAM(S): 1 INJECTION INTRAVENOUS at 22:51

## 2020-05-16 RX ADMIN — SENNA PLUS 2 TABLET(S): 8.6 TABLET ORAL at 22:50

## 2020-05-16 RX ADMIN — MIDODRINE HYDROCHLORIDE 10 MILLIGRAM(S): 2.5 TABLET ORAL at 22:49

## 2020-05-16 RX ADMIN — AMIODARONE HYDROCHLORIDE 16.7 MG/MIN: 400 TABLET ORAL at 14:23

## 2020-05-16 RX ADMIN — MEROPENEM 100 MILLIGRAM(S): 1 INJECTION INTRAVENOUS at 01:00

## 2020-05-16 RX ADMIN — MIDODRINE HYDROCHLORIDE 10 MILLIGRAM(S): 2.5 TABLET ORAL at 15:04

## 2020-05-16 RX ADMIN — CHLORHEXIDINE GLUCONATE 1 APPLICATION(S): 213 SOLUTION TOPICAL at 07:01

## 2020-05-16 RX ADMIN — Medication 0.12 MILLIGRAM(S): at 07:01

## 2020-05-16 RX ADMIN — ATORVASTATIN CALCIUM 40 MILLIGRAM(S): 80 TABLET, FILM COATED ORAL at 22:49

## 2020-05-16 RX ADMIN — Medication 0.12 MILLIGRAM(S): at 02:00

## 2020-05-16 RX ADMIN — CLOPIDOGREL BISULFATE 75 MILLIGRAM(S): 75 TABLET, FILM COATED ORAL at 12:35

## 2020-05-16 RX ADMIN — Medication 1300 MILLIGRAM(S): at 22:50

## 2020-05-16 RX ADMIN — PHENYLEPHRINE HYDROCHLORIDE 5.78 MICROGRAM(S)/KG/MIN: 10 INJECTION INTRAVENOUS at 12:32

## 2020-05-16 RX ADMIN — Medication 1300 MILLIGRAM(S): at 01:00

## 2020-05-16 RX ADMIN — Medication 1300 MILLIGRAM(S): at 15:04

## 2020-05-16 RX ADMIN — APIXABAN 2.5 MILLIGRAM(S): 2.5 TABLET, FILM COATED ORAL at 00:59

## 2020-05-16 RX ADMIN — MIDODRINE HYDROCHLORIDE 7.5 MILLIGRAM(S): 2.5 TABLET ORAL at 01:00

## 2020-05-16 RX ADMIN — SENNA PLUS 2 TABLET(S): 8.6 TABLET ORAL at 01:00

## 2020-05-16 RX ADMIN — Medication 1 TABLET(S): at 12:34

## 2020-05-16 RX ADMIN — APIXABAN 2.5 MILLIGRAM(S): 2.5 TABLET, FILM COATED ORAL at 22:49

## 2020-05-16 RX ADMIN — Medication 80 MILLIGRAM(S): at 17:43

## 2020-05-16 RX ADMIN — Medication 1300 MILLIGRAM(S): at 07:01

## 2020-05-16 RX ADMIN — MIDODRINE HYDROCHLORIDE 7.5 MILLIGRAM(S): 2.5 TABLET ORAL at 07:01

## 2020-05-16 RX ADMIN — ATORVASTATIN CALCIUM 40 MILLIGRAM(S): 80 TABLET, FILM COATED ORAL at 00:59

## 2020-05-16 NOTE — PROGRESS NOTE ADULT - SUBJECTIVE AND OBJECTIVE BOX
s/p cardioversion, Amiodarone load and Digoxin load. currently on IV amop. requiring Levp     on HF NC     DAPTOmycin IVPB 600  meropenem  IVPB 500  aMIOdarone Infusion 0.5  apixaban 2.5  clopidogrel Tablet 75  DAPTOmycin IVPB 600  meropenem  IVPB 500  midodrine 10  phenylephrine    Infusion 0.2      Allergies    No Known Allergies    Intolerances        Vital Signs Last 24 Hrs  T(C): 37.4 (16 May 2020 09:00), Max: 37.4 (16 May 2020 09:00)  T(F): 99.4 (16 May 2020 09:00), Max: 99.4 (16 May 2020 09:00)  HR: 102 (16 May 2020 11:00) (100 - 180)  BP: --  BP(mean): --  RR: 20 (16 May 2020 11:00) (20 - 30)  SpO2: 89% (16 May 2020 11:00) (89% - 98%)  I&O's Summary    15 May 2020 07:01  -  16 May 2020 07:00  --------------------------------------------------------  IN: 2342 mL / OUT: 4105 mL / NET: -1763 mL    16 May 2020 07:01  -  16 May 2020 12:13  --------------------------------------------------------  IN: 177 mL / OUT: 100 mL / NET: 77 mL        Physical Exam:  General: NAD  Pulmonary: b/l breath sounds   Cardiovascular: tachy to 120 and irregular   Abdominal: soft   Extremities:left PT signal noted   Left 3/4th toe amp site stable. 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 BRANDYN falsely elevated due to history of DM now s/p LLE Angiogram DEStent x2, L 3rd and 4th toe amputation, and debridement, transferred to SICU for fluid overload, respiratory distress, and urgent need for HD.    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

## 2020-05-16 NOTE — PROGRESS NOTE ADULT - ATTENDING COMMENTS
I independently performed the key portions of the evaluation and management service provided. I agree with the above history, physical, and plan which I have reviewed and edited where appropriate. I find ZEENAT, fluid overload, hypotension requiring midodrine. Follow Scr, volume status. IV lasix to keep net negative. Aquapheresis if needed. Cont midodrine, keep MAP > 65. See full note. (Patient seen earlier in day.)

## 2020-05-16 NOTE — PROGRESS NOTE ADULT - SUBJECTIVE AND OBJECTIVE BOX
Patient is a 84y Male seen and evaluated at bedside.   pt seen and discussed with team  remains tachycardic, despite temp cardioversion yesterday  was HD yesterday with UF 2.5 L in regards to volume overload  last 24 hr urine output @ 1200 cc  labs and cxr noted      acetaminophen   Tablet .. 650 every 6 hours PRN  aMIOdarone Infusion 0.5 <Continuous>  apixaban 2.5 every 12 hours  atorvastatin 40 at bedtime  chlorhexidine 2% Cloths 1 <User Schedule>  clopidogrel Tablet 75 daily  Dakins Solution - 1/4 Strength 1 daily  DAPTOmycin IVPB 600 every 48 hours  dextrose 40% Gel 15 once PRN  dextrose 5%. 1000 <Continuous>  dextrose 5%. 1000 <Continuous>  dextrose 50% Injectable 12.5 once  dextrose 50% Injectable 25 once  glucagon  Injectable 1 once PRN  insulin lispro (HumaLOG) corrective regimen sliding scale  Before meals and at bedtime  meropenem  IVPB 500 every 24 hours  midodrine 7.5 every 8 hours  multivitamin 1 daily  oxycodone    5 mG/acetaminophen 325 mG 1 every 6 hours PRN  phenylephrine    Infusion 0.2 <Continuous>  senna 2 at bedtime  sodium bicarbonate 1300 three times a day  sodium chloride 0.9% lock flush 10 every 1 hour PRN      Allergies    No Known Allergies    Intolerances        T(C): , Max: 37.4 (05-16-20 @ 09:00)  T(F): , Max: 99.4 (05-16-20 @ 09:00)  HR: 115 (05-16-20 @ 08:38)  BP: --  BP(mean): --  RR: 25 (05-16-20 @ 08:38)  SpO2: 92% (05-16-20 @ 08:38)  Wt(kg): --    05-15 @ 07:01  -  05-16 @ 07:00  --------------------------------------------------------  IN: 2299 mL / OUT: 4105 mL / NET: -1806 mL          Review of Systems:  CONSTITUTIONAL: No fever or chills, No fatigue or tiredness.  EYES: No blurred or double vision.  RESPIRATORY: No shortness of breath, cough, hemoptysis  CARDIOVASCULAR: No Chest pain or shortness of breath  GASTROINTESTINAL: NO abdominal or flank pain, No nausea or vomiting, No diarrhea  GENITOURINARY: No dysuria or urinary burning, No difficulty passing urine, No hematuria  NEUROLOGICAL: No headaches or blurred vision  SKIN: No skin rashes   MUSCULOSKELETAL: No arthralgia, Joint pain, leg edema, No muscle pains      PHYSICAL EXAM:  GENERAL: NAD,   HEAD:  Atraumatic, Normocephalic,   EYES: Bilateral conjuctiva and sclera normal   Oral cavity: Oral mucosa dry and pink  NECK: Neck supple, +JVD  CHEST/LUNG: + rales  HEART: irregularly irregaular tachycardia   ABDOMEN: Soft, Nontender, BS+nt, No flank tenderness.   EXTREMITIES:trace edema  Neurology: AAOx3, no focal neurological deficit  SKIN: No rashes or lesions    accees: r femoral HD cath               LABS:                        7.7    12.78 )-----------( 293      ( 16 May 2020 04:47 )             23.9     05-16    138  |  99  |  27<H>  ----------------------------<  127<H>  4.0   |  23  |  2.53<H>    Ca    8.5      16 May 2020 04:47  Phos  3.6     05-16  Mg     2.0     05-16    TPro  5.9<L>  /  Alb  3.0<L>  /  TBili  1.0  /  DBili  x   /  AST  62<H>  /  ALT  38  /  AlkPhos  57  05-15                RADIOLOGY & ADDITIONAL STUDIES:

## 2020-05-16 NOTE — PROGRESS NOTE ADULT - ASSESSMENT
A/p  83 y/o AAM w/PMH ckd3,  HTN/DM/A-fib on Xarelto/HF EF 35%, and PAD, recently admitted in march for toe gangrene s/p left 3rd toe amputation/ presented back to ED from Dr. 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   renal diet  renally dose abx  monitor urine output  keep MAP> 65 to ensure renal perfusion     ANemia  h.h noted  recommend transfusion prn Hb < 7    renal bone disease  calcium and phos noted

## 2020-05-16 NOTE — PROGRESS NOTE ADULT - SUBJECTIVE AND OBJECTIVE BOX
**INCOMPLETE NOTE    OVERNIGHT EVENTS:    SUBJECTIVE:  Patient seen and examined at bedside.    Vital Signs Last 12 Hrs  T(F): 99.4 (05-16-20 @ 09:00), Max: 99.4 (05-16-20 @ 09:00)  HR: 110 (05-16-20 @ 13:00) (100 - 122)  BP: --  BP(mean): --  RR: 26 (05-16-20 @ 13:00) (20 - 28)  SpO2: 89% (05-16-20 @ 13:00) (89% - 96%)  I&O's Summary    15 May 2020 07:01  -  16 May 2020 07:00  --------------------------------------------------------  IN: 2342 mL / OUT: 4105 mL / NET: -1763 mL    16 May 2020 07:01  -  16 May 2020 13:57  --------------------------------------------------------  IN: 333 mL / OUT: 160 mL / NET: 173 mL        PHYSICAL EXAM:  Constitutional: NAD, comfortable in bed.  HEENT: NC/AT, PERRLA, EOMI, no conjunctival pallor or scleral icterus, MMM  Neck: Supple, no JVD  Respiratory: CTA B/L. No w/r/r.   Cardiovascular: RRR, normal S1 and S2, no m/r/g.   Gastrointestinal: +BS, soft NTND, no guarding or rebound tenderness, no palpable masses   Extremities: wwp; no cyanosis, clubbing or edema.   Vascular: Pulses equal and strong throughout.   Neurological: AAOx3, no CN deficits, strength and sensation intact throughout.   Skin: No gross skin abnormalities or rashes        LABS:                        7.7    12.78 )-----------( 293      ( 16 May 2020 04:47 )             23.9     05-16    138  |  99  |  27<H>  ----------------------------<  127<H>  4.0   |  23  |  2.53<H>    Ca    8.5      16 May 2020 04:47  Phos  3.6     05-16  Mg     2.0     05-16    TPro  5.9<L>  /  Alb  3.0<L>  /  TBili  1.0  /  DBili  x   /  AST  62<H>  /  ALT  38  /  AlkPhos  57  05-15            RADIOLOGY & ADDITIONAL TESTS:    MEDICATIONS  (STANDING):  aMIOdarone Infusion 0.5 mG/Min (16.7 mL/Hr) IV Continuous <Continuous>  apixaban 2.5 milliGRAM(s) Oral every 12 hours  atorvastatin 40 milliGRAM(s) Oral at bedtime  chlorhexidine 2% Cloths 1 Application(s) Topical <User Schedule>  clopidogrel Tablet 75 milliGRAM(s) Oral daily  Dakins Solution - 1/4 Strength 1 Application(s) Topical daily  DAPTOmycin IVPB 600 milliGRAM(s) IV Intermittent every 48 hours  dextrose 5%. 1000 milliLiter(s) (50 mL/Hr) IV Continuous <Continuous>  dextrose 5%. 1000 milliLiter(s) (50 mL/Hr) IV Continuous <Continuous>  dextrose 50% Injectable 12.5 Gram(s) IV Push once  dextrose 50% Injectable 25 Gram(s) IV Push once  insulin lispro (HumaLOG) corrective regimen sliding scale   SubCutaneous Before meals and at bedtime  meropenem  IVPB 500 milliGRAM(s) IV Intermittent every 24 hours  midodrine 10 milliGRAM(s) Oral every 8 hours  multivitamin 1 Tablet(s) Oral daily  phenylephrine    Infusion 0.2 MICROgram(s)/kG/Min (5.78 mL/Hr) IV Continuous <Continuous>  senna 2 Tablet(s) Oral at bedtime  sodium bicarbonate 1300 milliGRAM(s) Oral three times a day    MEDICATIONS  (PRN):  acetaminophen   Tablet .. 650 milliGRAM(s) Oral every 6 hours PRN Mild Pain (1 - 3)  dextrose 40% Gel 15 Gram(s) Oral once PRN Blood Glucose LESS THAN 70 milliGRAM(s)/deciliter  glucagon  Injectable 1 milliGRAM(s) IntraMuscular once PRN Glucose LESS THAN 70 milligrams/deciliter  oxycodone    5 mG/acetaminophen 325 mG 1 Tablet(s) Oral every 6 hours PRN Severe Pain (7 - 10)  sodium chloride 0.9% lock flush 10 milliLiter(s) IV Push every 1 hour PRN Pre/post blood products, medications, blood draw, and to maintain line patency OVERNIGHT EVENTS:  pt on dialysis, 0.25 IV dig given, lactate and blood gas both looked good; 0.125mg IV dig q6 started for 2 doses; amio gtt switched to .5 mg/min x 18 hours    SUBJECTIVE:  Patient seen and examined at bedside. He appears comfortable and in no acute distress. He denies any fevers, chills, cough, shortness of breath, abdominal pain, n/v/d/c, edema.    Vital Signs Last 12 Hrs  T(F): 99.4 (05-16-20 @ 09:00), Max: 99.4 (05-16-20 @ 09:00)  HR: 110 (05-16-20 @ 13:00) (100 - 122)  BP: --  BP(mean): --  RR: 26 (05-16-20 @ 13:00) (20 - 28)  SpO2: 89% (05-16-20 @ 13:00) (89% - 96%)  I&O's Summary    15 May 2020 07:01  -  16 May 2020 07:00  --------------------------------------------------------  IN: 2342 mL / OUT: 4105 mL / NET: -1763 mL    16 May 2020 07:01  -  16 May 2020 13:57  --------------------------------------------------------  IN: 333 mL / OUT: 160 mL / NET: 173 mL        PHYSICAL EXAM:  Constitutional: NAD, comfortable in bed.  HEENT: NC/AT, PERRLA, EOMI, no conjunctival pallor or scleral icterus, MMM  Neck: Supple, no JVD  Respiratory: CTA B/L. No w/r/r.   Cardiovascular: RRR, normal S1 and S2, no m/r/g.   Gastrointestinal: +BS, soft NTND, no guarding or rebound tenderness, no palpable masses   Extremities: Left 3/4th toe amp site stable. no purulence and no surrounding erythema     Vascular: Pulses equal and strong throughout.   Neurological: AAOx3, no CN deficits, strength and sensation intact throughout.   Skin: No gross skin abnormalities or rashes    accees: r femoral HD cath         LABS:                        7.7    12.78 )-----------( 293      ( 16 May 2020 04:47 )             23.9     05-16    138  |  99  |  27<H>  ----------------------------<  127<H>  4.0   |  23  |  2.53<H>    Ca    8.5      16 May 2020 04:47  Phos  3.6     05-16  Mg     2.0     05-16    TPro  5.9<L>  /  Alb  3.0<L>  /  TBili  1.0  /  DBili  x   /  AST  62<H>  /  ALT  38  /  AlkPhos  57  05-15            RADIOLOGY & ADDITIONAL TESTS:    MEDICATIONS  (STANDING):  aMIOdarone Infusion 0.5 mG/Min (16.7 mL/Hr) IV Continuous <Continuous>  apixaban 2.5 milliGRAM(s) Oral every 12 hours  atorvastatin 40 milliGRAM(s) Oral at bedtime  chlorhexidine 2% Cloths 1 Application(s) Topical <User Schedule>  clopidogrel Tablet 75 milliGRAM(s) Oral daily  Dakins Solution - 1/4 Strength 1 Application(s) Topical daily  DAPTOmycin IVPB 600 milliGRAM(s) IV Intermittent every 48 hours  dextrose 5%. 1000 milliLiter(s) (50 mL/Hr) IV Continuous <Continuous>  dextrose 5%. 1000 milliLiter(s) (50 mL/Hr) IV Continuous <Continuous>  dextrose 50% Injectable 12.5 Gram(s) IV Push once  dextrose 50% Injectable 25 Gram(s) IV Push once  insulin lispro (HumaLOG) corrective regimen sliding scale   SubCutaneous Before meals and at bedtime  meropenem  IVPB 500 milliGRAM(s) IV Intermittent every 24 hours  midodrine 10 milliGRAM(s) Oral every 8 hours  multivitamin 1 Tablet(s) Oral daily  phenylephrine    Infusion 0.2 MICROgram(s)/kG/Min (5.78 mL/Hr) IV Continuous <Continuous>  senna 2 Tablet(s) Oral at bedtime  sodium bicarbonate 1300 milliGRAM(s) Oral three times a day    MEDICATIONS  (PRN):  acetaminophen   Tablet .. 650 milliGRAM(s) Oral every 6 hours PRN Mild Pain (1 - 3)  dextrose 40% Gel 15 Gram(s) Oral once PRN Blood Glucose LESS THAN 70 milliGRAM(s)/deciliter  glucagon  Injectable 1 milliGRAM(s) IntraMuscular once PRN Glucose LESS THAN 70 milligrams/deciliter  oxycodone    5 mG/acetaminophen 325 mG 1 Tablet(s) Oral every 6 hours PRN Severe Pain (7 - 10)  sodium chloride 0.9% lock flush 10 milliLiter(s) IV Push every 1 hour PRN Pre/post blood products, medications, blood draw, and to maintain line patency

## 2020-05-16 NOTE — PROGRESS NOTE ADULT - ASSESSMENT
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 BRANDYN falsely elevated due to history of DM now s/p LLE Angiogram DEStent x2, L 3rd and 4th toe amputation, and debridement, transferred to SICU for fluid overload, respiratory distress, and urgent need for HD.    NEURO:  - Pain control PRN  cards:   -s/p amiodarone load and now on drip   -dig loaded   -digoxin level 5/16  -on Plavix and Eliquis   - Wean pressors, titrate midodrine  Pulm:   - Wean HFNC to NC    RENAL:   ZEENAT on CKD  non oliguric as last 24 hr urine output @ 1200 cc  etiology: multifactorial, ATN, with possible SHERI and vancomycin related nephrotoxicity  access:  r femoral HD cath  s/p HD on 05/13, 05/14, 05/15 in light of volume overload  electrolytes noted  volume status somewhat improving-- however, recommend lasix challenge 80 mg IV q 12hrs  if no response to diuretic, will proceed with aquapheresis instead of IHD as pt does not need clearance.   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 On 5/15, pt's rate went to 170s not breaking despite amiodarone bolus and cardioversion. Pt will complete a full 24hr IV amiodarone load and transferred to medicine for further management management.     NEURO:  - Pain control PRN  cards:   -s/p amiodarone load and now on drip   -dig loaded   -digoxin level 5/16  -on Plavix and Eliquis   - Wean pressors, titrate midodrine  Pulm:   - Wean HFNC to NC    RENAL:   ZEENAT on CKD  non oliguric as last 24 hr urine output @ 1200 cc  etiology: multifactorial, ATN, with possible SHERI and vancomycin related nephrotoxicity  access:  r femoral HD cath  s/p HD on 05/13, 05/14, 05/15 in light of volume overload  electrolytes noted  volume status somewhat improving-- however, recommend lasix challenge 80 mg IV q 12hrs  if no response to diuretic, will proceed with aquapheresis instead of IHD as pt does not need clearance.   renal diet  renally dose abx  monitor urine output  keep MAP> 65 to ensure renal perfusion     CV:   Afib on eliquis,   Plavix, Amiodarone gtt, lipitor. flash pulmonary edema followed by renal, dialysis as needed.  Echo: Severely reduced left ventricular systolic function (EF 25%, cardiology following) started midodrine 7.5 tid to titrate down phenylephrine    PULM:  Pleural edema on HFNC    GI:   Renal Mech Soft. MVI senna     :   Abhi - ZEENAT - hd tonight Sodium bicarb tid      ID:   isolated fever as well as flash pulmonary edema--improved with dialysis.  - f/u bcx--ngtd  - continue daptomycin 600mg IV q48h while in house; anticipate transitioning to doxycycline 100mg PO q12h upon discharge through 6/17  - continue empiric meropenem 500mg IV q24h through 6/17; favor this agent over Zosyn due to decreased salt load with the former    Endo:  ISS    PPx: SCD Eliquis    Lines: piv, Right midline, Right groin dialysis catheter.      Wounds: Left toes wound vac. 84M PMH of HTN, DM, Afib, HF EF 35%, CKD3, PAD admitted with left 3rd/4th toe gangrene s/p amputation, debridement and LLE angiogram with KECIA stents x2 (5/6) Post-op course complicated by anuric renal failure secondary to likely contrast-induced nephropathy leading to flash pulmonary oedema requiring dialysis on 5/13 and 5/14, hypoxic respiratory failure requiring NIPPV, worsening septic shock requiring phenylephrine, and Afib with RVR. On 5/15, pt's rate went to 170s not breaking despite amiodarone bolus and cardioversion. Pt will complete a full 24hr IV amiodarone load and transferred to medicine for further management management.     NEURO:  - Pain control PRN  cards:   -s/p amiodarone load and now on drip   -dig loaded   -digoxin level 5/16  -on Plavix and Eliquis   - Wean pressors, titrate midodrine  Pulm:   - Wean HFNC to NC    RENAL:   ZEENAT on CKD  non oliguric as last 24 hr urine output @ 1200 cc  etiology: multifactorial, ATN, with possible SHERI and vancomycin related nephrotoxicity  access:  r femoral HD cath  s/p HD on 05/13, 05/14, 05/15 in light of volume overload  electrolytes noted  volume status somewhat improving-- however, recommend lasix challenge 80 mg IV q 12hrs  if no response to diuretic, will proceed with aquapheresis instead of IHD as pt does not need clearance.   renal diet  renally dose abx  monitor urine output  keep MAP> 65 to ensure renal perfusion     CV:   Afib on eliquis,   Plavix, Amiodarone gtt, lipitor. flash pulmonary edema followed by renal, dialysis as needed.  Echo: Severely reduced left ventricular systolic function (EF 25%, cardiology following) started midodrine 7.5 tid to titrate down phenylephrine  - now on midodrine 10mg TID    PULM:  Pleural edema on HFNC    GI:   Renal Mech Soft. MVI senna     :   Abhi - ZEENAT - hd tonight Sodium bicarb tid      ID:   isolated fever as well as flash pulmonary edema--improved with dialysis.  - f/u bcx--ngtd  - continue daptomycin 600mg IV q48h while in house; anticipate transitioning to doxycycline 100mg PO q12h upon discharge through 6/17  - continue empiric meropenem 500mg IV q24h through 6/17; favor this agent over Zosyn due to decreased salt load with the former    Endo:  ISS    PPx: SCD Eliquis    Lines: piv, Right midline, Right groin dialysis catheter.      Wounds: Left toes wound vac.

## 2020-05-17 LAB
ALBUMIN SERPL ELPH-MCNC: 2.7 G/DL — LOW (ref 3.3–5)
ALP SERPL-CCNC: 104 U/L — SIGNIFICANT CHANGE UP (ref 40–120)
ALT FLD-CCNC: 139 U/L — HIGH (ref 10–45)
ANION GAP SERPL CALC-SCNC: 14 MMOL/L — SIGNIFICANT CHANGE UP (ref 5–17)
APTT BLD: 27.6 SEC — SIGNIFICANT CHANGE UP (ref 27.5–36.3)
AST SERPL-CCNC: 224 U/L — HIGH (ref 10–40)
BILIRUB SERPL-MCNC: 1 MG/DL — SIGNIFICANT CHANGE UP (ref 0.2–1.2)
BUN SERPL-MCNC: 35 MG/DL — HIGH (ref 7–23)
CALCIUM SERPL-MCNC: 8.5 MG/DL — SIGNIFICANT CHANGE UP (ref 8.4–10.5)
CHLORIDE SERPL-SCNC: 100 MMOL/L — SIGNIFICANT CHANGE UP (ref 96–108)
CO2 SERPL-SCNC: 24 MMOL/L — SIGNIFICANT CHANGE UP (ref 22–31)
CREAT SERPL-MCNC: 2.63 MG/DL — HIGH (ref 0.5–1.3)
DIGOXIN SERPL-MCNC: 1.2 NG/ML — SIGNIFICANT CHANGE UP (ref 0.8–2)
GLUCOSE BLDC GLUCOMTR-MCNC: 101 MG/DL — HIGH (ref 70–99)
GLUCOSE BLDC GLUCOMTR-MCNC: 118 MG/DL — HIGH (ref 70–99)
GLUCOSE BLDC GLUCOMTR-MCNC: 144 MG/DL — HIGH (ref 70–99)
GLUCOSE BLDC GLUCOMTR-MCNC: 148 MG/DL — HIGH (ref 70–99)
GLUCOSE SERPL-MCNC: 122 MG/DL — HIGH (ref 70–99)
HCT VFR BLD CALC: 25.5 % — LOW (ref 39–50)
HGB BLD-MCNC: 8.1 G/DL — LOW (ref 13–17)
INR BLD: 1.82 — HIGH (ref 0.88–1.16)
MAGNESIUM SERPL-MCNC: 2 MG/DL — SIGNIFICANT CHANGE UP (ref 1.6–2.6)
MCHC RBC-ENTMCNC: 26.7 PG — LOW (ref 27–34)
MCHC RBC-ENTMCNC: 31.8 GM/DL — LOW (ref 32–36)
MCV RBC AUTO: 84.2 FL — SIGNIFICANT CHANGE UP (ref 80–100)
NRBC # BLD: 0 /100 WBCS — SIGNIFICANT CHANGE UP (ref 0–0)
PHOSPHATE SERPL-MCNC: 3.4 MG/DL — SIGNIFICANT CHANGE UP (ref 2.5–4.5)
PLATELET # BLD AUTO: 313 K/UL — SIGNIFICANT CHANGE UP (ref 150–400)
POTASSIUM SERPL-MCNC: 3.7 MMOL/L — SIGNIFICANT CHANGE UP (ref 3.5–5.3)
POTASSIUM SERPL-SCNC: 3.7 MMOL/L — SIGNIFICANT CHANGE UP (ref 3.5–5.3)
PROT SERPL-MCNC: 5.9 G/DL — LOW (ref 6–8.3)
PROTHROM AB SERPL-ACNC: 21.1 SEC — HIGH (ref 10–12.9)
RBC # BLD: 3.03 M/UL — LOW (ref 4.2–5.8)
RBC # FLD: 15.8 % — HIGH (ref 10.3–14.5)
SODIUM SERPL-SCNC: 138 MMOL/L — SIGNIFICANT CHANGE UP (ref 135–145)
WBC # BLD: 11.77 K/UL — HIGH (ref 3.8–10.5)
WBC # FLD AUTO: 11.77 K/UL — HIGH (ref 3.8–10.5)

## 2020-05-17 PROCEDURE — 99233 SBSQ HOSP IP/OBS HIGH 50: CPT | Mod: GC

## 2020-05-17 PROCEDURE — 71045 X-RAY EXAM CHEST 1 VIEW: CPT | Mod: 26

## 2020-05-17 PROCEDURE — 99291 CRITICAL CARE FIRST HOUR: CPT

## 2020-05-17 RX ORDER — DIGOXIN 250 MCG
0.06 TABLET ORAL EVERY OTHER DAY
Refills: 0 | Status: DISCONTINUED | OUTPATIENT
Start: 2020-05-18 | End: 2020-05-21

## 2020-05-17 RX ORDER — DIGOXIN 250 MCG
0.25 TABLET ORAL ONCE
Refills: 0 | Status: COMPLETED | OUTPATIENT
Start: 2020-05-17 | End: 2020-05-17

## 2020-05-17 RX ORDER — AMIODARONE HYDROCHLORIDE 400 MG/1
0.5 TABLET ORAL
Qty: 900 | Refills: 0 | Status: DISCONTINUED | OUTPATIENT
Start: 2020-05-17 | End: 2020-05-18

## 2020-05-17 RX ORDER — DIGOXIN 250 MCG
0.06 TABLET ORAL EVERY OTHER DAY
Refills: 0 | Status: DISCONTINUED | OUTPATIENT
Start: 2020-05-18 | End: 2020-05-17

## 2020-05-17 RX ORDER — AMIODARONE HYDROCHLORIDE 400 MG/1
200 TABLET ORAL
Refills: 0 | Status: DISCONTINUED | OUTPATIENT
Start: 2020-05-17 | End: 2020-05-17

## 2020-05-17 RX ORDER — AMIODARONE HYDROCHLORIDE 400 MG/1
1 TABLET ORAL
Qty: 900 | Refills: 0 | Status: DISCONTINUED | OUTPATIENT
Start: 2020-05-17 | End: 2020-05-18

## 2020-05-17 RX ADMIN — ATORVASTATIN CALCIUM 40 MILLIGRAM(S): 80 TABLET, FILM COATED ORAL at 21:14

## 2020-05-17 RX ADMIN — AMIODARONE HYDROCHLORIDE 33.3 MG/MIN: 400 TABLET ORAL at 14:15

## 2020-05-17 RX ADMIN — OXYCODONE AND ACETAMINOPHEN 1 TABLET(S): 5; 325 TABLET ORAL at 22:13

## 2020-05-17 RX ADMIN — SENNA PLUS 2 TABLET(S): 8.6 TABLET ORAL at 21:14

## 2020-05-17 RX ADMIN — MIDODRINE HYDROCHLORIDE 10 MILLIGRAM(S): 2.5 TABLET ORAL at 15:02

## 2020-05-17 RX ADMIN — Medication 1300 MILLIGRAM(S): at 07:02

## 2020-05-17 RX ADMIN — MEROPENEM 100 MILLIGRAM(S): 1 INJECTION INTRAVENOUS at 21:18

## 2020-05-17 RX ADMIN — APIXABAN 2.5 MILLIGRAM(S): 2.5 TABLET, FILM COATED ORAL at 10:59

## 2020-05-17 RX ADMIN — DAPTOMYCIN 124 MILLIGRAM(S): 500 INJECTION, POWDER, LYOPHILIZED, FOR SOLUTION INTRAVENOUS at 11:03

## 2020-05-17 RX ADMIN — CLOPIDOGREL BISULFATE 75 MILLIGRAM(S): 75 TABLET, FILM COATED ORAL at 11:02

## 2020-05-17 RX ADMIN — AMIODARONE HYDROCHLORIDE 200 MILLIGRAM(S): 400 TABLET ORAL at 10:59

## 2020-05-17 RX ADMIN — APIXABAN 2.5 MILLIGRAM(S): 2.5 TABLET, FILM COATED ORAL at 21:14

## 2020-05-17 RX ADMIN — Medication 1300 MILLIGRAM(S): at 21:13

## 2020-05-17 RX ADMIN — PHENYLEPHRINE HYDROCHLORIDE 5.78 MICROGRAM(S)/KG/MIN: 10 INJECTION INTRAVENOUS at 07:03

## 2020-05-17 RX ADMIN — Medication 80 MILLIGRAM(S): at 07:02

## 2020-05-17 RX ADMIN — Medication 80 MILLIGRAM(S): at 17:13

## 2020-05-17 RX ADMIN — Medication 1 TABLET(S): at 11:02

## 2020-05-17 RX ADMIN — MIDODRINE HYDROCHLORIDE 10 MILLIGRAM(S): 2.5 TABLET ORAL at 07:06

## 2020-05-17 RX ADMIN — AMIODARONE HYDROCHLORIDE 200 MILLIGRAM(S): 400 TABLET ORAL at 17:13

## 2020-05-17 RX ADMIN — MIDODRINE HYDROCHLORIDE 10 MILLIGRAM(S): 2.5 TABLET ORAL at 21:14

## 2020-05-17 RX ADMIN — Medication 1300 MILLIGRAM(S): at 15:02

## 2020-05-17 RX ADMIN — Medication 0.25 MILLIGRAM(S): at 11:01

## 2020-05-17 RX ADMIN — CHLORHEXIDINE GLUCONATE 1 APPLICATION(S): 213 SOLUTION TOPICAL at 07:07

## 2020-05-17 NOTE — PROGRESS NOTE ADULT - ATTENDING COMMENTS
Resp status better, continue diuresis. Monitor LFT increase on amiodarone. May need to d/c. When trial of d/c done pt back in rapid Afib. OOb. continue abx. PT. titrate hayden to SBP 90.

## 2020-05-17 NOTE — PROGRESS NOTE ADULT - ASSESSMENT
84M PMH of HTN, DM, Afib, HF EF 35%, CKD3, PAD admitted with left 3rd/4th toe gangrene s/p amputation, debridement and LLE angiogram with KECIA stents x2 (5/6) Post-op course complicated by anuric renal failure secondary to likely contrast-induced nephropathy leading to flash pulmonary oedema requiring dialysis on 5/13 and 5/14, hypoxic respiratory failure requiring NIPPV, worsening septic shock requiring phenylephrine, and Afib with RVR. On 5/15, pt's rate went to 170s not breaking despite amiodarone bolus and cardioversion. Pt will complete a full 24hr IV amiodarone load and transferred to medicine for further management management.    Neuro:    Tylenol Percocet prn     CV:   #Afib w/ RVR  ---> s/p amio load now on amio gtt for RC. c/w renally dosed eliquis. f/u EP. Patient received 0.25 digoxin today with plan to transition to PO pacerone, still with tachycardia to 150s,  so will continue on amio gtt  - Given increasing LFTs, patient will need monitoring of LFTs while one amio    # Shock--> c/w midodrine, wean phenylephrine gtt goal sbps 90s. Goal is to increase Afterload to improve forward flow.     # PVD--> c/w plavix, lipitor    Pulm:   #flash pulm edema--> improved now s/p HD,  currenlty on HFNC wean to O2    Renal:   #ZEENAT on CKD--> likely 2/2 due to ATN 2/2 to SHERI and vanc. Now s/p multiple rounds of dialysis Making urine. Pending lasix challenge aquapheresis vs. HD. c/w salt tabs.  - w/ good urine output overnight and this am s/p Lasix 80mg. c/w lasix for now, renal following appreciate recs    GI:  Renal Mech Soft. MVI senna     ID:   #MRSA and VRE--> c/w with DAPTO and meropenem-last day 6/17    Endo: ISS  PPx: SCDS, Elqiuis   Lines: piv, R fem HD cath   Wounds: dakins wet to dry to toe wound

## 2020-05-17 NOTE — PROGRESS NOTE ADULT - ASSESSMENT
A/p  85 y/o AAM w/PMH ckd3,  HTN/DM/A-fib on Xarelto/HF EF 35%, and PAD, recently admitted in march for toe gangrene s/p left 3rd toe amputation/ presented back to ED from Dr. Mcclellan's office for further work up of LLE pain and swelling, nephrology consulted for ZEENAT on CKD III  s/p Angio with angioplasty/stent 5/6     ZEENAT on CKD  non oliguric as last 24 hr urine output @ 1560 cc  etiology: multifactorial, ATN, with possible SHERI and vancomycin related nephrotoxicity  access:  r femoral HD cath  s/p HD on 05/13, 05/14, 05/15 in light of volume overload  electrolytes noted  volume status remains wet--> recommend lasix challenge 80 iV mg BIDe.   renal diet  renally dose abx  monitor urine output  keep MAP> 65 to ensure renal perfusion     ANemia  h.h noted  recommend transfusion prn Hb < 7    renal bone disease  calcium and phos noted

## 2020-05-17 NOTE — PROGRESS NOTE ADULT - ATTENDING COMMENTS
I independently performed the key portions of the evaluation and management service provided. I agree with the above history, physical, and plan which I have reviewed and edited where appropriate. I find ZEENAT, fluid overload, hypotension requiring midodrine. Follow Scr, volume status. IV lasix to keep net negative. Aquapheresis if needed. Cont midodrine, keep MAP > 65. See full note. (Patient seen earlier in day.) .

## 2020-05-17 NOTE — PROGRESS NOTE ADULT - SUBJECTIVE AND OBJECTIVE BOX
Patient is a 84y Male seen and evaluated at bedside.   pt seen and discussed with team   last 24 hr urine output  1560 cc  labs noted      acetaminophen   Tablet .. 650 every 6 hours PRN  apixaban 2.5 every 12 hours  atorvastatin 40 at bedtime  chlorhexidine 2% Cloths 1 <User Schedule>  clopidogrel Tablet 75 daily  DAPTOmycin IVPB 600 every 48 hours  dextrose 40% Gel 15 once PRN  dextrose 5%. 1000 <Continuous>  dextrose 5%. 1000 <Continuous>  dextrose 50% Injectable 12.5 once  dextrose 50% Injectable 25 once  digoxin    Elixir 0.0625 every other day  furosemide   Injectable 80 every 12 hours  glucagon  Injectable 1 once PRN  guaiFENesin   Syrup  (Sugar-Free) 100 once PRN  insulin lispro (HumaLOG) corrective regimen sliding scale  Before meals and at bedtime  meropenem  IVPB 500 every 24 hours  midodrine 10 every 8 hours  multivitamin 1 daily  oxycodone    5 mG/acetaminophen 325 mG 1 every 6 hours PRN  phenylephrine    Infusion 0.2 <Continuous>  senna 2 at bedtime  sodium bicarbonate 1300 three times a day  sodium chloride 0.9% lock flush 10 every 1 hour PRN      Allergies    No Known Allergies    Intolerances        T(C): , Max: 37.4 (05-16-20 @ 18:00)  T(F): , Max: 99.4 (05-16-20 @ 18:00)  HR: 122 (05-17-20 @ 08:00)  BP: 127/98 (05-16-20 @ 21:00)  BP(mean): 109 (05-16-20 @ 21:00)  RR: 26 (05-17-20 @ 08:00)  SpO2: 93% (05-17-20 @ 03:00)  Wt(kg): --    05-16 @ 07:01  -  05-17 @ 07:00  --------------------------------------------------------  IN: 1125 mL / OUT: 1790 mL / NET: -665 mL    05-17 @ 07:01  -  05-17 @ 09:57  --------------------------------------------------------  IN: 26 mL / OUT: 200 mL / NET: -174 mL          Review of Systems:  CONSTITUTIONAL: No fever or chills, No fatigue or tiredness.  EYES: No blurred or double vision.  RESPIRATORY: No shortness of breath, cough, hemoptysis  CARDIOVASCULAR: No Chest pain or shortness of breath  GASTROINTESTINAL: NO abdominal or flank pain, No nausea or vomiting, No diarrhea  GENITOURINARY: No dysuria or urinary burning, No difficulty passing urine, No hematuria  NEUROLOGICAL: No headaches or blurred vision  SKIN: No skin rashes   MUSCULOSKELETAL: No arthralgia, Joint pain, leg edema, No muscle pains      PHYSICAL EXAM:  GENERAL: NAD,   HEAD:  Atraumatic, Normocephalic,   EYES: Bilateral conjuctiva and sclera normal   Oral cavity: Oral mucosa dry and pink  NECK: Neck supple, +JVD  CHEST/LUNG: + rales  HEART: irregularly irregaular tachycardia   ABDOMEN: Soft, Nontender, BS+nt, No flank tenderness.   EXTREMITIES:trace edema  Neurology: AAOx3, no focal neurological deficit  SKIN: No rashes or lesions      LABS:                        8.1    11.77 )-----------( 313      ( 17 May 2020 06:18 )             25.5     05-17    138  |  100  |  35<H>  ----------------------------<  122<H>  3.7   |  24  |  2.63<H>    Ca    8.5      17 May 2020 06:18  Phos  3.4     05-17  Mg     2.0     05-17    TPro  5.9<L>  /  Alb  2.7<L>  /  TBili  1.0  /  DBili  x   /  AST  224<H>  /  ALT  139<H>  /  AlkPhos  104  05-17      PT/INR - ( 17 May 2020 06:18 )   PT: 21.1 sec;   INR: 1.82          PTT - ( 17 May 2020 06:18 )  PTT:27.6 sec          RADIOLOGY & ADDITIONAL STUDIES:

## 2020-05-17 NOTE — PROGRESS NOTE ADULT - SUBJECTIVE AND OBJECTIVE BOX
ON: off levo and amiodarone, started on oral digoxin         DAPTOmycin IVPB 600  meropenem  IVPB 500  apixaban 2.5  clopidogrel Tablet 75  DAPTOmycin IVPB 600  digoxin    Elixir 0.0625  furosemide   Injectable 80  meropenem  IVPB 500  midodrine 10  phenylephrine    Infusion 0.2      Allergies    No Known Allergies    Intolerances        Vital Signs Last 24 Hrs  T(C): 36.9 (17 May 2020 05:05), Max: 37.4 (16 May 2020 09:00)  T(F): 98.4 (17 May 2020 05:05), Max: 99.4 (16 May 2020 09:00)  HR: 135 (17 May 2020 06:00) (100 - 135)  BP: 127/98 (16 May 2020 21:00) (127/98 - 127/98)  BP(mean): 109 (16 May 2020 21:00) (109 - 109)  RR: 25 (17 May 2020 06:00) (20 - 29)  SpO2: 93% (17 May 2020 03:00) (89% - 95%)  I&O's Summary    16 May 2020 07:01  -  17 May 2020 07:00  --------------------------------------------------------  IN: 1004 mL / OUT: 1560 mL / NET: -556 mL        Physical Exam:  General:  Pulmonary:  Cardiovascular:  Abdominal:  Extremities:  Pulses:   Right:                                                                          Left:  FEM [ ]2+ [ ]1+ [ ]doppler                                             FEM [ ]2+ [ ]1+ [ ]doppler    POP [ ]2+ [ ]1+ [ ]doppler                                             POP [ ]2+ [ ]1+ [ ]doppler    DP [ ]2+ [ ]1+ [ ]doppler                                                DP [ ]2+ [ ]1+ [ ]doppler  PT[ ]2+ [ ]1+ [ ]doppler                                                  PT [ ]2+ [ ]1+ [ ]doppler      LABS:                        8.1    11.77 )-----------( 313      ( 17 May 2020 06:18 )             25.5     05-17    138  |  100  |  35<H>  ----------------------------<  122<H>  3.7   |  24  |  2.63<H>    Ca    8.5      17 May 2020 06:18  Phos  3.4     05-17  Mg     2.0     05-17    TPro  5.9<L>  /  Alb  2.7<L>  /  TBili  1.0  /  DBili  x   /  AST  224<H>  /  ALT  139<H>  /  AlkPhos  104  05-17    PT/INR - ( 17 May 2020 06:18 )   PT: 21.1 sec;   INR: 1.82          PTT - ( 17 May 2020 06:18 )  PTT:27.6 sec    Radiology and Additional Studies:    Assessment/Plan;  84 M PMH HTN, DM, A fib on Xarelto (Last taken 8am) HF EF 35%, PAD, CKD III 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 BRANDYN falsely elevated due to history of DM now s/p LLE Angiogram DEStent x2, L 3rd and 4th toe amputation, and debridement, transferred to SICU for fluid overload, respiratory distress, and urgent need for HD.    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 Denies Left foot pain at rest     DAPTOmycin IVPB 600  meropenem  IVPB 500  apixaban 2.5  clopidogrel Tablet 75  DAPTOmycin IVPB 600  digoxin    Elixir 0.0625  furosemide   Injectable 80  meropenem  IVPB 500  midodrine 10  phenylephrine    Infusion 0.2      Allergies    No Known Allergies    Intolerances        Vital Signs Last 24 Hrs  T(C): 36.9 (17 May 2020 05:05), Max: 37.4 (16 May 2020 09:00)  T(F): 98.4 (17 May 2020 05:05), Max: 99.4 (16 May 2020 09:00)  HR: 135 (17 May 2020 06:00) (100 - 135)  BP: 127/98 (16 May 2020 21:00) (127/98 - 127/98)  BP(mean): 109 (16 May 2020 21:00) (109 - 109)  RR: 25 (17 May 2020 06:00) (20 - 29)  SpO2: 93% (17 May 2020 03:00) (89% - 95%)  I&O's Summary    16 May 2020 07:01  -  17 May 2020 07:00  --------------------------------------------------------  IN: 1004 mL / OUT: 1560 mL / NET: -556 mL        Physical Exam:  General: NAD   Pulmonary: b/l breath sounds   Cardiovascular: tachycardic. irregular rate   Extremities: Left foot wound slightly more dry   no edema. no surrounding erythema   Pt signal      LABS:                        8.1    11.77 )-----------( 313      ( 17 May 2020 06:18 )             25.5     05-17    138  |  100  |  35<H>  ----------------------------<  122<H>  3.7   |  24  |  2.63<H>    Ca    8.5      17 May 2020 06:18  Phos  3.4     05-17  Mg     2.0     05-17    TPro  5.9<L>  /  Alb  2.7<L>  /  TBili  1.0  /  DBili  x   /  AST  224<H>  /  ALT  139<H>  /  AlkPhos  104  05-17    PT/INR - ( 17 May 2020 06:18 )   PT: 21.1 sec;   INR: 1.82          PTT - ( 17 May 2020 06:18 )  PTT:27.6 sec    Radiology and Additional Studies:    Assessment/Plan;  84 M PMH HTN, DM, A fib on Xarelto (Last taken 8am) HF EF 35%, PAD, CKD III 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 BRANDYN falsely elevated due to history of DM now s/p LLE Angiogram DEStent x2, L 3rd and 4th toe amputation, and debridement, transferred to SICU for fluid overload, respiratory distress, and urgent need for HD.    neuro:   - Pain control PRN  cards:   -s/p amiodarone load, digoxin load . now on Digoxin daily   -on Plavix and Eliquis   - off levo now   Pulm:   - Wean HFNC to NC  - OOB, Chest PT  Renal:   - IV lasix for diuresis   - f/u ID recs - Abx Meche+ Dapto for MRSA and VRE in wound     - Care per MICU ON: off levo and amiodarone, started on oral digoxin     This morning patient denies CP, SOB. Denies Left foot pain at rest     DAPTOmycin IVPB 600  meropenem  IVPB 500  apixaban 2.5  clopidogrel Tablet 75  DAPTOmycin IVPB 600  digoxin    Elixir 0.0625  furosemide   Injectable 80  meropenem  IVPB 500  midodrine 10  phenylephrine    Infusion 0.2      Allergies    No Known Allergies    Intolerances        Vital Signs Last 24 Hrs  T(C): 36.9 (17 May 2020 05:05), Max: 37.4 (16 May 2020 09:00)  T(F): 98.4 (17 May 2020 05:05), Max: 99.4 (16 May 2020 09:00)  HR: 135 (17 May 2020 06:00) (100 - 135)  BP: 127/98 (16 May 2020 21:00) (127/98 - 127/98)  BP(mean): 109 (16 May 2020 21:00) (109 - 109)  RR: 25 (17 May 2020 06:00) (20 - 29)  SpO2: 93% (17 May 2020 03:00) (89% - 95%)  I&O's Summary    16 May 2020 07:01  -  17 May 2020 07:00  --------------------------------------------------------  IN: 1004 mL / OUT: 1560 mL / NET: -556 mL        Physical Exam:  General: NAD   Pulmonary: b/l breath sounds   Cardiovascular: tachycardic. irregular rate   Extremities: Left foot wound slightly more dry   no edema. no surrounding erythema   Pt signal      LABS:                        8.1    11.77 )-----------( 313      ( 17 May 2020 06:18 )             25.5     05-17    138  |  100  |  35<H>  ----------------------------<  122<H>  3.7   |  24  |  2.63<H>    Ca    8.5      17 May 2020 06:18  Phos  3.4     05-17  Mg     2.0     05-17    TPro  5.9<L>  /  Alb  2.7<L>  /  TBili  1.0  /  DBili  x   /  AST  224<H>  /  ALT  139<H>  /  AlkPhos  104  05-17    PT/INR - ( 17 May 2020 06:18 )   PT: 21.1 sec;   INR: 1.82          PTT - ( 17 May 2020 06:18 )  PTT:27.6 sec    Radiology and Additional Studies:    Assessment/Plan;  84 M PMH HTN, DM, A fib on Xarelto (Last taken 8am) HF EF 35%, PAD, CKD III 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 BRANDYN falsely elevated due to history of DM now s/p LLE Angiogram DEStent x2, L 3rd and 4th toe amputation, and debridement, transferred to SICU for fluid overload, respiratory distress, and urgent need for HD.    neuro:   - Pain control PRN  cards:   -s/p amiodarone load, digoxin load . now on Digoxin daily   -on Plavix and Eliquis   - off levo now   Pulm:   - Wean HFNC to NC  - OOB, Chest PT  Renal:   - IV lasix for diuresis   - f/u ID recs - Abx Meche+ Dapto for MRSA and VRE in wound     Vascular:   -saline wet to dry to left foot wound     - Care per MICU

## 2020-05-17 NOTE — PROGRESS NOTE ADULT - SUBJECTIVE AND OBJECTIVE BOX
OVERNIGHT EVENTS: No acute events overnight    SUBJECTIVE / INTERVAL HPI: Patient seen and examined at bedside. With no acute complaints. Denies CP, SOB, palpitations, light-headedness. Endorsing discomfort when bandages are changed.     Review of systems negative except as noted above.     VITAL SIGNS:  Vital Signs Last 24 Hrs  T(C): 36.8 (17 May 2020 09:55), Max: 37.4 (16 May 2020 18:00)  T(F): 98.2 (17 May 2020 09:55), Max: 99.4 (16 May 2020 18:00)  HR: 123 (17 May 2020 11:00) (100 - 136)  BP: 88/76 (17 May 2020 11:00) (88/76 - 127/98)  BP(mean): 82 (17 May 2020 11:00) (82 - 109)  RR: 23 (17 May 2020 11:00) (20 - 29)  SpO2: 98% (17 May 2020 08:31) (89% - 98%)      05-16-20 @ 07:01  -  05-17-20 @ 07:00  --------------------------------------------------------  IN: 1125 mL / OUT: 1790 mL / NET: -665 mL    05-17-20 @ 07:01  -  05-17-20 @ 15:10  --------------------------------------------------------  IN: 78 mL / OUT: 475 mL / NET: -397 mL        PHYSICAL EXAM:    General: NAD, no accessory muscle use on HFNC  HEENT: MMM.  Neck: -JVD  Cardiovascular: +S1/S2; Tachycardic, irregular.   Respiratory: CTA B/L  Gastrointestinal:  NT/ND; +BS  Extremities: WWP; foot wound c/d/i   Vascular: 2+ radial, DP pulses B/L  Neurological: AAOx3; no focal deficits    MEDICATIONS:  MEDICATIONS  (STANDING):  aMIOdarone    Tablet 200 milliGRAM(s) Oral two times a day  aMIOdarone Infusion 1 mG/Min (33.3 mL/Hr) IV Continuous <Continuous>  aMIOdarone Infusion 0.5 mG/Min (16.7 mL/Hr) IV Continuous <Continuous>  apixaban 2.5 milliGRAM(s) Oral every 12 hours  atorvastatin 40 milliGRAM(s) Oral at bedtime  chlorhexidine 2% Cloths 1 Application(s) Topical <User Schedule>  clopidogrel Tablet 75 milliGRAM(s) Oral daily  DAPTOmycin IVPB 600 milliGRAM(s) IV Intermittent every 48 hours  dextrose 5%. 1000 milliLiter(s) (50 mL/Hr) IV Continuous <Continuous>  dextrose 5%. 1000 milliLiter(s) (50 mL/Hr) IV Continuous <Continuous>  dextrose 50% Injectable 12.5 Gram(s) IV Push once  dextrose 50% Injectable 25 Gram(s) IV Push once  furosemide   Injectable 80 milliGRAM(s) IV Push every 12 hours  insulin lispro (HumaLOG) corrective regimen sliding scale   SubCutaneous Before meals and at bedtime  meropenem  IVPB 500 milliGRAM(s) IV Intermittent every 24 hours  midodrine 10 milliGRAM(s) Oral every 8 hours  multivitamin 1 Tablet(s) Oral daily  phenylephrine    Infusion 0.2 MICROgram(s)/kG/Min (5.78 mL/Hr) IV Continuous <Continuous>  senna 2 Tablet(s) Oral at bedtime  sodium bicarbonate 1300 milliGRAM(s) Oral three times a day    MEDICATIONS  (PRN):  acetaminophen   Tablet .. 650 milliGRAM(s) Oral every 6 hours PRN Mild Pain (1 - 3)  dextrose 40% Gel 15 Gram(s) Oral once PRN Blood Glucose LESS THAN 70 milliGRAM(s)/deciliter  glucagon  Injectable 1 milliGRAM(s) IntraMuscular once PRN Glucose LESS THAN 70 milligrams/deciliter  guaiFENesin   Syrup  (Sugar-Free) 100 milliGRAM(s) Oral once PRN Cough  oxycodone    5 mG/acetaminophen 325 mG 1 Tablet(s) Oral every 6 hours PRN Severe Pain (7 - 10)  sodium chloride 0.9% lock flush 10 milliLiter(s) IV Push every 1 hour PRN Pre/post blood products, medications, blood draw, and to maintain line patency      ALLERGIES:  Allergies    No Known Allergies    Intolerances        LABS:                        8.1    11.77 )-----------( 313      ( 17 May 2020 06:18 )             25.5     05-17    138  |  100  |  35<H>  ----------------------------<  122<H>  3.7   |  24  |  2.63<H>    Ca    8.5      17 May 2020 06:18  Phos  3.4     05-17  Mg     2.0     05-17    TPro  5.9<L>  /  Alb  2.7<L>  /  TBili  1.0  /  DBili  x   /  AST  224<H>  /  ALT  139<H>  /  AlkPhos  104  05-17    PT/INR - ( 17 May 2020 06:18 )   PT: 21.1 sec;   INR: 1.82          PTT - ( 17 May 2020 06:18 )  PTT:27.6 sec    CAPILLARY BLOOD GLUCOSE      POCT Blood Glucose.: 101 mg/dL (17 May 2020 11:15)          RADIOLOGY & ADDITIONAL TESTS: Reviewed.

## 2020-05-18 LAB
ALBUMIN SERPL ELPH-MCNC: 2.5 G/DL — LOW (ref 3.3–5)
ALP SERPL-CCNC: 108 U/L — SIGNIFICANT CHANGE UP (ref 40–120)
ALT FLD-CCNC: 161 U/L — HIGH (ref 10–45)
ANION GAP SERPL CALC-SCNC: 13 MMOL/L — SIGNIFICANT CHANGE UP (ref 5–17)
APTT BLD: 28.4 SEC — SIGNIFICANT CHANGE UP (ref 27.5–36.3)
APTT BLD: 28.9 SEC — SIGNIFICANT CHANGE UP (ref 27.5–36.3)
AST SERPL-CCNC: 240 U/L — HIGH (ref 10–40)
BILIRUB SERPL-MCNC: 0.9 MG/DL — SIGNIFICANT CHANGE UP (ref 0.2–1.2)
BUN SERPL-MCNC: 43 MG/DL — HIGH (ref 7–23)
CALCIUM SERPL-MCNC: 8.1 MG/DL — LOW (ref 8.4–10.5)
CHLORIDE SERPL-SCNC: 100 MMOL/L — SIGNIFICANT CHANGE UP (ref 96–108)
CO2 SERPL-SCNC: 26 MMOL/L — SIGNIFICANT CHANGE UP (ref 22–31)
CREAT SERPL-MCNC: 2.78 MG/DL — HIGH (ref 0.5–1.3)
CULTURE RESULTS: SIGNIFICANT CHANGE UP
GLUCOSE BLDC GLUCOMTR-MCNC: 115 MG/DL — HIGH (ref 70–99)
GLUCOSE BLDC GLUCOMTR-MCNC: 50 MG/DL — LOW (ref 70–99)
GLUCOSE BLDC GLUCOMTR-MCNC: 51 MG/DL — LOW (ref 70–99)
GLUCOSE BLDC GLUCOMTR-MCNC: 65 MG/DL — LOW (ref 70–99)
GLUCOSE BLDC GLUCOMTR-MCNC: 91 MG/DL — SIGNIFICANT CHANGE UP (ref 70–99)
GLUCOSE BLDC GLUCOMTR-MCNC: 91 MG/DL — SIGNIFICANT CHANGE UP (ref 70–99)
GLUCOSE SERPL-MCNC: 107 MG/DL — HIGH (ref 70–99)
HCT VFR BLD CALC: 22.2 % — LOW (ref 39–50)
HGB BLD-MCNC: 7.2 G/DL — LOW (ref 13–17)
INR BLD: 1.98 — HIGH (ref 0.88–1.16)
MAGNESIUM SERPL-MCNC: 1.9 MG/DL — SIGNIFICANT CHANGE UP (ref 1.6–2.6)
MCHC RBC-ENTMCNC: 27 PG — SIGNIFICANT CHANGE UP (ref 27–34)
MCHC RBC-ENTMCNC: 32.4 GM/DL — SIGNIFICANT CHANGE UP (ref 32–36)
MCV RBC AUTO: 83.1 FL — SIGNIFICANT CHANGE UP (ref 80–100)
NRBC # BLD: 0 /100 WBCS — SIGNIFICANT CHANGE UP (ref 0–0)
PHOSPHATE SERPL-MCNC: 3.4 MG/DL — SIGNIFICANT CHANGE UP (ref 2.5–4.5)
PLATELET # BLD AUTO: 245 K/UL — SIGNIFICANT CHANGE UP (ref 150–400)
POTASSIUM SERPL-MCNC: 3.6 MMOL/L — SIGNIFICANT CHANGE UP (ref 3.5–5.3)
POTASSIUM SERPL-SCNC: 3.6 MMOL/L — SIGNIFICANT CHANGE UP (ref 3.5–5.3)
PROINSULIN SERPL-MCNC: 9.3 PMOL/L — SIGNIFICANT CHANGE UP (ref 0–10)
PROT SERPL-MCNC: 5.6 G/DL — LOW (ref 6–8.3)
PROTHROM AB SERPL-ACNC: 23.1 SEC — HIGH (ref 10–12.9)
RBC # BLD: 2.67 M/UL — LOW (ref 4.2–5.8)
RBC # FLD: 15.8 % — HIGH (ref 10.3–14.5)
SODIUM SERPL-SCNC: 139 MMOL/L — SIGNIFICANT CHANGE UP (ref 135–145)
SPECIMEN SOURCE: SIGNIFICANT CHANGE UP
WBC # BLD: 10.98 K/UL — HIGH (ref 3.8–10.5)
WBC # FLD AUTO: 10.98 K/UL — HIGH (ref 3.8–10.5)

## 2020-05-18 PROCEDURE — 99291 CRITICAL CARE FIRST HOUR: CPT

## 2020-05-18 PROCEDURE — 99232 SBSQ HOSP IP/OBS MODERATE 35: CPT

## 2020-05-18 PROCEDURE — 99233 SBSQ HOSP IP/OBS HIGH 50: CPT

## 2020-05-18 PROCEDURE — 71045 X-RAY EXAM CHEST 1 VIEW: CPT | Mod: 26

## 2020-05-18 RX ORDER — POTASSIUM CHLORIDE 20 MEQ
40 PACKET (EA) ORAL ONCE
Refills: 0 | Status: COMPLETED | OUTPATIENT
Start: 2020-05-18 | End: 2020-05-18

## 2020-05-18 RX ORDER — ALTEPLASE 100 MG
2 KIT INTRAVENOUS ONCE
Refills: 0 | Status: DISCONTINUED | OUTPATIENT
Start: 2020-05-18 | End: 2020-05-19

## 2020-05-18 RX ORDER — COLLAGENASE CLOSTRIDIUM HIST. 250 UNIT/G
1 OINTMENT (GRAM) TOPICAL DAILY
Refills: 0 | Status: DISCONTINUED | OUTPATIENT
Start: 2020-05-18 | End: 2020-05-18

## 2020-05-18 RX ORDER — DEXTROSE 50 % IN WATER 50 %
50 SYRINGE (ML) INTRAVENOUS ONCE
Refills: 0 | Status: DISCONTINUED | OUTPATIENT
Start: 2020-05-18 | End: 2020-05-21

## 2020-05-18 RX ORDER — COLLAGENASE CLOSTRIDIUM HIST. 250 UNIT/G
1 OINTMENT (GRAM) TOPICAL DAILY
Refills: 0 | Status: DISCONTINUED | OUTPATIENT
Start: 2020-05-18 | End: 2020-05-21

## 2020-05-18 RX ORDER — MAGNESIUM SULFATE 500 MG/ML
1 VIAL (ML) INJECTION ONCE
Refills: 0 | Status: COMPLETED | OUTPATIENT
Start: 2020-05-18 | End: 2020-05-18

## 2020-05-18 RX ORDER — APIXABAN 2.5 MG/1
2.5 TABLET, FILM COATED ORAL EVERY 12 HOURS
Refills: 0 | Status: DISCONTINUED | OUTPATIENT
Start: 2020-05-18 | End: 2020-05-21

## 2020-05-18 RX ORDER — DEXTROSE 50 % IN WATER 50 %
50 SYRINGE (ML) INTRAVENOUS ONCE
Refills: 0 | Status: COMPLETED | OUTPATIENT
Start: 2020-05-18 | End: 2020-05-18

## 2020-05-18 RX ORDER — AMIODARONE HYDROCHLORIDE 400 MG/1
200 TABLET ORAL DAILY
Refills: 0 | Status: DISCONTINUED | OUTPATIENT
Start: 2020-05-18 | End: 2020-05-21

## 2020-05-18 RX ORDER — MEROPENEM 1 G/30ML
500 INJECTION INTRAVENOUS EVERY 24 HOURS
Refills: 0 | Status: DISCONTINUED | OUTPATIENT
Start: 2020-05-18 | End: 2020-05-18

## 2020-05-18 RX ORDER — MEROPENEM 1 G/30ML
500 INJECTION INTRAVENOUS EVERY 24 HOURS
Refills: 0 | Status: DISCONTINUED | OUTPATIENT
Start: 2020-05-18 | End: 2020-05-20

## 2020-05-18 RX ORDER — HEPARIN SODIUM 5000 [USP'U]/ML
800 INJECTION INTRAVENOUS; SUBCUTANEOUS
Qty: 25000 | Refills: 0 | Status: DISCONTINUED | OUTPATIENT
Start: 2020-05-18 | End: 2020-05-21

## 2020-05-18 RX ORDER — HEPARIN SODIUM 5000 [USP'U]/ML
700 INJECTION INTRAVENOUS; SUBCUTANEOUS
Qty: 25000 | Refills: 0 | Status: DISCONTINUED | OUTPATIENT
Start: 2020-05-18 | End: 2020-05-18

## 2020-05-18 RX ORDER — DEXTROSE 10 % IN WATER 10 %
1000 INTRAVENOUS SOLUTION INTRAVENOUS
Refills: 0 | Status: DISCONTINUED | OUTPATIENT
Start: 2020-05-18 | End: 2020-05-19

## 2020-05-18 RX ADMIN — SENNA PLUS 2 TABLET(S): 8.6 TABLET ORAL at 23:38

## 2020-05-18 RX ADMIN — MIDODRINE HYDROCHLORIDE 10 MILLIGRAM(S): 2.5 TABLET ORAL at 23:38

## 2020-05-18 RX ADMIN — Medication 0.06 MILLIGRAM(S): at 15:33

## 2020-05-18 RX ADMIN — Medication 1300 MILLIGRAM(S): at 06:09

## 2020-05-18 RX ADMIN — CHLORHEXIDINE GLUCONATE 1 APPLICATION(S): 213 SOLUTION TOPICAL at 06:09

## 2020-05-18 RX ADMIN — PHENYLEPHRINE HYDROCHLORIDE 5.78 MICROGRAM(S)/KG/MIN: 10 INJECTION INTRAVENOUS at 10:54

## 2020-05-18 RX ADMIN — Medication 40 MILLIEQUIVALENT(S): at 10:53

## 2020-05-18 RX ADMIN — Medication 50 MILLILITER(S): at 16:45

## 2020-05-18 RX ADMIN — Medication 1 APPLICATION(S): at 13:21

## 2020-05-18 RX ADMIN — MIDODRINE HYDROCHLORIDE 10 MILLIGRAM(S): 2.5 TABLET ORAL at 06:09

## 2020-05-18 RX ADMIN — Medication 80 MILLIGRAM(S): at 06:10

## 2020-05-18 RX ADMIN — CLOPIDOGREL BISULFATE 75 MILLIGRAM(S): 75 TABLET, FILM COATED ORAL at 15:33

## 2020-05-18 RX ADMIN — ATORVASTATIN CALCIUM 40 MILLIGRAM(S): 80 TABLET, FILM COATED ORAL at 23:38

## 2020-05-18 RX ADMIN — APIXABAN 2.5 MILLIGRAM(S): 2.5 TABLET, FILM COATED ORAL at 23:45

## 2020-05-18 RX ADMIN — MEROPENEM 500 MILLIGRAM(S): 1 INJECTION INTRAVENOUS at 23:41

## 2020-05-18 RX ADMIN — Medication 1300 MILLIGRAM(S): at 23:41

## 2020-05-18 RX ADMIN — Medication 100 GRAM(S): at 10:53

## 2020-05-18 RX ADMIN — APIXABAN 2.5 MILLIGRAM(S): 2.5 TABLET, FILM COATED ORAL at 10:53

## 2020-05-18 NOTE — PROGRESS NOTE ADULT - ASSESSMENT
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 BRANDYN falsely elevated due to history of DM now s/p LLE Angiogram DEStent x2, L 3rd and 4th toe amputation, and debridement, transferred to SICU for fluid overload, respiratory distress, and urgent need for HD.    - Pain control PRN  - f/u Cards recs re: amio/digoxin  - Continue Plavix  - Wean HFNC to NC  - OOB, Chest PT  - f/u ID recs - Abx Meche+ Dapto for MRSA and VRE in wound   - saline wet to dry to left foot wound   - Care per MICU 84 M PMH HTN, DM, A fib on Xarelto (Last taken 8am) HF EF 35%, PAD, CKD III 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 BRANDYN falsely elevated due to history of DM now s/p LLE Angiogram DEStent x2, L 3rd and 4th toe amputation, and debridement, transferred to SICU for fluid overload, respiratory distress, and urgent need for HD.    - Pain control PRN  - f/u Cards recs re: amio/digoxin  - Continue Plavix  - Wean HFNC to NC  - OOB, Chest PT  - f/u ID recs - Abx Meche+ Dapto for MRSA and VRE in wound   - Dakins WTD of L foot  - Care per MICU

## 2020-05-18 NOTE — PROGRESS NOTE ADULT - SUBJECTIVE AND OBJECTIVE BOX
Patient is a 84y Male seen and evaluated at bedside.       Meds:  acetaminophen   Tablet .. 650 every 6 hours PRN  aMIOdarone Infusion 1 <Continuous>  aMIOdarone Infusion 0.5 <Continuous>  apixaban 2.5 every 12 hours  atorvastatin 40 at bedtime  chlorhexidine 2% Cloths 1 <User Schedule>  clopidogrel Tablet 75 daily  collagenase Ointment 1 daily  DAPTOmycin IVPB 600 every 48 hours  dextrose 40% Gel 15 once PRN  dextrose 5%. 1000 <Continuous>  dextrose 5%. 1000 <Continuous>  dextrose 50% Injectable 12.5 once  dextrose 50% Injectable 25 once  digoxin     Tablet 0.0625 every other day  furosemide   Injectable 80 every 12 hours  glucagon  Injectable 1 once PRN  guaiFENesin   Syrup  (Sugar-Free) 100 once PRN  insulin lispro (HumaLOG) corrective regimen sliding scale  Before meals and at bedtime  meropenem  IVPB 500 every 24 hours  midodrine 10 every 8 hours  multivitamin 1 daily  oxycodone    5 mG/acetaminophen 325 mG 1 every 6 hours PRN  phenylephrine    Infusion 0.2 <Continuous>  senna 2 at bedtime  sodium bicarbonate 1300 three times a day  sodium chloride 0.9% lock flush 10 every 1 hour PRN      T(C): , Max: 37.1 (05-17-20 @ 17:00)  T(F): , Max: 98.8 (05-17-20 @ 17:00)  HR: 106 (05-18-20 @ 12:00)  BP: 104/68 (05-18-20 @ 12:00)  BP(mean): 81 (05-18-20 @ 12:00)  RR: 22 (05-18-20 @ 12:00)  SpO2: 99% (05-18-20 @ 12:00)  Wt(kg): --    05-17 @ 07:01  -  05-18 @ 07:00  --------------------------------------------------------  IN: 680.9 mL / OUT: 1360 mL / NET: -679.1 mL    05-18 @ 07:01  -  05-18 @ 12:28  --------------------------------------------------------  IN: 344.3 mL / OUT: 380 mL / NET: -35.7 mL          Review of Systems:  CONSTITUTIONAL: No fever or chills, No fatigue or tiredness  RESPIRATORY: No shortness of breath, cough, hemoptysis  CARDIOVASCULAR: No chest pain or shortness of breath  GASTROINTESTINAL: No abdominal or flank pain, No nausea or vomiting, No diarrhea  GENITOURINARY: No dysuria or urinary burning, No difficulty passing urine, No hematuria  NEUROLOGICAL: No headaches or blurred vision  SKIN: No skin rashes   MUSCULOSKELETAL: No arthralgia, No leg edema, No muscle pain      PHYSICAL EXAM:  GENERAL: well-developed, well nourished, alert, no acute distress at present  NECK: supple, No JVD  CHEST/LUNG: Clear to auscultation bilaterally  HEART: normal S1S2, RRR  ABDOMEN: Soft, Nontender, +BS, No flank tenderness bilateral  EXTREMITIES: No clubbing, cyanosis, or edema   NEUROLOGY: AAO x3, no focal neurological deficit  ACCESS: good thrill and bruit appreciated      LABS:                        7.2    10.98 )-----------( 245      ( 18 May 2020 05:50 )             22.2     05-18    139  |  100  |  43<H>  ----------------------------<  107<H>  3.6   |  26  |  2.78<H>    Ca    8.1<L>      18 May 2020 05:50  Phos  3.4     05-18  Mg     1.9     05-18    TPro  5.6<L>  /  Alb  2.5<L>  /  TBili  0.9  /  DBili  x   /  AST  240<H>  /  ALT  161<H>  /  AlkPhos  108  05-18      PT/INR - ( 18 May 2020 05:50 )   PT: 23.1 sec;   INR: 1.98          PTT - ( 17 May 2020 06:18 )  PTT:27.6 sec          RADIOLOGY & ADDITIONAL STUDIES: no acute events overnight  on lasix 80 mg IVP q12hr w uop 1.3 L/ 24hr  remains slightly fluid overloaded   acceptable electrolytes       Meds:  acetaminophen   Tablet .. 650 every 6 hours PRN  aMIOdarone Infusion 1 <Continuous>  aMIOdarone Infusion 0.5 <Continuous>  apixaban 2.5 every 12 hours  atorvastatin 40 at bedtime  chlorhexidine 2% Cloths 1 <User Schedule>  clopidogrel Tablet 75 daily  collagenase Ointment 1 daily  DAPTOmycin IVPB 600 every 48 hours  dextrose 40% Gel 15 once PRN  dextrose 5%. 1000 <Continuous>  dextrose 5%. 1000 <Continuous>  dextrose 50% Injectable 12.5 once  dextrose 50% Injectable 25 once  digoxin     Tablet 0.0625 every other day  furosemide   Injectable 80 every 12 hours  glucagon  Injectable 1 once PRN  guaiFENesin   Syrup  (Sugar-Free) 100 once PRN  insulin lispro (HumaLOG) corrective regimen sliding scale  Before meals and at bedtime  meropenem  IVPB 500 every 24 hours  midodrine 10 every 8 hours  multivitamin 1 daily  oxycodone    5 mG/acetaminophen 325 mG 1 every 6 hours PRN  phenylephrine    Infusion 0.2 <Continuous>  senna 2 at bedtime  sodium bicarbonate 1300 three times a day  sodium chloride 0.9% lock flush 10 every 1 hour PRN      T(C): , Max: 37.1 (05-17-20 @ 17:00)  T(F): , Max: 98.8 (05-17-20 @ 17:00)  HR: 106 (05-18-20 @ 12:00)  BP: 104/68 (05-18-20 @ 12:00)  BP(mean): 81 (05-18-20 @ 12:00)  RR: 22 (05-18-20 @ 12:00)  SpO2: 99% (05-18-20 @ 12:00)  Wt(kg): --    05-17 @ 07:01  -  05-18 @ 07:00  --------------------------------------------------------  IN: 680.9 mL / OUT: 1360 mL / NET: -679.1 mL    05-18 @ 07:01  -  05-18 @ 12:28  --------------------------------------------------------  IN: 344.3 mL / OUT: 380 mL / NET: -35.7 mL      PHYSICAL EXAM:  GENERAL: NAD, alert  NECK: Neck supple, +JVD  CHEST/LUNG: + rales  HEART: irregularly irregular tachycardia   ABDOMEN: Soft, Nontender, BS+nt, No flank tenderness.   EXTREMITIES: trace edema  : Moe w good uop   Neurology: AAOx3, no focal neurological deficit  SKIN: No rashes or lesions  Access: R femoral HD cath       LABS:                        7.2    10.98 )-----------( 245      ( 18 May 2020 05:50 )             22.2     05-18    139  |  100  |  43<H>  ----------------------------<  107<H>  3.6   |  26  |  2.78<H>    Ca    8.1<L>      18 May 2020 05:50  Phos  3.4     05-18  Mg     1.9     05-18    TPro  5.6<L>  /  Alb  2.5<L>  /  TBili  0.9  /  DBili  x   /  AST  240<H>  /  ALT  161<H>  /  AlkPhos  108  05-18      PT/INR - ( 18 May 2020 05:50 )   PT: 23.1 sec;   INR: 1.98          PTT - ( 17 May 2020 06:18 )  PTT:27.6 sec          RADIOLOGY & ADDITIONAL STUDIES:    < from: Xray Chest 1 View- PORTABLE-Routine (05.17.20 @ 03:45) >    EXAM:  XR CHEST PORTABLE ROUTINE 1V                          PROCEDURE DATE:  05/17/2020          INTERPRETATION:  Clinical history: Interval change.    Portable examination chest demonstrates progression lung infiltrates in comparison to prior examination of the chest 5/16/2020. Left effusion. No interval change position remaining support devices.    Impression: Progression lung infiltrates      < end of copied text >

## 2020-05-18 NOTE — PROCEDURE NOTE - NSPROCDETAILS_GEN_ALL_CORE
sterile dressing applied/sterile technique, catheter placed/ultrasound guidance/guidewire recovered/lumen(s) aspirated and flushed
supine position/sterile dressing applied/sterile technique, catheter placed/ultrasound guidance/ultrasound assessment/location identified, draped/prepped, sterile technique used
lumen(s) aspirated and flushed/sterile technique, catheter placed/sterile dressing applied
sutured in place/hemostasis with direct pressure, dressing applied/connected to a pressurized flush line/Seldinger technique/ultrasound guidance/positive blood return obtained via catheter/all materials/supplies accounted for at end of procedure/location identified, draped/prepped, sterile technique used, needle inserted/introduced

## 2020-05-18 NOTE — PROCEDURE NOTE - NSSECUREBY_VASC_A_CORE
tape/sterile occulsive dressing
stabilization device/sterile occulsive dressing/sterile pressure dressing

## 2020-05-18 NOTE — PROCEDURE NOTE - ADDITIONAL PROCEDURE DETAILS
Called for placement of venous access. Right UE double lumen PICC in place. Left UE with numerous IV insertion attempts sites.

## 2020-05-18 NOTE — PROGRESS NOTE ADULT - ASSESSMENT
85 y/o AAM w/PMH ckd3,  HTN/DM/A-fib on Xarelto/HF EF 35%, and PAD, recently admitted in march for toe gangrene s/p left 3rd toe amputation/ presented back to ED from Dr. Mcclellan's office for further work up of LLE pain and swelling, nephrology consulted for ZEENAT on CKD III  s/p Angio with angioplasty/stent 5/6     # ZEENAT on CKD  - non oliguric, uop 1.3 L/ 24hr on lasix 80 mg IVP q12hr  - multifactorial, ATN, with possible SHERI and vancomycin related nephrotoxicity  - access:  R femoral HD cath, s/p HD on 05/13, 05/14, 05/15 in light of volume overload  - acceptable electrolytes w wet volume status, will anticipate aquapheresis w UF net negative 50 ml/hr to keep net negative 1.5-2 L/24hr   - renal diet/  Nepro  - renally adjusted meds/ ABx  - monitor BUN/Cr, lytes, uop   - keep MAP >65 mmHg to ensure renal perfusion 83 y/o AAM w/PMH ckd3,  HTN/DM/A-fib on Xarelto/HF EF 35%, and PAD, recently admitted in march for toe gangrene s/p left 3rd toe amputation/ presented back to ED from Dr. Mcclellan's office for further work up of LLE pain and swelling, nephrology consulted for ZEENAT on CKD III  s/p Angio with angioplasty/stent 5/6     # ZEENAT on CKD  - non oliguric, uop 1.3 L/ 24hr on lasix 80 mg IVP q12hr  - multifactorial, ATN, with possible SHERI and vancomycin related nephrotoxicity  - access:  R femoral HD cath, s/p HD on 05/13, 05/14, 05/15 in light of volume overload  - acceptable electrolytes w wet volume status, will anticipate aquapheresis w UF net negative 50 ml/hr, cw current lasix regimen to keep net negative ~1.5 - 2 L/24hr   - renal diet/  Nepro  - renally adjusted meds/ ABx  - monitor BUN/Cr, lytes, uop   - keep MAP >65 mmHg to ensure renal perfusion

## 2020-05-18 NOTE — PROGRESS NOTE ADULT - SUBJECTIVE AND OBJECTIVE BOX
patient seen while on aquapheresis  system is clotting despite a/c w eliquis for AFib:  would recommend cathflo 2mg x2 into the occluded catheter,  sent PTT, increase PTT by 5-10 sec w heparin drip;  if no success w a/c and line still clotting, HD line has to be replaced,  also, could consider switching lasix IVP q12hr to bumetanide drip:  give bumetanide 2mg bolus w continuous drip starting at 1 mg/hr to maintain uop at 100 ml/hr;   if diuretic response is not adequate, repeat IV bolus dose and increase continuous infusion to 2 mg/hr

## 2020-05-18 NOTE — PROCEDURE NOTE - NSSITEPREP_SKIN_A_CORE
chlorhexidine
alcohol/chlorhexidine
chlorhexidine
Adherence to aseptic technique: hand hygiene prior to donning barriers (gown, gloves), don cap and mask, sterile drape over patient/chlorhexidine

## 2020-05-18 NOTE — PROGRESS NOTE ADULT - SUBJECTIVE AND OBJECTIVE BOX
OVERNIGHT EVENTS: Amiodarone gtt adjusted overnight.     SUBJECTIVE / INTERVAL HPI: Patient seen and examined at bedside. Patient continues to endorse foot pain. Denies light-headedness, HA, CP, SOB, palpitations.     Review of systems negative except as noted above.     VITAL SIGNS:  Vital Signs Last 24 Hrs  T(C): 35.7 (18 May 2020 10:00), Max: 37.1 (17 May 2020 17:00)  T(F): 96.2 (18 May 2020 10:00), Max: 98.8 (17 May 2020 17:00)  HR: 105 (18 May 2020 13:00) (81 - 136)  BP: 95/60 (18 May 2020 13:00) (91/78 - 136/87)  BP(mean): 72 (18 May 2020 13:00) (69 - 86)  RR: 22 (18 May 2020 13:00) (16 - 24)  SpO2: 96% (18 May 2020 13:00) (93% - 100%)      05-17-20 @ 07:01  -  05-18-20 @ 07:00  --------------------------------------------------------  IN: 680.9 mL / OUT: 1360 mL / NET: -679.1 mL    05-18-20 @ 07:01  -  05-18-20 @ 14:14  --------------------------------------------------------  IN: 344.3 mL / OUT: 380 mL / NET: -35.7 mL        PHYSICAL EXAM:    General: NAD, with accessory muscle use this am  HEENT: PERRLA. MMM  Neck: -JVD  Cardiovascular: +S1/S2; Irregular, no murmurs.   Respiratory: Crackles B/L R>L  Gastrointestinal: NTND BS+4  Extremities: WWP; no edema  Vascular: 2+ pulse RLE. LLE wraped  Neurological: AAOx3; no focal deficits    MEDICATIONS:  MEDICATIONS  (STANDING):  alteplase for catheter clearance 2 milliGRAM(s) Catheter once  aMIOdarone Infusion 0.5 mG/Min (16.7 mL/Hr) IV Continuous <Continuous>  apixaban 2.5 milliGRAM(s) Oral every 12 hours  atorvastatin 40 milliGRAM(s) Oral at bedtime  chlorhexidine 2% Cloths 1 Application(s) Topical <User Schedule>  clopidogrel Tablet 75 milliGRAM(s) Oral daily  collagenase Ointment 1 Application(s) Topical daily  DAPTOmycin IVPB 600 milliGRAM(s) IV Intermittent every 48 hours  dextrose 5%. 1000 milliLiter(s) (50 mL/Hr) IV Continuous <Continuous>  dextrose 5%. 1000 milliLiter(s) (50 mL/Hr) IV Continuous <Continuous>  dextrose 50% Injectable 12.5 Gram(s) IV Push once  dextrose 50% Injectable 25 Gram(s) IV Push once  digoxin     Tablet 0.0625 milliGRAM(s) Oral every other day  furosemide   Injectable 80 milliGRAM(s) IV Push every 12 hours  insulin lispro (HumaLOG) corrective regimen sliding scale   SubCutaneous Before meals and at bedtime  meropenem  IVPB 500 milliGRAM(s) IV Intermittent every 24 hours  midodrine 10 milliGRAM(s) Oral every 8 hours  multivitamin 1 Tablet(s) Oral daily  phenylephrine    Infusion 0.2 MICROgram(s)/kG/Min (5.78 mL/Hr) IV Continuous <Continuous>  senna 2 Tablet(s) Oral at bedtime  sodium bicarbonate 1300 milliGRAM(s) Oral three times a day    MEDICATIONS  (PRN):  acetaminophen   Tablet .. 650 milliGRAM(s) Oral every 6 hours PRN Mild Pain (1 - 3)  dextrose 40% Gel 15 Gram(s) Oral once PRN Blood Glucose LESS THAN 70 milliGRAM(s)/deciliter  glucagon  Injectable 1 milliGRAM(s) IntraMuscular once PRN Glucose LESS THAN 70 milligrams/deciliter  guaiFENesin   Syrup  (Sugar-Free) 100 milliGRAM(s) Oral once PRN Cough  oxycodone    5 mG/acetaminophen 325 mG 1 Tablet(s) Oral every 6 hours PRN Severe Pain (7 - 10)  sodium chloride 0.9% lock flush 10 milliLiter(s) IV Push every 1 hour PRN Pre/post blood products, medications, blood draw, and to maintain line patency      ALLERGIES:  Allergies    No Known Allergies    Intolerances        LABS:                        7.2    10.98 )-----------( 245      ( 18 May 2020 05:50 )             22.2     05-18    139  |  100  |  43<H>  ----------------------------<  107<H>  3.6   |  26  |  2.78<H>    Ca    8.1<L>      18 May 2020 05:50  Phos  3.4     05-18  Mg     1.9     05-18    TPro  5.6<L>  /  Alb  2.5<L>  /  TBili  0.9  /  DBili  x   /  AST  240<H>  /  ALT  161<H>  /  AlkPhos  108  05-18    PT/INR - ( 18 May 2020 05:50 )   PT: 23.1 sec;   INR: 1.98          PTT - ( 17 May 2020 06:18 )  PTT:27.6 sec    CAPILLARY BLOOD GLUCOSE      POCT Blood Glucose.: 91 mg/dL (18 May 2020 11:52)          RADIOLOGY & ADDITIONAL TESTS: Reviewed.

## 2020-05-18 NOTE — PROGRESS NOTE ADULT - ASSESSMENT
83 y/o M with chronic AFIB, cardiomyopathy EF 35%, CKD, MRSA gangrenous toe s/p toe amputation. Hospital course with worsening renal function requiring aquaphoris, and AFIB RVR with hypotension on 5/15 requiring urgent bedside cardioversion. He went right back to AFIB. Amio gtt started for better rate control (beta-blockade property of amio). Off phenylephrine gtt. On midodrine for BP.   - Using low dose Digoxin while in hospital and level being closely followed.   - Amio gtt ok to be switched over to PO today. LFT elevated, EP aware. Per Dr. Vidales, will continue to follow LFT. Would just go back to Amio 200 mg daily. Will not repeat dccv again so Amio is used mainly for rate control as we don't have much option for rate control.   - May need AVN ablation and micra pacer in spite of MRSA infection. Last option.   - Consider palliative consultation to help with establishing goal of care. 85 y/o M with chronic AFIB, cardiomyopathy EF 35%, CKD, MRSA gangrenous toe s/p toe amputation. Hospital course with worsening renal function requiring aquaphoris, and AFIB RVR with hypotension on 5/15 requiring urgent bedside cardioversion. He went right back to AFIB. Amio gtt started for better rate control (beta-blockade property of amio). Off phenylephrine gtt. On midodrine for BP.   - Using low dose Digoxin while in hospital and level being closely followed.   - Amio gtt ok to be switched over to PO today. LFT elevated, EP aware. Per Dr. Vidales, will continue to follow LFT. Would just go back to Amio 200 mg daily. Will not repeat dccv again so Amio is used mainly for rate control as we don't have much option for rate control.   - May need AVN ablation and micra pacer in spite of MRSA infection. It would be our last option given infection risk.   - Consider palliative consultation to help with establishing goal of care.

## 2020-05-18 NOTE — PROGRESS NOTE ADULT - SUBJECTIVE AND OBJECTIVE BOX
INTERVAL HPI/OVERNIGHT EVENTS:    Patient is a 84y old  Male who presents with a chief complaint of toe ischemia (15 May 2020 07:35)      Pt reports the following symptoms:    CONSTITUTIONAL:  Negative fever or chills, feels well, good appetite  EYES:  Negative  blurry vision or double vision  CARDIOVASCULAR:  Negative for chest pain or palpitations  RESPIRATORY:  Negative for cough, wheezing, or SOB   GASTROINTESTINAL:  Negative for nausea, vomiting, diarrhea, constipation, or abdominal pain  GENITOURINARY:  Negative frequency, urgency or dysuria  NEUROLOGIC:  No headache, confusion, dizziness, lightheadedness    MEDICATIONS  (STANDING):  aMIOdarone Infusion 1 mG/Min (33.3 mL/Hr) IV Continuous <Continuous>  aMIOdarone Infusion 0.5 mG/Min (16.7 mL/Hr) IV Continuous <Continuous>  apixaban 2.5 milliGRAM(s) Oral every 12 hours  atorvastatin 40 milliGRAM(s) Oral at bedtime  chlorhexidine 2% Cloths 1 Application(s) Topical <User Schedule>  clopidogrel Tablet 75 milliGRAM(s) Oral daily  collagenase Ointment 1 Application(s) Topical daily  DAPTOmycin IVPB 600 milliGRAM(s) IV Intermittent every 48 hours  dextrose 5%. 1000 milliLiter(s) (50 mL/Hr) IV Continuous <Continuous>  dextrose 5%. 1000 milliLiter(s) (50 mL/Hr) IV Continuous <Continuous>  dextrose 50% Injectable 12.5 Gram(s) IV Push once  dextrose 50% Injectable 25 Gram(s) IV Push once  digoxin     Tablet 0.0625 milliGRAM(s) Oral every other day  furosemide   Injectable 80 milliGRAM(s) IV Push every 12 hours  insulin lispro (HumaLOG) corrective regimen sliding scale   SubCutaneous Before meals and at bedtime  meropenem  IVPB 500 milliGRAM(s) IV Intermittent every 24 hours  midodrine 10 milliGRAM(s) Oral every 8 hours  multivitamin 1 Tablet(s) Oral daily  phenylephrine    Infusion 0.2 MICROgram(s)/kG/Min (5.78 mL/Hr) IV Continuous <Continuous>  senna 2 Tablet(s) Oral at bedtime  sodium bicarbonate 1300 milliGRAM(s) Oral three times a day    MEDICATIONS  (PRN):  acetaminophen   Tablet .. 650 milliGRAM(s) Oral every 6 hours PRN Mild Pain (1 - 3)  dextrose 40% Gel 15 Gram(s) Oral once PRN Blood Glucose LESS THAN 70 milliGRAM(s)/deciliter  glucagon  Injectable 1 milliGRAM(s) IntraMuscular once PRN Glucose LESS THAN 70 milligrams/deciliter  guaiFENesin   Syrup  (Sugar-Free) 100 milliGRAM(s) Oral once PRN Cough  oxycodone    5 mG/acetaminophen 325 mG 1 Tablet(s) Oral every 6 hours PRN Severe Pain (7 - 10)  sodium chloride 0.9% lock flush 10 milliLiter(s) IV Push every 1 hour PRN Pre/post blood products, medications, blood draw, and to maintain line patency      PHYSICAL EXAM  Vital Signs Last 24 Hrs  T(C): 35.7 (18 May 2020 10:00), Max: 37.1 (17 May 2020 17:00)  T(F): 96.2 (18 May 2020 10:00), Max: 98.8 (17 May 2020 17:00)  HR: 106 (18 May 2020 12:00) (81 - 167)  BP: 104/68 (18 May 2020 12:00) (91/78 - 136/87)  BP(mean): 81 (18 May 2020 12:00) (69 - 107)  RR: 22 (18 May 2020 12:00) (16 - 32)  SpO2: 99% (18 May 2020 12:00) (93% - 100%)    Constitutional: wn/wd in NAD.   HEENT: NCAT, MMM, OP clear, EOMI, no proptosis or lid retraction  Neck: no thyromegaly or palpable thyroid nodules   Respiratory: lungs CTAB.  Cardiovascular: regular rhythm, normal S1 and S2, no audible murmurs, no peripheral edema  GI: soft, NT/ND, no masses/HSM appreciated.  Neurology: no tremors, DTR 2+  Skin: no visible rashes/lesions  Psychiatric: AAO x 3, normal affect/mood.    LABS:                        7.2    10.98 )-----------( 245      ( 18 May 2020 05:50 )             22.2     05-18    139  |  100  |  43<H>  ----------------------------<  107<H>  3.6   |  26  |  2.78<H>    Ca    8.1<L>      18 May 2020 05:50  Phos  3.4     05-18  Mg     1.9     05-18    TPro  5.6<L>  /  Alb  2.5<L>  /  TBili  0.9  /  DBili  x   /  AST  240<H>  /  ALT  161<H>  /  AlkPhos  108  05-18    PT/INR - ( 18 May 2020 05:50 )   PT: 23.1 sec;   INR: 1.98          PTT - ( 17 May 2020 06:18 )  PTT:27.6 sec    Thyroid Stimulating Hormone, Serum: 0.585 uIU/mL (05-07 @ 06:37)  Thyroid Stimulating Hormone, Serum: 0.558 uIU/mL (05-07 @ 06:37)      HbA1C: 6.0 % (01-16 @ 06:03)    CAPILLARY BLOOD GLUCOSE      POCT Blood Glucose.: 91 mg/dL (18 May 2020 11:52)  POCT Blood Glucose.: 115 mg/dL (18 May 2020 06:30)  POCT Blood Glucose.: 148 mg/dL (17 May 2020 21:23)  POCT Blood Glucose.: 144 mg/dL (17 May 2020 17:17)      Insulin Sliding Scale requirements X 24 Hours:    RADIOLOGY & ADDITIONAL TESTS:    A/P: 84y Male with history of DM type II presenting for       1.  DM -     Please continue           units lantus at bedtime  / in the morning and        units lispro with meals and lispro moderate / low dose sliding scale 4 times daily with meals and at bedtime.  Please continue consistent carbohydrate diet.      Goal FSG is   Will continue to monitor   For discharge, pt can continue    Pt can follow up at discharge with [facility] by calling  to make an appointment.   Will discuss case with     and update primary team

## 2020-05-18 NOTE — PROGRESS NOTE ADULT - SUBJECTIVE AND OBJECTIVE BOX
24 hr events  5/17: Spoke with EP who recommended PO amio and IV digoxin today and to start PO digoxin tomorrow however after receiving meds, patient went afib with -160s, spoke with Dr. Vidales who recommended restarting amio drip, transitioned from HFNC to NC 6L but did not tolerate, desaturated to mid 80s so palced back on HFNC, Weaned off of phenylephrine  o/n amio gtt switched to 0.5 dosing x 18 hours    Subjective:  No acute complaints. Denies CP, on HFNC at the time of exam. Clara N/V.    Vital Signs Last 24 Hrs  T(C): 36.4 (18 May 2020 06:15), Max: 37.1 (17 May 2020 17:00)  T(F): 97.6 (18 May 2020 06:15), Max: 98.8 (17 May 2020 17:00)  HR: 101 (18 May 2020 06:00) (81 - 167)  BP: 108/58 (18 May 2020 06:00) (80/61 - 122/62)  BP(mean): 69 (17 May 2020 18:00) (69 - 107)  RR: 20 (18 May 2020 06:00) (16 - 32)  SpO2: 98% (18 May 2020 06:00) (95% - 100%)    I&O's Summary    17 May 2020 07:01  -  18 May 2020 07:00  --------------------------------------------------------  IN: 680.9 mL / OUT: 1360 mL / NET: -679.1 mL    Physical Exam:  General: NAD   Pulmonary: b/l breath sounds   Cardiovascular: tachycardic. irregular rate   Extremities: Left foot wound slightly more dry   no edema. no surrounding erythema   Pt signal    LABS:                    7.2    10.98 )-----------( 245      ( 18 May 2020 05:50 )             22.2     05-18  139  |  100  |  43<H>  ----------------------------<  107<H>  3.6   |  26  |  2.78<H>    Ca    8.1<L>      18 May 2020 05:50  Phos  3.4     05-18  Mg     1.9     05-18    TPro  5.6<L>  /  Alb  2.5<L>  /  TBili  0.9  /  DBili  x   /  AST  240<H>  /  ALT  161<H>  /  AlkPhos  108  05-18    PT/INR - ( 18 May 2020 05:50 )   PT: 23.1 sec;   INR: 1.98     PTT - ( 17 May 2020 06:18 )  PTT:27.6 sec    LIVER FUNCTIONS - ( 18 May 2020 05:50 )  Alb: 2.5 g/dL / Pro: 5.6 g/dL / ALK PHOS: 108 U/L / ALT: 161 U/L / AST: 240 U/L / GGT: x           CAPILLARY BLOOD GLUCOSE  POCT Blood Glucose.: 115 mg/dL (18 May 2020 06:30)  POCT Blood Glucose.: 148 mg/dL (17 May 2020 21:23)  POCT Blood Glucose.: 144 mg/dL (17 May 2020 17:17)  POCT Blood Glucose.: 101 mg/dL (17 May 2020 11:15) 24 hr events  5/17: Spoke with EP who recommended PO amio and IV digoxin today and to start PO digoxin tomorrow however after receiving meds, patient went afib with -160s, spoke with Dr. Vidales who recommended restarting amio drip, transitioned from HFNC to NC 6L but did not tolerate, desaturated to mid 80s so palced back on HFNC, Weaned off of phenylephrine  o/n amio gtt switched to 0.5 dosing x 18 hours    Subjective:  No acute complaints. Denies CP, on HFNC at the time of exam. Denies N/V.    Vital Signs Last 24 Hrs  T(C): 36.4 (18 May 2020 06:15), Max: 37.1 (17 May 2020 17:00)  T(F): 97.6 (18 May 2020 06:15), Max: 98.8 (17 May 2020 17:00)  HR: 101 (18 May 2020 06:00) (81 - 167)  BP: 108/58 (18 May 2020 06:00) (80/61 - 122/62)  BP(mean): 69 (17 May 2020 18:00) (69 - 107)  RR: 20 (18 May 2020 06:00) (16 - 32)  SpO2: 98% (18 May 2020 06:00) (95% - 100%)    I&O's Summary    17 May 2020 07:01  -  18 May 2020 07:00  --------------------------------------------------------  IN: 680.9 mL / OUT: 1360 mL / NET: -679.1 mL    Physical Exam:  General: NAD   Pulmonary: b/l breath sounds   Cardiovascular: tachycardic. irregular rate   Extremities: Left foot wound slightly more dry   no edema. no surrounding erythema   Pt signal    LABS:                    7.2    10.98 )-----------( 245      ( 18 May 2020 05:50 )             22.2     05-18  139  |  100  |  43<H>  ----------------------------<  107<H>  3.6   |  26  |  2.78<H>    Ca    8.1<L>      18 May 2020 05:50  Phos  3.4     05-18  Mg     1.9     05-18    TPro  5.6<L>  /  Alb  2.5<L>  /  TBili  0.9  /  DBili  x   /  AST  240<H>  /  ALT  161<H>  /  AlkPhos  108  05-18    PT/INR - ( 18 May 2020 05:50 )   PT: 23.1 sec;   INR: 1.98     PTT - ( 17 May 2020 06:18 )  PTT:27.6 sec    LIVER FUNCTIONS - ( 18 May 2020 05:50 )  Alb: 2.5 g/dL / Pro: 5.6 g/dL / ALK PHOS: 108 U/L / ALT: 161 U/L / AST: 240 U/L / GGT: x           CAPILLARY BLOOD GLUCOSE  POCT Blood Glucose.: 115 mg/dL (18 May 2020 06:30)  POCT Blood Glucose.: 148 mg/dL (17 May 2020 21:23)  POCT Blood Glucose.: 144 mg/dL (17 May 2020 17:17)  POCT Blood Glucose.: 101 mg/dL (17 May 2020 11:15)

## 2020-05-18 NOTE — PROGRESS NOTE ADULT - ASSESSMENT
84M PMH of HTN, DM, Afib, HF EF 35%, CKD3, PAD admitted with left 3rd/4th toe gangrene s/p amputation, debridement and LLE angiogram with KECIA stents x2 (5/6) Post-op course complicated by anuric renal failure secondary to likely contrast-induced nephropathy leading to flash pulmonary oedema requiring dialysis on 5/13 and 5/14, hypoxic respiratory failure requiring NIPPV, worsening septic shock requiring phenylephrine, and Afib with RVR. On 5/15, pt's rate went to 170s not breaking despite amiodarone bolus and cardioversion. Pt will complete a full 24hr IV amiodarone load and transferred to medicine for further management management.    Neuro:    Tylenol Percocet prn     CV:   #Afib w/ RVR-HR better controlled today, with HR 90s-low 100s. Still on amio gtt, and intermittent digoxin dosing-check digoxin level after HD.   - c/w renally dosed eliquis. f/u EP.    - Given increasing LFTs, patient will need monitoring of LFTs while one amio. LFTs elevated but stable.     # Shock--> c/w midodrine, wean phenylephrine gtt goal sbps 90s. Goal is to maintain a lows systolic promoting reduced Afterload to improve forward flow.     # PVD--> c/w plavix, lipitor    Pulm:   CXR this am with increased vascular congestion likely 2/2 to renal dysfunction although patient not overtly fluid overloaded on exam. c/w HFNC for now, wean as tolerated will attempt after dialysis today.   #flash pulm edema--> improved now s/p HD,  currenlty on HFNC wean to O2    Renal:   #ZEENAT on CKD--> likely 2/2 due to ATN 2/2 to SHERI and vanc. Now s/p multiple rounds of dialysis Patient making urine, for aquapheresis vs. cvvhd with renal today.   - w/ good urine output overnight and this am s/p Lasix 80mg. c/w lasix for now, renal following appreciate recs    GI:  Renal Mech Soft. MVI senna     ID:   #[id number] and VRE--> c/w with DAPTO and meropenem-last day 6/17-has peripheral picc.     Endo: [facility]  PPx: SCDS, Elqiuis   Lines: piv, R fem HD cath   Wounds: dakins wet to dry to toe wound

## 2020-05-18 NOTE — PROCEDURE NOTE - NSINDICATIONS_GEN_A_CORE
antibiotic therapy
dialysis/CRRT
monitoring purposes/cannulation purposes/arterial puncture to obtain ABG's/critical patient
inadequate peripheral access

## 2020-05-19 LAB
ALBUMIN SERPL ELPH-MCNC: 2.9 G/DL — LOW (ref 3.3–5)
ALP SERPL-CCNC: 160 U/L — HIGH (ref 40–120)
ALT FLD-CCNC: 166 U/L — HIGH (ref 10–45)
ANION GAP SERPL CALC-SCNC: 16 MMOL/L — SIGNIFICANT CHANGE UP (ref 5–17)
APTT BLD: 40.8 SEC — HIGH (ref 27.5–36.3)
APTT BLD: 42.6 SEC — HIGH (ref 27.5–36.3)
APTT BLD: 43.7 SEC — HIGH (ref 27.5–36.3)
APTT BLD: 45.6 SEC — HIGH (ref 27.5–36.3)
AST SERPL-CCNC: 189 U/L — HIGH (ref 10–40)
BASOPHILS # BLD AUTO: 0.01 K/UL — SIGNIFICANT CHANGE UP (ref 0–0.2)
BASOPHILS NFR BLD AUTO: 0.1 % — SIGNIFICANT CHANGE UP (ref 0–2)
BILIRUB SERPL-MCNC: 1.1 MG/DL — SIGNIFICANT CHANGE UP (ref 0.2–1.2)
BUN SERPL-MCNC: 43 MG/DL — HIGH (ref 7–23)
CALCIUM SERPL-MCNC: 8.6 MG/DL — SIGNIFICANT CHANGE UP (ref 8.4–10.5)
CHLORIDE SERPL-SCNC: 97 MMOL/L — SIGNIFICANT CHANGE UP (ref 96–108)
CO2 SERPL-SCNC: 24 MMOL/L — SIGNIFICANT CHANGE UP (ref 22–31)
CREAT SERPL-MCNC: 2.65 MG/DL — HIGH (ref 0.5–1.3)
DIGOXIN SERPL-MCNC: 1.3 NG/ML — SIGNIFICANT CHANGE UP (ref 0.8–2)
EOSINOPHIL # BLD AUTO: 0.03 K/UL — SIGNIFICANT CHANGE UP (ref 0–0.5)
EOSINOPHIL NFR BLD AUTO: 0.2 % — SIGNIFICANT CHANGE UP (ref 0–6)
GAS PNL BLDA: SIGNIFICANT CHANGE UP
GLUCOSE BLDC GLUCOMTR-MCNC: 136 MG/DL — HIGH (ref 70–99)
GLUCOSE BLDC GLUCOMTR-MCNC: 193 MG/DL — HIGH (ref 70–99)
GLUCOSE BLDC GLUCOMTR-MCNC: 197 MG/DL — HIGH (ref 70–99)
GLUCOSE BLDC GLUCOMTR-MCNC: 25 MG/DL — CRITICAL LOW (ref 70–99)
GLUCOSE SERPL-MCNC: 196 MG/DL — HIGH (ref 70–99)
HCT VFR BLD CALC: 25.6 % — LOW (ref 39–50)
HGB BLD-MCNC: 8.1 G/DL — LOW (ref 13–17)
IMM GRANULOCYTES NFR BLD AUTO: 0.6 % — SIGNIFICANT CHANGE UP (ref 0–1.5)
INR BLD: 1.97 — HIGH (ref 0.88–1.16)
LYMPHOCYTES # BLD AUTO: 0.42 K/UL — LOW (ref 1–3.3)
LYMPHOCYTES # BLD AUTO: 3.4 % — LOW (ref 13–44)
MAGNESIUM SERPL-MCNC: 1.9 MG/DL — SIGNIFICANT CHANGE UP (ref 1.6–2.6)
MCHC RBC-ENTMCNC: 26.4 PG — LOW (ref 27–34)
MCHC RBC-ENTMCNC: 31.6 GM/DL — LOW (ref 32–36)
MCV RBC AUTO: 83.4 FL — SIGNIFICANT CHANGE UP (ref 80–100)
MONOCYTES # BLD AUTO: 0.23 K/UL — SIGNIFICANT CHANGE UP (ref 0–0.9)
MONOCYTES NFR BLD AUTO: 1.9 % — LOW (ref 2–14)
NEUTROPHILS # BLD AUTO: 11.56 K/UL — HIGH (ref 1.8–7.4)
NEUTROPHILS NFR BLD AUTO: 93.8 % — HIGH (ref 43–77)
NRBC # BLD: 0 /100 WBCS — SIGNIFICANT CHANGE UP (ref 0–0)
PHOSPHATE SERPL-MCNC: 3.1 MG/DL — SIGNIFICANT CHANGE UP (ref 2.5–4.5)
PLATELET # BLD AUTO: 269 K/UL — SIGNIFICANT CHANGE UP (ref 150–400)
POTASSIUM SERPL-MCNC: 3.8 MMOL/L — SIGNIFICANT CHANGE UP (ref 3.5–5.3)
POTASSIUM SERPL-SCNC: 3.8 MMOL/L — SIGNIFICANT CHANGE UP (ref 3.5–5.3)
PROT SERPL-MCNC: 6.8 G/DL — SIGNIFICANT CHANGE UP (ref 6–8.3)
PROTHROM AB SERPL-ACNC: 22.9 SEC — HIGH (ref 10–12.9)
RBC # BLD: 3.07 M/UL — LOW (ref 4.2–5.8)
RBC # FLD: 15.6 % — HIGH (ref 10.3–14.5)
SODIUM SERPL-SCNC: 137 MMOL/L — SIGNIFICANT CHANGE UP (ref 135–145)
WBC # BLD: 12.33 K/UL — HIGH (ref 3.8–10.5)
WBC # FLD AUTO: 12.33 K/UL — HIGH (ref 3.8–10.5)

## 2020-05-19 PROCEDURE — 71045 X-RAY EXAM CHEST 1 VIEW: CPT | Mod: 26

## 2020-05-19 PROCEDURE — 99231 SBSQ HOSP IP/OBS SF/LOW 25: CPT | Mod: GC

## 2020-05-19 PROCEDURE — 99232 SBSQ HOSP IP/OBS MODERATE 35: CPT

## 2020-05-19 PROCEDURE — 99233 SBSQ HOSP IP/OBS HIGH 50: CPT | Mod: GC

## 2020-05-19 RX ORDER — POTASSIUM CHLORIDE 20 MEQ
40 PACKET (EA) ORAL ONCE
Refills: 0 | Status: DISCONTINUED | OUTPATIENT
Start: 2020-05-19 | End: 2020-05-19

## 2020-05-19 RX ORDER — DEXTROSE 10 % IN WATER 10 %
1000 INTRAVENOUS SOLUTION INTRAVENOUS
Refills: 0 | Status: DISCONTINUED | OUTPATIENT
Start: 2020-05-19 | End: 2020-05-19

## 2020-05-19 RX ADMIN — Medication 1300 MILLIGRAM(S): at 07:11

## 2020-05-19 RX ADMIN — MIDODRINE HYDROCHLORIDE 10 MILLIGRAM(S): 2.5 TABLET ORAL at 07:11

## 2020-05-19 RX ADMIN — MIDODRINE HYDROCHLORIDE 10 MILLIGRAM(S): 2.5 TABLET ORAL at 21:51

## 2020-05-19 RX ADMIN — Medication 80 MILLIGRAM(S): at 07:10

## 2020-05-19 RX ADMIN — SENNA PLUS 2 TABLET(S): 8.6 TABLET ORAL at 21:51

## 2020-05-19 RX ADMIN — ATORVASTATIN CALCIUM 40 MILLIGRAM(S): 80 TABLET, FILM COATED ORAL at 21:51

## 2020-05-19 RX ADMIN — MIDODRINE HYDROCHLORIDE 10 MILLIGRAM(S): 2.5 TABLET ORAL at 12:01

## 2020-05-19 RX ADMIN — Medication 1 TABLET(S): at 12:01

## 2020-05-19 RX ADMIN — AMIODARONE HYDROCHLORIDE 200 MILLIGRAM(S): 400 TABLET ORAL at 07:11

## 2020-05-19 RX ADMIN — Medication 650 MILLIGRAM(S): at 17:20

## 2020-05-19 RX ADMIN — Medication 1300 MILLIGRAM(S): at 21:51

## 2020-05-19 RX ADMIN — APIXABAN 2.5 MILLIGRAM(S): 2.5 TABLET, FILM COATED ORAL at 21:51

## 2020-05-19 RX ADMIN — CLOPIDOGREL BISULFATE 75 MILLIGRAM(S): 75 TABLET, FILM COATED ORAL at 12:01

## 2020-05-19 RX ADMIN — Medication 1300 MILLIGRAM(S): at 12:01

## 2020-05-19 RX ADMIN — MEROPENEM 500 MILLIGRAM(S): 1 INJECTION INTRAVENOUS at 21:55

## 2020-05-19 RX ADMIN — Medication 20 MILLILITER(S): at 12:23

## 2020-05-19 RX ADMIN — APIXABAN 2.5 MILLIGRAM(S): 2.5 TABLET, FILM COATED ORAL at 12:01

## 2020-05-19 RX ADMIN — DAPTOMYCIN 124 MILLIGRAM(S): 500 INJECTION, POWDER, LYOPHILIZED, FOR SOLUTION INTRAVENOUS at 12:00

## 2020-05-19 RX ADMIN — Medication 100 MILLIGRAM(S): at 17:20

## 2020-05-19 NOTE — PROGRESS NOTE ADULT - ASSESSMENT
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 BRANDYN falsely elevated due to history of DM now s/p LLE Angiogram DEStent x2, L 3rd and 4th toe amputation, and debridement, transferred to SICU for fluid overload, respiratory distress, and urgent need for HD.    - Pain control PRN  - f/u Cards recs  - Continue Plavix  - Wean HFNC to NC  - OOB, Chest PT  - f/u ID recs - Abx Meche+ Dapto for MRSA and VRE in wound   - Dakins WTD of L foot + Santyl  - Care per MICU

## 2020-05-19 NOTE — PROGRESS NOTE ADULT - SUBJECTIVE AND OBJECTIVE BOX
INTERVAL HPI/OVERNIGHT EVENTS:    Patient is a 84y old  Male who presents with a chief complaint of Gangrene (19 May 2020 10:32)      Pt reports the following symptoms:    CONSTITUTIONAL:  Negative fever or chills, feels well, good appetite  EYES:  Negative  blurry vision or double vision  CARDIOVASCULAR:  Negative for chest pain or palpitations  RESPIRATORY:  Negative for cough, wheezing, or SOB   GASTROINTESTINAL:  Negative for nausea, vomiting, diarrhea, constipation, or abdominal pain  GENITOURINARY:  Negative frequency, urgency or dysuria  NEUROLOGIC:  No headache, confusion, dizziness, lightheadedness    MEDICATIONS  (STANDING):  aMIOdarone    Tablet 200 milliGRAM(s) Oral daily  apixaban 2.5 milliGRAM(s) Oral every 12 hours  atorvastatin 40 milliGRAM(s) Oral at bedtime  chlorhexidine 2% Cloths 1 Application(s) Topical <User Schedule>  clopidogrel Tablet 75 milliGRAM(s) Oral daily  collagenase Ointment 1 Application(s) Topical daily  DAPTOmycin IVPB 600 milliGRAM(s) IV Intermittent every 48 hours  dextrose 10%. 1000 milliLiter(s) (50 mL/Hr) IV Continuous <Continuous>  dextrose 5%. 1000 milliLiter(s) (50 mL/Hr) IV Continuous <Continuous>  dextrose 50% Injectable 12.5 Gram(s) IV Push once  dextrose 50% Injectable 25 Gram(s) IV Push once  dextrose 50% Injectable 50 milliLiter(s) IV Push once  digoxin     Tablet 0.0625 milliGRAM(s) Oral every other day  heparin  Infusion 800 Unit(s)/Hr (8 mL/Hr) IV Continuous <Continuous>  insulin lispro (HumaLOG) corrective regimen sliding scale   SubCutaneous Before meals and at bedtime  meropenem Injectable 500 milliGRAM(s) IV Push every 24 hours  midodrine 10 milliGRAM(s) Oral every 8 hours  multivitamin 1 Tablet(s) Oral daily  phenylephrine    Infusion 0.2 MICROgram(s)/kG/Min (5.78 mL/Hr) IV Continuous <Continuous>  senna 2 Tablet(s) Oral at bedtime  sodium bicarbonate 1300 milliGRAM(s) Oral three times a day    MEDICATIONS  (PRN):  acetaminophen   Tablet .. 650 milliGRAM(s) Oral every 6 hours PRN Mild Pain (1 - 3)  dextrose 40% Gel 15 Gram(s) Oral once PRN Blood Glucose LESS THAN 70 milliGRAM(s)/deciliter  glucagon  Injectable 1 milliGRAM(s) IntraMuscular once PRN Glucose LESS THAN 70 milligrams/deciliter  guaiFENesin   Syrup  (Sugar-Free) 100 milliGRAM(s) Oral once PRN Cough  oxycodone    5 mG/acetaminophen 325 mG 1 Tablet(s) Oral every 6 hours PRN Severe Pain (7 - 10)  sodium chloride 0.9% lock flush 10 milliLiter(s) IV Push every 1 hour PRN Pre/post blood products, medications, blood draw, and to maintain line patency      PHYSICAL EXAM  Vital Signs Last 24 Hrs  T(C): 36.7 (19 May 2020 10:00), Max: 37.2 (19 May 2020 01:01)  T(F): 98 (19 May 2020 10:00), Max: 98.9 (19 May 2020 01:01)  HR: 121 (19 May 2020 11:00) (86 - 131)  BP: 106/66 (19 May 2020 03:00) (95/60 - 129/72)  BP(mean): 76 (19 May 2020 03:00) (71 - 101)  RR: 25 (19 May 2020 11:00) (20 - 27)  SpO2: 93% (19 May 2020 11:00) (93% - 100%)    Constitutional: wn/wd in NAD.   HEENT: NCAT, MMM, OP clear, EOMI, no proptosis or lid retraction  Neck: no thyromegaly or palpable thyroid nodules   Respiratory: lungs CTAB.  Cardiovascular: regular rhythm, normal S1 and S2, no audible murmurs, no peripheral edema  GI: soft, NT/ND, no masses/HSM appreciated.  Neurology: no tremors, DTR 2+  Skin: no visible rashes/lesions  Psychiatric: AAO x 3, normal affect/mood.    LABS:                        8.1    12.33 )-----------( 269      ( 19 May 2020 05:59 )             25.6     05-19    137  |  97  |  43<H>  ----------------------------<  196<H>  3.8   |  24  |  2.65<H>    Ca    8.6      19 May 2020 05:59  Phos  3.1     05-19  Mg     1.9     05-19    TPro  6.8  /  Alb  2.9<L>  /  TBili  1.1  /  DBili  x   /  AST  189<H>  /  ALT  166<H>  /  AlkPhos  160<H>  05-19    PT/INR - ( 19 May 2020 05:59 )   PT: 22.9 sec;   INR: 1.97          PTT - ( 19 May 2020 07:10 )  PTT:42.6 sec    Thyroid Stimulating Hormone, Serum: 0.585 uIU/mL (05-07 @ 06:37)  Thyroid Stimulating Hormone, Serum: 0.558 uIU/mL (05-07 @ 06:37)      HbA1C: 6.0 % (01-16 @ 06:03)    CAPILLARY BLOOD GLUCOSE      POCT Blood Glucose.: 193 mg/dL (19 May 2020 11:13)  POCT Blood Glucose.: 25 mg/dL (19 May 2020 11:11)  POCT Blood Glucose.: 197 mg/dL (19 May 2020 05:43)  POCT Blood Glucose.: 91 mg/dL (18 May 2020 21:26)  POCT Blood Glucose.: 50 mg/dL (18 May 2020 19:36)  POCT Blood Glucose.: 51 mg/dL (18 May 2020 17:38)  POCT Blood Glucose.: 65 mg/dL (18 May 2020 16:37)  POCT Blood Glucose.: 91 mg/dL (18 May 2020 11:52)      Insulin Sliding Scale requirements X 24 Hours:    RADIOLOGY & ADDITIONAL TESTS:    A/P: 84y Male with history of DM type II presenting for       1.  DM -     Please continue           units lantus at bedtime  / in the morning and        units lispro with meals and lispro moderate / low dose sliding scale 4 times daily with meals and at bedtime.  Please continue consistent carbohydrate diet.      Goal FSG is   Will continue to monitor   For discharge, pt can continue    Pt can follow up at discharge with [facility] by calling  to make an appointment.   Will discuss case with     and update primary team INTERVAL HPI/OVERNIGHT EVENTS:    Patient is a 84y old  Male who presents with a chief complaint of Gangrene (19 May 2020 10:32)        MEDICATIONS  (STANDING):  aMIOdarone    Tablet 200 milliGRAM(s) Oral daily  apixaban 2.5 milliGRAM(s) Oral every 12 hours  atorvastatin 40 milliGRAM(s) Oral at bedtime  chlorhexidine 2% Cloths 1 Application(s) Topical <User Schedule>  clopidogrel Tablet 75 milliGRAM(s) Oral daily  collagenase Ointment 1 Application(s) Topical daily  DAPTOmycin IVPB 600 milliGRAM(s) IV Intermittent every 48 hours  dextrose 10%. 1000 milliLiter(s) (50 mL/Hr) IV Continuous <Continuous>  dextrose 5%. 1000 milliLiter(s) (50 mL/Hr) IV Continuous <Continuous>  dextrose 50% Injectable 12.5 Gram(s) IV Push once  dextrose 50% Injectable 25 Gram(s) IV Push once  dextrose 50% Injectable 50 milliLiter(s) IV Push once  digoxin     Tablet 0.0625 milliGRAM(s) Oral every other day  heparin  Infusion 800 Unit(s)/Hr (8 mL/Hr) IV Continuous <Continuous>  insulin lispro (HumaLOG) corrective regimen sliding scale   SubCutaneous Before meals and at bedtime  meropenem Injectable 500 milliGRAM(s) IV Push every 24 hours  midodrine 10 milliGRAM(s) Oral every 8 hours  multivitamin 1 Tablet(s) Oral daily  phenylephrine    Infusion 0.2 MICROgram(s)/kG/Min (5.78 mL/Hr) IV Continuous <Continuous>  senna 2 Tablet(s) Oral at bedtime  sodium bicarbonate 1300 milliGRAM(s) Oral three times a day    MEDICATIONS  (PRN):  acetaminophen   Tablet .. 650 milliGRAM(s) Oral every 6 hours PRN Mild Pain (1 - 3)  dextrose 40% Gel 15 Gram(s) Oral once PRN Blood Glucose LESS THAN 70 milliGRAM(s)/deciliter  glucagon  Injectable 1 milliGRAM(s) IntraMuscular once PRN Glucose LESS THAN 70 milligrams/deciliter  guaiFENesin   Syrup  (Sugar-Free) 100 milliGRAM(s) Oral once PRN Cough  oxycodone    5 mG/acetaminophen 325 mG 1 Tablet(s) Oral every 6 hours PRN Severe Pain (7 - 10)  sodium chloride 0.9% lock flush 10 milliLiter(s) IV Push every 1 hour PRN Pre/post blood products, medications, blood draw, and to maintain line patency      PHYSICAL EXAM  Vital Signs Last 24 Hrs  T(C): 36.7 (19 May 2020 10:00), Max: 37.2 (19 May 2020 01:01)  T(F): 98 (19 May 2020 10:00), Max: 98.9 (19 May 2020 01:01)  HR: 121 (19 May 2020 11:00) (86 - 131)  BP: 106/66 (19 May 2020 03:00) (95/60 - 129/72)  BP(mean): 76 (19 May 2020 03:00) (71 - 101)  RR: 25 (19 May 2020 11:00) (20 - 27)  SpO2: 93% (19 May 2020 11:00) (93% - 100%)    Constitutional: wn/wd in NAD.   HEENT: NCAT, MMM, OP clear, EOMI, no proptosis or lid retraction  Neck: no thyromegaly or palpable thyroid nodules   Respiratory: lungs CTAB.  Cardiovascular: regular rhythm, normal S1 and S2, no audible murmurs, no peripheral edema  GI: soft, NT/ND, no masses/HSM appreciated.  Neurology: no tremors, DTR 2+  Skin: no visible rashes/lesions  Psychiatric: AAO x 3, normal affect/mood.    LABS:                        8.1    12.33 )-----------( 269      ( 19 May 2020 05:59 )             25.6     05-19    137  |  97  |  43<H>  ----------------------------<  196<H>  3.8   |  24  |  2.65<H>    Ca    8.6      19 May 2020 05:59  Phos  3.1     05-19  Mg     1.9     05-19    TPro  6.8  /  Alb  2.9<L>  /  TBili  1.1  /  DBili  x   /  AST  189<H>  /  ALT  166<H>  /  AlkPhos  160<H>  05-19    PT/INR - ( 19 May 2020 05:59 )   PT: 22.9 sec;   INR: 1.97          PTT - ( 19 May 2020 07:10 )  PTT:42.6 sec    Thyroid Stimulating Hormone, Serum: 0.585 uIU/mL (05-07 @ 06:37)  Thyroid Stimulating Hormone, Serum: 0.558 uIU/mL (05-07 @ 06:37)      HbA1C: 6.0 % (01-16 @ 06:03)    CAPILLARY BLOOD GLUCOSE      POCT Blood Glucose.: 193 mg/dL (19 May 2020 11:13)  POCT Blood Glucose.: 25 mg/dL (19 May 2020 11:11)  POCT Blood Glucose.: 197 mg/dL (19 May 2020 05:43)  POCT Blood Glucose.: 91 mg/dL (18 May 2020 21:26)  POCT Blood Glucose.: 50 mg/dL (18 May 2020 19:36)  POCT Blood Glucose.: 51 mg/dL (18 May 2020 17:38)  POCT Blood Glucose.: 65 mg/dL (18 May 2020 16:37)  POCT Blood Glucose.: 91 mg/dL (18 May 2020 11:52)      Insulin Sliding Scale requirements X 24 Hours:    RADIOLOGY & ADDITIONAL TESTS:    A/P: 84y Male with history of DM type II presenting for       1.  DM -     Please continue           units lantus at bedtime  / in the morning and        units lispro with meals and lispro moderate / low dose sliding scale 4 times daily with meals and at bedtime.  Please continue consistent carbohydrate diet.      Goal FSG is   Will continue to monitor   For discharge, pt can continue    Pt can follow up at discharge with [facility] by calling  to make an appointment.   Will discuss case with     and update primary team INTERVAL HPI/OVERNIGHT EVENTS:    Patient is a 84y old  Male who presents with a chief complaint of Gangrene (19 May 2020 10:32)  Patient seen and examined at the bedside.   He was started on aquapheresis yesterday   made about 900 cc of urine yesterday.  was on midodrine 10mg Q8H, phenylephrine infusion,  ON daptomycin and meropenam  Cr 2.65 and GFR 25.  CXR showed pulm vascular congestion  He did not eat anything much yesterday. But today, had some salmon and jello for lunch  He had episodes of asymptomatic hypoglycemia yesterday. He was started on D50 @ 50cc/hr  FSG & Insulin received:  Yesterday:  1630: FSg - 65 - was given D50  1735 FSG - 51  1900 FSG - 50. He was started on D10 @ 50cc/hr  2100 FSG - 91  Today:  600   600 AM FSG - 197  1100 Am FSG - 25 - repeat in 2minutes was 193    Pt reports the following symptoms:  CONSTITUTIONAL:  feels okay, poor appetite  CARDIOVASCULAR:  Negative for chest pain or palpitations  RESPIRATORY:  Negative for cough, wheezing, or SOB   GASTROINTESTINAL:  Negative for vomiting, diarrhea, constipation  NEUROLOGIC:  No headache, confusion, dizziness, lightheadedness    MEDICATIONS  (STANDING):  aMIOdarone    Tablet 200 milliGRAM(s) Oral daily  apixaban 2.5 milliGRAM(s) Oral every 12 hours  atorvastatin 40 milliGRAM(s) Oral at bedtime  chlorhexidine 2% Cloths 1 Application(s) Topical <User Schedule>  clopidogrel Tablet 75 milliGRAM(s) Oral daily  collagenase Ointment 1 Application(s) Topical daily  DAPTOmycin IVPB 600 milliGRAM(s) IV Intermittent every 48 hours  dextrose 10%. 1000 milliLiter(s) (50 mL/Hr) IV Continuous <Continuous>  dextrose 5%. 1000 milliLiter(s) (50 mL/Hr) IV Continuous <Continuous>  dextrose 50% Injectable 12.5 Gram(s) IV Push once  dextrose 50% Injectable 25 Gram(s) IV Push once  dextrose 50% Injectable 50 milliLiter(s) IV Push once  digoxin     Tablet 0.0625 milliGRAM(s) Oral every other day  heparin  Infusion 800 Unit(s)/Hr (8 mL/Hr) IV Continuous <Continuous>  insulin lispro (HumaLOG) corrective regimen sliding scale   SubCutaneous Before meals and at bedtime  meropenem Injectable 500 milliGRAM(s) IV Push every 24 hours  midodrine 10 milliGRAM(s) Oral every 8 hours  multivitamin 1 Tablet(s) Oral daily  phenylephrine    Infusion 0.2 MICROgram(s)/kG/Min (5.78 mL/Hr) IV Continuous <Continuous>  senna 2 Tablet(s) Oral at bedtime  sodium bicarbonate 1300 milliGRAM(s) Oral three times a day    MEDICATIONS  (PRN):  acetaminophen   Tablet .. 650 milliGRAM(s) Oral every 6 hours PRN Mild Pain (1 - 3)  dextrose 40% Gel 15 Gram(s) Oral once PRN Blood Glucose LESS THAN 70 milliGRAM(s)/deciliter  glucagon  Injectable 1 milliGRAM(s) IntraMuscular once PRN Glucose LESS THAN 70 milligrams/deciliter  guaiFENesin   Syrup  (Sugar-Free) 100 milliGRAM(s) Oral once PRN Cough  oxycodone    5 mG/acetaminophen 325 mG 1 Tablet(s) Oral every 6 hours PRN Severe Pain (7 - 10)  sodium chloride 0.9% lock flush 10 milliLiter(s) IV Push every 1 hour PRN Pre/post blood products, medications, blood draw, and to maintain line patency      PHYSICAL EXAM  Vital Signs Last 24 Hrs  T(C): 36.7 (19 May 2020 10:00), Max: 37.2 (19 May 2020 01:01)  T(F): 98 (19 May 2020 10:00), Max: 98.9 (19 May 2020 01:01)  HR: 121 (19 May 2020 11:00) (86 - 131)  BP: 106/66 (19 May 2020 03:00) (95/60 - 129/72)  BP(mean): 76 (19 May 2020 03:00) (71 - 101)  RR: 25 (19 May 2020 11:00) (20 - 27)  SpO2: 93% (19 May 2020 11:00) (93% - 100%)    Constitutional: Tired, NAD  Respiratory: lungs CTAB.  Cardiovascular: regular rhythm, normal S1 and S2, peripheral edema 1+  GI: soft, NT/ND, no masses/HSM appreciated.  Neurology: no tremors, DTR 2+, moves extremities    LABS:                        8.1    12.33 )-----------( 269      ( 19 May 2020 05:59 )             25.6     05-19    137  |  97  |  43<H>  ----------------------------<  196<H>  3.8   |  24  |  2.65<H>    Ca    8.6      19 May 2020 05:59  Phos  3.1     05-19  Mg     1.9     05-19    TPro  6.8  /  Alb  2.9<L>  /  TBili  1.1  /  DBili  x   /  AST  189<H>  /  ALT  166<H>  /  AlkPhos  160<H>  05-19    PT/INR - ( 19 May 2020 05:59 )   PT: 22.9 sec;   INR: 1.97          PTT - ( 19 May 2020 07:10 )  PTT:42.6 sec    Thyroid Stimulating Hormone, Serum: 0.585 uIU/mL (05-07 @ 06:37)  Thyroid Stimulating Hormone, Serum: 0.558 uIU/mL (05-07 @ 06:37)      HbA1C: 6.0 % (01-16 @ 06:03)    CAPILLARY BLOOD GLUCOSE      POCT Blood Glucose.: 193 mg/dL (19 May 2020 11:13)  POCT Blood Glucose.: 25 mg/dL (19 May 2020 11:11)  POCT Blood Glucose.: 197 mg/dL (19 May 2020 05:43)  POCT Blood Glucose.: 91 mg/dL (18 May 2020 21:26)  POCT Blood Glucose.: 50 mg/dL (18 May 2020 19:36)  POCT Blood Glucose.: 51 mg/dL (18 May 2020 17:38)  POCT Blood Glucose.: 65 mg/dL (18 May 2020 16:37)  POCT Blood Glucose.: 91 mg/dL (18 May 2020 11:52)      Insulin Sliding Scale requirements X 24 Hours:    RADIOLOGY & ADDITIONAL TESTS:    A/P: 84 M PMH HTN, DM, A fib on Xarelto (Last taken 8am) HF EF 35%, CKD III PAD p/w 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 He is s/p left fourth toe amputation and angiogram 5/6/20. He is s/p 2 KECIA placement in TP trunk. currently on antibiotics.    1.  Hypoglycemia  -  Multifactorial at this time given vascular disease, sepsis and renal failure  - Mostly factitious as well - given no documented low FSg in the BMP  - Has CKD - in renal failure  - Cr 2.65 and GFR 25  - Wt 77.1 kg with BMI 24.4  Hba1c 6  TSH 0.585, Free T4 1.09  Am cortisol was 24  - 5/10 10 Am FSG - 42 - was given juice - Repeat . Labs at that time. BMP blood glucose was 125, c-peptide was 7.9 and insulin level 7.2  5/14 - FSG 51 and 43 - asymptomatic. Was given juice before the blood work was drawn  5/18 - had episodes of hypoglycemia as mentioned above - no BMP drawn before dextrose administration  - Continue FSG monitoring - four times a day - before meals and bedtime  - PLEASE STOP THE D10  - Please obtain Basic metabolic panel, c-peptide level, insulin level and pro-insulin level when the FSG is less than 60. These labs should be drawn before administering any dextrose containing solution ( Juice or D50)  - Please consider warming the fingers before obtaining the FSG.      - on MSD - renal diet  Will continue to monitor     For discharge, TBD    Pt can follow up at discharge with [facility] by calling  to make an appointment.   discussed case with Dr. Bello    and updated primary team

## 2020-05-19 NOTE — PROGRESS NOTE ADULT - SUBJECTIVE AND OBJECTIVE BOX
on aquapheresis, no issues overnight  UF net neg 40 ml/hr, still positive fluid balance  on heparin ggt to prevent clotting PTT 42, at goal       Meds:  acetaminophen   Tablet .. 650 every 6 hours PRN  aMIOdarone    Tablet 200 daily  apixaban 2.5 every 12 hours  atorvastatin 40 at bedtime  chlorhexidine 2% Cloths 1 <User Schedule>  clopidogrel Tablet 75 daily  collagenase Ointment 1 daily  DAPTOmycin IVPB 600 every 48 hours  dextrose 10%. 1000 <Continuous>  dextrose 40% Gel 15 once PRN  dextrose 5%. 1000 <Continuous>  dextrose 50% Injectable 12.5 once  dextrose 50% Injectable 25 once  dextrose 50% Injectable 50 once  digoxin     Tablet 0.0625 every other day  glucagon  Injectable 1 once PRN  guaiFENesin   Syrup  (Sugar-Free) 100 once PRN  heparin  Infusion 800 <Continuous>  insulin lispro (HumaLOG) corrective regimen sliding scale  Before meals and at bedtime  meropenem Injectable 500 every 24 hours  midodrine 10 every 8 hours  multivitamin 1 daily  oxycodone    5 mG/acetaminophen 325 mG 1 every 6 hours PRN  phenylephrine    Infusion 0.2 <Continuous>  senna 2 at bedtime  sodium bicarbonate 1300 three times a day  sodium chloride 0.9% lock flush 10 every 1 hour PRN      T(C): , Max: 37.2 (05-19-20 @ 01:01)  T(F): , Max: 98.9 (05-19-20 @ 01:01)  HR: 123 (05-19-20 @ 13:00)  BP: 106/66 (05-19-20 @ 03:00)  BP(mean): 76 (05-19-20 @ 03:00)  RR: 22 (05-19-20 @ 13:00)  SpO2: 99% (05-19-20 @ 13:00)  Wt(kg): --    05-18 @ 07:01  -  05-19 @ 07:00  --------------------------------------------------------  IN: 1494.6 mL / OUT: 900 mL / NET: 594.6 mL    05-19 @ 07:01  -  05-19 @ 13:11  --------------------------------------------------------  IN: 650.4 mL / OUT: 1455 mL / NET: -804.6 mL      PHYSICAL EXAM:  GENERAL: NAD, alert  NECK: Neck supple, +JVD  CHEST/LUNG: + rales  HEART: irregularly irregular tachycardia   ABDOMEN: Soft, Nontender, BS+  EXTREMITIES: trace edema  : Moe w 790 ml/ 24hr   Neurology: no focal neurological deficit  Access: R femoral HD cath         LABS:                        8.1    12.33 )-----------( 269      ( 19 May 2020 05:59 )             25.6     05-19    137  |  97  |  43<H>  ----------------------------<  196<H>  3.8   |  24  |  2.65<H>    Ca    8.6      19 May 2020 05:59  Phos  3.1     05-19  Mg     1.9     05-19    TPro  6.8  /  Alb  2.9<L>  /  TBili  1.1  /  DBili  x   /  AST  189<H>  /  ALT  166<H>  /  AlkPhos  160<H>  05-19      PT/INR - ( 19 May 2020 05:59 )   PT: 22.9 sec;   INR: 1.97          PTT - ( 19 May 2020 07:10 )  PTT:42.6 sec          RADIOLOGY & ADDITIONAL STUDIES:    < from: Xray Chest 1 View- PORTABLE-Routine (05.19.20 @ 04:34) >      INTERPRETATION:    Examination: XR CHEST    History: , Shortness of Breath    Findings:  Cardiomegaly. Patchy bilateral airspace opacities throughout both lungs. Pulmonary vascular congestion. Left pleural effusion. No pneumothorax.    Impression:  1.  Pulmonary vascular congestion. Patchy bilateral airspace opacities throughout both lungs, focal edema and/or pneumonia. Opacity in the right lung isslightly improved.      DISCRETE X-RAY DATA:  Percent of LEFT lung opacification: 34-66%  Percent of RIGHT lung opacification: %  Change in lung opacification from most recent x-ray (<=3 days): Decrease  Change from prior dated 3 or more days (same admission): Increase      < end of copied text > on aquapheresis, no issues overnight  UF net neg 40 ml/hr, still positive fluid balance  on heparin ggt to prevent clotting PTT 42, at goal       Meds:  acetaminophen   Tablet .. 650 every 6 hours PRN  aMIOdarone    Tablet 200 daily  apixaban 2.5 every 12 hours  atorvastatin 40 at bedtime  chlorhexidine 2% Cloths 1 <User Schedule>  clopidogrel Tablet 75 daily  collagenase Ointment 1 daily  DAPTOmycin IVPB 600 every 48 hours  dextrose 10%. 1000 <Continuous>  dextrose 40% Gel 15 once PRN  dextrose 5%. 1000 <Continuous>  dextrose 50% Injectable 12.5 once  dextrose 50% Injectable 25 once  dextrose 50% Injectable 50 once  digoxin     Tablet 0.0625 every other day  glucagon  Injectable 1 once PRN  guaiFENesin   Syrup  (Sugar-Free) 100 once PRN  heparin  Infusion 800 <Continuous>  insulin lispro (HumaLOG) corrective regimen sliding scale  Before meals and at bedtime  meropenem Injectable 500 every 24 hours  midodrine 10 every 8 hours  multivitamin 1 daily  oxycodone    5 mG/acetaminophen 325 mG 1 every 6 hours PRN  phenylephrine    Infusion 0.2 <Continuous>  senna 2 at bedtime  sodium bicarbonate 1300 three times a day  sodium chloride 0.9% lock flush 10 every 1 hour PRN      T(C): , Max: 37.2 (05-19-20 @ 01:01)  T(F): , Max: 98.9 (05-19-20 @ 01:01)  HR: 123 (05-19-20 @ 13:00)  BP: 106/66 (05-19-20 @ 03:00)  BP(mean): 76 (05-19-20 @ 03:00)  RR: 22 (05-19-20 @ 13:00)  SpO2: 99% (05-19-20 @ 13:00)  Wt(kg): --    05-18 @ 07:01  -  05-19 @ 07:00  --------------------------------------------------------  IN: 1494.6 mL / OUT: 900 mL / NET: 594.6 mL    05-19 @ 07:01  -  05-19 @ 13:11  --------------------------------------------------------  IN: 650.4 mL / OUT: 1455 mL / NET: -804.6 mL      PHYSICAL EXAM:  GENERAL: NAD, alert  NECK: Neck supple, +JVD  CHEST/LUNG: + rales  HEART: irregularly irregular tachycardia   ABDOMEN: Soft, Nontender, BS+  EXTREMITIES: no  edema b/l   : Moe w 790 ml/ 24hr   Neurology: no focal neurological deficit  Access: R femoral HD cath         LABS:                        8.1    12.33 )-----------( 269      ( 19 May 2020 05:59 )             25.6     05-19    137  |  97  |  43<H>  ----------------------------<  196<H>  3.8   |  24  |  2.65<H>    Ca    8.6      19 May 2020 05:59  Phos  3.1     05-19  Mg     1.9     05-19    TPro  6.8  /  Alb  2.9<L>  /  TBili  1.1  /  DBili  x   /  AST  189<H>  /  ALT  166<H>  /  AlkPhos  160<H>  05-19      PT/INR - ( 19 May 2020 05:59 )   PT: 22.9 sec;   INR: 1.97          PTT - ( 19 May 2020 07:10 )  PTT:42.6 sec          RADIOLOGY & ADDITIONAL STUDIES:    < from: Xray Chest 1 View- PORTABLE-Routine (05.19.20 @ 04:34) >      INTERPRETATION:    Examination: XR CHEST    History: , Shortness of Breath    Findings:  Cardiomegaly. Patchy bilateral airspace opacities throughout both lungs. Pulmonary vascular congestion. Left pleural effusion. No pneumothorax.    Impression:  1.  Pulmonary vascular congestion. Patchy bilateral airspace opacities throughout both lungs, focal edema and/or pneumonia. Opacity in the right lung isslightly improved.      DISCRETE X-RAY DATA:  Percent of LEFT lung opacification: 34-66%  Percent of RIGHT lung opacification: %  Change in lung opacification from most recent x-ray (<=3 days): Decrease  Change from prior dated 3 or more days (same admission): Increase      < end of copied text >

## 2020-05-19 NOTE — PROGRESS NOTE ADULT - ATTENDING COMMENTS
lytes ok--nonoliguric and renal fxn appears stable  cont aquapheresis for UF as tolerated to aid pulm status -- goal 70 cc/hour net for now if tolerates  mgt rapid AF per team/cardiology

## 2020-05-19 NOTE — PROGRESS NOTE ADULT - ASSESSMENT
84M PMH of HTN, DM, Afib, HF EF 35%, CKD3, PAD admitted with left 3rd/4th toe gangrene s/p amputation, debridement and LLE angiogram with KECIA stents x2 (5/6) Post-op course complicated by anuric renal failure secondary to likely contrast-induced nephropathy leading to flash pulmonary oedema requiring dialysis on 5/13 and 5/14, hypoxic respiratory failure requiring NIPPV, worsening septic shock requiring phenylephrine, and Afib with RVR. On 5/15, pt's rate went to 170s not breaking despite amiodarone bolus and cardioversion. Pt will complete a full 24hr IV amiodarone load and transferred to medicine for further management management.    Neuro:    Tylenol Percocet prn     CV:   #Afib w/ RVR-HR better controlled today, with HR 90s-low 100s. Now transitioned to PO amiodarone 200mg qd, and intermittent digoxin (to be used while inpatient where level can be easily monitored). Levels s/p dialysis. Monitoring of LFTs while on amiodarone-->improved today but overall elevated.   - c/w renally dosed eliquis. f/u EP.      # Shock--> c/w midodrine, wean phenylephrine gtt goal sbps 90s. Goal is to maintain a lows systolic promoting reduced Afterload to improve forward flow.     # PVD--> c/w plavix, lipitor    Pulm:   overall respiratory status of patient improved today with less accessory muscle use, and improved lung exam. CXR with improved congestion, likely in setting of continued dialysis.   -still on HFNC, will wean as tolerated.     Renal:   #ZEENAT on CKD--> likely 2/2 due to ATN 2/2 to SHERI/vanc now s/p multiple rounds of dialysis  -currently tolerating aquapheresis, with no HD cath difficulties given intiation on hep gtt.  - currently on 80mg lasix BID--> will discuss w/ renal whether to continue or not    GI:  Renal Mech Soft. MVI senna   -Tolerating PO     ID:   #MRSA and VRE--> c/w with DAPTO and meropenem-last day 6/17-has peripheral picc.     Endo:   With intermittent episodes of hypoglycemia, on d10-->c/w ISS  ISS    PPx: SCDS, Elqiuis   Lines: EJ,PICC,, R fem HD cath   Wounds: dakins wet to dry to toe wound

## 2020-05-19 NOTE — PROGRESS NOTE ADULT - ASSESSMENT
85 y/o AAM w/PMH ckd3,  HTN/DM/A-fib on Xarelto/HF EF 35%, and PAD, recently admitted in march for toe gangrene s/p left 3rd toe amputation/ presented back to ED from Dr. Mcclellan's office for further work up of LLE pain and swelling, nephrology consulted for ZEENAT on CKD III  s/p Angio with angioplasty/stent 5/6     # ZEENAT on CKD  - non-oliguric  - multifactorial, ATN, with possible SHERI and vancomycin related nephrotoxicity  - access:  R femoral HD cath, s/p HD on 05/13, 05/14, 05/15 in light of volume overload  - acceptable electrolytes  - on aquapheresis for fluid overload, will increase UF to net negative 70 ml/hr   - hold diuretics   - renal diet/  Nepro  - renally adjusted meds/ ABx  - monitor BUN/Cr, lytes, uop   - strict I/O, daily weight   - keep MAP >65 mmHg to ensure renal perfusion

## 2020-05-19 NOTE — PROGRESS NOTE ADULT - SUBJECTIVE AND OBJECTIVE BOX
OVERNIGHT EVENTS: converted to PO amio overnight, Aquapheresis restarted with no difficulty.      SUBJECTIVE / INTERVAL HPI: Patient seen and examined at bedside. Reports improvement in sob, no CP, no palpitations, fevers, cough. still with continued foot pain.     Review of systems negative except as noted above.     VITAL SIGNS:  Vital Signs Last 24 Hrs  T(C): 36.8 (19 May 2020 05:01), Max: 37.2 (19 May 2020 01:01)  T(F): 98.3 (19 May 2020 05:01), Max: 98.9 (19 May 2020 01:01)  HR: 112 (19 May 2020 10:00) (86 - 131)  BP: 106/66 (19 May 2020 03:00) (95/60 - 129/72)  BP(mean): 76 (19 May 2020 03:00) (71 - 101)  RR: 24 (19 May 2020 10:00) (20 - 27)  SpO2: 96% (19 May 2020 10:00) (93% - 100%)      05-18-20 @ 07:01  -  05-19-20 @ 07:00  --------------------------------------------------------  IN: 1494.6 mL / OUT: 900 mL / NET: 594.6 mL    05-19-20 @ 07:01  -  05-19-20 @ 10:33  --------------------------------------------------------  IN: 317.7 mL / OUT: 1175 mL / NET: -857.3 mL        PHYSICAL EXAM:    General: NAD, on HFNC  HEENT: NC/AT; anicteric sclera  Neck: supple  Cardiovascular: +S1/S2; RRR  Respiratory: CTA B/L; no W/R/R  Gastrointestinal: soft, NT/ND; +BS  Extremities: WWP; no edema, clubbing or cyanosis  Vascular: 2+ radial, DP pulses B/L  Neurological: AAOx3; no focal deficits    MEDICATIONS:  MEDICATIONS  (STANDING):  aMIOdarone    Tablet 200 milliGRAM(s) Oral daily  apixaban 2.5 milliGRAM(s) Oral every 12 hours  atorvastatin 40 milliGRAM(s) Oral at bedtime  chlorhexidine 2% Cloths 1 Application(s) Topical <User Schedule>  clopidogrel Tablet 75 milliGRAM(s) Oral daily  collagenase Ointment 1 Application(s) Topical daily  DAPTOmycin IVPB 600 milliGRAM(s) IV Intermittent every 48 hours  dextrose 10%. 1000 milliLiter(s) (50 mL/Hr) IV Continuous <Continuous>  dextrose 5%. 1000 milliLiter(s) (50 mL/Hr) IV Continuous <Continuous>  dextrose 50% Injectable 12.5 Gram(s) IV Push once  dextrose 50% Injectable 25 Gram(s) IV Push once  dextrose 50% Injectable 50 milliLiter(s) IV Push once  digoxin     Tablet 0.0625 milliGRAM(s) Oral every other day  furosemide   Injectable 80 milliGRAM(s) IV Push every 12 hours  heparin  Infusion 800 Unit(s)/Hr (8 mL/Hr) IV Continuous <Continuous>  insulin lispro (HumaLOG) corrective regimen sliding scale   SubCutaneous Before meals and at bedtime  meropenem Injectable 500 milliGRAM(s) IV Push every 24 hours  midodrine 10 milliGRAM(s) Oral every 8 hours  multivitamin 1 Tablet(s) Oral daily  phenylephrine    Infusion 0.2 MICROgram(s)/kG/Min (5.78 mL/Hr) IV Continuous <Continuous>  senna 2 Tablet(s) Oral at bedtime  sodium bicarbonate 1300 milliGRAM(s) Oral three times a day    MEDICATIONS  (PRN):  acetaminophen   Tablet .. 650 milliGRAM(s) Oral every 6 hours PRN Mild Pain (1 - 3)  dextrose 40% Gel 15 Gram(s) Oral once PRN Blood Glucose LESS THAN 70 milliGRAM(s)/deciliter  glucagon  Injectable 1 milliGRAM(s) IntraMuscular once PRN Glucose LESS THAN 70 milligrams/deciliter  guaiFENesin   Syrup  (Sugar-Free) 100 milliGRAM(s) Oral once PRN Cough  oxycodone    5 mG/acetaminophen 325 mG 1 Tablet(s) Oral every 6 hours PRN Severe Pain (7 - 10)  sodium chloride 0.9% lock flush 10 milliLiter(s) IV Push every 1 hour PRN Pre/post blood products, medications, blood draw, and to maintain line patency      ALLERGIES:  Allergies    No Known Allergies    Intolerances        LABS:                        8.1    12.33 )-----------( 269      ( 19 May 2020 05:59 )             25.6     05-19    137  |  97  |  43<H>  ----------------------------<  196<H>  3.8   |  24  |  2.65<H>    Ca    8.6      19 May 2020 05:59  Phos  3.1     05-19  Mg     1.9     05-19    TPro  6.8  /  Alb  2.9<L>  /  TBili  1.1  /  DBili  x   /  AST  189<H>  /  ALT  166<H>  /  AlkPhos  160<H>  05-19    PT/INR - ( 19 May 2020 05:59 )   PT: 22.9 sec;   INR: 1.97          PTT - ( 19 May 2020 07:10 )  PTT:42.6 sec    CAPILLARY BLOOD GLUCOSE      POCT Blood Glucose.: 197 mg/dL (19 May 2020 05:43)          RADIOLOGY & ADDITIONAL TESTS: Reviewed. OVERNIGHT EVENTS: converted to PO amio overnight, Aquapheresis restarted with no difficulty.      SUBJECTIVE / INTERVAL HPI: Patient seen and examined at bedside. Reports improvement in sob, no CP, no palpitations, fevers, cough. still with continued foot pain.     Review of systems negative except as noted above.     VITAL SIGNS:  Vital Signs Last 24 Hrs  T(C): 36.8 (19 May 2020 05:01), Max: 37.2 (19 May 2020 01:01)  T(F): 98.3 (19 May 2020 05:01), Max: 98.9 (19 May 2020 01:01)  HR: 112 (19 May 2020 10:00) (86 - 131)  BP: 106/66 (19 May 2020 03:00) (95/60 - 129/72)  BP(mean): 76 (19 May 2020 03:00) (71 - 101)  RR: 24 (19 May 2020 10:00) (20 - 27)  SpO2: 96% (19 May 2020 10:00) (93% - 100%)      05-18-20 @ 07:01  -  05-19-20 @ 07:00  --------------------------------------------------------  IN: 1494.6 mL / OUT: 900 mL / NET: 594.6 mL    05-19-20 @ 07:01  -  05-19-20 @ 10:33  --------------------------------------------------------  IN: 317.7 mL / OUT: 1175 mL / NET: -857.3 mL        PHYSICAL EXAM:    General: NAD, with no accessory muscle use this am  HEENT: PERRLA. MMM  Neck: -JVD  Cardiovascular: +S1/S2; Irregular, no murmurs.   Respiratory: Lung exam improved, Clear breath sounds b/l  Gastrointestinal: NTND BS+4  Extremities: WWP; no edema  Vascular: 2+ pulse RLE. LLE wraped  Neurological: AAOx3; no focal deficits    MEDICATIONS:  MEDICATIONS  (STANDING):  aMIOdarone    Tablet 200 milliGRAM(s) Oral daily  apixaban 2.5 milliGRAM(s) Oral every 12 hours  atorvastatin 40 milliGRAM(s) Oral at bedtime  chlorhexidine 2% Cloths 1 Application(s) Topical <User Schedule>  clopidogrel Tablet 75 milliGRAM(s) Oral daily  collagenase Ointment 1 Application(s) Topical daily  DAPTOmycin IVPB 600 milliGRAM(s) IV Intermittent every 48 hours  dextrose 10%. 1000 milliLiter(s) (50 mL/Hr) IV Continuous <Continuous>  dextrose 5%. 1000 milliLiter(s) (50 mL/Hr) IV Continuous <Continuous>  dextrose 50% Injectable 12.5 Gram(s) IV Push once  dextrose 50% Injectable 25 Gram(s) IV Push once  dextrose 50% Injectable 50 milliLiter(s) IV Push once  digoxin     Tablet 0.0625 milliGRAM(s) Oral every other day  furosemide   Injectable 80 milliGRAM(s) IV Push every 12 hours  heparin  Infusion 800 Unit(s)/Hr (8 mL/Hr) IV Continuous <Continuous>  insulin lispro (HumaLOG) corrective regimen sliding scale   SubCutaneous Before meals and at bedtime  meropenem Injectable 500 milliGRAM(s) IV Push every 24 hours  midodrine 10 milliGRAM(s) Oral every 8 hours  multivitamin 1 Tablet(s) Oral daily  phenylephrine    Infusion 0.2 MICROgram(s)/kG/Min (5.78 mL/Hr) IV Continuous <Continuous>  senna 2 Tablet(s) Oral at bedtime  sodium bicarbonate 1300 milliGRAM(s) Oral three times a day    MEDICATIONS  (PRN):  acetaminophen   Tablet .. 650 milliGRAM(s) Oral every 6 hours PRN Mild Pain (1 - 3)  dextrose 40% Gel 15 Gram(s) Oral once PRN Blood Glucose LESS THAN 70 milliGRAM(s)/deciliter  glucagon  Injectable 1 milliGRAM(s) IntraMuscular once PRN Glucose LESS THAN 70 milligrams/deciliter  guaiFENesin   Syrup  (Sugar-Free) 100 milliGRAM(s) Oral once PRN Cough  oxycodone    5 mG/acetaminophen 325 mG 1 Tablet(s) Oral every 6 hours PRN Severe Pain (7 - 10)  sodium chloride 0.9% lock flush 10 milliLiter(s) IV Push every 1 hour PRN Pre/post blood products, medications, blood draw, and to maintain line patency      ALLERGIES:  Allergies    No Known Allergies    Intolerances        LABS:                        8.1    12.33 )-----------( 269      ( 19 May 2020 05:59 )             25.6     05-19    137  |  97  |  43<H>  ----------------------------<  196<H>  3.8   |  24  |  2.65<H>    Ca    8.6      19 May 2020 05:59  Phos  3.1     05-19  Mg     1.9     05-19    TPro  6.8  /  Alb  2.9<L>  /  TBili  1.1  /  DBili  x   /  AST  189<H>  /  ALT  166<H>  /  AlkPhos  160<H>  05-19    PT/INR - ( 19 May 2020 05:59 )   PT: 22.9 sec;   INR: 1.97          PTT - ( 19 May 2020 07:10 )  PTT:42.6 sec    CAPILLARY BLOOD GLUCOSE      POCT Blood Glucose.: 197 mg/dL (19 May 2020 05:43)          RADIOLOGY & ADDITIONAL TESTS: Reviewed.

## 2020-05-19 NOTE — PROGRESS NOTE ADULT - SUBJECTIVE AND OBJECTIVE BOX
Vascular Surgery Consult - Progress Note    Subjective:  No acute complaints, though states he is tired of all the treatments. Denies pain. Denies CP.    Vital Signs Last 24 Hrs  T(C): 36.7 (19 May 2020 10:00), Max: 37.2 (19 May 2020 01:01)  T(F): 98 (19 May 2020 10:00), Max: 98.9 (19 May 2020 01:01)  HR: 121 (19 May 2020 11:00) (86 - 131)  BP: 106/66 (19 May 2020 03:00) (95/60 - 129/72)  BP(mean): 76 (19 May 2020 03:00) (71 - 101)  RR: 25 (19 May 2020 11:00) (20 - 27)  SpO2: 93% (19 May 2020 11:00) (93% - 100%)    I&O's Summary  18 May 2020 07:01  -  19 May 2020 07:00  --------------------------------------------------------  IN: 1494.6 mL / OUT: 900 mL / NET: 594.6 mL    19 May 2020 07:01  -  19 May 2020 11:39  --------------------------------------------------------  IN: 383.6 mL / OUT: 1200 mL / NET: -816.4 mL    Physical Exam:  General: NAD   Pulmonary: b/l breath sounds   Cardiovascular: tachycardic. irregular rate   Extremities: Left foot wound slightly more dry   no edema. no surrounding erythema   Pt signal    LABS:                     8.1    12.33 )-----------( 269      ( 19 May 2020 05:59 )             25.6     05-19  137  |  97  |  43<H>  ----------------------------<  196<H>  3.8   |  24  |  2.65<H>    Ca    8.6      19 May 2020 05:59  Phos  3.1     05-19  Mg     1.9     05-19    TPro  6.8  /  Alb  2.9<L>  /  TBili  1.1  /  DBili  x   /  AST  189<H>  /  ALT  166<H>  /  AlkPhos  160<H>  05-19    PT/INR - ( 19 May 2020 05:59 )   PT: 22.9 sec;   INR: 1.97     PTT - ( 19 May 2020 07:10 )  PTT:42.6 sec    LIVER FUNCTIONS - ( 19 May 2020 05:59 )  Alb: 2.9 g/dL / Pro: 6.8 g/dL / ALK PHOS: 160 U/L / ALT: 166 U/L / AST: 189 U/L / GGT: x           CAPILLARY BLOOD GLUCOSE  POCT Blood Glucose.: 193 mg/dL (19 May 2020 11:13)  POCT Blood Glucose.: 25 mg/dL (19 May 2020 11:11)  POCT Blood Glucose.: 197 mg/dL (19 May 2020 05:43)  POCT Blood Glucose.: 91 mg/dL (18 May 2020 21:26)  POCT Blood Glucose.: 50 mg/dL (18 May 2020 19:36)  POCT Blood Glucose.: 51 mg/dL (18 May 2020 17:38)  POCT Blood Glucose.: 65 mg/dL (18 May 2020 16:37)  POCT Blood Glucose.: 91 mg/dL (18 May 2020 11:52)

## 2020-05-19 NOTE — PROGRESS NOTE ADULT - ATTENDING COMMENTS
Had another fingerstick in the hypoglycemic range last night, but no confirmatory met panel was drawn.  He was given D50W IV, but a follow-up fingerstick was again in the 50 range.  He was then started on D10W, and is still on it at present, although it has been tapered.  His fingersticks on the D10 have been normal, and he also had one elevated value of 194 mg%.   At this point, we are still strongly suspicious that the low fingersticks are factitious, but cannot put this question to rest until at least one confirmatory met panel is done before pt is given IV dextrose.  (The failure of his glucose to rise after an amp of D50W is of course strongly suggestive that the low fingersticks are false.

## 2020-05-20 LAB
ALBUMIN SERPL ELPH-MCNC: 2.9 G/DL — LOW (ref 3.3–5)
ALP SERPL-CCNC: 173 U/L — HIGH (ref 40–120)
ALT FLD-CCNC: 126 U/L — HIGH (ref 10–45)
ANION GAP SERPL CALC-SCNC: 17 MMOL/L — SIGNIFICANT CHANGE UP (ref 5–17)
ANION GAP SERPL CALC-SCNC: 19 MMOL/L — HIGH (ref 5–17)
APTT BLD: 44.1 SEC — HIGH (ref 27.5–36.3)
AST SERPL-CCNC: 133 U/L — HIGH (ref 10–40)
BASOPHILS # BLD AUTO: 0.01 K/UL — SIGNIFICANT CHANGE UP (ref 0–0.2)
BASOPHILS NFR BLD AUTO: 0.1 % — SIGNIFICANT CHANGE UP (ref 0–2)
BILIRUB SERPL-MCNC: 1.1 MG/DL — SIGNIFICANT CHANGE UP (ref 0.2–1.2)
BUN SERPL-MCNC: 47 MG/DL — HIGH (ref 7–23)
BUN SERPL-MCNC: 53 MG/DL — HIGH (ref 7–23)
CALCIUM SERPL-MCNC: 8.5 MG/DL — SIGNIFICANT CHANGE UP (ref 8.4–10.5)
CALCIUM SERPL-MCNC: 8.8 MG/DL — SIGNIFICANT CHANGE UP (ref 8.4–10.5)
CHLORIDE SERPL-SCNC: 93 MMOL/L — LOW (ref 96–108)
CHLORIDE SERPL-SCNC: 93 MMOL/L — LOW (ref 96–108)
CK SERPL-CCNC: 686 U/L — HIGH (ref 30–200)
CK SERPL-CCNC: 742 U/L — HIGH (ref 30–200)
CO2 SERPL-SCNC: 21 MMOL/L — LOW (ref 22–31)
CO2 SERPL-SCNC: 23 MMOL/L — SIGNIFICANT CHANGE UP (ref 22–31)
CREAT SERPL-MCNC: 2.92 MG/DL — HIGH (ref 0.5–1.3)
CREAT SERPL-MCNC: 3.13 MG/DL — HIGH (ref 0.5–1.3)
EOSINOPHIL # BLD AUTO: 0.01 K/UL — SIGNIFICANT CHANGE UP (ref 0–0.5)
EOSINOPHIL NFR BLD AUTO: 0.1 % — SIGNIFICANT CHANGE UP (ref 0–6)
GLUCOSE BLDC GLUCOMTR-MCNC: 125 MG/DL — HIGH (ref 70–99)
GLUCOSE BLDC GLUCOMTR-MCNC: 126 MG/DL — HIGH (ref 70–99)
GLUCOSE BLDC GLUCOMTR-MCNC: 129 MG/DL — HIGH (ref 70–99)
GLUCOSE BLDC GLUCOMTR-MCNC: 23 MG/DL — CRITICAL LOW (ref 70–99)
GLUCOSE BLDC GLUCOMTR-MCNC: 89 MG/DL — SIGNIFICANT CHANGE UP (ref 70–99)
GLUCOSE BLDC GLUCOMTR-MCNC: 93 MG/DL — SIGNIFICANT CHANGE UP (ref 70–99)
GLUCOSE SERPL-MCNC: 135 MG/DL — HIGH (ref 70–99)
GLUCOSE SERPL-MCNC: 141 MG/DL — HIGH (ref 70–99)
HCT VFR BLD CALC: 23.6 % — LOW (ref 39–50)
HGB BLD-MCNC: 7.5 G/DL — LOW (ref 13–17)
IMM GRANULOCYTES NFR BLD AUTO: 1.2 % — SIGNIFICANT CHANGE UP (ref 0–1.5)
INR BLD: 2.24 — HIGH (ref 0.88–1.16)
LYMPHOCYTES # BLD AUTO: 0.34 K/UL — LOW (ref 1–3.3)
LYMPHOCYTES # BLD AUTO: 2.7 % — LOW (ref 13–44)
MAGNESIUM SERPL-MCNC: 2 MG/DL — SIGNIFICANT CHANGE UP (ref 1.6–2.6)
MCHC RBC-ENTMCNC: 26.4 PG — LOW (ref 27–34)
MCHC RBC-ENTMCNC: 31.8 GM/DL — LOW (ref 32–36)
MCV RBC AUTO: 83.1 FL — SIGNIFICANT CHANGE UP (ref 80–100)
MONOCYTES # BLD AUTO: 0.17 K/UL — SIGNIFICANT CHANGE UP (ref 0–0.9)
MONOCYTES NFR BLD AUTO: 1.4 % — LOW (ref 2–14)
NEUTROPHILS # BLD AUTO: 11.74 K/UL — HIGH (ref 1.8–7.4)
NEUTROPHILS NFR BLD AUTO: 94.5 % — HIGH (ref 43–77)
NRBC # BLD: 0 /100 WBCS — SIGNIFICANT CHANGE UP (ref 0–0)
PHOSPHATE SERPL-MCNC: 3.6 MG/DL — SIGNIFICANT CHANGE UP (ref 2.5–4.5)
PLATELET # BLD AUTO: 247 K/UL — SIGNIFICANT CHANGE UP (ref 150–400)
POTASSIUM SERPL-MCNC: 4 MMOL/L — SIGNIFICANT CHANGE UP (ref 3.5–5.3)
POTASSIUM SERPL-MCNC: 4.3 MMOL/L — SIGNIFICANT CHANGE UP (ref 3.5–5.3)
POTASSIUM SERPL-SCNC: 4 MMOL/L — SIGNIFICANT CHANGE UP (ref 3.5–5.3)
POTASSIUM SERPL-SCNC: 4.3 MMOL/L — SIGNIFICANT CHANGE UP (ref 3.5–5.3)
PROT SERPL-MCNC: 6.3 G/DL — SIGNIFICANT CHANGE UP (ref 6–8.3)
PROTHROM AB SERPL-ACNC: 26.2 SEC — HIGH (ref 10–12.9)
RBC # BLD: 2.84 M/UL — LOW (ref 4.2–5.8)
RBC # FLD: 15.6 % — HIGH (ref 10.3–14.5)
SODIUM SERPL-SCNC: 133 MMOL/L — LOW (ref 135–145)
SODIUM SERPL-SCNC: 133 MMOL/L — LOW (ref 135–145)
WBC # BLD: 12.42 K/UL — HIGH (ref 3.8–10.5)
WBC # FLD AUTO: 12.42 K/UL — HIGH (ref 3.8–10.5)

## 2020-05-20 PROCEDURE — 99291 CRITICAL CARE FIRST HOUR: CPT

## 2020-05-20 PROCEDURE — 99231 SBSQ HOSP IP/OBS SF/LOW 25: CPT | Mod: GC

## 2020-05-20 PROCEDURE — 71045 X-RAY EXAM CHEST 1 VIEW: CPT | Mod: 26

## 2020-05-20 PROCEDURE — 99233 SBSQ HOSP IP/OBS HIGH 50: CPT

## 2020-05-20 RX ORDER — MEROPENEM 1 G/30ML
500 INJECTION INTRAVENOUS EVERY 24 HOURS
Refills: 0 | Status: DISCONTINUED | OUTPATIENT
Start: 2020-05-20 | End: 2020-05-21

## 2020-05-20 RX ORDER — DILTIAZEM HCL 120 MG
1 CAPSULE, EXT RELEASE 24 HR ORAL
Qty: 0 | Refills: 0 | DISCHARGE

## 2020-05-20 RX ORDER — GABAPENTIN 400 MG/1
1 CAPSULE ORAL
Qty: 0 | Refills: 0 | DISCHARGE

## 2020-05-20 RX ORDER — RISPERIDONE 4 MG/1
1 TABLET ORAL
Qty: 0 | Refills: 0 | DISCHARGE

## 2020-05-20 RX ORDER — DONEPEZIL HYDROCHLORIDE 10 MG/1
1 TABLET, FILM COATED ORAL
Qty: 0 | Refills: 0 | DISCHARGE

## 2020-05-20 RX ORDER — ATORVASTATIN CALCIUM 80 MG/1
1 TABLET, FILM COATED ORAL
Qty: 0 | Refills: 0 | DISCHARGE

## 2020-05-20 RX ORDER — METOPROLOL TARTRATE 50 MG
1 TABLET ORAL
Qty: 0 | Refills: 0 | DISCHARGE

## 2020-05-20 RX ORDER — FLUTICASONE FUROATE, UMECLIDINIUM BROMIDE AND VILANTEROL TRIFENATATE 200; 62.5; 25 UG/1; UG/1; UG/1
1 POWDER RESPIRATORY (INHALATION)
Qty: 0 | Refills: 0 | DISCHARGE

## 2020-05-20 RX ORDER — COLLAGENASE CLOSTRIDIUM HIST. 250 UNIT/G
1 OINTMENT (GRAM) TOPICAL DAILY
Refills: 0 | Status: DISCONTINUED | OUTPATIENT
Start: 2020-05-20 | End: 2020-05-21

## 2020-05-20 RX ORDER — AMIODARONE HYDROCHLORIDE 400 MG/1
1 TABLET ORAL
Qty: 0 | Refills: 0 | DISCHARGE

## 2020-05-20 RX ORDER — FONDAPARINUX SODIUM 2.5 MG/.5ML
1 INJECTION, SOLUTION SUBCUTANEOUS
Qty: 0 | Refills: 0 | DISCHARGE

## 2020-05-20 RX ORDER — ALBUTEROL 90 UG/1
2 AEROSOL, METERED ORAL
Qty: 0 | Refills: 0 | DISCHARGE

## 2020-05-20 RX ORDER — ALPRAZOLAM 0.25 MG
1 TABLET ORAL
Qty: 0 | Refills: 0 | DISCHARGE

## 2020-05-20 RX ORDER — FUROSEMIDE 40 MG
1 TABLET ORAL
Qty: 0 | Refills: 0 | DISCHARGE

## 2020-05-20 RX ADMIN — APIXABAN 2.5 MILLIGRAM(S): 2.5 TABLET, FILM COATED ORAL at 10:31

## 2020-05-20 RX ADMIN — CLOPIDOGREL BISULFATE 75 MILLIGRAM(S): 75 TABLET, FILM COATED ORAL at 11:07

## 2020-05-20 RX ADMIN — Medication 1300 MILLIGRAM(S): at 13:35

## 2020-05-20 RX ADMIN — PHENYLEPHRINE HYDROCHLORIDE 5.78 MICROGRAM(S)/KG/MIN: 10 INJECTION INTRAVENOUS at 01:30

## 2020-05-20 RX ADMIN — AMIODARONE HYDROCHLORIDE 200 MILLIGRAM(S): 400 TABLET ORAL at 07:12

## 2020-05-20 RX ADMIN — Medication 1 TABLET(S): at 11:07

## 2020-05-20 RX ADMIN — CHLORHEXIDINE GLUCONATE 1 APPLICATION(S): 213 SOLUTION TOPICAL at 07:10

## 2020-05-20 RX ADMIN — CHLORHEXIDINE GLUCONATE 1 APPLICATION(S): 213 SOLUTION TOPICAL at 07:11

## 2020-05-20 RX ADMIN — MEROPENEM 500 MILLIGRAM(S): 1 INJECTION INTRAVENOUS at 22:21

## 2020-05-20 RX ADMIN — MIDODRINE HYDROCHLORIDE 10 MILLIGRAM(S): 2.5 TABLET ORAL at 22:21

## 2020-05-20 RX ADMIN — Medication 1300 MILLIGRAM(S): at 07:12

## 2020-05-20 RX ADMIN — Medication 650 MILLIGRAM(S): at 13:36

## 2020-05-20 RX ADMIN — HEPARIN SODIUM 8 UNIT(S)/HR: 5000 INJECTION INTRAVENOUS; SUBCUTANEOUS at 01:30

## 2020-05-20 RX ADMIN — MIDODRINE HYDROCHLORIDE 10 MILLIGRAM(S): 2.5 TABLET ORAL at 07:12

## 2020-05-20 RX ADMIN — MIDODRINE HYDROCHLORIDE 10 MILLIGRAM(S): 2.5 TABLET ORAL at 13:36

## 2020-05-20 RX ADMIN — Medication 0.06 MILLIGRAM(S): at 11:06

## 2020-05-20 RX ADMIN — Medication 1300 MILLIGRAM(S): at 22:21

## 2020-05-20 RX ADMIN — SENNA PLUS 2 TABLET(S): 8.6 TABLET ORAL at 22:22

## 2020-05-20 RX ADMIN — Medication 1 APPLICATION(S): at 10:37

## 2020-05-20 RX ADMIN — APIXABAN 2.5 MILLIGRAM(S): 2.5 TABLET, FILM COATED ORAL at 22:22

## 2020-05-20 NOTE — PROGRESS NOTE ADULT - SUBJECTIVE AND OBJECTIVE BOX
on aquapheresis, well tolerated net neg UF at 80 ml/hr, no issues overnight  net negative 1.8 L/ 24hr   cxr w signs of improvement, however remains congested    on HFNC c FiO2 at 75%, sat 88%      Meds:  acetaminophen   Tablet .. 650 every 6 hours PRN  aMIOdarone    Tablet 200 daily  apixaban 2.5 every 12 hours  atorvastatin 40 at bedtime  chlorhexidine 2% Cloths 1 <User Schedule>  clopidogrel Tablet 75 daily  collagenase Ointment 1 daily  collagenase Ointment 1 daily  DAPTOmycin IVPB 600 every 48 hours  dextrose 40% Gel 15 once PRN  dextrose 5%. 1000 <Continuous>  dextrose 50% Injectable 12.5 once  dextrose 50% Injectable 25 once  dextrose 50% Injectable 50 once  digoxin     Tablet 0.0625 every other day  glucagon  Injectable 1 once PRN  heparin  Infusion 800 <Continuous>  insulin lispro (HumaLOG) corrective regimen sliding scale  Before meals and at bedtime  meropenem Injectable 500 every 24 hours  midodrine 10 every 8 hours  multivitamin 1 daily  oxycodone    5 mG/acetaminophen 325 mG 1 every 6 hours PRN  phenylephrine    Infusion 0.2 <Continuous>  senna 2 at bedtime  sodium bicarbonate 1300 three times a day  sodium chloride 0.9% lock flush 10 every 1 hour PRN      T(C): , Max: 37.1 (05-20-20 @ 05:38)  T(F): , Max: 98.7 (05-20-20 @ 05:38)  HR: 117 (05-20-20 @ 11:00)  BP: 117/66  BP(mean): --  RR: 22 (05-20-20 @ 08:52)  SpO2: 88% (05-20-20 @ 11:00)  Wt(kg): --    05-19 @ 07:01  -  05-20 @ 07:00  --------------------------------------------------------  IN: 1279.6 mL / OUT: 3149 mL / NET: -1869.4 mL    05-20 @ 07:01  -  05-20 @ 12:01  --------------------------------------------------------  IN: 98 mL / OUT: 20 mL / NET: 78 mL        PHYSICAL EXAM:  GENERAL: NAD, alert  NECK: supple, +JVD  CHEST/LUNG: + rales  HEART: irregularly irregular tachycardia   ABDOMEN: Soft, Nontender, BS+  EXTREMITIES: no edema b/l   : Moe, uop 500 ml/ 24hr   Neurology: no focal neurological deficit  Access: R femoral HD cath         LABS:                        7.5    12.42 )-----------( 247      ( 20 May 2020 05:35 )             23.6     05-20    133<L>  |  93<L>  |  47<H>  ----------------------------<  135<H>  4.0   |  23  |  2.92<H>    Ca    8.5      20 May 2020 05:35  Phos  3.6     05-20  Mg     2.0     05-20    TPro  6.3  /  Alb  2.9<L>  /  TBili  1.1  /  DBili  x   /  AST  133<H>  /  ALT  126<H>  /  AlkPhos  173<H>  05-20      PT/INR - ( 20 May 2020 05:35 )   PT: 26.2 sec;   INR: 2.24          PTT - ( 20 May 2020 05:35 )  PTT:44.1 sec          RADIOLOGY & ADDITIONAL STUDIES:    reviewed on aquapheresis, well tolerated net neg UF at 80 ml/hr, no issues overnight  net negative 1.8 L/ 24hr   cxr w signs of improvement, however remains quite congested    on HFNC c FiO2 at 75%, sat 88%      Meds:  acetaminophen   Tablet .. 650 every 6 hours PRN  aMIOdarone    Tablet 200 daily  apixaban 2.5 every 12 hours  atorvastatin 40 at bedtime  chlorhexidine 2% Cloths 1 <User Schedule>  clopidogrel Tablet 75 daily  collagenase Ointment 1 daily  collagenase Ointment 1 daily  DAPTOmycin IVPB 600 every 48 hours  dextrose 40% Gel 15 once PRN  dextrose 5%. 1000 <Continuous>  dextrose 50% Injectable 12.5 once  dextrose 50% Injectable 25 once  dextrose 50% Injectable 50 once  digoxin     Tablet 0.0625 every other day  glucagon  Injectable 1 once PRN  heparin  Infusion 800 <Continuous>  insulin lispro (HumaLOG) corrective regimen sliding scale  Before meals and at bedtime  meropenem Injectable 500 every 24 hours  midodrine 10 every 8 hours  multivitamin 1 daily  oxycodone    5 mG/acetaminophen 325 mG 1 every 6 hours PRN  phenylephrine    Infusion 0.2 <Continuous>  senna 2 at bedtime  sodium bicarbonate 1300 three times a day  sodium chloride 0.9% lock flush 10 every 1 hour PRN      T(C): , Max: 37.1 (05-20-20 @ 05:38)  T(F): , Max: 98.7 (05-20-20 @ 05:38)  HR: 117 (05-20-20 @ 11:00)  BP: 117/66  BP(mean): --  RR: 22 (05-20-20 @ 08:52)  SpO2: 88% (05-20-20 @ 11:00)  Wt(kg): --    05-19 @ 07:01  -  05-20 @ 07:00  --------------------------------------------------------  IN: 1279.6 mL / OUT: 3149 mL / NET: -1869.4 mL    05-20 @ 07:01  -  05-20 @ 12:01  --------------------------------------------------------  IN: 98 mL / OUT: 20 mL / NET: 78 mL        PHYSICAL EXAM:  GENERAL: NAD, alert  NECK: supple, no JVD  CHEST/LUNG: + rales b/l diffusely  HEART: irregularly irregular tachycardia   ABDOMEN: Soft, Nontender, BS+  EXTREMITIES: no edema b/l   : Moe, uop 500 ml/ 24hr   Neurology: no focal neurological deficit  Access: R femoral HD cath         LABS:                        7.5    12.42 )-----------( 247      ( 20 May 2020 05:35 )             23.6     05-20    133<L>  |  93<L>  |  47<H>  ----------------------------<  135<H>  4.0   |  23  |  2.92<H>    Ca    8.5      20 May 2020 05:35  Phos  3.6     05-20  Mg     2.0     05-20    TPro  6.3  /  Alb  2.9<L>  /  TBili  1.1  /  DBili  x   /  AST  133<H>  /  ALT  126<H>  /  AlkPhos  173<H>  05-20      PT/INR - ( 20 May 2020 05:35 )   PT: 26.2 sec;   INR: 2.24          PTT - ( 20 May 2020 05:35 )  PTT:44.1 sec          RADIOLOGY & ADDITIONAL STUDIES:    reviewed

## 2020-05-20 NOTE — PROGRESS NOTE ADULT - ATTENDING COMMENTS
As noted by Dr. Ash, pt finally had a metabolic panel drawn at the time of a low fingerstick before D50W as given, and the discordance was striking--23 on the FS, 93 on the met panel.  Would do this one more time for any low fingersticks.  If the discordance is again confirmed, will then discontinue the fingersticks until/unless pt shows hypoglycemia on random met panels

## 2020-05-20 NOTE — PROGRESS NOTE ADULT - ASSESSMENT
84M PMH of HTN, DM, Afib, HF EF 35%, CKD3, PAD admitted with left 3rd/4th toe gangrene s/p amputation, debridement and LLE angiogram with KECIA stents x2 (5/6) Post-op course complicated by anuric renal failure secondary to likely contrast-induced nephropathy leading to flash pulmonary oedema requiring dialysis on 5/13 and 5/14, hypoxic respiratory failure requiring NIPPV, worsening septic shock requiring phenylephrine, and Afib with RVR. On 5/15, pt's rate went to 170s not breaking despite amiodarone bolus and cardioversion. Pt will complete a full 24hr IV amiodarone load and transferred to medicine for further management management.    Neuro:    Tylenol Percocet prn. Awake and alert, no active issues.    CV:   #Afib w/ RVR-HR 120s Now transitioned to PO amiodarone 200mg qd, and intermittent digoxin (to be used while inpatient where level can be easily monitored). Levels s/p dialysis. Monitoring of LFTs while on amiodarone-->improved today but overall elevated-will d/c lipitor in the meantime.     - c/w renally dosed eliquis. f/u EP.      # Shock--> c/w midodrine, wean phenylephrine gtt goal sbps 90s. Goal is to maintain a lows systolic promoting reduced Afterload to improve forward flow.     # PVD--> c/w plavix  - no lipitor for now 84M PMH of HTN, DM, Afib, HF EF 35%, CKD3, PAD admitted with left 3rd/4th toe gangrene s/p amputation, debridement and LLE angiogram with KECIA stents x2 (5/6) Post-op course complicated by anuric renal failure secondary to likely contrast-induced nephropathy leading to flash pulmonary oedema requiring dialysis on 5/13 and 5/14, hypoxic respiratory failure requiring NIPPV, worsening septic shock requiring phenylephrine, and Afib with RVR. On 5/15, pt's rate went to 170s not breaking despite amiodarone bolus and cardioversion. Pt will complete a full 24hr IV amiodarone load and transferred to medicine for further management management.    Neuro:    Tylenol Percocet prn. Awake and alert, no active issues.    CV:   #Afib w/ RVR-HR 120s Now transitioned to PO amiodarone 200mg qd, and intermittent digoxin (to be used while inpatient where level can be easily monitored). Levels s/p dialysis. Monitoring of LFTs while on amiodarone-->improved today but overall elevated-will d/c lipitor in the meantime.   -discuss with EP given patient with persistent tachycardia >120 while on Digoxin and amiodarone--> may need EP procedure  - c/w renally dosed eliquis. f/u EP.    # Shock--> Hx of sepsis, and EF of 20%, now worsened given persistent tachycardia with poor filling times. C/w midodrine, wean phenylephrine gtt goal sbps 90s. Goal is to maintain a lows systolic promoting reduced Afterload to improve forward flow.   # PVD--> c/w plavix  < from: [facility] or F/U (05.14.20 @ 12:19) >   1. Moderate symmetric left ventricular hypertrophy.   2. Severely reduced left ventricular systolic function.   3. The right ventricle is not well visualized.   4. Biatrial enlargement.   5. Mild mitral regurgitation.   6. Moderate tricuspid regurgitation.   7. Pulmonary artery systolic pressure is 37 mmHg.   8. No pericardial effusion.   9. Left pleural effusion.   ejection fraction of 20% with global hypokinesis.    Pulm:  #Pulmonary edema  Multifactorial-2/2 to fluid overload due to ZEENAT on CKD in addition to poor baseline cardiac function with EF 20%. Currently on HFNC with increased pulmonary congestion on CXR this am with increased WOB in physical exam.  - discused with renal--> will continue with aquapheresis goal rate at 75-100cc hour of removal  - holding on diuresis for now   - wean O2 as tolerated     Renal:   #ZEENAT on CKD--> with good UOP overnight  likely 2/2 due to ATN 2/2 to SHERI/vanc now s/p multiple rounds of dialysis  -currently tolerating aquapheresis, with no HD cath difficulties given intiation on hep gtt.  - holding further lasix    GI:  Renal Mech Soft. MVI senna   -Tolerating PO     ID:   #MRSA and VRE--> c/w with DAPTO and meropenem-last day 6/17-has peripheral picc.   - vascular team following     Endo:   -hypoglycemic when fingersticks taken at hands- continue to check blood glucose from lines  - ISS  - will give glucose as needed     PPx: [email or web address]   Lines: EJ,PICC,, R fem HD cath   Wounds: dakins wet to dry to toe wound

## 2020-05-20 NOTE — PROGRESS NOTE ADULT - ATTENDING COMMENTS
lytes ok  will cont aquapheresis and increase rate of UF to aid pulm status   HR control per cardiology/EP

## 2020-05-20 NOTE — PROGRESS NOTE ADULT - SUBJECTIVE AND OBJECTIVE BOX
INTERVAL HPI/OVERNIGHT EVENTS:    Patient is a 84y old  Male who presents with a chief complaint of Gangrene (19 May 2020 10:32)      Pt reports the following symptoms:    CONSTITUTIONAL:  Negative fever or chills, feels well, good appetite  EYES:  Negative  blurry vision or double vision  CARDIOVASCULAR:  Negative for chest pain or palpitations  RESPIRATORY:  Negative for cough, wheezing, or SOB   GASTROINTESTINAL:  Negative for nausea, vomiting, diarrhea, constipation, or abdominal pain  GENITOURINARY:  Negative frequency, urgency or dysuria  NEUROLOGIC:  No headache, confusion, dizziness, lightheadedness    MEDICATIONS  (STANDING):  aMIOdarone    Tablet 200 milliGRAM(s) Oral daily  apixaban 2.5 milliGRAM(s) Oral every 12 hours  atorvastatin 40 milliGRAM(s) Oral at bedtime  chlorhexidine 2% Cloths 1 Application(s) Topical <User Schedule>  clopidogrel Tablet 75 milliGRAM(s) Oral daily  collagenase Ointment 1 Application(s) Topical daily  collagenase Ointment 1 Application(s) Topical daily  DAPTOmycin IVPB 600 milliGRAM(s) IV Intermittent every 48 hours  dextrose 5%. 1000 milliLiter(s) (50 mL/Hr) IV Continuous <Continuous>  dextrose 50% Injectable 12.5 Gram(s) IV Push once  dextrose 50% Injectable 25 Gram(s) IV Push once  dextrose 50% Injectable 50 milliLiter(s) IV Push once  digoxin     Tablet 0.0625 milliGRAM(s) Oral every other day  heparin  Infusion 800 Unit(s)/Hr (8 mL/Hr) IV Continuous <Continuous>  insulin lispro (HumaLOG) corrective regimen sliding scale   SubCutaneous Before meals and at bedtime  meropenem Injectable 500 milliGRAM(s) IV Push every 24 hours  midodrine 10 milliGRAM(s) Oral every 8 hours  multivitamin 1 Tablet(s) Oral daily  phenylephrine    Infusion 0.2 MICROgram(s)/kG/Min (5.78 mL/Hr) IV Continuous <Continuous>  senna 2 Tablet(s) Oral at bedtime  sodium bicarbonate 1300 milliGRAM(s) Oral three times a day    MEDICATIONS  (PRN):  acetaminophen   Tablet .. 650 milliGRAM(s) Oral every 6 hours PRN Mild Pain (1 - 3)  dextrose 40% Gel 15 Gram(s) Oral once PRN Blood Glucose LESS THAN 70 milliGRAM(s)/deciliter  glucagon  Injectable 1 milliGRAM(s) IntraMuscular once PRN Glucose LESS THAN 70 milligrams/deciliter  oxycodone    5 mG/acetaminophen 325 mG 1 Tablet(s) Oral every 6 hours PRN Severe Pain (7 - 10)  sodium chloride 0.9% lock flush 10 milliLiter(s) IV Push every 1 hour PRN Pre/post blood products, medications, blood draw, and to maintain line patency      PHYSICAL EXAM  Vital Signs Last 24 Hrs  T(C): 37.1 (20 May 2020 05:38), Max: 37.1 (20 May 2020 05:38)  T(F): 98.7 (20 May 2020 05:38), Max: 98.7 (20 May 2020 05:38)  HR: 131 (20 May 2020 10:00) (113 - 131)  BP: --  BP(mean): --  RR: 22 (20 May 2020 08:52) (17 - 32)  SpO2: 83% (20 May 2020 10:00) (83% - 99%)    Constitutional: wn/wd in NAD.   HEENT: NCAT, MMM, OP clear, EOMI, no proptosis or lid retraction  Neck: no thyromegaly or palpable thyroid nodules   Respiratory: lungs CTAB.  Cardiovascular: regular rhythm, normal S1 and S2, no audible murmurs, no peripheral edema  GI: soft, NT/ND, no masses/HSM appreciated.  Neurology: no tremors, DTR 2+  Skin: no visible rashes/lesions  Psychiatric: AAO x 3, normal affect/mood.    LABS:                        7.5    12.42 )-----------( 247      ( 20 May 2020 05:35 )             23.6     05-20    133<L>  |  93<L>  |  47<H>  ----------------------------<  135<H>  4.0   |  23  |  2.92<H>    Ca    8.5      20 May 2020 05:35  Phos  3.6     05-20  Mg     2.0     05-20    TPro  6.3  /  Alb  2.9<L>  /  TBili  1.1  /  DBili  x   /  AST  133<H>  /  ALT  126<H>  /  AlkPhos  173<H>  05-20    PT/INR - ( 20 May 2020 05:35 )   PT: 26.2 sec;   INR: 2.24          PTT - ( 20 May 2020 05:35 )  PTT:44.1 sec    Thyroid Stimulating Hormone, Serum: 0.585 uIU/mL (05-07 @ 06:37)  Thyroid Stimulating Hormone, Serum: 0.558 uIU/mL (05-07 @ 06:37)      HbA1C: 6.0 % (01-16 @ 06:03)    CAPILLARY BLOOD GLUCOSE      POCT Blood Glucose.: 129 mg/dL (20 May 2020 07:29)  POCT Blood Glucose.: 125 mg/dL (20 May 2020 00:41)  POCT Blood Glucose.: 136 mg/dL (19 May 2020 16:18)  POCT Blood Glucose.: 193 mg/dL (19 May 2020 11:13)  POCT Blood Glucose.: 25 mg/dL (19 May 2020 11:11)      Insulin Sliding Scale requirements X 24 Hours:    RADIOLOGY & ADDITIONAL TESTS:    A/P: 84y Male with history of DM type II presenting for       1.  DM -     Please continue           units lantus at bedtime  / in the morning and        units lispro with meals and lispro moderate / low dose sliding scale 4 times daily with meals and at bedtime.  Please continue consistent carbohydrate diet.      Goal FSG is   Will continue to monitor   For discharge, pt can continue    Pt can follow up at discharge with [facility] by calling  to make an appointment.   Will discuss case with     and update primary team INTERVAL HPI/OVERNIGHT EVENTS:    Patient is a 84y old  Male who presents with a chief complaint of Gangrene (19 May 2020 10:32)  Patient seen and examined at the bedside.   Currently on aquapheresis. made about 570 cc of urine yesterday.  On midodrine 10mg Q8H, phenylephrine infusion.  Continues to be in a-fib. given the low vascular tone, his radial pulses are not that good B/l now. Before, it was good.  ON daptomycin and meropenam. Cr 2.92 and GFR 22  CXR showed pulm vascular congestion  His D10 was stopped at 500 PM yesterday.  His appetite is not that good  FSG & Insulin received:  Yesterday:  1600 FSG - 136, had some salmon  5/20  Midnight - FSg 125  500 BMP showed glucose was 135  700 Am FSG - 129, had some juice and sips of nephro  1100 FSg was 23, a repeat FSG that was drawn from A-line was 93 - asymptomatic      Pt reports the following symptoms:  CONSTITUTIONAL:  feels okay, poor appetite  CARDIOVASCULAR:  Negative for chest pain or palpitations  RESPIRATORY:  Negative for cough, wheezing, or SOB   GASTROINTESTINAL:  Negative for vomiting, diarrhea, constipation  NEUROLOGIC:  No headache, confusion, dizziness, lightheadedness      MEDICATIONS  (STANDING):  aMIOdarone    Tablet 200 milliGRAM(s) Oral daily  apixaban 2.5 milliGRAM(s) Oral every 12 hours  atorvastatin 40 milliGRAM(s) Oral at bedtime  chlorhexidine 2% Cloths 1 Application(s) Topical <User Schedule>  clopidogrel Tablet 75 milliGRAM(s) Oral daily  collagenase Ointment 1 Application(s) Topical daily  collagenase Ointment 1 Application(s) Topical daily  DAPTOmycin IVPB 600 milliGRAM(s) IV Intermittent every 48 hours  dextrose 5%. 1000 milliLiter(s) (50 mL/Hr) IV Continuous <Continuous>  dextrose 50% Injectable 12.5 Gram(s) IV Push once  dextrose 50% Injectable 25 Gram(s) IV Push once  dextrose 50% Injectable 50 milliLiter(s) IV Push once  digoxin     Tablet 0.0625 milliGRAM(s) Oral every other day  heparin  Infusion 800 Unit(s)/Hr (8 mL/Hr) IV Continuous <Continuous>  insulin lispro (HumaLOG) corrective regimen sliding scale   SubCutaneous Before meals and at bedtime  meropenem Injectable 500 milliGRAM(s) IV Push every 24 hours  midodrine 10 milliGRAM(s) Oral every 8 hours  multivitamin 1 Tablet(s) Oral daily  phenylephrine    Infusion 0.2 MICROgram(s)/kG/Min (5.78 mL/Hr) IV Continuous <Continuous>  senna 2 Tablet(s) Oral at bedtime  sodium bicarbonate 1300 milliGRAM(s) Oral three times a day    MEDICATIONS  (PRN):  acetaminophen   Tablet .. 650 milliGRAM(s) Oral every 6 hours PRN Mild Pain (1 - 3)  dextrose 40% Gel 15 Gram(s) Oral once PRN Blood Glucose LESS THAN 70 milliGRAM(s)/deciliter  glucagon  Injectable 1 milliGRAM(s) IntraMuscular once PRN Glucose LESS THAN 70 milligrams/deciliter  oxycodone    5 mG/acetaminophen 325 mG 1 Tablet(s) Oral every 6 hours PRN Severe Pain (7 - 10)  sodium chloride 0.9% lock flush 10 milliLiter(s) IV Push every 1 hour PRN Pre/post blood products, medications, blood draw, and to maintain line patency      PHYSICAL EXAM  Vital Signs Last 24 Hrs  T(C): 37.1 (20 May 2020 05:38), Max: 37.1 (20 May 2020 05:38)  T(F): 98.7 (20 May 2020 05:38), Max: 98.7 (20 May 2020 05:38)  HR: 131 (20 May 2020 10:00) (113 - 131)  BP: --  BP(mean): --  RR: 22 (20 May 2020 08:52) (17 - 32)  SpO2: 83% (20 May 2020 10:00) (83% - 99%)    Constitutional: awake, alert, NAD  Respiratory: lungs CTAB.  Cardiovascular: regular rhythm, normal S1 and S2, peripheral edema 1+  GI: soft, NT/ND, no masses/HSM appreciated.  Neurology: no tremors, DTR 2+, moves extremities, peripheries are cold    LABS:                        7.5    12.42 )-----------( 247      ( 20 May 2020 05:35 )             23.6     05-20    133<L>  |  93<L>  |  47<H>  ----------------------------<  135<H>  4.0   |  23  |  2.92<H>    Ca    8.5      20 May 2020 05:35  Phos  3.6     05-20  Mg     2.0     05-20    TPro  6.3  /  Alb  2.9<L>  /  TBili  1.1  /  DBili  x   /  AST  133<H>  /  ALT  126<H>  /  AlkPhos  173<H>  05-20    PT/INR - ( 20 May 2020 05:35 )   PT: 26.2 sec;   INR: 2.24          PTT - ( 20 May 2020 05:35 )  PTT:44.1 sec    Thyroid Stimulating Hormone, Serum: 0.585 uIU/mL (05-07 @ 06:37)  Thyroid Stimulating Hormone, Serum: 0.558 uIU/mL (05-07 @ 06:37)      HbA1C: 6.0 % (01-16 @ 06:03)    CAPILLARY BLOOD GLUCOSE      POCT Blood Glucose.: 129 mg/dL (20 May 2020 07:29)  POCT Blood Glucose.: 125 mg/dL (20 May 2020 00:41)  POCT Blood Glucose.: 136 mg/dL (19 May 2020 16:18)  POCT Blood Glucose.: 193 mg/dL (19 May 2020 11:13)  POCT Blood Glucose.: 25 mg/dL (19 May 2020 11:11)      Insulin Sliding Scale requirements X 24 Hours:    RADIOLOGY & ADDITIONAL TESTS:    A/P: 84y Male with history of DM type II presenting for       1.  DM -     Please continue           units lantus at bedtime  / in the morning and        units lispro with meals and lispro moderate / low dose sliding scale 4 times daily with meals and at bedtime.  Please continue consistent carbohydrate diet.      Goal FSG is   Will continue to monitor   For discharge, pt can continue    Pt can follow up at discharge with [facility] by calling  to make an appointment.   Will discuss case with     and update primary team INTERVAL HPI/OVERNIGHT EVENTS:    Patient is a 84y old  Male who presents with a chief complaint of Gangrene (19 May 2020 10:32)  Patient seen and examined at the bedside.   Currently on aquapheresis. made about 570 cc of urine yesterday.  On midodrine 10mg Q8H, phenylephrine infusion.  Continues to be in a-fib. given the low vascular tone, his radial pulses are not that good B/l now. Before, it was good.  ON daptomycin and meropenam. Cr 2.92 and GFR 22  CXR showed pulm vascular congestion  His D10 was stopped at 500 PM yesterday.  His appetite is not that good  FSG & Insulin received:  Yesterday:  1600 FSG - 136, had some salmon  5/20  Midnight - FSg 125  500 BMP showed glucose was 135  700 Am FSG - 129, had some juice and sips of nephro  1100 FSg was 23, a repeat FSG that was drawn from A-line was 93 - asymptomatic      Pt reports the following symptoms:  CONSTITUTIONAL:  feels okay, poor appetite  CARDIOVASCULAR:  Negative for chest pain or palpitations  RESPIRATORY:  Negative for cough, wheezing, or SOB   GASTROINTESTINAL:  Negative for vomiting, diarrhea, constipation  NEUROLOGIC:  No headache, confusion, dizziness, lightheadedness      MEDICATIONS  (STANDING):  aMIOdarone    Tablet 200 milliGRAM(s) Oral daily  apixaban 2.5 milliGRAM(s) Oral every 12 hours  atorvastatin 40 milliGRAM(s) Oral at bedtime  chlorhexidine 2% Cloths 1 Application(s) Topical <User Schedule>  clopidogrel Tablet 75 milliGRAM(s) Oral daily  collagenase Ointment 1 Application(s) Topical daily  collagenase Ointment 1 Application(s) Topical daily  DAPTOmycin IVPB 600 milliGRAM(s) IV Intermittent every 48 hours  dextrose 5%. 1000 milliLiter(s) (50 mL/Hr) IV Continuous <Continuous>  dextrose 50% Injectable 12.5 Gram(s) IV Push once  dextrose 50% Injectable 25 Gram(s) IV Push once  dextrose 50% Injectable 50 milliLiter(s) IV Push once  digoxin     Tablet 0.0625 milliGRAM(s) Oral every other day  heparin  Infusion 800 Unit(s)/Hr (8 mL/Hr) IV Continuous <Continuous>  insulin lispro (HumaLOG) corrective regimen sliding scale   SubCutaneous Before meals and at bedtime  meropenem Injectable 500 milliGRAM(s) IV Push every 24 hours  midodrine 10 milliGRAM(s) Oral every 8 hours  multivitamin 1 Tablet(s) Oral daily  phenylephrine    Infusion 0.2 MICROgram(s)/kG/Min (5.78 mL/Hr) IV Continuous <Continuous>  senna 2 Tablet(s) Oral at bedtime  sodium bicarbonate 1300 milliGRAM(s) Oral three times a day    MEDICATIONS  (PRN):  acetaminophen   Tablet .. 650 milliGRAM(s) Oral every 6 hours PRN Mild Pain (1 - 3)  dextrose 40% Gel 15 Gram(s) Oral once PRN Blood Glucose LESS THAN 70 milliGRAM(s)/deciliter  glucagon  Injectable 1 milliGRAM(s) IntraMuscular once PRN Glucose LESS THAN 70 milligrams/deciliter  oxycodone    5 mG/acetaminophen 325 mG 1 Tablet(s) Oral every 6 hours PRN Severe Pain (7 - 10)  sodium chloride 0.9% lock flush 10 milliLiter(s) IV Push every 1 hour PRN Pre/post blood products, medications, blood draw, and to maintain line patency      PHYSICAL EXAM  Vital Signs Last 24 Hrs  T(C): 37.1 (20 May 2020 05:38), Max: 37.1 (20 May 2020 05:38)  T(F): 98.7 (20 May 2020 05:38), Max: 98.7 (20 May 2020 05:38)  HR: 131 (20 May 2020 10:00) (113 - 131)  BP: --  BP(mean): --  RR: 22 (20 May 2020 08:52) (17 - 32)  SpO2: 83% (20 May 2020 10:00) (83% - 99%)    Constitutional: awake, alert, NAD  Respiratory: lungs CTAB.  Cardiovascular: regular rhythm, normal S1 and S2, peripheral edema 1+  GI: soft, NT/ND, no masses/HSM appreciated.  Neurology: no tremors, DTR 2+, moves extremities, peripheries are cold    LABS:                        7.5    12.42 )-----------( 247      ( 20 May 2020 05:35 )             23.6     05-20    133<L>  |  93<L>  |  47<H>  ----------------------------<  135<H>  4.0   |  23  |  2.92<H>    Ca    8.5      20 May 2020 05:35  Phos  3.6     05-20  Mg     2.0     05-20    TPro  6.3  /  Alb  2.9<L>  /  TBili  1.1  /  DBili  x   /  AST  133<H>  /  ALT  126<H>  /  AlkPhos  173<H>  05-20    PT/INR - ( 20 May 2020 05:35 )   PT: 26.2 sec;   INR: 2.24          PTT - ( 20 May 2020 05:35 )  PTT:44.1 sec    Thyroid Stimulating Hormone, Serum: 0.585 uIU/mL (05-07 @ 06:37)  Thyroid Stimulating Hormone, Serum: 0.558 uIU/mL (05-07 @ 06:37)      HbA1C: 6.0 % (01-16 @ 06:03)    CAPILLARY BLOOD GLUCOSE      POCT Blood Glucose.: 129 mg/dL (20 May 2020 07:29)  POCT Blood Glucose.: 125 mg/dL (20 May 2020 00:41)  POCT Blood Glucose.: 136 mg/dL (19 May 2020 16:18)  POCT Blood Glucose.: 193 mg/dL (19 May 2020 11:13)  POCT Blood Glucose.: 25 mg/dL (19 May 2020 11:11)      Insulin Sliding Scale requirements X 24 Hours:    RADIOLOGY & ADDITIONAL TESTS:    A/P: 84 M PMH HTN, DM, A fib on Xarelto (Last taken 8am) HF EF 35%, CKD III PAD p/w 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 He is s/p left fourth toe amputation and angiogram 5/6/20. He is s/p 2 KECIA placement in TP trunk. currently on antibiotics.    1.  Hypoglycemia  -  Multifactorial at this time given vascular disease, sepsis and renal failure  - Mostly factitious as well - given no documented low FSg in the BMP  - Has CKD - in renal failure  - Cr 2.65 and GFR 25  - Wt 77.1 kg with BMI 24.4  Hba1c 6  TSH 0.585, Free T4 1.09  Am cortisol was 24  - 5/10 10 Am FSG - 42 - was given juice - Repeat . Labs at that time. [facility] blood glucose was 125, c-peptide was 7.9 and insulin level 7.2  5/14 - FSG 51 and 43 - asymptomatic. Was given juice before the blood work was drawn  5/18 - had episodes of hypoglycemia as mentioned above - no BMP drawn before dextrose administration  5/20 - 1100 FSg was 23, a repeat FSG that was drawn from A-line was 93 - asymptomatic  - Continue FSG monitoring - four times a day - before meals and bedtime  - PLEASE obtain FSG from the Arterial line from now if needed.  - Please obtain Basic metabolic panel, c-peptide level, insulin level and pro-insulin level when the FSG is less than 60. These labs should be drawn before administering any dextrose containing solution ( Juice or D50)  - Please consider warming the fingers before obtaining the FSG.      - on MSD - renal diet  Will continue to monitor     For discharge, TBD    Pt can follow up at discharge with [facility] by calling  to make an appointment.   discussed case with Dr. Bello    and updated primary team

## 2020-05-20 NOTE — PROGRESS NOTE ADULT - SUBJECTIVE AND OBJECTIVE BOX
OVERNIGHT EVENTS: Patient persistently tachycardic overnight to 120s.     SUBJECTIVE / INTERVAL HPI: Patient seen and examined at bedside. No acute complaints, reporting improvement in shortness of breath, denies CP, light-headedness, palpitations, fevers, chills.     Review of systems negative except as noted above.     VITAL SIGNS:  Vital Signs Last 24 Hrs  T(C): 35.4 (20 May 2020 11:00), Max: 37.1 (20 May 2020 05:38)  T(F): 95.7 (20 May 2020 11:00), Max: 98.7 (20 May 2020 05:38)  HR: 121 (20 May 2020 12:00) (113 - 131)  BP: --  BP(mean): --  RR: 22 (20 May 2020 08:52) (17 - 32)  SpO2: 89% (20 May 2020 12:00) (83% - 99%)      05-19-20 @ 07:01  -  05-20-20 @ 07:00  --------------------------------------------------------  IN: 1279.6 mL / OUT: 3149 mL / NET: -1869.4 mL    05-20-20 @ 07:01  -  05-20-20 @ 13:00  --------------------------------------------------------  IN: 207.5 mL / OUT: 45 mL / NET: 162.5 mL        PHYSICAL EXAM:    General: With increased WOB this am  HEENT: MMM  Neck: -JVD  Cardiovascular: +S1/S2; Irregular, no murmurs  Respiratory: crackles as bases  Gastrointestinal: NTND BS+4. Tympanetic  Extremities: WWP. LLE wrapped.   Vascular: 2+ radial, DP pulses B/L  Neurological: AAOx3; no focal deficits    MEDICATIONS:  MEDICATIONS  (STANDING):  aMIOdarone    Tablet 200 milliGRAM(s) Oral daily  apixaban 2.5 milliGRAM(s) Oral every 12 hours  chlorhexidine 2% Cloths 1 Application(s) Topical <User Schedule>  clopidogrel Tablet 75 milliGRAM(s) Oral daily  collagenase Ointment 1 Application(s) Topical daily  collagenase Ointment 1 Application(s) Topical daily  DAPTOmycin IVPB 600 milliGRAM(s) IV Intermittent every 48 hours  dextrose 5%. 1000 milliLiter(s) (50 mL/Hr) IV Continuous <Continuous>  dextrose 50% Injectable 12.5 Gram(s) IV Push once  dextrose 50% Injectable 25 Gram(s) IV Push once  dextrose 50% Injectable 50 milliLiter(s) IV Push once  digoxin     Tablet 0.0625 milliGRAM(s) Oral every other day  heparin  Infusion 800 Unit(s)/Hr (8 mL/Hr) IV Continuous <Continuous>  insulin lispro (HumaLOG) corrective regimen sliding scale   SubCutaneous Before meals and at bedtime  meropenem Injectable 500 milliGRAM(s) IV Push every 24 hours  midodrine 10 milliGRAM(s) Oral every 8 hours  multivitamin 1 Tablet(s) Oral daily  phenylephrine    Infusion 0.2 MICROgram(s)/kG/Min (5.78 mL/Hr) IV Continuous <Continuous>  senna 2 Tablet(s) Oral at bedtime  sodium bicarbonate 1300 milliGRAM(s) Oral three times a day    MEDICATIONS  (PRN):  acetaminophen   Tablet .. 650 milliGRAM(s) Oral every 6 hours PRN Mild Pain (1 - 3)  dextrose 40% Gel 15 Gram(s) Oral once PRN Blood Glucose LESS THAN 70 milliGRAM(s)/deciliter  glucagon  Injectable 1 milliGRAM(s) IntraMuscular once PRN Glucose LESS THAN 70 milligrams/deciliter  sodium chloride 0.9% lock flush 10 milliLiter(s) IV Push every 1 hour PRN Pre/post blood products, medications, blood draw, and to maintain line patency      ALLERGIES:  Allergies    No Known Allergies    Intolerances        LABS:                        7.5    12.42 )-----------( 247      ( 20 May 2020 05:35 )             23.6     05-20    133<L>  |  93<L>  |  47<H>  ----------------------------<  135<H>  4.0   |  23  |  2.92<H>    Ca    8.5      20 May 2020 05:35  Phos  3.6     05-20  Mg     2.0     05-20    TPro  6.3  /  Alb  2.9<L>  /  TBili  1.1  /  DBili  x   /  AST  133<H>  /  ALT  126<H>  /  AlkPhos  173<H>  05-20    PT/INR - ( 20 May 2020 05:35 )   PT: 26.2 sec;   INR: 2.24          PTT - ( 20 May 2020 05:35 )  PTT:44.1 sec    CAPILLARY BLOOD GLUCOSE      POCT Blood Glucose.: 93 mg/dL (20 May 2020 11:22)    CARDIAC MARKERS ( 20 May 2020 05:35 )  x     / x     / 686 U/L / x     / x            < from: Xray Chest 1 View- PORTABLE-Routine (05.20.20 @ 04:49) >  Indication: Shortness of Breath    Bedside examination of the chest dated 5/20/2020 4:49 AM is compared to the previous study of 5/19/2020.    Findings: Right-sided PICC again present. Mild worsening of bilateral infiltrates/edema pattern. No pleural effusion seen. Cardiomediastinal silhouette suboptimally evaluated in this projection.    Impression: Mild worsening of bilateral infiltrates/edema pattern.            Thank you for the opportunity to participate in the care of this patient.    < end of copied text >

## 2020-05-20 NOTE — PROGRESS NOTE ADULT - NSHPATTENDINGPLANDISCUSS_GEN_ALL_CORE
primary team
primary team
team
Dr. Ash
Dr. Ash and 2WO staff
Dr. Ash and SICU team
Dr. Ash and Surgery
Dr. Ash and Vascular Surgery
Dr. Ash and 5E housestaff
Dr. Ash and ICU team
HS, patient

## 2020-05-20 NOTE — PROGRESS NOTE ADULT - ASSESSMENT
85 y/o AAM w/PMH ckd3,  HTN/DM/A-fib on Xarelto/HF EF 35%, and PAD, recently admitted in march for toe gangrene s/p left 3rd toe amputation/ presented back to ED from Dr. Mcclellan's office for further work up of LLE pain and swelling, nephrology consulted for ZEENAT on CKD III  s/p Angio with angioplasty/stent 5/6     # ZEENAT on CKD  - non-oliguric range uop   - multifactorial, ATN, with possible SHERI and vancomycin related nephrotoxicity  - access:  R femoral HD cath, s/p HD on 05/13, 05/14, 05/15 in light of volume overload  - acceptable electrolytes  - on aquapheresis for fluid overload, with well tolerated net negative UF of 80 ml/hr, will increase to net neg 100 ml/hr; if any issues with access could switch to IV diuretics to maintain uop ~100 ml/hr   - keep MAP >65 mmHg to ensure renal perfusion   - monitor BUN/Cr, lytes, uop   - strict I/O, daily weight   - renally adjusted meds/ ABx  - renal diet/  Nepro  - monitor H/H, transfuse as indicated 83 y/o AAM w/PMH ckd3, HTN/DM/A-fib on Xarelto/HF EF 35%, and PAD, recently admitted in march for toe gangrene s/p left 3rd toe amputation/ presented back to ED from Dr. Mcclellan's office for further work up of LLE pain and swelling, nephrology consulted for ZEENAT on CKD III  s/p Angio with angioplasty/stent 5/6     # ZEENAT on CKD  - non-oliguric range uop   - multifactorial, ATN, with possible SHERI and vancomycin related nephrotoxicity  - access:  R femoral HD cath, s/p HD on 05/13, 05/14, 05/15 in light of volume overload  - acceptable electrolytes  - on aquapheresis for fluid overload, with well tolerated net negative UF of 80 ml/hr, will increase to net neg 100 ml/hr; if any issues with access could switch to IV diuretics to maintain uop ~100 ml/hr   - keep MAP >65 mmHg to ensure renal perfusion   - monitor BUN/Cr, lytes, uop   - strict I/O, daily weight   - renally adjusted meds/ ABx  - renal diet/  Nepro  - monitor H/H, transfuse as indicated

## 2020-05-20 NOTE — PROGRESS NOTE ADULT - SUBJECTIVE AND OBJECTIVE BOX
24 hr events  5/19: patient continued on aquaphoresis, lasix was DC with net goal of neg 70cc/hr, stopped dextrose gtt to 40cc  o/n 7pm ptt therapeutic, FSs good, on aquapheresis w/o issue; AM PTT therapeutic    Subjective:  Denies pain in foot, only with manipulation during dressing changes. Still reports feeling tired of being in the hospital. Denies CP/SOB, N/V.    Vital Signs Last 24 Hrs  T(C): 37.1 (20 May 2020 05:38), Max: 37.1 (20 May 2020 05:38)  T(F): 98.7 (20 May 2020 05:38), Max: 98.7 (20 May 2020 05:38)  HR: 131 (20 May 2020 10:00) (113 - 131)  BP: --  BP(mean): --  RR: 22 (20 May 2020 08:52) (17 - 32)  SpO2: 83% (20 May 2020 10:00) (83% - 99%)    I&O's Summary  19 May 2020 07:01  -  20 May 2020 07:00  --------------------------------------------------------  IN: 1279.6 mL / OUT: 3149 mL / NET: -1869.4 mL    20 May 2020 07:01  -  20 May 2020 10:07  --------------------------------------------------------  IN: 79 mL / OUT: 20 mL / NET: 59 mL    Physical Exam:  General: NAD   Pulmonary: HFNC  Cardiovascular: tachycardic. irregular rate   Extremities: Left dorsal foot and amputation wound with clean wound base, no purulence, drainage, or surrounding erythema; biphasic PT signal    LABS:                    7.5    12.42 )-----------( 247      ( 20 May 2020 05:35 )             23.6     05-20  133<L>  |  93<L>  |  47<H>  ----------------------------<  135<H>  4.0   |  23  |  2.92<H>    Ca    8.5      20 May 2020 05:35  Phos  3.6     05-20  Mg     2.0     05-20    TPro  6.3  /  Alb  2.9<L>  /  TBili  1.1  /  DBili  x   /  AST  133<H>  /  ALT  126<H>  /  AlkPhos  173<H>  05-20    PT/INR - ( 20 May 2020 05:35 )   PT: 26.2 sec;   INR: 2.24     PTT - ( 20 May 2020 05:35 )  PTT:44.1 sec    LIVER FUNCTIONS - ( 20 May 2020 05:35 )  Alb: 2.9 g/dL / Pro: 6.3 g/dL / ALK PHOS: 173 U/L / ALT: 126 U/L / AST: 133 U/L / GGT: x           CAPILLARY BLOOD GLUCOSE  POCT Blood Glucose.: 129 mg/dL (20 May 2020 07:29)  POCT Blood Glucose.: 125 mg/dL (20 May 2020 00:41)  POCT Blood Glucose.: 136 mg/dL (19 May 2020 16:18)  POCT Blood Glucose.: 193 mg/dL (19 May 2020 11:13)  POCT Blood Glucose.: 25 mg/dL (19 May 2020 11:11)

## 2020-05-20 NOTE — PROGRESS NOTE ADULT - ASSESSMENT
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 BRANDYN falsely elevated due to history of DM now s/p LLE Angiogram DEStent x2, L 3rd and 4th toe amputation, and debridement, transferred to SICU for fluid overload, respiratory distress, and urgent need for HD.    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 Aquapheresis  - Wound care: clean with peroxide, apply santyl, Kerlix  - Care per MICU

## 2020-05-21 VITALS — HEART RATE: 30 BPM | RESPIRATION RATE: 10 BRPM

## 2020-05-21 LAB
ALBUMIN SERPL ELPH-MCNC: 2.2 G/DL — LOW (ref 3.3–5)
ALBUMIN SERPL ELPH-MCNC: 3.1 G/DL — LOW (ref 3.3–5)
ALP SERPL-CCNC: 250 U/L — HIGH (ref 40–120)
ALP SERPL-CCNC: 90 U/L — SIGNIFICANT CHANGE UP (ref 40–120)
ALT FLD-CCNC: 10 U/L — SIGNIFICANT CHANGE UP (ref 10–45)
ALT FLD-CCNC: 267 U/L — HIGH (ref 10–45)
ANION GAP SERPL CALC-SCNC: 27 MMOL/L — HIGH (ref 5–17)
ANION GAP SERPL CALC-SCNC: 6 MMOL/L — SIGNIFICANT CHANGE UP (ref 5–17)
APTT BLD: 36.2 SEC — SIGNIFICANT CHANGE UP (ref 27.5–36.3)
APTT BLD: 53.9 SEC — HIGH (ref 27.5–36.3)
AST SERPL-CCNC: 21 U/L — SIGNIFICANT CHANGE UP (ref 10–40)
AST SERPL-CCNC: 583 U/L — HIGH (ref 10–40)
BASE EXCESS BLDA CALC-SCNC: -4.8 MMOL/L — LOW (ref -2–3)
BASOPHILS # BLD AUTO: 0.1 K/UL — SIGNIFICANT CHANGE UP (ref 0–0.2)
BASOPHILS NFR BLD AUTO: 2.6 % — HIGH (ref 0–2)
BILIRUB SERPL-MCNC: 0.7 MG/DL — SIGNIFICANT CHANGE UP (ref 0.2–1.2)
BILIRUB SERPL-MCNC: 1.7 MG/DL — HIGH (ref 0.2–1.2)
BUN SERPL-MCNC: 19 MG/DL — SIGNIFICANT CHANGE UP (ref 7–23)
BUN SERPL-MCNC: 60 MG/DL — HIGH (ref 7–23)
CALCIUM SERPL-MCNC: 8.8 MG/DL — SIGNIFICANT CHANGE UP (ref 8.4–10.5)
CALCIUM SERPL-MCNC: 8.9 MG/DL — SIGNIFICANT CHANGE UP (ref 8.4–10.5)
CHLORIDE SERPL-SCNC: 100 MMOL/L — SIGNIFICANT CHANGE UP (ref 96–108)
CHLORIDE SERPL-SCNC: 93 MMOL/L — LOW (ref 96–108)
CK SERPL-CCNC: 56 U/L — SIGNIFICANT CHANGE UP (ref 30–200)
CK SERPL-CCNC: 723 U/L — HIGH (ref 30–200)
CO2 SERPL-SCNC: 14 MMOL/L — LOW (ref 22–31)
CO2 SERPL-SCNC: 32 MMOL/L — HIGH (ref 22–31)
CREAT SERPL-MCNC: 1.79 MG/DL — HIGH (ref 0.5–1.3)
CREAT SERPL-MCNC: 3.77 MG/DL — HIGH (ref 0.5–1.3)
DIGOXIN SERPL-MCNC: 1.7 NG/ML — SIGNIFICANT CHANGE UP (ref 0.8–2)
EOSINOPHIL # BLD AUTO: 0.19 K/UL — SIGNIFICANT CHANGE UP (ref 0–0.5)
EOSINOPHIL NFR BLD AUTO: 5 % — SIGNIFICANT CHANGE UP (ref 0–6)
GAS PNL BLDA: SIGNIFICANT CHANGE UP
GLUCOSE BLDC GLUCOMTR-MCNC: 103 MG/DL — HIGH (ref 70–99)
GLUCOSE SERPL-MCNC: 102 MG/DL — HIGH (ref 70–99)
GLUCOSE SERPL-MCNC: 108 MG/DL — HIGH (ref 70–99)
HCO3 BLDA-SCNC: 18 MMOL/L — LOW (ref 21–28)
HCT VFR BLD CALC: 24.1 % — LOW (ref 39–50)
HCT VFR BLD CALC: 32.7 % — LOW (ref 39–50)
HGB BLD-MCNC: 7.7 G/DL — LOW (ref 13–17)
HGB BLD-MCNC: 9 G/DL — LOW (ref 13–17)
IMM GRANULOCYTES NFR BLD AUTO: 0.5 % — SIGNIFICANT CHANGE UP (ref 0–1.5)
INR BLD: 1.32 — HIGH (ref 0.88–1.16)
INR BLD: 3.07 — HIGH (ref 0.88–1.16)
LYMPHOCYTES # BLD AUTO: 0.42 K/UL — LOW (ref 1–3.3)
LYMPHOCYTES # BLD AUTO: 11 % — LOW (ref 13–44)
MAGNESIUM SERPL-MCNC: 1.6 MG/DL — SIGNIFICANT CHANGE UP (ref 1.6–2.6)
MAGNESIUM SERPL-MCNC: 2.5 MG/DL — SIGNIFICANT CHANGE UP (ref 1.6–2.6)
MCHC RBC-ENTMCNC: 21.8 PG — LOW (ref 27–34)
MCHC RBC-ENTMCNC: 26.6 PG — LOW (ref 27–34)
MCHC RBC-ENTMCNC: 27.5 GM/DL — LOW (ref 32–36)
MCHC RBC-ENTMCNC: 32 GM/DL — SIGNIFICANT CHANGE UP (ref 32–36)
MCV RBC AUTO: 79.2 FL — LOW (ref 80–100)
MCV RBC AUTO: 83.1 FL — SIGNIFICANT CHANGE UP (ref 80–100)
MONOCYTES # BLD AUTO: 0.61 K/UL — SIGNIFICANT CHANGE UP (ref 0–0.9)
MONOCYTES NFR BLD AUTO: 15.9 % — HIGH (ref 2–14)
NEUTROPHILS # BLD AUTO: 2.49 K/UL — SIGNIFICANT CHANGE UP (ref 1.8–7.4)
NEUTROPHILS NFR BLD AUTO: 65 % — SIGNIFICANT CHANGE UP (ref 43–77)
NRBC # BLD: 0 /100 WBCS — SIGNIFICANT CHANGE UP (ref 0–0)
NRBC # BLD: 0 /100 WBCS — SIGNIFICANT CHANGE UP (ref 0–0)
PCO2 BLDA: 26 MMHG — LOW (ref 35–48)
PH BLDA: 7.46 — HIGH (ref 7.35–7.45)
PHOSPHATE SERPL-MCNC: 2.7 MG/DL — SIGNIFICANT CHANGE UP (ref 2.5–4.5)
PHOSPHATE SERPL-MCNC: 6.9 MG/DL — HIGH (ref 2.5–4.5)
PLATELET # BLD AUTO: 166 K/UL — SIGNIFICANT CHANGE UP (ref 150–400)
PLATELET # BLD AUTO: 260 K/UL — SIGNIFICANT CHANGE UP (ref 150–400)
PO2 BLDA: 73 MMHG — LOW (ref 83–108)
POTASSIUM SERPL-MCNC: 4.2 MMOL/L — SIGNIFICANT CHANGE UP (ref 3.5–5.3)
POTASSIUM SERPL-MCNC: 5.3 MMOL/L — SIGNIFICANT CHANGE UP (ref 3.5–5.3)
POTASSIUM SERPL-SCNC: 4.2 MMOL/L — SIGNIFICANT CHANGE UP (ref 3.5–5.3)
POTASSIUM SERPL-SCNC: 5.3 MMOL/L — SIGNIFICANT CHANGE UP (ref 3.5–5.3)
PROT SERPL-MCNC: 6.6 G/DL — SIGNIFICANT CHANGE UP (ref 6–8.3)
PROT SERPL-MCNC: 6.8 G/DL — SIGNIFICANT CHANGE UP (ref 6–8.3)
PROTHROM AB SERPL-ACNC: 15.2 SEC — HIGH (ref 10–12.9)
PROTHROM AB SERPL-ACNC: 36.2 SEC — HIGH (ref 10–12.9)
RBC # BLD: 2.9 M/UL — LOW (ref 4.2–5.8)
RBC # BLD: 4.13 M/UL — LOW (ref 4.2–5.8)
RBC # FLD: 16 % — HIGH (ref 10.3–14.5)
RBC # FLD: 27.6 % — HIGH (ref 10.3–14.5)
SAO2 % BLDA: 95 % — SIGNIFICANT CHANGE UP (ref 95–100)
SODIUM SERPL-SCNC: 134 MMOL/L — LOW (ref 135–145)
SODIUM SERPL-SCNC: 138 MMOL/L — SIGNIFICANT CHANGE UP (ref 135–145)
WBC # BLD: 13.83 K/UL — HIGH (ref 3.8–10.5)
WBC # BLD: 3.83 K/UL — SIGNIFICANT CHANGE UP (ref 3.8–10.5)
WBC # FLD AUTO: 13.83 K/UL — HIGH (ref 3.8–10.5)
WBC # FLD AUTO: 3.83 K/UL — SIGNIFICANT CHANGE UP (ref 3.8–10.5)

## 2020-05-21 PROCEDURE — 97164 PT RE-EVAL EST PLAN CARE: CPT

## 2020-05-21 PROCEDURE — 80299 QUANTITATIVE ASSAY DRUG: CPT

## 2020-05-21 PROCEDURE — 83036 HEMOGLOBIN GLYCOSYLATED A1C: CPT

## 2020-05-21 PROCEDURE — 88305 TISSUE EXAM BY PATHOLOGIST: CPT

## 2020-05-21 PROCEDURE — C1874: CPT

## 2020-05-21 PROCEDURE — 84439 ASSAY OF FREE THYROXINE: CPT

## 2020-05-21 PROCEDURE — 83735 ASSAY OF MAGNESIUM: CPT

## 2020-05-21 PROCEDURE — 85652 RBC SED RATE AUTOMATED: CPT

## 2020-05-21 PROCEDURE — 97116 GAIT TRAINING THERAPY: CPT

## 2020-05-21 PROCEDURE — 97530 THERAPEUTIC ACTIVITIES: CPT

## 2020-05-21 PROCEDURE — 86803 HEPATITIS C AB TEST: CPT

## 2020-05-21 PROCEDURE — 82570 ASSAY OF URINE CREATININE: CPT

## 2020-05-21 PROCEDURE — 86850 RBC ANTIBODY SCREEN: CPT

## 2020-05-21 PROCEDURE — 84540 ASSAY OF URINE/UREA-N: CPT

## 2020-05-21 PROCEDURE — 99291 CRITICAL CARE FIRST HOUR: CPT

## 2020-05-21 PROCEDURE — 86704 HEP B CORE ANTIBODY TOTAL: CPT

## 2020-05-21 PROCEDURE — 82550 ASSAY OF CK (CPK): CPT

## 2020-05-21 PROCEDURE — 81003 URINALYSIS AUTO W/O SCOPE: CPT

## 2020-05-21 PROCEDURE — 87340 HEPATITIS B SURFACE AG IA: CPT

## 2020-05-21 PROCEDURE — 96375 TX/PRO/DX INJ NEW DRUG ADDON: CPT

## 2020-05-21 PROCEDURE — 81001 URINALYSIS AUTO W/SCOPE: CPT

## 2020-05-21 PROCEDURE — 84132 ASSAY OF SERUM POTASSIUM: CPT

## 2020-05-21 PROCEDURE — 85027 COMPLETE CBC AUTOMATED: CPT

## 2020-05-21 PROCEDURE — 84484 ASSAY OF TROPONIN QUANT: CPT

## 2020-05-21 PROCEDURE — 84295 ASSAY OF SERUM SODIUM: CPT

## 2020-05-21 PROCEDURE — 99285 EMERGENCY DEPT VISIT HI MDM: CPT | Mod: 25

## 2020-05-21 PROCEDURE — 87040 BLOOD CULTURE FOR BACTERIA: CPT

## 2020-05-21 PROCEDURE — 93005 ELECTROCARDIOGRAM TRACING: CPT

## 2020-05-21 PROCEDURE — 80053 COMPREHEN METABOLIC PANEL: CPT

## 2020-05-21 PROCEDURE — 80061 LIPID PANEL: CPT

## 2020-05-21 PROCEDURE — 86706 HEP B SURFACE ANTIBODY: CPT

## 2020-05-21 PROCEDURE — 82962 GLUCOSE BLOOD TEST: CPT

## 2020-05-21 PROCEDURE — 85730 THROMBOPLASTIN TIME PARTIAL: CPT

## 2020-05-21 PROCEDURE — 83527 ASSAY OF INSULIN: CPT

## 2020-05-21 PROCEDURE — 82533 TOTAL CORTISOL: CPT

## 2020-05-21 PROCEDURE — 71045 X-RAY EXAM CHEST 1 VIEW: CPT | Mod: 26

## 2020-05-21 PROCEDURE — 99232 SBSQ HOSP IP/OBS MODERATE 35: CPT

## 2020-05-21 PROCEDURE — 92610 EVALUATE SWALLOWING FUNCTION: CPT

## 2020-05-21 PROCEDURE — 87070 CULTURE OTHR SPECIMN AEROBIC: CPT

## 2020-05-21 PROCEDURE — 73630 X-RAY EXAM OF FOOT: CPT

## 2020-05-21 PROCEDURE — 80048 BASIC METABOLIC PNL TOTAL CA: CPT

## 2020-05-21 PROCEDURE — 86140 C-REACTIVE PROTEIN: CPT

## 2020-05-21 PROCEDURE — 83525 ASSAY OF INSULIN: CPT

## 2020-05-21 PROCEDURE — C1769: CPT

## 2020-05-21 PROCEDURE — 84443 ASSAY THYROID STIM HORMONE: CPT

## 2020-05-21 PROCEDURE — 85025 COMPLETE CBC W/AUTO DIFF WBC: CPT

## 2020-05-21 PROCEDURE — 84206 ASSAY OF PROINSULIN: CPT

## 2020-05-21 PROCEDURE — C1887: CPT

## 2020-05-21 PROCEDURE — 76770 US EXAM ABDO BACK WALL COMP: CPT

## 2020-05-21 PROCEDURE — 36415 COLL VENOUS BLD VENIPUNCTURE: CPT

## 2020-05-21 PROCEDURE — 83605 ASSAY OF LACTIC ACID: CPT

## 2020-05-21 PROCEDURE — 36573 INSJ PICC RS&I 5 YR+: CPT

## 2020-05-21 PROCEDURE — 71045 X-RAY EXAM CHEST 1 VIEW: CPT

## 2020-05-21 PROCEDURE — 87186 SC STD MICRODIL/AGAR DIL: CPT

## 2020-05-21 PROCEDURE — U0003: CPT

## 2020-05-21 PROCEDURE — 84300 ASSAY OF URINE SODIUM: CPT

## 2020-05-21 PROCEDURE — 80202 ASSAY OF VANCOMYCIN: CPT

## 2020-05-21 PROCEDURE — 83880 ASSAY OF NATRIURETIC PEPTIDE: CPT

## 2020-05-21 PROCEDURE — 87635 SARS-COV-2 COVID-19 AMP PRB: CPT

## 2020-05-21 PROCEDURE — 90935 HEMODIALYSIS ONE EVALUATION: CPT

## 2020-05-21 PROCEDURE — 86901 BLOOD TYPING SEROLOGIC RH(D): CPT

## 2020-05-21 PROCEDURE — 84681 ASSAY OF C-PEPTIDE: CPT

## 2020-05-21 PROCEDURE — 76000 FLUOROSCOPY <1 HR PHYS/QHP: CPT

## 2020-05-21 PROCEDURE — 85610 PROTHROMBIN TIME: CPT

## 2020-05-21 PROCEDURE — C1894: CPT

## 2020-05-21 PROCEDURE — 82330 ASSAY OF CALCIUM: CPT

## 2020-05-21 PROCEDURE — 80162 ASSAY OF DIGOXIN TOTAL: CPT

## 2020-05-21 PROCEDURE — 93308 TTE F-UP OR LMTD: CPT

## 2020-05-21 PROCEDURE — P9047: CPT

## 2020-05-21 PROCEDURE — 84133 ASSAY OF URINE POTASSIUM: CPT

## 2020-05-21 PROCEDURE — 97161 PT EVAL LOW COMPLEX 20 MIN: CPT

## 2020-05-21 PROCEDURE — 82803 BLOOD GASES ANY COMBINATION: CPT

## 2020-05-21 PROCEDURE — 73590 X-RAY EXAM OF LOWER LEG: CPT

## 2020-05-21 PROCEDURE — 96365 THER/PROPH/DIAG IV INF INIT: CPT

## 2020-05-21 PROCEDURE — 80076 HEPATIC FUNCTION PANEL: CPT

## 2020-05-21 PROCEDURE — 93926 LOWER EXTREMITY STUDY: CPT

## 2020-05-21 PROCEDURE — 84100 ASSAY OF PHOSPHORUS: CPT

## 2020-05-21 RX ORDER — HEPARIN SODIUM 5000 [USP'U]/ML
1200 INJECTION INTRAVENOUS; SUBCUTANEOUS
Qty: 25000 | Refills: 0 | Status: DISCONTINUED | OUTPATIENT
Start: 2020-05-21 | End: 2020-05-21

## 2020-05-21 RX ORDER — DOBUTAMINE HCL 250MG/20ML
2.5 VIAL (ML) INTRAVENOUS
Qty: 500 | Refills: 0 | Status: DISCONTINUED | OUTPATIENT
Start: 2020-05-21 | End: 2020-05-21

## 2020-05-21 RX ORDER — HEPARIN SODIUM 5000 [USP'U]/ML
700 INJECTION INTRAVENOUS; SUBCUTANEOUS
Qty: 25000 | Refills: 0 | Status: DISCONTINUED | OUTPATIENT
Start: 2020-05-21 | End: 2020-05-21

## 2020-05-21 RX ORDER — BUMETANIDE 0.25 MG/ML
2 INJECTION INTRAMUSCULAR; INTRAVENOUS
Qty: 20 | Refills: 0 | Status: DISCONTINUED | OUTPATIENT
Start: 2020-05-21 | End: 2020-05-21

## 2020-05-21 RX ORDER — ALBUMIN HUMAN 25 %
50 VIAL (ML) INTRAVENOUS
Refills: 0 | Status: DISCONTINUED | OUTPATIENT
Start: 2020-05-21 | End: 2020-05-21

## 2020-05-21 RX ORDER — FUROSEMIDE 40 MG
80 TABLET ORAL
Refills: 0 | Status: DISCONTINUED | OUTPATIENT
Start: 2020-05-21 | End: 2020-05-21

## 2020-05-21 RX ORDER — MAGNESIUM SULFATE 500 MG/ML
1 VIAL (ML) INJECTION ONCE
Refills: 0 | Status: DISCONTINUED | OUTPATIENT
Start: 2020-05-21 | End: 2020-05-21

## 2020-05-21 RX ADMIN — MIDODRINE HYDROCHLORIDE 10 MILLIGRAM(S): 2.5 TABLET ORAL at 05:28

## 2020-05-21 RX ADMIN — HEPARIN SODIUM 12 UNIT(S)/HR: 5000 INJECTION INTRAVENOUS; SUBCUTANEOUS at 09:45

## 2020-05-21 RX ADMIN — Medication 5.78 MICROGRAM(S)/KG/MIN: at 09:45

## 2020-05-21 RX ADMIN — CHLORHEXIDINE GLUCONATE 1 APPLICATION(S): 213 SOLUTION TOPICAL at 05:28

## 2020-05-21 RX ADMIN — AMIODARONE HYDROCHLORIDE 200 MILLIGRAM(S): 400 TABLET ORAL at 05:28

## 2020-05-21 RX ADMIN — Medication 80 MILLIGRAM(S): at 09:45

## 2020-05-21 RX ADMIN — Medication 1300 MILLIGRAM(S): at 05:28

## 2020-05-21 NOTE — PROGRESS NOTE ADULT - ASSESSMENT
85 y/o AAM w/PMH ckd3, HTN/DM/A-fib on Xarelto/HF EF 35%, and PAD, recently admitted in march for toe gangrene s/p left 3rd toe amputation/ presented back to ED from Dr. Mcclellan's office for further work up of LLE pain and swelling, nephrology consulted for ZEENAT on CKD III  s/p Angio with angioplasty/stent 5/6     # ZEENAT on CKD  - multifactorial, ATN, with possible SHERI and vancomycin related nephrotoxicity  - currently w oligoanuric range uop   - access:  R femoral HD cath, s/p HD on 05/13, 05/14, 05/15 in light of volume overload  - on aquapheresis for fluid overload, with well tolerated net negative UF of 100 ml/hr, however has mild hyperkalemia at 5.3, met acidosis requiring clearance  - will discontinue aquapheresis and anticipate IHD today for clearance and UF as tolerated  - keep map >65-70 mmHg   - monitor BUN/Cr, lytes, uop   - strict I/O, daily weight   - renally adjusted meds/ ABx  - renal diet/  Nepro  - monitor H/H, transfuse as indicated

## 2020-05-21 NOTE — PROGRESS NOTE ADULT - SUBJECTIVE AND OBJECTIVE BOX
started on dobutamine in setting of CHF to improve perfusion   on aquapheresis, well tolerated net neg UF at 100 ml/hr, no issues overnight  net negative 2.0 L/ 24hr, ~4.0 L negative since admission  worsening of respiratory status w increasing FiO2 requir, impending intubation   labs noted, will need clearance today  discussed w primary team plan for IHD           Meds:  acetaminophen   Tablet .. 650 every 6 hours PRN  albumin human 25% IVPB 50 every 1 hour  buMETAnide Infusion 2 <Continuous>  chlorhexidine 2% Cloths 1 <User Schedule>  clopidogrel Tablet 75 daily  collagenase Ointment 1 daily  collagenase Ointment 1 daily  DAPTOmycin IVPB 600 every 48 hours  dextrose 40% Gel 15 once PRN  dextrose 5%. 1000 <Continuous>  dextrose 50% Injectable 12.5 once  dextrose 50% Injectable 25 once  digoxin     Tablet 0.0625 every other day  DOBUTamine Infusion 2.5 <Continuous>  glucagon  Injectable 1 once PRN  heparin  Infusion 1200 <Continuous>  insulin lispro (HumaLOG) corrective regimen sliding scale  Before meals and at bedtime  meropenem Injectable 500 every 24 hours  midodrine 10 every 8 hours  multivitamin 1 daily  phenylephrine    Infusion 0.2 <Continuous>  senna 2 at bedtime  sodium bicarbonate 1300 three times a day  sodium chloride 0.9% lock flush 10 every 1 hour PRN      T(C): , Max: 37.2 (05-20-20 @ 17:45)  T(F): , Max: 98.9 (05-20-20 @ 17:45)  HR: 30 (05-21-20 @ 12:00)  BP: 111/73 (05-20-20 @ 17:00)  BP(mean): 88 (05-20-20 @ 17:00)  RR: 10 (05-21-20 @ 12:00)  SpO2: 88% (05-21-20 @ 11:00)  Wt(kg): --    05-20 @ 07:01  -  05-21 @ 07:00  --------------------------------------------------------  IN: 340.5 mL / OUT: 2518 mL / NET: -2177.5 mL    05-21 @ 07:01  -  05-21 @ 13:01  --------------------------------------------------------  IN: 8.5 mL / OUT: 99 mL / NET: -90.5 mL      PHYSICAL EXAM:  GENERAL: NAD, alert  NECK: supple, no JVD  CHEST/LUNG: + rales b/l diffusely  HEART: irregularly irregular tachycardia   ABDOMEN: Soft, Nontender, BS+  EXTREMITIES: no edema b/l   : momo Moe cw aligoanuria   Neurology: no focal neurological deficit  Access: R femoral HD cath         LABS:                        7.7    13.83 )-----------( 260      ( 21 May 2020 06:26 )             24.1     05-21    134<L>  |  93<L>  |  60<H>  ----------------------------<  108<H>  5.3   |  14<L>  |  3.77<H>    Ca    8.9      21 May 2020 06:26  Phos  6.9     05-21  Mg     2.5     05-21    TPro  6.8  /  Alb  3.1<L>  /  TBili  1.7<H>  /  DBili  x   /  AST  583<H>  /  ALT  267<H>  /  AlkPhos  250<H>  05-21      PT/INR - ( 21 May 2020 06:26 )   PT: 36.2 sec;   INR: 3.07          PTT - ( 21 May 2020 06:26 )  PTT:53.9 sec          RADIOLOGY & ADDITIONAL STUDIES:    < from: Xray Chest 1 View- PORTABLE-Routine (05.21.20 @ 03:20) >  EXAM:  XR CHEST PORTABLE ROUTINE 1V                          PROCEDURE DATE:  05/21/2020          INTERPRETATION:  XR CHEST dated 5/21/2020 3:20 AM    CLINICAL INFORMATION: fluid overload    COMPARISON: May 20, 2020    FINDINGS: Stable heart size. Right arm PICC line in place. There is further worsening of bilateral infiltrates/pulmonary edema. No definite pleural effusion or pneumothorax.    IMPRESSION: Further increase of bilateral infiltrates/pulmonary edema.      DISCRETE X-RAY DATA:  Percent of LEFT lung opacification: %  Percent of RIGHT lung opacification: %  Change in lung opacification from most recent x-ray (<=3 days): Increase  Change from prior dated 3 or more days (same admission): Stable    < end of copied text > started on dobutamine in setting of CHF to improve perfusion   on aquapheresis, well tolerated net neg UF at 100 ml/hr, no issues overnight  net negative 2.0 L/ 24hr, ~4.0 L negative since admission  worsening of respiratory status w increasing FiO2 requir, impending intubation   labs noted, will need clearance today  discussed w primary team plan for IHD           Meds:  acetaminophen   Tablet .. 650 every 6 hours PRN  albumin human 25% IVPB 50 every 1 hour  buMETAnide Infusion 2 <Continuous>  chlorhexidine 2% Cloths 1 <User Schedule>  clopidogrel Tablet 75 daily  collagenase Ointment 1 daily  collagenase Ointment 1 daily  DAPTOmycin IVPB 600 every 48 hours  dextrose 40% Gel 15 once PRN  dextrose 5%. 1000 <Continuous>  dextrose 50% Injectable 12.5 once  dextrose 50% Injectable 25 once  digoxin     Tablet 0.0625 every other day  DOBUTamine Infusion 2.5 <Continuous>  glucagon  Injectable 1 once PRN  heparin  Infusion 1200 <Continuous>  insulin lispro (HumaLOG) corrective regimen sliding scale  Before meals and at bedtime  meropenem Injectable 500 every 24 hours  midodrine 10 every 8 hours  multivitamin 1 daily  phenylephrine    Infusion 0.2 <Continuous>  senna 2 at bedtime  sodium bicarbonate 1300 three times a day  sodium chloride 0.9% lock flush 10 every 1 hour PRN      T(C): , Max: 37.2 (05-20-20 @ 17:45)  T(F): , Max: 98.9 (05-20-20 @ 17:45)  HR:92  BP: 111/73 (05-20-20 @ 17:00)  BP(mean): 88 (05-20-20 @ 17:00)  RR: 10 (05-21-20 @ 12:00)  SpO2: 88% (05-21-20 @ 11:00)  Wt(kg): --    05-20 @ 07:01  -  05-21 @ 07:00  --------------------------------------------------------  IN: 340.5 mL / OUT: 2518 mL / NET: -2177.5 mL    05-21 @ 07:01  -  05-21 @ 13:01  --------------------------------------------------------  IN: 8.5 mL / OUT: 99 mL / NET: -90.5 mL      PHYSICAL EXAM:  GENERAL: NAD, alert  NECK: supple, no JVD  CHEST/LUNG: + rales b/l diffusely  HEART: irregularly irregular tachycardia   ABDOMEN: Soft, Nontender, BS+  EXTREMITIES: no edema b/l   : momo Moe cw aligoanuria   Neurology: no focal neurological deficit  Access: R femoral HD cath         LABS:                        7.7    13.83 )-----------( 260      ( 21 May 2020 06:26 )             24.1     05-21    134<L>  |  93<L>  |  60<H>  ----------------------------<  108<H>  5.3   |  14<L>  |  3.77<H>    Ca    8.9      21 May 2020 06:26  Phos  6.9     05-21  Mg     2.5     05-21    TPro  6.8  /  Alb  3.1<L>  /  TBili  1.7<H>  /  DBili  x   /  AST  583<H>  /  ALT  267<H>  /  AlkPhos  250<H>  05-21      PT/INR - ( 21 May 2020 06:26 )   PT: 36.2 sec;   INR: 3.07          PTT - ( 21 May 2020 06:26 )  PTT:53.9 sec          RADIOLOGY & ADDITIONAL STUDIES:    < from: Xray Chest 1 View- PORTABLE-Routine (05.21.20 @ 03:20) >  EXAM:  XR CHEST PORTABLE ROUTINE 1V                          PROCEDURE DATE:  05/21/2020          INTERPRETATION:  XR CHEST dated 5/21/2020 3:20 AM    CLINICAL INFORMATION: fluid overload    COMPARISON: May 20, 2020    FINDINGS: Stable heart size. Right arm PICC line in place. There is further worsening of bilateral infiltrates/pulmonary edema. No definite pleural effusion or pneumothorax.    IMPRESSION: Further increase of bilateral infiltrates/pulmonary edema.      DISCRETE X-RAY DATA:  Percent of LEFT lung opacification: %  Percent of RIGHT lung opacification: %  Change in lung opacification from most recent x-ray (<=3 days): Increase  Change from prior dated 3 or more days (same admission): Stable    < end of copied text >

## 2020-05-21 NOTE — PROGRESS NOTE ADULT - SUBJECTIVE AND OBJECTIVE BOX
EPS Progress Note    S: laying in bed no complaints    O: T(C): 36.4 (05-21-20 @ 09:00), Max: 37.2 (05-20-20 @ 17:45)  HR: 97 (05-21-20 @ 10:00) (97 - 127)  BP: 111/73 (05-20-20 @ 17:00) (111/73 - 111/73)  RR: 50 (05-21-20 @ 10:00) (20 - 50)  SpO2: 87% (05-21-20 @ 10:00) (82% - 95%)       TELE: afib rates 90-110s    PHYSICAL  General:  NAD        Chest:  CTA    Cardiac: irregular irregular  Abdomen:    ND  Skin: normal color and pigmentation  Psych: A&Ox3   Neuro: no deficit noted     LABS:                        7.7    13.83 )-----------( 260      ( 21 May 2020 06:26 )             24.1     134<L>  |  93<L>  |  60<H>  ----------------------------<  108<H>  5.3   |  14<L>  |  3.77<H>    Ca    8.9         Phos  6.9        Mg     2.5        TPro  6.8  /  Alb  3.1<L>  /  TBili  1.7<H>  /  DBili  x   /  AST  583<H>  /  ALT  267<H>  /  AlkPhos  250<H>  PT: 36.2 sec;   INR: 3.07   PTT:53.9 sec      MEDICATIONS:  acetaminophen   Tablet .. 650 milliGRAM(s) Oral every 6 hours PRN  albumin human 25% IVPB 50 milliLiter(s) IV Intermittent every 1 hour  chlorhexidine 2% Cloths 1 Application(s) Topical <User Schedule>  clopidogrel Tablet 75 milliGRAM(s) Oral daily  DAPTOmycin IVPB 600 milliGRAM(s) IV Intermittent every 48 hours  digoxin     Tablet 0.0625 milliGRAM(s) Oral every other day  DOBUTamine Infusion 2.5 MICROgram(s)/kG/Min IV Continuous <Continuous>  furosemide   Injectable 80 milliGRAM(s) IV Push two times a day  heparin  Infusion 1200 Unit(s)/Hr IV Continuous <Continuous>  insulin lispro (HumaLOG) corrective regimen sliding scale   SubCutaneous Before meals and at bedtime  meropenem Injectable 500 milliGRAM(s) IV Push every 24 hours  midodrine 10 milliGRAM(s) Oral every 8 hours  multivitamin 1 Tablet(s) Oral daily  phenylephrine    Infusion 0.2 MICROgram(s)/kG/Min IV Continuous <Continuous>  senna 2 Tablet(s) Oral at bedtime  sodium bicarbonate 1300 milliGRAM(s) Oral three times a day  sodium chloride 0.9% lock flush 10 milliLiter(s) IV Push every 1 hour PRN    < from: TTE Echo Limited or F/U (05.14.20 @ 12:19) >   1. Moderate symmetric left ventricular hypertrophy.   2. Severely reduced left ventricular systolic function.   3. The right ventricle is not well visualized.   4. Biatrial enlargement.   5. Mild mitral regurgitation.   6. Moderate tricuspid regurgitation.   7. Pulmonary artery systolic pressure is 37 mmHg.   8. No pericardial effusion.   9. Left pleural effusion.   ejection fraction of 20% with global hypokinesis.    ASSESSMENT/PLAN  84M PMH of HTN, DM, Afib, HF EF 35%, CKD3, PAD admitted with left 3rd/4th toe gangrene s/p amputation, debridement and LLE angiogram with KECIA stents x2 (5/6) Post-op course complicated by anuric renal failure secondary to likely contrast-induced nephropathy leading to flash pulmonary oedema requiring dialysis on 5/13 and 5/14, hypoxic respiratory failure requiring NIPPV, worsening septic shock requiring phenylephrine (now off), and Afib with RVR. On 5/15, pt's rate went to 170s s/p bedside cardioversion with return back to afib. On aquaphresis and being treated for sepsis.  With afib rates 90-110s.  LFts increasing and amiodarone stopped today.   He is on dobutamine which is likely causing rapid heart rates.  Rates reviewed over past 24 hours and range from 90-110s- which is acceptable in afib.  If rates go up please consider switching dobutamine to milrinone.  LFTs are increasing but given total lab trends and timing this is likely not from amiodarone - however given uptrend ok with stopping.  Will see how he does off amiodarone with goal heart rate <120.  Given current sepsis he is not a candidate for a transvenous device - the issue with a AV node ablation and a micra is his low EF.  THis would cause chronic RV pacing which would likely exacerbate his  heart failure.  We would like to avoid this and in the future if needed once infection clears can consider AV node ablation and BIV device - but currently he is not a candidate for this.   No repeat cardioversion given return to afib shortly after recent DCCV.   GIven current heart rate trends on tele for past 24 hours we will watch and see how he does off amiodarone.  Consider palliative consultation to help with establishing goal of care.  Will continue to follow - call [phone] with any questions

## 2020-05-21 NOTE — DISCHARGE NOTE FOR THE EXPIRED PATIENT - HOSPITAL COURSE
84M PMH of HTN, DM, Afib, HF EF 35%, CKD3, PAD admitted with left 3rd/4th toe gangrene s/p amputation, debridement and LLE angiogram with KECIA stents x2 (5/6) Post-op course complicated by anuric renal failure secondary to likely contrast-induced nephropathy leading to flash pulmonary oedema requiring dialysis on 5/13 and 5/14, hypoxic respiratory failure requiring NIPPV, worsening septic shock requiring phenylephrine, and Afib with RVR. On 5/21 patient was with worsening pulmonary edema and increasing oxygen requirements. Per patient wishes, did not want to be intubated or have compressions performed. Patient verbalized understanding of prognosis and wished to be comfortable and no further intervention. On physical exam, patient did not respond to verbal or noxious stimuli.  No spontaneous respirations.  Absent heart and breath sounds.  Absent radial and carotid pulses.   Pupils are fixed and dilated, no corneal reflex.  EKG rhythm strip shows asystole.   Patient pronounced dead at 12:10.  Attending notified.  Family declined autopsy.

## 2020-05-21 NOTE — PROGRESS NOTE ADULT - ATTENDING COMMENTS
HD today for clearance and UF as tolerates -- worsening pulm status despite UF is very concerning and suspect issue is not primarily volume-- ARDS?

## 2020-05-22 NOTE — PHYSICAL EXAM
Spoke with pt's [facility] CM Kleber CARBAJAL [phone]- He states Kelly Hammer Respiratory Effort] : normal respiratory effort [Normal Heart Sounds] : normal heart sounds [2+] : left 2+ [0] : left 0 [Alert] : alert [Oriented to Person] : oriented to person [Oriented to Place] : oriented to place [Oriented to Time] : oriented to time [Calm] : calm [JVD] : no jugular venous distention  [Normal Thyroid] : the thyroid was normal [Normal Breath Sounds] : Normal breath sounds [Ankle Swelling (On Exam)] : not present [Varicose Veins Of Lower Extremities] : not present [] : not present [Purpura] : no purpura  [Petechiae] : no petechiae [Skin Ulcer] : ulcer [Skin Induration] : no induration [de-identified] : appears stated age, NAD [de-identified] : NC/AT, anicteric [de-identified] : irregular heart [de-identified] : FROM throughout, strength 5/5x4 [de-identified] : L 3rd toe w/dry callus overlying the DIP joint, resolving lymphatic changes, no erythema/streaking in the L foot, toe non-tender

## 2020-05-27 DIAGNOSIS — I70.262 ATHEROSCLEROSIS OF NATIVE ARTERIES OF EXTREMITIES WITH GANGRENE, LEFT LEG: ICD-10-CM

## 2020-05-27 DIAGNOSIS — Z79.01 LONG TERM (CURRENT) USE OF ANTICOAGULANTS: ICD-10-CM

## 2020-05-27 DIAGNOSIS — D63.1 ANEMIA IN CHRONIC KIDNEY DISEASE: ICD-10-CM

## 2020-05-27 DIAGNOSIS — E11.52 TYPE 2 DIABETES MELLITUS WITH DIABETIC PERIPHERAL ANGIOPATHY WITH GANGRENE: ICD-10-CM

## 2020-05-27 DIAGNOSIS — T50.8X5A ADVERSE EFFECT OF DIAGNOSTIC AGENTS, INITIAL ENCOUNTER: ICD-10-CM

## 2020-05-27 DIAGNOSIS — I42.9 CARDIOMYOPATHY, UNSPECIFIED: ICD-10-CM

## 2020-05-27 DIAGNOSIS — I13.2 HYPERTENSIVE HEART AND CHRONIC KIDNEY DISEASE WITH HEART FAILURE AND WITH STAGE 5 CHRONIC KIDNEY DISEASE, OR END STAGE RENAL DISEASE: ICD-10-CM

## 2020-05-27 DIAGNOSIS — I97.88 OTHER INTRAOPERATIVE COMPLICATIONS OF THE CIRCULATORY SYSTEM, NOT ELSEWHERE CLASSIFIED: ICD-10-CM

## 2020-05-27 DIAGNOSIS — Z87.891 PERSONAL HISTORY OF NICOTINE DEPENDENCE: ICD-10-CM

## 2020-05-27 DIAGNOSIS — E87.5 HYPERKALEMIA: ICD-10-CM

## 2020-05-27 DIAGNOSIS — N17.0 ACUTE KIDNEY FAILURE WITH TUBULAR NECROSIS: ICD-10-CM

## 2020-05-27 DIAGNOSIS — J93.9 PNEUMOTHORAX, UNSPECIFIED: ICD-10-CM

## 2020-05-27 DIAGNOSIS — I48.0 PAROXYSMAL ATRIAL FIBRILLATION: ICD-10-CM

## 2020-05-27 DIAGNOSIS — A41.02 SEPSIS DUE TO METHICILLIN RESISTANT STAPHYLOCOCCUS AUREUS: ICD-10-CM

## 2020-05-27 DIAGNOSIS — E78.5 HYPERLIPIDEMIA, UNSPECIFIED: ICD-10-CM

## 2020-05-27 DIAGNOSIS — R33.9 RETENTION OF URINE, UNSPECIFIED: ICD-10-CM

## 2020-05-27 DIAGNOSIS — I27.20 PULMONARY HYPERTENSION, UNSPECIFIED: ICD-10-CM

## 2020-05-27 DIAGNOSIS — E87.2 ACIDOSIS: ICD-10-CM

## 2020-05-27 DIAGNOSIS — R65.21 SEVERE SEPSIS WITH SEPTIC SHOCK: ICD-10-CM

## 2020-05-27 DIAGNOSIS — I45.10 UNSPECIFIED RIGHT BUNDLE-BRANCH BLOCK: ICD-10-CM

## 2020-05-27 DIAGNOSIS — N18.6 END STAGE RENAL DISEASE: ICD-10-CM
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
## 2020-05-27 DIAGNOSIS — E11.649 TYPE 2 DIABETES MELLITUS WITH HYPOGLYCEMIA WITHOUT COMA: ICD-10-CM

## 2020-05-27 DIAGNOSIS — E11.69 TYPE 2 DIABETES MELLITUS WITH OTHER SPECIFIED COMPLICATION: ICD-10-CM

## 2020-05-27 DIAGNOSIS — I08.1 RHEUMATIC DISORDERS OF BOTH MITRAL AND TRICUSPID VALVES: ICD-10-CM

## 2020-05-27 DIAGNOSIS — M86.9 OSTEOMYELITIS, UNSPECIFIED: ICD-10-CM

## 2020-05-27 DIAGNOSIS — E11.22 TYPE 2 DIABETES MELLITUS WITH DIABETIC CHRONIC KIDNEY DISEASE: ICD-10-CM

## 2020-05-27 DIAGNOSIS — Z89.422 ACQUIRED ABSENCE OF OTHER LEFT TOE(S): ICD-10-CM
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
## 2020-05-27 DIAGNOSIS — J96.91 RESPIRATORY FAILURE, UNSPECIFIED WITH HYPOXIA: ICD-10-CM

## 2020-05-27 DIAGNOSIS — J18.9 PNEUMONIA, UNSPECIFIED ORGANISM: ICD-10-CM

## 2020-05-27 DIAGNOSIS — E87.1 HYPO-OSMOLALITY AND HYPONATREMIA: ICD-10-CM

## 2020-05-27 DIAGNOSIS — N14.1 NEPHROPATHY INDUCED BY OTHER DRUGS, MEDICAMENTS AND BIOLOGICAL SUBSTANCES: ICD-10-CM

## 2020-05-27 DIAGNOSIS — L03.032 CELLULITIS OF LEFT TOE: ICD-10-CM

## 2020-05-27 DIAGNOSIS — I50.22 CHRONIC SYSTOLIC (CONGESTIVE) HEART FAILURE: ICD-10-CM

## 2020-05-27 LAB — DIGITOXIN SERPL-MCNC: SIGNIFICANT CHANGE UP NG/ML

## 2021-06-01 NOTE — ED PROVIDER NOTE - TEMPLATE
Chief Complaint   Patient presents with     Dysuria     x1d,        HPI:  Jaime Masters is a 8 y.o. male with past medical history of speech and developmental delay who presents today complaining of pain with urination for the past 1 day. He has not had any fevers.  Previous urinary tract infection before.  Has never had any he has not had any accidents with the exception of he is playing and forgets to use the bathroom.  He is circumcised.  He reports that his penis hurts.  He has not had any abnormal urinary frequency, hematuria, penile swelling, rash, scrotal swelling, or testicle pain.    History obtained from the patient.    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    Social History     Tobacco Use     Smoking status: Never Smoker     Smokeless tobacco: Never Used     Tobacco comment: MOTHER SMOKES OUTIDE    Substance Use Topics     Alcohol use: Not on file       Review of Systems   Constitutional: Negative for chills and fever.   Genitourinary: Positive for dysuria and penile pain. Negative for frequency, hematuria, penile swelling, scrotal swelling and testicular pain.   Skin: Negative for color change.       Vitals:    19 1425   BP: 100/61   Patient Site: Right Arm   Patient Position: Sitting   Cuff Size: Child   Pulse: 94   Resp: 16   Temp: 97.6  F (36.4  C)   TempSrc: Oral   SpO2: 99%   Weight: 50 lb 1 oz (22.7 kg)       Physical Exam  Constitutional:       General: He is not in acute distress.     Appearance: He is well-developed. He is not diaphoretic.   HENT:      Head: Atraumatic.   Eyes:      Conjunctiva/sclera: Conjunctivae normal.   Cardiovascular:      Heart sounds: No murmur.   Pulmonary:      Effort: Pulmonary effort is normal. No respiratory distress.      Breath sounds: Normal air entry.    Genitourinary:     Penis: Circumcised. Tenderness (On the head of the penis) present. No erythema or swelling.       Scrotum/Testes: Normal.      Epididymis:      Right: Normal.      Left: Normal.      Giovany stage (genital): 1.   Neurological:      Mental Status: He is alert.       Labs:  Results for orders placed or performed in visit on 09/19/19   Urinalysis-UC if Indicated   Result Value Ref Range    Color, UA Yellow Colorless, Yellow, Straw, Light Yellow    Clarity, UA Clear Clear    Glucose, UA Negative Negative    Bilirubin, UA Negative Negative    Ketones, UA 15 mg/dL (!) Negative    Specific Gravity, UA 1.020 1.005 - 1.030    Blood, UA Negative Negative    pH, UA 6.0 5.0 - 8.0    Protein, UA Negative Negative mg/dL    Urobilinogen, UA 1.0 E.U./dL 0.2 E.U./dL, 1.0 E.U./dL    Nitrite, UA Negative Negative    Leukocytes, UA Negative Negative    Bacteria, UA None Seen None Seen hpf    RBC, UA None Seen None Seen, 0-2 hpf    WBC, UA None Seen None Seen, 0-5 hpf    Squam Epithel, UA 0-5 None Seen, 0-5 lpf         Clinical Decision Making:  There is mild tenderness of the head of the penis, but no notable rash or lesions.  Patient's UA is not indicative of your urinary tract infection.  Suspect that this may be mild irritation of the penile head.  Recommend low-dose hydrocortisone cream with aloe for soothing effect.  Instructed to follow-up with primary care if symptoms persist.  Parent is reassured that there are no significant signs of infection.  At the end of the encounter, I discussed results, diagnosis, medications. Discussed red flags for immediate return to clinic/ER, as well as indications for follow up if no improvement. Patient understood and agreed to plan. Patient was stable for discharge.    1. Dysuria  Urinalysis-UC if Indicated    hydrocortisone with aloe 1 % Crea cream         Patient Instructions   1. Begin applying Hydrocortisone cream to the head of the penis.   2. Follow up if no  Wounds improvement in symptoms over the next 4 days.

## 2021-06-15 NOTE — ED ADULT NURSE NOTE - [facility], MLM
Assessment/Plan:     1. Paroxysmal dyspnea  We discussed broad differential diagnosis of his symptoms.  EKG concerning for intermittent significant bradycardia, could also be suggestive of intermittent tachycardia in the setting of his atrial fibrillation.  Underlying COPD or lung disease would be a consideration as well, especially in light of chronic marijuana use.  Coronary artery disease also possibility though not suggested by EKG.  Symptoms do not sound typical of coronary artery disease, pulmonary embolism.  We discussed options.  Will obtain labs as noted.  Will obtain Holter monitor to further characterize rhythm.  Referrals placed to cardiology for further evaluation.  Further follow-up and recommendations pending results.  - Electrocardiogram Perform and Read  - XR Chest 2 Views  - Holter Monitor; Future  - Ambulatory referral to Cardiology  - [facility](CBC w/o Differential); Future  - Basic Metabolic Panel; Future  - Magnesium; Future  - Thyroid [place]; Future    2. Chronic atrial fibrillation  Will obtain Holter monitor to assess control of rate, unclear if this is related to his symptoms.  He remains on aspirin for stroke prevention due to frequent falls and dementia.  I feel this is appropriate.  - Holter Monitor; Future  - Ambulatory referral to Cardiology  - [facility](CBC w/o Differential); Future  - Basic Metabolic Panel; Future  - Magnesium; Future  - Thyroid Cascade; Future    3. Anxiety  Inadequate control.  We discussed options.  Will increase citalopram to 20 mg daily.  Discussed with him that I do not feel clonazepam would be appropriate in light of dementia, history of encephalopathy, history of benzodiazepines detected in urine tox screen, and ongoing marijuana use.  Patient and his significant other state understanding.  He will continue to see his psychologist.  Follow-up in 1-2 months to assess response.  - citalopram (CELEXA) 20 MG tablet; Take 1 tablet (20 mg total) by mouth daily.  Dispense:  90 tablet; Refill: 1  - [facility](CBC w/o Differential); Future  - Basic Metabolic Panel; Future  - Magnesium; Future  - Thyroid Cascade; Future    4. Depressive disorder, atypical  Doing okay overall, will monitor with citalopram adjustment as above  - [facility](CBC w/o Differential); Future  - Basic Metabolic Panel; Future  - Magnesium; Future  - Thyroid Cascade; Future    5. Alzheimer's dementia without behavioral disturbance, unspecified timing of dementia onset  Stable currently, but contributing to the above as noted.  - [facility](CBC w/o Differential); Future  - Basic Metabolic Panel; Future  - Magnesium; Future  - Thyroid Cascade; Future    6. Frequent falls  Referral is placed for physical therapy.  - Ambulatory referral to Adult PT- Internal  - [facility](CBC w/o Differential); Future  - Basic Metabolic Panel; Future  - Magnesium; Future  - Thyroid [place]; Future    7. Hearing loss  Ear exam unremarkable today.  Could consider audiology follow-up or second opinion from a new audiologist if desired.  They declined referral today.      Subjective:      Navdeep Spann is a 72 y.o. male presenting to clinic today along with his significant other Melva for multiple concerns.  He is seeing a psychologist regarding his Alzheimer's disease, had to switch to a new psychologist and is seen her for 3 visits.  She has noted significant anxiety, wondering about adjusting citalopram versus adding Klonopin.  Patient has a history of dementia and is also a regular marijuana user.  When I review his records, he was noted to have a positive urine drug tox screen for benzodiazepines and marijuana during hospitalization July 2017.  Dementia has been stable.  He feels that his depression has been stable.    Was told by the hearing aid technician that his ears were full of wax, hearing aids were working, he is interested in evaluation to see if cerumen can be removed.    Noting a new mole on his back that he like evaluated.    History of  recurrent falls in the past.  He has some weakness in his legs bilaterally and has difficulty getting back up.  He is limited motion of his left shoulder as well.  They are wondering about physical therapy to provide strengthening and improvement in balance.    Over the past 6 months or so he has had intermittent episodes where he is breathing heavy.  He has chronic dyspnea with exertion, that is unchanged.  Melva is noting that on a daily basis intermittently he will breathe heavy.  Occasionally will cough but patient denies any shortness of breath.  No chest pain or wheezing associated with it.  It resolves if she pointed out to him.  He denies any associated symptoms and has not noted this himself.  Denies any palpitations.  No lightheadedness or dizziness.  No edema.  No nocturnal or supine symptoms.  History of chronic atrial fibrillation, rate controlled with digoxin previously.  He is taking aspirin for stroke prevention due to history of frequent falls and dementia and risk of warfarin outweighing the benefit.  History of seeing Dr. King of cardiology, November 2017.    Current Outpatient Prescriptions   Medication Sig Dispense Refill     acetaminophen (TYLENOL) 500 MG tablet Take 500-1,000 mg by mouth every 6 (six) hours as needed.       ARGININE, L-ARGININE, ORAL Take 1 tablet by mouth 2 (two) times a day.       ascorbic acid (VITAMIN C) 500 MG tablet Take 1,000 mg by mouth daily.        aspirin 81 MG EC tablet Take 1 tablet (81 mg total) by mouth daily. 150 tablet 2     biotin 2,500 mcg cap Take 5,000 mcg by mouth daily.       CALCIUM CARB-MAG OXIDE-ZINC OX ORAL Take 1 tablet by mouth daily.       chlorthalidone (HYGROTEN) 25 MG tablet TAKE ONE TABLET BY MOUTH IN THE MORNING  30 tablet 11     cholecalciferol, vitamin D3, 5,000 unit Tab Take 5,000 Units by mouth daily.       citalopram (CELEXA) 20 MG tablet Take 1 tablet (20 mg total) by mouth daily. 90 tablet 1     CRANBERRY FRUIT EXTRACT (CRANBERRY  ORAL) Take 1 capsule by mouth daily.       cyanocobalamin, vitamin B-12, 2,500 mcg Subl Place 2,500 mcg under the tongue daily.       digoxin (LANOXIN) 125 mcg tablet TAKE ONE TABLET BY MOUTH IN THE MORNING  90 tablet 1     donepezil (ARICEPT) 10 MG tablet Take 10 mg by mouth bedtime.       ferrous sulfate 325 (65 FE) MG tablet Take 1 tablet by mouth 3 (three) times a day with meals. 270 tablet 3     LACTOBACILLUS ACIDOPHILUS (PROBIOTIC ACIDOPHILUS ORAL) Take 1 capsule by mouth daily.        OMEGA-3S/DHA/EPA/FISH OIL (OMEGA 3 ORAL) Take 1,000 mg by mouth 2 (two) times a day.        potassium chloride SA (KLOR-CON M20) 20 MEQ tablet Take 1 tablet (20 mEq total) by mouth 3 (three) times a day. 270 tablet 2     SAW PALMETTO ORAL Take 450 mg by mouth daily.       trimethoprim (TRIMPEX) 100 mg tablet Take 100 mg by mouth once daily.       VIT B1 MN/B2/B3/B5/B6/B12/C/FA (B COMPLEX W-VIT C ORAL) Take 1 tablet by mouth daily.        vitamin E 400 UNIT capsule Take by mouth see administration instructions. 1200 mg in the morning and 800 mg in the evening.       digoxin (LANOXIN) 125 mcg tablet Take 125 mcg by mouth every morning.       No current facility-administered medications for this visit.        Past Medical History, Family History, and Social History reviewed.  Past Medical History:   Diagnosis Date     Anemia      Anxiety      Aortic valve disorder      Arthritis      Atrial fibrillation      BPH (benign prostatic hypertrophy) with urinary retention      Carotid stenosis      Depression      Dyslipidemia      [place] (hard of hearing)      Hypertension      Lymphedema      Syncope and collapse 2009, 2010    r/t urinary obstruction     Past Surgical History:   Procedure Laterality Date     APPENDECTOMY       COLON SURGERY       JOINT REPLACEMENT       [facility]  10/23/2014          [place] APPENDECTOMY      Description: Appendectomy;  Recorded: 05/21/2008;  Comments: at 16 years old     [place] ARTHROPLASTY TIBIAL PLATEAU       "Description: Knee Replacement;  Recorded: 05/21/2008;  Comments: right knee 8/1999; Left knee 5/2002     [place] COLOSTOMY      Description: Colostomy;  Recorded: 07/22/2014;     RI LAP, SURG CLOSE ENTEROSTOMY RESECT ANAST N/A 2/11/2015    Procedure: LAPAROSCOPIC ASSISTED COLOSTOMY TAKEDOWN;  Surgeon: Jed Ritchie MD;  Location: [facility];  Service: General     RI LAP,SURG,COLECTOMY, PARTIAL, W/ANAST N/A 7/15/2014    Procedure: Exploratory Laparotomy, Sigmoid Colectomy,  Colostomy (Fernandez's);  Surgeon: Jed Ritchie MD;  Location: [facility];  Service: General     RI TRANSURETHRAL ELEC-SURG PROSTATECTOM  5/23/2008    Description: Transurethral Resection Of Prostate (TURP);  Proc Date: 05/23/2008;     Review of patient's allergies indicates no known allergies.  Family History   Problem Relation Age of Onset     Cancer Mother      Social History     Social History     Marital status:      Spouse name: N/A     Number of children: N/A     Years of education: N/A     Occupational History     Not on file.     Social History Main Topics     Smoking status: Former Smoker     Smokeless tobacco: Never Used     Alcohol use No      Comment: Past history- quit June 1st, 1990     Drug use: Yes     Special: Marijuana      Comment: none since 3/2014     Sexual activity: Not on file     Other Topics Concern     Not on file     Social History Narrative         Review of systems is as stated in HPI, and the remainder of the 10 system review is otherwise negative.    Objective:     Vitals:    01/30/18 1438   BP: 122/70   Patient Site: Right Arm   Patient Position: Sitting   Cuff Size: Adult Regular   Pulse: 62   Resp: 16   SpO2: 98%   Weight: 177 lb 11.2 oz (80.6 kg)   Height: 5' 11\" (1.803 m)    Body mass index is 24.78 kg/(m^2).    Alert male.  Answers questions appropriately and in context.  Information provided by patient is verified by significant other.  Pupils are equal, round, and reactive to light.  " Tympanic membranes pearly and translucent without cerumen impaction.  Oropharynx clear.  Neck supple without lymphadenopathy or thyromegaly.  Heart with irregularly irregular rhythm, no murmurs.  Heart rate within normal range, approximately 60 bpm, on exam.  Lungs are clear and well aerated, no wheezes or rhonchi.  Extremities without edema.    I ordered and personally reviewed a 2 view chest x-ray which is within normal limits.    I ordered personally reviewed a 12-lead EKG which reveals atrial fibrillation.  There are 2 significant pauses in the EKG.  Heart rate 59 bpm Toradol on the EKG, taking into account these pauses.  Normal QT interval which had been prolonged in the past.  No ischemic changes.      This note has been dictated using voice recognition software. Any grammatical or context distortions are unintentional and inherent to the the software.    Patent

## 2023-04-27 NOTE — PROGRESS NOTE ADULT - SUBJECTIVE AND OBJECTIVE BOX
EPS Progress Note  CC: necrotic toe     S: Remains in AFIB after cardioversion 3 days ago. He is not aware of palpitations.   States he is not feeling any better or worse. He asks if perhaps he can come off all these meds and maybe he could still feel ok like he was before.     O: T(C): 36.1 (05-18-20 @ 14:00), Max: 37.1 (05-17-20 @ 17:00)  HR: 119 (05-18-20 @ 15:00) (81 - 136)  BP: 112/79 (05-18-20 @ 15:00) (94/81 - 136/87)  RR: 22 (05-18-20 @ 15:00) (18 - 24)  SpO2: 95% (05-18-20 @ 15:00) (93% - 100%)    TELE: AFIB -120s     PHYSICAL  Constitutional:  NAD        Neck: No JVD  Pulm:  + rales.   Cardiac:   + s1/s2, irregular, tachy  GI:  +BS , soft ND/NT  Vascular: No LE edema, pulse 2+  Neuro: AAO x 3. no focal deficit  Skin: Warm.    LABS:                        7.2    10.98 )-----------( 245      ( 18 May 2020 05:50 )             22.2     05-18    139  |  100  |  43<H>  ----------------------------<  107<H>  3.6   |  26  |  2.78<H>    Ca    8.1<L>      18 May 2020 05:50  Phos  3.4     05-18  Mg     1.9     05-18    TPro  5.6<L>  /  Alb  2.5<L>  /  TBili  0.9  /  DBili  x   /  AST  240<H>  /  ALT  161<H>  /  AlkPhos  108  05-18    PT/INR - ( 18 May 2020 05:50 )   PT: 23.1 sec;   INR: 1.98          PTT - ( 18 May 2020 05:50 )  PTT:cancelled cancelled, specimen was drawn at 4 am, stability of the specimen is 4 hrs.  notified ROCKY Bauman RN  05/18/20 15:06  RR  The recommended therapeutic heparin range (full dose) is 58-99 seconds.  Recommended therapeutic Argatroban range is 1.5to 3.0 times the baseline  APTT value, not to exceed 100 seconds. Recommended therapeutic Refludan  range is 1.5 to 2.5 times the baseline APTT. sec    MEDICATIONS:  acetaminophen   Tablet .. 650 milliGRAM(s) Oral every 6 hours PRN  alteplase for catheter clearance 2 milliGRAM(s) Catheter once  aMIOdarone Infusion 0.5 mG/Min IV Continuous <Continuous>  apixaban 2.5 milliGRAM(s) Oral every 12 hours  atorvastatin 40 milliGRAM(s) Oral at bedtime  chlorhexidine 2% Cloths 1 Application(s) Topical <User Schedule>  clopidogrel Tablet 75 milliGRAM(s) Oral daily  collagenase Ointment 1 Application(s) Topical daily  DAPTOmycin IVPB 600 milliGRAM(s) IV Intermittent every 48 hours  digoxin     Tablet 0.0625 milliGRAM(s) Oral every other day  furosemide   Injectable 80 milliGRAM(s) IV Push every 12 hours  glucagon  Injectable 1 milliGRAM(s) IntraMuscular once PRN  guaiFENesin   Syrup  (Sugar-Free) 100 milliGRAM(s) Oral once PRN  heparin  Infusion 700 Unit(s)/Hr IV Continuous <Continuous>  insulin lispro (HumaLOG) corrective regimen sliding scale   SubCutaneous Before meals and at bedtime  meropenem  IVPB 500 milliGRAM(s) IV Intermittent every 24 hours  midodrine 10 milliGRAM(s) Oral every 8 hours  multivitamin 1 Tablet(s) Oral daily  oxycodone    5 mG/acetaminophen 325 mG 1 Tablet(s) Oral every 6 hours PRN  senna 2 Tablet(s) Oral at bedtime  sodium bicarbonate 1300 milliGRAM(s) Oral three times a day  sodium chloride 0.9% lock flush 10 milliLiter(s) IV Push every 1 hour PRN 27-Apr-2023 17:09

## 2023-10-31 NOTE — H&P ADULT - BIRTH SEX
While in rehab to do Physical therapy daily to help strengthen the lower extremity bilaterally.     Male

## 2024-10-18 NOTE — ED PROVIDER NOTE - NS ED ATTENDING STATEMENT MOD
[Time Spent: ___ minutes] : I have spent [unfilled] minutes of time on the encounter which excludes teaching and separately reported services.
I have personally performed a face to face diagnostic evaluation on this patient. I have reviewed the ACP note and agree with the history, exam and plan of care, except as noted.

## 2025-05-16 NOTE — ED ADULT NURSE NOTE - SKIN CAPILLARY REFILL
Per MD, patient is medically cleared for discharge home today.  CM met with patient to discuss discharge disposition to home. No discharge needs identified, patient is independent with scheduling all necessary follow up MD appointments. Discharge notice signed and copy given to patient.  Patient will transport himself home. Patient verbalized understanding of the transition plan and is in agreement. CM answered all questions to the best of my abilities. CM remains available throughout hospital stay.  
2 seconds or less